# Patient Record
Sex: FEMALE | Race: WHITE | NOT HISPANIC OR LATINO | Employment: OTHER | URBAN - METROPOLITAN AREA
[De-identification: names, ages, dates, MRNs, and addresses within clinical notes are randomized per-mention and may not be internally consistent; named-entity substitution may affect disease eponyms.]

---

## 2017-04-17 ENCOUNTER — RADIANT APPOINTMENT (OUTPATIENT)
Dept: GENERAL RADIOLOGY | Facility: CLINIC | Age: 67
End: 2017-04-17
Attending: PHYSICIAN ASSISTANT
Payer: COMMERCIAL

## 2017-04-17 ENCOUNTER — OFFICE VISIT (OUTPATIENT)
Dept: FAMILY MEDICINE | Facility: CLINIC | Age: 67
End: 2017-04-17
Payer: COMMERCIAL

## 2017-04-17 VITALS
BODY MASS INDEX: 32.8 KG/M2 | TEMPERATURE: 101.1 F | HEART RATE: 117 BPM | WEIGHT: 209 LBS | HEIGHT: 67 IN | DIASTOLIC BLOOD PRESSURE: 78 MMHG | SYSTOLIC BLOOD PRESSURE: 132 MMHG | OXYGEN SATURATION: 97 %

## 2017-04-17 DIAGNOSIS — J01.90 ACUTE SINUSITIS WITH SYMPTOMS > 10 DAYS: ICD-10-CM

## 2017-04-17 DIAGNOSIS — R50.9 FEVER, UNSPECIFIED: ICD-10-CM

## 2017-04-17 DIAGNOSIS — Z82.49 FAMILY HISTORY OF ABDOMINAL AORTIC ANEURYSM: ICD-10-CM

## 2017-04-17 DIAGNOSIS — J20.9 ACUTE BRONCHITIS WITH SYMPTOMS GREATER THAN 10 DAYS: Primary | ICD-10-CM

## 2017-04-17 PROCEDURE — 99213 OFFICE O/P EST LOW 20 MIN: CPT | Performed by: PHYSICIAN ASSISTANT

## 2017-04-17 PROCEDURE — 71020 XR CHEST 2 VW: CPT

## 2017-04-17 RX ORDER — DOXYCYCLINE 100 MG/1
100 CAPSULE ORAL 2 TIMES DAILY
Qty: 20 CAPSULE | Refills: 0 | Status: SHIPPED | OUTPATIENT
Start: 2017-04-17 | End: 2017-05-15

## 2017-04-17 RX ORDER — ALBUTEROL SULFATE 90 UG/1
2 AEROSOL, METERED RESPIRATORY (INHALATION) EVERY 4 HOURS PRN
Qty: 1 INHALER | Refills: 1 | Status: SHIPPED | OUTPATIENT
Start: 2017-04-17 | End: 2018-01-25

## 2017-04-17 RX ORDER — CODEINE PHOSPHATE AND GUAIFENESIN 10; 100 MG/5ML; MG/5ML
1 SOLUTION ORAL EVERY 4 HOURS PRN
Qty: 240 ML | Refills: 0 | Status: SHIPPED | OUTPATIENT
Start: 2017-04-17 | End: 2017-05-15

## 2017-04-17 NOTE — PROGRESS NOTES
SUBJECTIVE:                                                    Tri Ingram is a 67 year old female who presents to clinic today for the following health issues:      ENT Symptoms             Symptoms: cc Present Absent Comment   Fever/Chills  x  Chills    Fatigue  x     Muscle Aches  x  Body aches    Eye Irritation  x  Pain and pressure    Sneezing  x     Nasal Deshawn/Drg  x  Runny nose, burning sensation    Sinus Pressure/Pain  x     Loss of smell  x     Dental pain  x  Pain and pressure    Sore Throat  x  Dry and drainage    Swollen Glands   x    Ear Pain/Fullness  x  Both, pressure and itching   Cough  x     Wheeze  x     Chest Pain   x    Shortness of breath  x     Rash   x    Other  x  Lost voice, loss of appetite, headaches, dizziness with coughing so hard      Symptom duration:  3 weeks    Symptom severity:  moderate    Treatments tried:  Nebulizer's, tylenol, ibuprofen, vitamin c    Contacts:  Her  had a cold      Started 3 weeks ago with cough/cold - her  was sick so she assumed she got what he had  Felt like she would have good days followed by bad days  Yesterday - says things took a turn for the worse  Cough worsened, she developed fevers and felt more pain and pressure in her cheeks/teeth and forehead    Has asthma - flares when she gets sick  Has been using her qvar two times daily and the albuterol prn but hasn't noticed much improvement        Problem list and histories reviewed & adjusted, as indicated.  Additional history: as documented    Current Outpatient Prescriptions   Medication Sig Dispense Refill     meclizine (ANTIVERT) 25 MG tablet Take 1 tablet (25 mg) by mouth every 6 hours as needed for dizziness 30 tablet 1     MAGNESIUM PO        lovastatin (MEVACOR) 40 MG tablet Take 1 tablet (40 mg) by mouth At Bedtime 90 tablet 3     Lovastatin 40 MG TB24 Take 1 tablet by mouth daily at bedtime. 90 tablet 3     estrogen conj-medroxyPROGESTERone (PREMPRO) 0.45-1.5 MG per  "tablet Take one three times weekly or as needed to control hot flashes/menopausal symptoms. 40 tablet 3     albuterol (PROAIR HFA, PROVENTIL HFA, VENTOLIN HFA) 108 (90 BASE) MCG/ACT inhaler Inhale 2 puffs into the lungs every 4 hours as needed for shortness of breath / dyspnea or wheezing 1 Inhaler 1     scopolamine (TRANSDERM) (1.5mg base/3day) patch Place 1 patch onto the skin every 72 hours.  Apply to hairless area behind one ear at least 4 hours before travel.  Remove old patch and change every 3 days. 2 patch 0     ibuprofen 200 MG capsule Take 1,000 mg by mouth as needed for fever (does not use every day.)       Multiple Vitamins-Minerals (ICAPS) CAPS Take 1 capsule by mouth daily       glucosamine-chondroitin 500-400 MG CAPS Take 3 capsules by mouth daily. Takes 2 in am and 1 in pm  0     acetaminophen (ARTHRITIS PAIN RELIEF) 650 MG CR tablet Take 2 tablets by mouth daily. Takes in pm 100 tablet 11     calcium carb 1250 mg, 500 mg [place] IRA,/vitamin D 200 units (OSCAL WITH D) 500-200 MG-UNIT per tablet Take 1 tablet by mouth daily.       ASPIRIN 81 MG OR TABS ONE DAILY 100 3     Allergies   Allergen Reactions     Adhesive Tape      Iodine Anaphylaxis     contrast dye     Penicillins Hives       Reviewed and updated as needed this visit by clinical staff       Reviewed and updated as needed this visit by Provider         ROS:  Remainder of ROS obtained and found to be negative other than that which was documented above      OBJECTIVE:                                                    /78  Pulse 117  Temp 101.1  F (38.4  C) (Tympanic)  Ht 5' 6.5\" (1.689 m)  Wt 209 lb (94.8 kg)  SpO2 97%  BMI 33.23 kg/m2  Body mass index is 33.23 kg/(m^2).  GENERAL: healthy, alert and no distress  EYES: Eyes grossly normal to inspection, PERRL and conjunctivae and sclerae normal  HENT: ear canals and TM's normal, nose and mouth without ulcers or lesions  NECK: no adenopathy  RESP: lungs with decreased breath sounds " throughout due to coughing fits  CV: regular rates and rhythm, normal S1 S2, no S3 or S4, no murmur, click or rub and no peripheral edema    Diagnostic Test Results:  CXR - no focal area of consolidation or infiltrate noted on exam pending final review by radiology     ASSESSMENT/PLAN:                                                    (J20.9) Acute bronchitis with symptoms greater than 10 days  (primary encounter diagnosis)  Comment:   Plan: albuterol (PROAIR HFA/PROVENTIL HFA/VENTOLIN         HFA) 108 (90 BASE) MCG/ACT Inhaler,         guaiFENesin-codeine (ROBITUSSIN AC) 100-10         MG/5ML SOLN solution            (J01.90) Acute sinusitis with symptoms > 10 days  Comment:   Plan: doxycycline (VIBRAMYCIN) 100 MG capsule            (R50.9) Fever, unspecified  Comment: given unable to get good listen to lungs due to coughing fits, cxr obtained   Plan: XR Chest 2 Views            (Z82.49) Family history of abdominal aortic aneurysm  Comment: brother with AAA. Possibly in dad as well but she isn't sure. Past history of smoking (quit 8 years ago) Recommended 1 time screening   Plan: US Abdomen Complete                  Charlene Cain PA-C  [facility]

## 2017-04-17 NOTE — NURSING NOTE
"Chief Complaint   Patient presents with     Sinus Problem       Initial BP (!) 161/100 (BP Location: Right arm, Patient Position: Chair, Cuff Size: Adult Large)  Pulse 117  Temp 101.1  F (38.4  C) (Tympanic)  Ht 5' 6.5\" (1.689 m)  Wt 209 lb (94.8 kg)  SpO2 97%  BMI 33.23 kg/m2 Estimated body mass index is 33.23 kg/(m^2) as calculated from the following:    Height as of this encounter: 5' 6.5\" (1.689 m).    Weight as of this encounter: 209 lb (94.8 kg).  Medication Reconciliation: complete     Linwood Obrien CMA    "

## 2017-04-17 NOTE — MR AVS SNAPSHOT
After Visit Summary   4/17/2017    Tri Ingram    MRN: [id number]           Patient Information     Date Of Birth          1950        Visit Information        Provider Department      4/17/2017 6:00 PM Charlene Cain PA-C [facility]        Today's Diagnoses     Fever, unspecified    -  1    Family history of abdominal aortic aneurysm        Acute sinusitis with symptoms > 10 days        Acute bronchitis with symptoms greater than 10 days        Persistent cough for 3 weeks or longer        Chest tightness           Follow-ups after your visit        Your next 10 appointments already scheduled     Apr 20, 2017  1:15 PM CDT   MA SCREENING DIGITAL BILATERAL with [id number]   [facility] ([facility])    [address] [place] [address], [address]  [facility] [address] [phone]           Do not use any powder, lotion or deodorant under your arms or on your breast. If you do, we will ask you to remove it before your exam.  Wear comfortable, two-piece clothing.  If you have any allergies, tell your care team.  Bring any previous mammograms from other facilities or have them mailed to the breast center.              Future tests that were ordered for you today     Open Future Orders        Priority Expected Expires Ordered    US Abdomen Complete Routine 7/16/2017 4/17/2018 4/17/2017    MA Screening Digital Bilateral Routine  4/17/2018 4/17/2017            Who to contact     Normal or non-critical lab and imaging results will be communicated to you by MyChart, letter or phone within 4 business days after the clinic has received the results. If you do not hear from us within 7 days, please contact the clinic through MyChart or phone. If you have a critical or abnormal lab result, we will notify you by phone as soon as possible.  Submit refill requests through [facility] or call your pharmacy and they will forward the refill request to us. Please allow 3 business  "days for your refill to be completed.          If you need to speak with a  for additional information , please call: [phone]             Additional Information About Your Visit        [facility] Information     [facility] lets you send messages to your doctor, view your test results, renew your prescriptions, schedule appointments and more. To sign up, go to [email or web address] . Click on \"Log in\" on the left side of the screen, which will take you to the Welcome page. Then click on \"Sign up Now\" on the right side of the page.     You will be asked to enter the access code listed below, as well as some personal information. Please follow the directions to create your username and password.     Your access code is: [email or web address]  Expires: 2017  6:14 PM     Your access code will  in 90 days. If you need help or a new code, please call your [facility] clinic or [phone].        Care EveryWhere ID     This is your Care EveryWhere ID. This could be used by other organizations to access your [facility] medical records  [id number]        Your Vitals Were     Pulse Temperature Height Pulse Oximetry BMI (Body Mass Index)       117 101.1  F (38.4  C) (Tympanic) 5' 6.5\" (1.689 m) 97% 33.23 kg/m2        Blood Pressure from Last 3 Encounters:   17 132/78   16 107/71   16 133/90    Weight from Last 3 Encounters:   17 209 lb (94.8 kg)   16 205 lb (93 kg)   16 209 lb 9.6 oz (95.1 kg)                 Today's Medication Changes          These changes are accurate as of: 17  6:15 PM.  If you have any questions, ask your nurse or doctor.               Start taking these medicines.        Dose/Directions    doxycycline 100 MG capsule   Commonly known as:  VIBRAMYCIN   Used for:  Acute sinusitis with symptoms > 10 days   Started by:  Charlene Cain PA-C        Dose:  100 mg   Take 1 capsule (100 mg) by mouth 2 times daily   Quantity:  20 capsule "   Refills:  0       guaiFENesin-codeine 100-10 MG/5ML Soln solution   Commonly known as:  ROBITUSSIN AC   Used for:  Persistent cough for 3 weeks or longer   Started by:  Charlene Cain PA-C        Dose:  1 tsp.   Take 5 mLs by mouth every 4 hours as needed for cough   Quantity:  240 mL   Refills:  0            Where to get your medicines      These medications were sent to [place], [place] - [address] [facility]  [address] [facility] [address]     Phone:  [phone]     doxycycline 100 MG capsule         These medications were sent to [facility] [address] [facility]  [address] [facility] [address]     Phone:  [phone]     albuterol 108 (90 BASE) MCG/ACT Inhaler         Some of these will need a paper prescription and others can be bought over the counter.  Ask your nurse if you have questions.     Bring a paper prescription for each of these medications     guaiFENesin-codeine 100-10 MG/5ML Soln solution                Primary Care Provider Office Phone # Fax #    Preethi Itzel Jesus [phone]       [facility] [address] [address] [facility] [address]        Thank you!     Thank you for choosing [facility]  for your care. Our goal is always to provide you with excellent care. Hearing back from our patients is one way we can continue to improve our services. Please take a few minutes to complete the written survey that you may receive in the mail after your visit with us. Thank you!             Your Updated Medication List - Protect others around you: Learn how to safely use, store and throw away your medicines at www.disposemymeds.org.          This list is accurate as of: 4/17/17  6:15 PM.  Always use your most recent med list.                   Brand Name Dispense Instructions for use    albuterol 108 (90 BASE) MCG/ACT Inhaler    PROAIR HFA/PROVENTIL HFA/VENTOLIN HFA    1 Inhaler     Inhale 2 puffs into the lungs every 4 hours as needed for shortness of breath / dyspnea or wheezing       ARTHRITIS PAIN RELIEF 650 MG CR tablet   Generic drug:  acetaminophen     100 tablet    Take 2 tablets by mouth daily. Takes in pm       aspirin 81 MG tablet     100    ONE DAILY       calcium carb 1250 mg (500 mg [place])/vitamin D 200 units 500-200 MG-UNIT per tablet    OSCAL with D     Take 1 tablet by mouth daily.       doxycycline 100 MG capsule    VIBRAMYCIN    20 capsule    Take 1 capsule (100 mg) by mouth 2 times daily       estrogen conj-medroxyPROGESTERone 0.45-1.5 MG per tablet    PREMPRO    40 tablet    Take one three times weekly or as needed to control hot flashes/menopausal symptoms.       glucosamine-chondroitin 500-400 MG Caps per capsule      Take 3 capsules by mouth daily. Takes 2 in am and 1 in pm       guaiFENesin-codeine 100-10 MG/5ML Soln solution    ROBITUSSIN AC    240 mL    Take 5 mLs by mouth every 4 hours as needed for cough       ibuprofen 200 MG capsule      Take 1,000 mg by mouth as needed for fever (does not use every day.)       ICAPS Caps      Take 1 capsule by mouth daily       * Lovastatin 40 MG Tb24     90 tablet    Take 1 tablet by mouth daily at bedtime.       * lovastatin 40 MG tablet    MEVACOR    90 tablet    Take 1 tablet (40 mg) by mouth At Bedtime       MAGNESIUM PO          meclizine 25 MG tablet    ANTIVERT    30 tablet    Take 1 tablet (25 mg) by mouth every 6 hours as needed for dizziness       scopolamine 72 hr patch    TRANSDERM    2 patch    Place 1 patch onto the skin every 72 hours.  Apply to hairless area behind one ear at least 4 hours before travel.  Remove old patch and change every 3 days.       * Notice:  This list has 2 medication(s) that are the same as other medications prescribed for you. Read the directions carefully, and ask your doctor or other care provider to review them with you.

## 2017-05-02 ENCOUNTER — RADIANT APPOINTMENT (OUTPATIENT)
Dept: MAMMOGRAPHY | Facility: CLINIC | Age: 67
End: 2017-05-02
Attending: FAMILY MEDICINE
Payer: COMMERCIAL

## 2017-05-02 DIAGNOSIS — Z12.31 VISIT FOR SCREENING MAMMOGRAM: ICD-10-CM

## 2017-05-02 PROCEDURE — G0202 SCR MAMMO BI INCL CAD: HCPCS

## 2017-05-15 ENCOUNTER — OFFICE VISIT (OUTPATIENT)
Dept: FAMILY MEDICINE | Facility: CLINIC | Age: 67
End: 2017-05-15
Payer: COMMERCIAL

## 2017-05-15 VITALS
HEART RATE: 62 BPM | HEIGHT: 66 IN | WEIGHT: 210 LBS | DIASTOLIC BLOOD PRESSURE: 75 MMHG | TEMPERATURE: 98.1 F | BODY MASS INDEX: 33.75 KG/M2 | SYSTOLIC BLOOD PRESSURE: 124 MMHG

## 2017-05-15 DIAGNOSIS — G47.33 OBSTRUCTIVE SLEEP APNEA SYNDROME: ICD-10-CM

## 2017-05-15 DIAGNOSIS — Z00.00 ENCOUNTER FOR ROUTINE ADULT HEALTH EXAMINATION WITHOUT ABNORMAL FINDINGS: Primary | ICD-10-CM

## 2017-05-15 DIAGNOSIS — Z23 NEED FOR PROPHYLACTIC VACCINATION AGAINST STREPTOCOCCUS PNEUMONIAE (PNEUMOCOCCUS): ICD-10-CM

## 2017-05-15 DIAGNOSIS — Z12.11 SCREEN FOR COLON CANCER: ICD-10-CM

## 2017-05-15 DIAGNOSIS — N95.1 SYMPTOMATIC MENOPAUSAL OR FEMALE CLIMACTERIC STATES: ICD-10-CM

## 2017-05-15 DIAGNOSIS — Z13.6 CARDIOVASCULAR SCREENING; LDL GOAL LESS THAN 160: ICD-10-CM

## 2017-05-15 DIAGNOSIS — Z78.0 ASYMPTOMATIC POSTMENOPAUSAL STATUS: ICD-10-CM

## 2017-05-15 DIAGNOSIS — Z11.59 NEED FOR HEPATITIS C SCREENING TEST: ICD-10-CM

## 2017-05-15 PROCEDURE — 90471 IMMUNIZATION ADMIN: CPT | Performed by: PHYSICIAN ASSISTANT

## 2017-05-15 PROCEDURE — 90670 PCV13 VACCINE IM: CPT | Performed by: PHYSICIAN ASSISTANT

## 2017-05-15 PROCEDURE — 99397 PER PM REEVAL EST PAT 65+ YR: CPT | Mod: 25 | Performed by: PHYSICIAN ASSISTANT

## 2017-05-15 NOTE — MR AVS SNAPSHOT
After Visit Summary   5/15/2017    Tri Ingram    MRN: [id number]           Patient Information     Date Of Birth          1950        Visit Information        Provider Department      5/15/2017 5:40 PM Charlene aCin PA-C [facility]        Today's Diagnoses     Obstructive sleep apnea syndrome    -  1    SYMPTOMATIC FEMALE CLIMACTERIC STATE        Screen for colon cancer        Asymptomatic postmenopausal status        Need for hepatitis C screening test        Need for prophylactic vaccination against Streptococcus pneumoniae (pneumococcus)        CARDIOVASCULAR SCREENING; LDL GOAL LESS THAN 160          Care Instructions      Preventive Health Recommendations  Female Ages 65 +    Yearly exam:     See your health care provider every year in order to  o Review health changes.   o Discuss preventive care.    o Review your medicines if your doctor has prescribed any.      You no longer need a yearly Pap test unless you've had an abnormal Pap test in the past 10 years. If you have vaginal symptoms, such as bleeding or discharge, be sure to talk with your provider about a Pap test.      Every 1 to 2 years, have a mammogram.  If you are over 69, talk with your health care provider about whether or not you want to continue having screening mammograms.      Every 10 years, have a colonoscopy. Or, have a yearly FIT test (stool test). These exams will check for colon cancer.       Have a cholesterol test every 5 years, or more often if your doctor advises it.       Have a diabetes test (fasting glucose) every three years. If you are at risk for diabetes, you should have this test more often.       At age 65, have a bone density scan (DEXA) to check for osteoporosis (brittle bone disease).    Shots:    Get a flu shot each year.    Get a tetanus shot every 10 years.    Talk to your doctor about your pneumonia vaccines. There are now two you should receive - Pneumovax (PPSV  23) and Prevnar (PCV 13).    Talk to your doctor about the shingles vaccine.    Talk to your doctor about the hepatitis B vaccine.    Nutrition:     Eat at least 5 servings of fruits and vegetables each day.      Eat whole-grain bread, whole-wheat pasta and brown rice instead of white grains and rice.      Talk to your provider about Calcium and Vitamin D.     Lifestyle    Exercise at least 150 minutes a week (30 minutes a day, 5 days a week). This will help you control your weight and prevent disease.      Limit alcohol to one drink per day.      No smoking.       Wear sunscreen to prevent skin cancer.       See your dentist twice a year for an exam and cleaning.      See your eye doctor every 1 to 2 years to screen for conditions such as glaucoma, macular degeneration, cataracts, etc         Follow-ups after your visit        Additional Services     GASTROENTEROLOGY ADULT REF PROCEDURE ONLY       Last Lab Result: Creatinine (mg/dL)       Date                     Value                 11/28/2016               0.93             ----------  Body mass index is 33.89 kg/(m^2).     Needed:  No  Language:  English    Patient will be contacted to schedule procedure.     Please be aware that coverage of these services is subject to the terms and limitations of your health insurance plan.  Call member services at your health plan with any benefit or coverage questions.  Any procedures must be performed at a [facility] facility OR coordinated by your clinic's referral office.    Please bring the following with you to your appointment:    (1) Any X-Rays, CTs or MRIs which have been performed.  Contact the facility where they were done to arrange for  prior to your scheduled appointment.    (2) List of current medications   (3) This referral request   (4) Any documents/labs given to you for this referral            SLEEP EVALUATION & MANAGEMENT REFERRAL - ADULT       Please be aware that coverage of these  "services is subject to the terms and limitations of your health insurance plan.  Call member services at your health plan with any benefit or coverage questions.      Please bring the following to your appointment:    >>   List of current medications   >>   This referral request   >>   Any documents/labs given to you for this referral    [facility]   [phone] (Age 15 and up)                  Future tests that were ordered for you today     Open Future Orders        Priority Expected Expires Ordered    DEXA HIP/PELVIS/SPINE - Future Routine  5/15/2018 5/15/2017    Hepatitis C Screen Reflex to HCV RNA Quant and Genotype Routine  5/15/2018 5/15/2017    SLEEP EVALUATION & MANAGEMENT REFERRAL - ADULT Routine  5/15/2018 5/15/2017    Lipid Profile with reflex to direct LDL Routine  5/15/2018 5/15/2017    Glucose Routine  5/15/2018 5/15/2017            Who to contact     Normal or non-critical lab and imaging results will be communicated to you by [employer], letter or phone within 4 business days after the clinic has received the results. If you do not hear from us within 7 days, please contact the clinic through [employer] or phone. If you have a critical or abnormal lab result, we will notify you by phone as soon as possible.  Submit refill requests through [employer] or call your pharmacy and they will forward the refill request to us. Please allow 3 business days for your refill to be completed.          If you need to speak with a  for additional information , please call: [phone]             Additional Information About Your Visit        [facility] Information     [employer] lets you send messages to your doctor, view your test results, renew your prescriptions, schedule appointments and more. To sign up, go to [email or web address] . Click on \"Log in\" on the left side of the screen, which will take you to the Welcome page. Then click on \"Sign up Now\" on the right side of " "the page.     You will be asked to enter the access code listed below, as well as some personal information. Please follow the directions to create your username and password.     Your access code is: [email or web address]  Expires: 2017  6:14 PM     Your access code will  in 90 days. If you need help or a new code, please call your [facility] clinic or [phone].        Care EveryWhere ID     This is your Care EveryWhere ID. This could be used by other organizations to access your [facility] medical records  [id number]        Your Vitals Were     Pulse Temperature Height BMI (Body Mass Index)          62 98.1  F (36.7  C) (Tympanic) 5' 6\" (1.676 m) 33.89 kg/m2         Blood Pressure from Last 3 Encounters:   05/15/17 124/75   17 132/78   16 107/71    Weight from Last 3 Encounters:   05/15/17 210 lb (95.3 kg)   17 209 lb (94.8 kg)   16 205 lb (93 kg)              We Performed the Following     GASTROENTEROLOGY ADULT REF PROCEDURE ONLY          Where to get your medicines      These medications were sent to [facility] [address]  [address] [facility] [address]     Phone:  [phone]     estrogen conj-medroxyPROGESTERone 0.45-1.5 MG per tablet          Primary Care Provider Office Phone # Fax #    Preethi Itzel Jesus [phone]       [facility] [address] [place] [address]        Thank you!     Thank you for choosing [facility]  for your care. Our goal is always to provide you with excellent care. Hearing back from our patients is one way we can continue to improve our services. Please take a few minutes to complete the written survey that you may receive in the mail after your visit with us. Thank you!             Your Updated Medication List - Protect others around you: Learn how to safely use, store and throw away your medicines at www.disposemymeds.org.          This list is " accurate as of: 5/15/17  6:42 PM.  Always use your most recent med list.                   Brand Name Dispense Instructions for use    albuterol 108 (90 BASE) MCG/ACT Inhaler    PROAIR HFA/PROVENTIL HFA/VENTOLIN HFA    1 Inhaler    Inhale 2 puffs into the lungs every 4 hours as needed for shortness of breath / dyspnea or wheezing       ARTHRITIS PAIN RELIEF 650 MG CR tablet   Generic drug:  acetaminophen     100 tablet    Take 2 tablets by mouth daily. Takes in pm       aspirin 81 MG tablet     100    ONE DAILY       calcium carb 1250 mg (500 mg [place])/vitamin D 200 units 500-200 MG-UNIT per tablet    OSCAL with D     Take 1 tablet by mouth daily.       estrogen conj-medroxyPROGESTERone 0.45-1.5 MG per tablet    PREMPRO    40 tablet    Take one three times weekly or as needed to control hot flashes/menopausal symptoms.       glucosamine-chondroitin 500-400 MG Caps per capsule      Take 3 capsules by mouth daily. Takes 2 in am and 1 in pm       ibuprofen 200 MG capsule      Take 1,000 mg by mouth as needed for fever (does not use every day.)       ICAPS Caps      Take 1 capsule by mouth daily       * Lovastatin 40 MG Tb24     90 tablet    Take 1 tablet by mouth daily at bedtime.       * lovastatin 40 MG tablet    MEVACOR    90 tablet    Take 1 tablet (40 mg) by mouth At Bedtime       MAGNESIUM PO          scopolamine 72 hr patch    TRANSDERM    2 patch    Place 1 patch onto the skin every 72 hours.  Apply to hairless area behind one ear at least 4 hours before travel.  Remove old patch and change every 3 days.       * Notice:  This list has 2 medication(s) that are the same as other medications prescribed for you. Read the directions carefully, and ask your doctor or other care provider to review them with you.

## 2017-05-15 NOTE — PROGRESS NOTES
SUBJECTIVE:     CC: Tri Ingram is an 67 year old woman who presents for preventive health visit.     Healthy Habits:    Do you get at least three servings of calcium containing foods daily (dairy, green leafy vegetables, etc.)? yes    Amount of exercise or daily activities, outside of work: Not much     Problems taking medications regularly No    Medication side effects: No    Have you had an eye exam in the past two years? yes    Do you see a dentist twice per year? no    Do you have sleep apnea, excessive snoring or daytime drowsiness?yes, she knows she should have the sleep study done, she snores very loud per her . She states when she took doxycycline it made her not snore.            Today's PHQ-2 Score:   PHQ-2 ( 1999 [employer]) 5/15/2017 4/18/2016   Q1: Little interest or pleasure in doing things 0 0   Q2: Feeling down, depressed or hopeless 0 0   PHQ-2 Score 0 0       Abuse: Current or Past(Physical, Sexual or Emotional)- No  Do you feel safe in your environment - Yes    Social History   Substance Use Topics     Smoking status: Former Smoker     Packs/day: 0.50     Years: 37.00     Types: Cigarettes     Quit date: 10/20/2007     Smokeless tobacco: Not on file     Alcohol use Yes      Comment: minimal     The patient does not drink >3 drinks per day nor >7 drinks per week.    Recent Labs   Lab Test  04/14/16   0847  03/17/15   0922   03/21/14   0800   CHOL  154  150   --   185   HDL  46*  44*   --   39*   LDL  75  64   --   Cannot estimate LDL when triglyceride exceeds 400 mg/dL  110   TRIG  167*  212*   < >  413*   CHOLHDLRATIO   --   3.4   --   5.0   NHDL  108   --    --    --     < > = values in this interval not displayed.       Reviewed orders with patient.  Reviewed health maintenance and updated orders accordingly - Yes    Mammo Decision Support:  Patient over age 50, mutual decision to screen reflected in health maintenance.    Pertinent mammograms are reviewed under the imaging  "tab.  History of abnormal Pap smear: NO - age 67 - see link Cervical Cytology Screening Guidelines  Normal Pap and HPV in 2015, no need for future paps. Screening concluded.    Reviewed and updated as needed this visit by clinical staff  Tobacco  Allergies  Meds  Med Hx  Surg Hx  Fam Hx  Soc Hx        Reviewed and updated as needed this visit by Provider            ROS:  C: NEGATIVE for fever, chills, change in weight  I: NEGATIVE for worrisome rashes, moles or lesions  E: NEGATIVE for vision changes or irritation  ENT: NEGATIVE for ear, mouth and throat problems  R: NEGATIVE for significant cough or SOB  B: NEGATIVE for masses, tenderness or discharge  CV: NEGATIVE for chest pain, palpitations or peripheral edema  GI: NEGATIVE for nausea, abdominal pain, heartburn, or change in bowel habits  : NEGATIVE for unusual urinary or vaginal symptoms. No vaginal bleeding.  M: NEGATIVE for significant arthralgias or myalgia  N: NEGATIVE for weakness, dizziness or paresthesias  P: NEGATIVE for changes in mood or affect     Problem list, Medication list, Allergies, and Medical/Social/Surgical histories reviewed in EPIC and updated as appropriate.  BP Readings from Last 3 Encounters:   05/15/17 124/75   04/17/17 132/78   11/30/16 107/71    Wt Readings from Last 3 Encounters:   05/15/17 210 lb (95.3 kg)   04/17/17 209 lb (94.8 kg)   11/30/16 205 lb (93 kg)                  OBJECTIVE:     /75 (BP Location: Right arm, Patient Position: Chair, Cuff Size: Adult Large)  Pulse 62  Temp 98.1  F (36.7  C) (Tympanic)  Ht 5' 6\" (1.676 m)  Wt 210 lb (95.3 kg)  BMI 33.89 kg/m2  EXAM:  GENERAL: healthy, alert and no distress  EYES: Eyes grossly normal to inspection, PERRL and conjunctivae and sclerae normal  HENT: ear canals and TM's normal, nose and mouth without ulcers or lesions  NECK: no adenopathy, no asymmetry, masses, or scars and thyroid normal to palpation  RESP: lungs clear to auscultation - no rales, rhonchi or " wheezes  BREAST: deferred per patient as she just had her mammogram  CV: regular rate and rhythm, normal S1 S2, no S3 or S4, no murmur, click or rub, no peripheral edema and peripheral pulses strong  ABDOMEN: soft, nontender, no hepatosplenomegaly, no masses and bowel sounds normal  MS: no gross musculoskeletal defects noted, no edema  SKIN: no suspicious lesions or rashes  NEURO: Normal strength and tone, mentation intact and speech normal  PSYCH: mentation appears normal, affect normal/bright    ASSESSMENT/PLAN:         ICD-10-CM    1. Encounter for routine adult health examination without abnormal findings Z00.00    2. SYMPTOMATIC FEMALE CLIMACTERIC STATE N95.1 estrogen conj-medroxyPROGESTERone (PREMPRO) 0.45-1.5 MG per tablet   3. Screen for colon cancer Z12.11 GASTROENTEROLOGY ADULT REF PROCEDURE ONLY   4. Asymptomatic postmenopausal status Z78.0 DEXA HIP/PELVIS/SPINE - Future   5. Need for hepatitis C screening test Z11.59 Hepatitis C Screen Reflex to HCV RNA Quant and Genotype   6. Need for prophylactic vaccination against Streptococcus pneumoniae (pneumococcus) Z23 ADMIN 1st VACCINE     PNEUMOCOCCAL CONJ VACCINE 13 VALENT IM   7. Obstructive sleep apnea syndrome G47.33 SLEEP EVALUATION & MANAGEMENT REFERRAL - ADULT   8. CARDIOVASCULAR SCREENING; LDL GOAL LESS THAN 160 Z13.6 Lipid Profile with reflex to direct LDL     Glucose     Discussed ongoing hormones for menopause  Patient notes that a few years ago when she attempted to go off she had significant change in her mood and energy  Went back on and symptoms improved  She is really unwilling to try to taper and understands the risks    COUNSELING:   Reviewed preventive health counseling, as reflected in patient instructions       Regular exercise       Healthy diet/nutrition         reports that she quit smoking about 9 years ago. Her smoking use included Cigarettes. She has a 18.50 pack-year smoking history. She does not have any smokeless tobacco history  "on file.    Estimated body mass index is 33.89 kg/(m^2) as calculated from the following:    Height as of this encounter: 5' 6\" (1.676 m).    Weight as of this encounter: 210 lb (95.3 kg).       Counseling Resources:  ATP IV Guidelines  Pooled Cohorts Equation Calculator  Breast Cancer Risk Calculator  FRAX Risk Assessment  ICSI Preventive Guidelines  Dietary Guidelines for Americans, 2010  USDA's MyPlate  ASA Prophylaxis  Lung CA Screening    Charlene Cain PA-C  [facility]  "

## 2017-05-15 NOTE — NURSING NOTE
Screening Questionnaire for Adult Immunization    Are you sick today?   No   Do you have allergies to medications, food, a vaccine component or latex?   No   Have you ever had a serious reaction after receiving a vaccination?   No   Do you have a long-term health problem with heart disease, lung disease, asthma, kidney disease, metabolic disease (e.g. diabetes), anemia, or other blood disorder?   No   Do you have cancer, leukemia, HIV/AIDS, or any other immune system problem?   No   In the past 3 months, have you taken medications that affect  your immune system, such as prednisone, other steroids, or anticancer drugs; drugs for the treatment of rheumatoid arthritis, Crohn s disease, or psoriasis; or have you had radiation treatments?   No   Have you had a seizure, or a brain or other nervous system problem?   No   During the past year, have you received a transfusion of blood or blood     products, or been given immune (gamma) globulin or antiviral drug?   No   For women: Are you pregnant or is there a chance you could become        pregnant during the next month?   No   Have you received any vaccinations in the past 4 weeks?   No     Immunization questionnaire answers were all negative.      MNVFC doesn't apply on this patient    Per orders of Charlene Cain PA-C, injection of Prevnar 13 given by Linwood Obrien. Patient instructed to remain in clinic for 20 minutes afterwards, and to report any adverse reaction to me immediately.       Screening performed by Linwood Obrien on 5/15/2017 at 6:50 PM.

## 2017-05-15 NOTE — NURSING NOTE
"Chief Complaint   Patient presents with     Physical       Initial /75 (BP Location: Right arm, Patient Position: Chair, Cuff Size: Adult Large)  Pulse 62  Temp 98.1  F (36.7  C) (Tympanic)  Ht 5' 6\" (1.676 m)  Wt 210 lb (95.3 kg)  BMI 33.89 kg/m2 Estimated body mass index is 33.89 kg/(m^2) as calculated from the following:    Height as of this encounter: 5' 6\" (1.676 m).    Weight as of this encounter: 210 lb (95.3 kg).  Medication Reconciliation: complete     Linwood Obrien CMA    "

## 2017-05-16 ENCOUNTER — TELEPHONE (OUTPATIENT)
Dept: FAMILY MEDICINE | Facility: CLINIC | Age: 67
End: 2017-05-16

## 2017-05-16 DIAGNOSIS — Z82.49 FAMILY HISTORY OF ABDOMINAL AORTIC ANEURYSM: Primary | ICD-10-CM

## 2017-05-16 NOTE — TELEPHONE ENCOUNTER
Yes - I only want the US given her family history of abdominal aortic aneurysm in her dad and brother. I just want her screened. I changed the order to what was advised.   Charlene

## 2017-05-16 NOTE — TELEPHONE ENCOUNTER
Kaitlyn from  of  Radiology calling with questions about US order of 4/17/17.    If the only reason she is having the US is Hx of abdominal aortic aneurysm, then the IMG code should be changed to [id number].  Could you please review and change in chart if appropriate.  States that you might need to change whole order, she wasn't sure.  Thank you..Maureen Valdivia

## 2017-05-17 DIAGNOSIS — Z13.6 CARDIOVASCULAR SCREENING; LDL GOAL LESS THAN 160: ICD-10-CM

## 2017-05-17 DIAGNOSIS — Z11.59 NEED FOR HEPATITIS C SCREENING TEST: ICD-10-CM

## 2017-05-17 LAB
CHOLEST SERPL-MCNC: 168 MG/DL
GLUCOSE SERPL-MCNC: 108 MG/DL (ref 70–99)
HCV AB SERPL QL IA: NORMAL
HDLC SERPL-MCNC: 47 MG/DL
LDLC SERPL CALC-MCNC: 86 MG/DL
NONHDLC SERPL-MCNC: 121 MG/DL
TRIGL SERPL-MCNC: 176 MG/DL

## 2017-05-17 PROCEDURE — 82947 ASSAY GLUCOSE BLOOD QUANT: CPT | Performed by: PHYSICIAN ASSISTANT

## 2017-05-17 PROCEDURE — 86803 HEPATITIS C AB TEST: CPT | Performed by: PHYSICIAN ASSISTANT

## 2017-05-17 PROCEDURE — 36415 COLL VENOUS BLD VENIPUNCTURE: CPT | Performed by: PHYSICIAN ASSISTANT

## 2017-05-17 PROCEDURE — 80061 LIPID PANEL: CPT | Performed by: PHYSICIAN ASSISTANT

## 2017-05-22 ENCOUNTER — TELEPHONE (OUTPATIENT)
Dept: FAMILY MEDICINE | Facility: CLINIC | Age: 67
End: 2017-05-22

## 2017-05-22 NOTE — TELEPHONE ENCOUNTER
Tri was notified of her US results and she would like a letter with those results and if any follow up is needed in the future?  Does she keep an eye on this for a year and have it rechecked?   She would like it written in a letter.  Please review and advise. Thank you..Maureen Valdivia

## 2017-05-23 ENCOUNTER — RADIANT APPOINTMENT (OUTPATIENT)
Dept: BONE DENSITY | Facility: CLINIC | Age: 67
End: 2017-05-23
Attending: PHYSICIAN ASSISTANT
Payer: COMMERCIAL

## 2017-05-23 DIAGNOSIS — Z78.0 ASYMPTOMATIC POSTMENOPAUSAL STATUS: ICD-10-CM

## 2017-05-23 PROCEDURE — 77080 DXA BONE DENSITY AXIAL: CPT

## 2017-11-02 ENCOUNTER — ANESTHESIA EVENT (OUTPATIENT)
Dept: GASTROENTEROLOGY | Facility: CLINIC | Age: 67
End: 2017-11-02
Payer: COMMERCIAL

## 2017-11-02 ASSESSMENT — LIFESTYLE VARIABLES: TOBACCO_USE: 1

## 2017-11-02 NOTE — ANESTHESIA PREPROCEDURE EVALUATION
Anesthesia Evaluation     . Pt has had prior anesthetic. Type: General    No history of anesthetic complications          ROS/MED HX    ENT/Pulmonary:     (+)tobacco use, Past use , . .    Neurologic:  - neg neurologic ROS     Cardiovascular:     (+) Dyslipidemia, ----. : . . . :. .       METS/Exercise Tolerance:  4 - Raking leaves, gardening   Hematologic:  - neg hematologic  ROS       Musculoskeletal:   (+) arthritis, , , other musculoskeletal-       GI/Hepatic: Comment: IBS        Renal/Genitourinary:  - ROS Renal section negative       Endo:     (+) Obesity, .      Psychiatric:  - neg psychiatric ROS       Infectious Disease:  - neg infectious disease ROS       Malignancy:      - no malignancy   Other:    - neg other ROS                 Physical Exam  Normal systems: cardiovascular, pulmonary and dental    Airway   Mallampati: III  TM distance: >3 FB  Neck ROM: full    Dental     Cardiovascular       Pulmonary                     Anesthesia Plan      History & Physical Review      ASA Status:  2 .    NPO Status:  > 4 hours    Plan for MAC Reason for MAC:  Deep or markedly invasive procedure (G8)         Postoperative Care      Consents  Anesthetic plan, risks, benefits and alternatives discussed with:  Patient..                          .

## 2017-11-03 ENCOUNTER — SURGERY (OUTPATIENT)
Age: 67
End: 2017-11-03

## 2017-11-03 ENCOUNTER — ANESTHESIA (OUTPATIENT)
Dept: GASTROENTEROLOGY | Facility: CLINIC | Age: 67
End: 2017-11-03
Payer: COMMERCIAL

## 2017-11-03 ENCOUNTER — HOSPITAL ENCOUNTER (OUTPATIENT)
Facility: CLINIC | Age: 67
Discharge: HOME | End: 2017-11-03
Attending: SURGERY | Admitting: SURGERY
Payer: COMMERCIAL

## 2017-11-03 VITALS
TEMPERATURE: 98.1 F | OXYGEN SATURATION: 100 % | DIASTOLIC BLOOD PRESSURE: 73 MMHG | RESPIRATION RATE: 16 BRPM | HEART RATE: 86 BPM | HEIGHT: 67 IN | WEIGHT: 202 LBS | BODY MASS INDEX: 31.71 KG/M2 | SYSTOLIC BLOOD PRESSURE: 131 MMHG

## 2017-11-03 LAB — COLONOSCOPY: NORMAL

## 2017-11-03 PROCEDURE — 25000125 ZZHC RX 250: Performed by: NURSE ANESTHETIST, CERTIFIED REGISTERED

## 2017-11-03 PROCEDURE — 45378 DIAGNOSTIC COLONOSCOPY: CPT | Performed by: SURGERY

## 2017-11-03 PROCEDURE — 25000125 ZZHC RX 250: Performed by: SURGERY

## 2017-11-03 PROCEDURE — G0121 COLON CA SCRN NOT HI RSK IND: HCPCS | Performed by: SURGERY

## 2017-11-03 PROCEDURE — 25000128 H RX IP 250 OP 636: Performed by: SURGERY

## 2017-11-03 PROCEDURE — 37000008 ZZH ANESTHESIA TECHNICAL FEE, 1ST 30 MIN: Performed by: SURGERY

## 2017-11-03 PROCEDURE — 25000128 H RX IP 250 OP 636: Performed by: NURSE ANESTHETIST, CERTIFIED REGISTERED

## 2017-11-03 RX ORDER — PROPOFOL 10 MG/ML
INJECTION, EMULSION INTRAVENOUS CONTINUOUS PRN
Status: DISCONTINUED | OUTPATIENT
Start: 2017-11-03 | End: 2017-11-03

## 2017-11-03 RX ORDER — SODIUM CHLORIDE, SODIUM LACTATE, POTASSIUM CHLORIDE, CALCIUM CHLORIDE 600; 310; 30; 20 MG/100ML; MG/100ML; MG/100ML; MG/100ML
INJECTION, SOLUTION INTRAVENOUS CONTINUOUS
Status: DISCONTINUED | OUTPATIENT
Start: 2017-11-03 | End: 2017-11-03 | Stop reason: HOSPADM

## 2017-11-03 RX ORDER — ONDANSETRON 2 MG/ML
4 INJECTION INTRAMUSCULAR; INTRAVENOUS
Status: DISCONTINUED | OUTPATIENT
Start: 2017-11-03 | End: 2017-11-03 | Stop reason: HOSPADM

## 2017-11-03 RX ORDER — PROPOFOL 10 MG/ML
INJECTION, EMULSION INTRAVENOUS PRN
Status: DISCONTINUED | OUTPATIENT
Start: 2017-11-03 | End: 2017-11-03

## 2017-11-03 RX ORDER — LIDOCAINE HYDROCHLORIDE 10 MG/ML
INJECTION, SOLUTION EPIDURAL; INFILTRATION; INTRACAUDAL; PERINEURAL PRN
Status: DISCONTINUED | OUTPATIENT
Start: 2017-11-03 | End: 2017-11-03

## 2017-11-03 RX ORDER — GLYCOPYRROLATE 0.2 MG/ML
INJECTION, SOLUTION INTRAMUSCULAR; INTRAVENOUS PRN
Status: DISCONTINUED | OUTPATIENT
Start: 2017-11-03 | End: 2017-11-03

## 2017-11-03 RX ORDER — LIDOCAINE 40 MG/G
CREAM TOPICAL
Status: DISCONTINUED | OUTPATIENT
Start: 2017-11-03 | End: 2017-11-03 | Stop reason: HOSPADM

## 2017-11-03 RX ADMIN — LIDOCAINE HYDROCHLORIDE 1 ML: 10 INJECTION, SOLUTION EPIDURAL; INFILTRATION; INTRACAUDAL; PERINEURAL at 09:07

## 2017-11-03 RX ADMIN — SODIUM CHLORIDE, POTASSIUM CHLORIDE, SODIUM LACTATE AND CALCIUM CHLORIDE: 600; 310; 30; 20 INJECTION, SOLUTION INTRAVENOUS at 09:06

## 2017-11-03 RX ADMIN — PROPOFOL 100 MG: 10 INJECTION, EMULSION INTRAVENOUS at 09:45

## 2017-11-03 RX ADMIN — GLYCOPYRROLATE 0.2 MG: 0.2 INJECTION, SOLUTION INTRAMUSCULAR; INTRAVENOUS at 09:44

## 2017-11-03 RX ADMIN — PROPOFOL 200 MCG/KG/MIN: 10 INJECTION, EMULSION INTRAVENOUS at 09:45

## 2017-11-03 RX ADMIN — LIDOCAINE HYDROCHLORIDE 50 MG: 10 INJECTION, SOLUTION EPIDURAL; INFILTRATION; INTRACAUDAL; PERINEURAL at 09:44

## 2017-11-03 NOTE — ANESTHESIA POSTPROCEDURE EVALUATION
Patient: Tri Ingram    Procedure(s):  Colonoscopy   - Wound Class: II-Clean Contaminated    Diagnosis:screening  Diagnosis Additional Information: No value filed.    Anesthesia Type:  No value filed.    Note:  Anesthesia Post Evaluation    Patient location during evaluation: Bedside  Patient participation: Able to fully participate in evaluation  Level of consciousness: awake and alert  Pain management: adequate  Airway patency: patent  Cardiovascular status: acceptable  Respiratory status: acceptable  Hydration status: acceptable  PONV: none     Anesthetic complications: None          Last vitals:  Vitals:    11/03/17 0853   BP: (!) 140/94   Pulse: 86   Resp: 18   Temp: 36.7  C (98.1  F)   SpO2: 96%         Electronically Signed By: HEBERT Mederos CRNA  November 3, 2017  10:01 AM

## 2017-11-03 NOTE — BRIEF OP NOTE
[facility]   Brief Operative Note    Pre-operative diagnosis: screening   Post-operative diagnosis diverticulosis, no polyps seen   Procedure: Procedure(s):  Colonoscopy   - Wound Class: II-Clean Contaminated   Surgeon(s): Surgeon(s) and Role:     * Lg Guerrero MD - Primary   Estimated blood loss: * No values recorded between 11/3/2017 12:00 AM and 11/3/2017  9:58 AM *    Specimens: * No specimens in log *   Findings: 1.  Mild sigmoid diverticulosis  2.  Colon otherwise normal

## 2017-11-03 NOTE — ANESTHESIA CARE TRANSFER NOTE
Patient: Tri Ingram    Procedure(s):  Colonoscopy   - Wound Class: II-Clean Contaminated    Diagnosis: screening  Diagnosis Additional Information: No value filed.    Anesthesia Type:   No value filed.     Note:  Airway :Room Air  Patient transferred to:Phase II  Handoff Report: Identifed the Patient, Identified the Reponsible Provider, Reviewed the pertinent medical history, Discussed the surgical course, Reviewed Intra-OP anesthesia mangement and issues during anesthesia, Set expectations for post-procedure period and Allowed opportunity for questions and acknowledgement of understanding      Vitals: (Last set prior to Anesthesia Care Transfer)    CRNA VITALS  11/3/2017 0931 - 11/3/2017 1001      11/3/2017             Pulse: 71    SpO2: 97 %    EKG: NSR                Electronically Signed By: HEBERT Mederos CRNA  November 3, 2017  10:01 AM

## 2017-11-03 NOTE — H&P
67 year old year old female here for colonoscopy for screening.    Patient Active Problem List   Diagnosis     Other malignant neoplasm of skin of trunk, except scrotum     Generalized osteoarthrosis, unspecified site     Disorder of bone and cartilage     Symptomatic menopausal or female climacteric states     Plantar fasciitis     Hyperlipidemia LDL goal <130     Obesity     Advanced directives, counseling/discussion     Family history of abdominal aortic aneurysm     Former moderate cigarette smoker (10-19 per day)       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       Allergies   Allergen Reactions     Adhesive Tape      Iodine Anaphylaxis     contrast dye     Penicillins Hives       Pt reports that she quit smoking about 10 years ago. Her smoking use included Cigarettes. She has a 18.50 pack-year smoking history. She does not have any smokeless tobacco history on file. She reports that she drinks alcohol. She reports that she does not use illicit drugs.    Exam:  There were no vitals taken for this visit.    Awake, Alert OX3  Lungs - CTA bilaterally  CV - RRR, no murmurs, distal pulses intact  Abd - soft, non-distended, non-tender, +BS  Extr - No cyanosis or edema    A/P 67 year old year old female in need of colonoscopy for screening. Risks, benefits, alternatives, and complications were discussed including the possibility of perforation and the patient  agreed to proceed    Lg Guerrero MD

## 2017-11-18 DIAGNOSIS — E78.5 HYPERLIPIDEMIA LDL GOAL <130: ICD-10-CM

## 2017-11-21 RX ORDER — LOVASTATIN 40 MG
TABLET ORAL
Qty: 90 TABLET | Refills: 1 | Status: SHIPPED | OUTPATIENT
Start: 2017-11-21 | End: 2018-07-25

## 2017-11-22 ENCOUNTER — OFFICE VISIT (OUTPATIENT)
Dept: DERMATOLOGY | Facility: CLINIC | Age: 67
End: 2017-11-22

## 2017-11-22 DIAGNOSIS — Z29.9 PROPHYLACTIC MEASURE: ICD-10-CM

## 2017-11-22 DIAGNOSIS — D48.5 NEOPLASM OF UNCERTAIN BEHAVIOR OF SKIN: Primary | ICD-10-CM

## 2017-11-22 RX ORDER — CLINDAMYCIN HCL 300 MG
600 CAPSULE ORAL ONCE
Qty: 2 CAPSULE | Refills: 0 | Status: SHIPPED | OUTPATIENT
Start: 2017-11-22 | End: 2017-11-22

## 2017-11-22 ASSESSMENT — PAIN SCALES - GENERAL
PAINLEVEL: NO PAIN (0)
PAINLEVEL: NO PAIN (0)

## 2017-11-22 NOTE — PATIENT INSTRUCTIONS

## 2017-11-22 NOTE — LETTER
"Patient:  Funmi De La Cruz  :   1950  MRN:     [id number]      2017    Funmi De La Cruz  [address] [facility] [address]      Dear Funmi De La Cruz,    We are writing to inform you of your test results that show a harmless keratosis.       Results for orders placed or performed in visit on 17   Surgical pathology exam   Result Value Ref Range    Copath Report       Patient Name: FUNMI DE LA CRUZ  MR#: [id number]  Specimen #: [id number]  Collected: 2017  Received: 2017  Reported: 2017 15:13  Ordering Phy(s): GUIDO CABRAL    For improved result formatting, select 'View Enhanced Report Format'  under Linked Documents section.    SPECIMEN(S):  Skin, left groin    FINAL DIAGNOSIS:  Skin, groin, left:  - Seborrheic keratosis, inflamed - (see description)    I have personally reviewed all specimens and/or slides, including the  listed special stains, and used them with my medical judgement to  determine or confirm the final diagnosis.    Electronically signed out by:    Cosmo Glass M.D., [facility]    CLINICAL HISTORY:  The patient is a 67-year-old female.    GROSS:  The specimen is received in formalin with proper patient identification,  labeled \"left groin\" and the specimen consists of a single, irregular  skin shave measuring 0.7 x 0.5 cm.  The skin surface displays a 1.0 x  0.7 x 0.2 cm brown lesion.  The resection winifred in is inked black.  It is  serially sectioned and entirely submitted in cassette 1. (Dictated by:  Amanda BURRIS [facility] 2017 04:00 PM)    MICROSCOPIC:  The specimen shows hyperkeratosis with basaloid epidermal hyperplasia  and horn cyst formation typical of a seborrheic keratosis.  A  mononuclear inflammatory cell infiltrate is seen in the dermis.  This  infiltrate impinges on the base of the lesion with exocytosis and  spongiosis.    CPT Codes:  A: 73828-ZA2.T, 86333-IQ2.P    TESTING LAB LOCATION:  [facility]" [facility], [facility] [address] [place]   [address] [phone]    COLLECTION SITE:  Client: [facility]  Location: [facility] (B)           Thank you for taking the time to be seen in our dermatology clinic. If you have further questions or concerns, please contact the clinic(see phone number listed below).      Sincerely,    Lacy Ch MD    Department of Dermatology  [facility]: Phone: [phone], Fax:[phone]  [facility]: Phone: [phone], Fax: [phone]

## 2017-11-22 NOTE — LETTER
11/22/2017       RE: Tri Ingram  [address] [facility] [address]     Dear Colleague,    Thank you for referring your patient, Tri Ingram, to the [facility] DERMATOLOGY at [facility]. Please see a copy of my visit note below.    [facility] Dermatology Note      Dermatology Problem List:  1.Hx of NMSC  -Seferino, 1998, recurrence on leg and groin per chart  2. NUB, left groin s/p biopsy 11/22/17   -Current Tx: 600 mg Clindamycin today.     Encounter Date: Nov 22, 2017    CC:  Chief Complaint   Patient presents with     Derm Problem     Bleeding lesion in groin. Tri has Bowen's disease.         History of Present Illness:  Ms. Tri Ingram is a 67 year old female with a history of NMSC who presents for a spot check on the groin. The patient notes that it bleeds occasionally and it is painful at times.     No other reported lesions.     Past Medical History:   Patient Active Problem List   Diagnosis     Other malignant neoplasm of skin of trunk, except scrotum     Generalized osteoarthrosis, unspecified site     Disorder of bone and cartilage     Symptomatic menopausal or female climacteric states     Plantar fasciitis     Hyperlipidemia LDL goal <130     Obesity     Advanced directives, counseling/discussion     Family history of abdominal aortic aneurysm     Former moderate cigarette smoker (10-19 per day)     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     Family History:  Not addressed at this visit     Medications:  Current Outpatient Prescriptions   Medication Sig Dispense Refill     lovastatin (MEVACOR) 40 MG tablet TAKE ONE TABLET BY MOUTH EVERY NIGHT AT BEDTIME 90 tablet 1     estrogen conj-medroxyPROGESTERone (PREMPRO) 0.45-1.5 MG per tablet Take one three times weekly or as needed to control hot flashes/menopausal symptoms. 40 tablet 3     albuterol (PROAIR HFA/PROVENTIL HFA/VENTOLIN HFA) 108 (90 BASE) MCG/ACT Inhaler Inhale 2 puffs into the lungs every 4 hours as needed for shortness of breath / dyspnea or wheezing 1 Inhaler 1     MAGNESIUM PO        Lovastatin 40 MG TB24 Take 1 tablet by mouth daily at bedtime. 90 tablet 3     ibuprofen 200 MG capsule Take 1,000 mg by mouth as needed for fever (does not use every day.)       Multiple Vitamins-Minerals (ICAPS) CAPS Take 1 capsule by mouth daily       glucosamine-chondroitin 500-400 MG CAPS Take 3 capsules by mouth daily. Takes 2 in am and 1 in pm  0     acetaminophen (ARTHRITIS PAIN RELIEF) 650 MG CR tablet Take 2 tablets by mouth daily. Takes in pm 100 tablet 11     calcium carb 1250 mg, 500 mg [place],/vitamin D 200 units (OSCAL WITH D) 500-200 MG-UNIT per tablet Take 1 tablet by mouth daily.       ASPIRIN 81 MG OR TABS ONE DAILY 100 3     scopolamine (TRANSDERM) (1.5mg base/3day) patch Place 1 patch onto the skin every 72 hours.  Apply to hairless area behind one ear at least 4 hours before travel.  Remove old patch and change every 3 days. (Patient not taking: Reported on 11/22/2017) 2 patch 0       Allergies   Allergen Reactions     Adhesive Tape      Iodine Anaphylaxis     contrast dye     Penicillins Hives       Review of Systems:  -Constitutional: The patient denies fatigue, fevers,  chills, unintended weight loss, and night sweats. The patient reports having a cold last week.   -Skin: As above in HPI. No additional skin concerns.    Physical exam:  Vitals: There were no vitals taken for this visit.  GEN: This is a well developed, well-nourished female in no acute distress, in a pleasant mood.    SKIN: Focused examination of the groin was performed.  - On the left groin, there is a stuck on brown to black papule, 1 cm   -No other lesions of concern on areas examined.       Impression/Plan:  1. Hx of nonmelanoma skin cancer-no addressed today  2. Neoplasm of unknown behavior. Left groin. Most likely SK rule out Melanoma    Shave biopsy:  After discussion of benefits and risks including but not limited to bleeding/bruising, pain/swelling, infection, scar, incomplete removal, nerve damage/numbness, recurrence, and non-diagnostic biopsy, written consent, verbal consent and photographs were obtained. Time-out was performed. The area was cleaned with chlorohexadine.  was injected to obtain adequate anesthesia of the lesion on the left groin. 0.5 ml of 1% lidocaine was injected to obtain adequate anesthesia. A  shave biopsy was performed. Hemostasis was achieved with aluminium chloride. Vaseline and a sterile dressing were applied. The patient tolerated the procedure and no complications were noted. The patient was provided with verbal and written post care instructions.      Start clindamycin 600 mg once      Follow-up within 1 year for skin exam and pending biopsy results, other new or changing lesions.       Staff Involved:  Scribe/Staff      Scribe Disclosure:   I, Cailin Mays, am serving as a scribe to document services personally performed by Dr. Lacy Ch, based on data collection and the provider's statements to me.       Provider Disclosure:   The documentation recorded by the scribe accurately reflects the services I personally performed and the decisions made by me.    Lacy Ch,  MD    Department of Dermatology  [facility]: Phone: [phone], Fax:[phone]  [facility]: Phone: [phone], Fax: [phone]            Again, thank you for allowing me to participate in the care of your patient.      Sincerely,    Lacy Ch MD

## 2017-11-22 NOTE — NURSING NOTE
Dermatology Rooming Note    Tri Ingram's goals for this visit include:   Chief Complaint   Patient presents with     Derm Problem     Bleeding lesion in groin. Tri has Bowen's disease.     Disha Calix, CMA

## 2017-11-22 NOTE — PROGRESS NOTES
[facility] Dermatology Note      Dermatology Problem List:  1.Hx of NMSC  -Seferino, 1998, recurrence on leg and groin per chart  2. NUB, left groin s/p biopsy 11/22/17   -Current Tx: 600 mg Clindamycin today.     Encounter Date: Nov 22, 2017    CC:  Chief Complaint   Patient presents with     Derm Problem     Bleeding lesion in groin. Tri has Bowen's disease.         History of Present Illness:  Ms. Tri Ingram is a 67 year old female with a history of NMSC who presents for a spot check on the groin. The patient notes that it bleeds occasionally and it is painful at times.     No other reported lesions.     Past Medical History:   Patient Active Problem List   Diagnosis     Other malignant neoplasm of skin of trunk, except scrotum     Generalized osteoarthrosis, unspecified site     Disorder of bone and cartilage     Symptomatic menopausal or female climacteric states     Plantar fasciitis     Hyperlipidemia LDL goal <130     Obesity     Advanced directives, counseling/discussion     Family history of abdominal aortic aneurysm     Former moderate cigarette smoker (10-19 per day)     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     Past Surgical History:   Procedure Laterality Date     C NONSPECIFIC PROCEDURE      Bowen's disease removed X 2     COLONOSCOPY  2001, 2007     COLONOSCOPY N/A 11/3/2017    Procedure: COLONOSCOPY;  Colonoscopy  ;  Surgeon: Lg Guerrero MD;  Location: [facility]       Social History:  The patient works in [employer] for [employer] .     Family History:  Not addressed at this visit     Medications:  Current  Outpatient Prescriptions   Medication Sig Dispense Refill     lovastatin (MEVACOR) 40 MG tablet TAKE ONE TABLET BY MOUTH EVERY NIGHT AT BEDTIME 90 tablet 1     estrogen conj-medroxyPROGESTERone (PREMPRO) 0.45-1.5 MG per tablet Take one three times weekly or as needed to control hot flashes/menopausal symptoms. 40 tablet 3     albuterol (PROAIR HFA/PROVENTIL HFA/VENTOLIN HFA) 108 (90 BASE) MCG/ACT Inhaler Inhale 2 puffs into the lungs every 4 hours as needed for shortness of breath / dyspnea or wheezing 1 Inhaler 1     MAGNESIUM PO        Lovastatin 40 MG TB24 Take 1 tablet by mouth daily at bedtime. 90 tablet 3     ibuprofen 200 MG capsule Take 1,000 mg by mouth as needed for fever (does not use every day.)       Multiple Vitamins-Minerals (ICAPS) CAPS Take 1 capsule by mouth daily       glucosamine-chondroitin 500-400 MG CAPS Take 3 capsules by mouth daily. Takes 2 in am and 1 in pm  0     acetaminophen (ARTHRITIS PAIN RELIEF) 650 MG CR tablet Take 2 tablets by mouth daily. Takes in pm 100 tablet 11     calcium carb 1250 mg, 500 mg [place],/vitamin D 200 units (OSCAL WITH D) 500-200 MG-UNIT per tablet Take 1 tablet by mouth daily.       ASPIRIN 81 MG OR TABS ONE DAILY 100 3     scopolamine (TRANSDERM) (1.5mg base/3day) patch Place 1 patch onto the skin every 72 hours.  Apply to hairless area behind one ear at least 4 hours before travel.  Remove old patch and change every 3 days. (Patient not taking: Reported on 11/22/2017) 2 patch 0       Allergies   Allergen Reactions     Adhesive Tape      Iodine Anaphylaxis     contrast dye     Penicillins Hives       Review of Systems:  -Constitutional: The patient denies fatigue, fevers, chills, unintended weight loss, and night sweats. The patient reports having a cold last week.   -Skin: As above in HPI. No additional skin concerns.    Physical exam:  Vitals: There were no vitals taken for this visit.  GEN: This is a well developed, well-nourished female in no acute distress,  in a pleasant mood.    SKIN: Focused examination of the groin was performed.  - On the left groin, there is a stuck on brown to black papule, 1 cm   -No other lesions of concern on areas examined.       Impression/Plan:  1. Hx of nonmelanoma skin cancer-no addressed today  2. Neoplasm of unknown behavior. Left groin. Most likely SK rule out Melanoma    Shave biopsy:  After discussion of benefits and risks including but not limited to bleeding/bruising, pain/swelling, infection, scar, incomplete removal, nerve damage/numbness, recurrence, and non-diagnostic biopsy, written consent, verbal consent and photographs were obtained. Time-out was performed. The area was cleaned with chlorohexadine.  was injected to obtain adequate anesthesia of the lesion on the left groin. 0.5 ml of 1% lidocaine was injected to obtain adequate anesthesia. A  shave biopsy was performed. Hemostasis was achieved with aluminium chloride. Vaseline and a sterile dressing were applied. The patient tolerated the procedure and no complications were noted. The patient was provided with verbal and written post care instructions.      Start clindamycin 600 mg once      Follow-up within 1 year for skin exam and pending biopsy results, other new or changing lesions.       Staff Involved:  Scribe/Staff      Scribe Disclosure:   I, Cailin Mays, am serving as a scribe to document services personally performed by Dr. Lacy Ch, based on data collection and the provider's statements to me.       Provider Disclosure:   The documentation recorded by the scribe accurately reflects the services I personally performed and the decisions made by me.    Lacy Ch MD    Department of Dermatology  [facility]: Phone: [phone], Fax:[phone]  [facility]: Phone: [phone], Fax: [phone]

## 2017-11-22 NOTE — MR AVS SNAPSHOT
After Visit Summary   11/22/2017    Tri Ingram    MRN: [id number]           Patient Information     Date Of Birth          1950        Visit Information        Provider Department      11/22/2017 12:45 PM Lacy Ch MD [facility] Dermatology        Today's Diagnoses     Neoplasm of uncertain behavior of skin    -  1    Prophylactic measure          Care Instructions    Wound Care After a Biopsy    What is a skin biopsy?  A skin biopsy allows the doctor to examine a very small piece of tissue under the microscope to determine the diagnosis and the best treatment for the skin condition. A local anesthetic (numbing medicine)  is injected with a very small needle into the skin area to be tested. A small piece of skin is taken from the area. Sometimes a suture (stitch) is used.     What are the risks of a skin biopsy?  I will experience scar, bleeding, swelling, pain, crusting and redness. I may experience incomplete removal or recurrence. Risks of this procedure are excessive bleeding, bruising, infection, nerve damage, numbness, thick (hypertrophic or keloidal) scar and non-diagnostic biopsy.    How should I care for my wound for the first 24 hours?    Keep the wound dry and covered for 24 hours    If it bleeds, hold direct pressure on the area for 15 minutes. If bleeding does not stop then go to the emergency room    Avoid strenuous exercise the first 1-2 days or as your doctor instructs you    How should I care for the wound after 24 hours?    After 24 hours, remove the bandage    You may bathe or shower as normal    If you had a scalp biopsy, you can shampoo as usual and can use shower water to clean the biopsy site daily    Clean the wound twice a day with gentle soap and water    Do not scrub, be gentle    Apply white petroleum/Vaseline after cleaning the wound with a cotton swab or a clean finger, and keep the site covered with a Bandaid /bandage. Bandages are not necessary with a  scalp biopsy    If you are unable to cover the site with a Bandaid /bandage, re-apply ointment 2-3 times a day to keep the site moist. Moisture will help with healing    Avoid strenuous activity for first 1-2 days    Avoid lakes, rivers, pools, and oceans until the stitches are removed or the site is healed    How do I clean my wound?    Wash hands thoroughly with soap or use hand  before all wound care    Clean the wound with gentle soap and water    Apply white petroleum/Vaseline  to wound after it is clean    Replace the Bandaid /bandage to keep the wound covered for the first few days or as instructed by your doctor    If you had a scalp biopsy, warm shower water to the area on a daily basis should suffice    What should I use to clean my wound?     Cotton-tipped applicators (Qtips )    White petroleum jelly (Vaseline ). Use a clean new container and use Q-tips to apply.    Bandaids   as needed    Gentle soap     How should I care for my wound long term?    Do not get your wound dirty    Keep up with wound care for one week or until the area is healed.    A small scab will form and fall off by itself when the area is completely healed. The area will be red and will become pink in color as it heals. Sun protection is very important for how your scar will turn out. Sunscreen with an SPF 30 or greater is recommended once the area is healed.    You should have some soreness but it should be mild and slowly go away over several days. Talk to your doctor about using tylenol for pain,    When should I call my doctor?  If you have increased:     Pain or swelling    Pus or drainage (clear or slightly yellow drainage is ok)    Temperature over 100F    Spreading redness or warmth around wound    When will I hear about my results?  The biopsy results can take 2-3 weeks to come back. The clinic will call you with the results, send you a Book[employer] (Livebook[employer]) message, or have you schedule a follow-up clinic or phone time to  discuss the results. Contact our clinics if you do not hear from us in 3 weeks.     Who should I call with questions?    [facility]: [phone]     [facility]: [phone]    For urgent needs outside of business hours call the [facility] at [phone] and ask for the dermatology resident on call              Follow-ups after your visit        Your next 10 appointments already scheduled     Jan 09, 2018  1:15 PM CST   [facility]    [address] [address] [phone]              Who to contact     Please call your clinic at [phone] to:    Ask questions about your health    Make or cancel appointments    Discuss your medicines    Learn about your test results    Speak to your doctor   If you have compliments or concerns about an experience at your clinic, or if you wish to file a complaint, please contact [facility] Patient Relations at [phone] or email us at [email or web address]         Additional Information About Your Visit        Care EveryWhere ID     This is your Care EveryWhere ID. This could be used by other organizations to access your [place] medical records  [id number]         Blood Pressure from Last 3 Encounters:   11/03/17 131/73   05/15/17 124/75   04/17/17 132/78    Weight from Last 3 Encounters:   11/03/17 91.6 kg (202 lb)   05/15/17 95.3 kg (210 lb)   04/17/17 94.8 kg (209 lb)              We Performed the Following     BIOPSY SKIN/SUBQ/MUC MEM, SINGLE LESION     Surgical pathology exam          Today's Medication Changes          These changes are accurate as of: 11/22/17  2:15 PM.  If you have any questions, ask your nurse or doctor.               Start taking these medicines.        Dose/Directions    clindamycin 300 MG capsule   Commonly known as:  CLEOCIN    Used for:  Prophylactic measure   Started by:  Lacy Ch MD        Dose:  600 mg   Take 2 capsules (600 mg) by mouth once for 1 dose   Quantity:  2 capsule   Refills:  0            Where to get your medicines      These medications were sent to [place], [place] - [address]  [address], [facility] [address]    Hours:  TRANSPLANT PHONE NUMBER [phone] Phone:  [phone]     clindamycin 300 MG capsule                Primary Care Provider Office Phone # Fax #    Charlene Cain PA-C [phone] [address] [facility] [address]        Equal Access to Services     CHANTEL SUAREZ : Favian Hanna, wahelena wing, qaybta kaalmada rose, deanne clay. So [facility] [phone].    ATENCIÓN: Si habla español, tiene a jackson disposición servicios gratuitos de asistencia lingüística. Llame al [phone].    We comply with applicable federal civil rights laws and [place] laws. We do not discriminate on the basis of race, color, national origin, age, disability, sex, sexual orientation, or gender identity.            Thank you!     Thank you for choosing [facility] DERMATOLOG[facility]  for your care. Our goal is always to provide you with excellent care. Hearing back from our patients is one way we can continue to improve our services. Please take a few minutes to complete the written survey that you may receive in the mail after your visit with us. Thank you!             Your Updated Medication List - Protect others around you: Learn how to safely use, store and throw away your medicines at www.disposemymeds.org.          This list is accurate as of: 11/22/17  2:15 PM.  Always use your most recent med list.                   Brand Name Dispense Instructions for use Diagnosis    albuterol 108 (90 BASE) MCG/ACT Inhaler    PROAIR HFA/PROVENTIL HFA/VENTOLIN HFA    1 Inhaler    Inhale 2  puffs into the lungs every 4 hours as needed for shortness of breath / dyspnea or wheezing    Acute bronchitis with symptoms greater than 10 days       ARTHRITIS PAIN RELIEF 650 MG CR tablet   Generic drug:  acetaminophen     100 tablet    Take 2 tablets by mouth daily. Takes in pm        aspirin 81 MG tablet     100    ONE DAILY    Tobacco use disorder, Other and unspecified hyperlipidemia       Calcium carb-Vitamin D 500 mg [place]-200 units 500-200 MG-UNIT per tablet    OSCAL with D;Oyster Shell Calcium     Take 1 tablet by mouth daily.        clindamycin 300 MG capsule    CLEOCIN    2 capsule    Take 2 capsules (600 mg) by mouth once for 1 dose    Prophylactic measure       estrogen conj-medroxyPROGESTERone 0.45-1.5 MG per tablet    PREMPRO    40 tablet    Take one three times weekly or as needed to control hot flashes/menopausal symptoms.    Symptomatic menopausal or female climacteric states       glucosamine-chondroitin 500-400 MG Caps per capsule      Take 3 capsules by mouth daily. Takes 2 in am and 1 in pm        ibuprofen 200 MG capsule      Take 1,000 mg by mouth as needed for fever (does not use every day.)        ICAPS Caps      Take 1 capsule by mouth daily        * Lovastatin 40 MG Tb24     90 tablet    Take 1 tablet by mouth daily at bedtime.    Hyperlipidemia LDL goal <130       * lovastatin 40 MG tablet    MEVACOR    90 tablet    TAKE ONE TABLET BY MOUTH EVERY NIGHT AT BEDTIME    Hyperlipidemia LDL goal <130       MAGNESIUM PO           scopolamine 72 hr patch    TRANSDERM    2 patch    Place 1 patch onto the skin every 72 hours.  Apply to hairless area behind one ear at least 4 hours before travel.  Remove old patch and change every 3 days.    Motion sickness, initial encounter       * Notice:  This list has 2 medication(s) that are the same as other medications prescribed for you. Read the directions carefully, and ask your doctor or other care provider to review them with you.

## 2017-11-27 LAB — COPATH REPORT: NORMAL

## 2018-01-09 ENCOUNTER — OFFICE VISIT (OUTPATIENT)
Dept: DERMATOLOGY | Facility: CLINIC | Age: 68
End: 2018-01-09
Payer: COMMERCIAL

## 2018-01-09 VITALS — DIASTOLIC BLOOD PRESSURE: 92 MMHG | HEART RATE: 81 BPM | SYSTOLIC BLOOD PRESSURE: 144 MMHG | TEMPERATURE: 98.7 F

## 2018-01-09 DIAGNOSIS — Z85.828 HISTORY OF SKIN CANCER: Primary | ICD-10-CM

## 2018-01-09 DIAGNOSIS — L81.4 LENTIGO: ICD-10-CM

## 2018-01-09 DIAGNOSIS — L85.3 XEROSIS OF SKIN: ICD-10-CM

## 2018-01-09 DIAGNOSIS — D18.00 ANGIOMA: ICD-10-CM

## 2018-01-09 DIAGNOSIS — L82.1 SK (SEBORRHEIC KERATOSIS): ICD-10-CM

## 2018-01-09 PROCEDURE — 99214 OFFICE O/P EST MOD 30 MIN: CPT | Performed by: DERMATOLOGY

## 2018-01-09 NOTE — PATIENT INSTRUCTIONS
Proper skin care from Dr. Good- [facility]     Eliminate harsh soaps, i.e. Dial, Zest, [place];   Use mild soaps such as Cetaphil or Dove Sensitive Skin   Avoid hot or cold showers   After showering, lightly dry off.    Aggressive use of a moisturizer (including Vanicream, Cetaphil, Aquaphor or Cerave)   We recommend using a tub that needs to be scooped out, not a pump. This has more of an oil base. It will hold moisture in your skin much better than a water base moisturizer. The ones recommended are non- pore clogging.       If you have any questions call [phone] and follow the prompts to Dr. Good's office.

## 2018-01-09 NOTE — LETTER
1/9/2018         RE: Tri Ingram  [address]  [facility] [address]        Dear Colleague,    Thank you for referring your patient, Tri Ingram, to the [facility]. Please see a copy of my visit note below.    Tri Ingram is a 68 year old year old female patient here today for hx of bowens disease.  She notes spots on back.   .  Patient states this has been present for a while.  Patient reports the following symptoms:  itching.  Patient reports the following previous treatments none.  Patient reports the following modifying factors none.  Associated symptoms: none.  Patient has no other skin complaints today.  Remainder of the HPI, Meds, PMH, Allergies, FH, and SH was reviewed in chart.      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       Past Surgical History:   Procedure Laterality Date     C NONSPECIFIC PROCEDURE      Bowen's disease removed X 2     COLONOSCOPY  2001, 2007     COLONOSCOPY N/A 11/3/2017    Procedure: COLONOSCOPY;  Colonoscopy  ;  Surgeon: Lg Guerrero MD;  Location: [facility]        Family History   Problem Relation Age of Onset     Eye Disorder Mother      glaucoma, wet macular degeneration     Lipids Mother      Gynecology Mother      hyst     Melanoma Mother      Skin Cancer Mother      CANCER Father      colon /prostate     Circulatory Father      amputee     DIABETES Father      type II     C.A.D. Father      MIs     Eye Disorder Father      dry macular degeneration     Cardiovascular Father      small AAA     Melanoma Father      Arthritis Sister      Gynecology Sister      partial  "hyst  fibrous sheaths on ovary egg sized polyp uterine removed     Lipids Sister      Neurologic Disorder Sister      migraines     Cardiovascular Brother      large 7 cm AAA     Coronary Artery Disease Brother      MI     GASTROINTESTINAL DISEASE Paternal Grandmother       from hepatitis       Social History     Social History     Marital status:      Spouse name: N/A     Number of children: N/A     Years of education: N/A     Occupational History     Not on file.     Social History Main Topics     Smoking status: Former Smoker     Packs/day: 0.50     Years: 37.00     Types: Cigarettes     Quit date: 10/20/2007     Smokeless tobacco: Never Used     Alcohol use Yes      Comment: minimal     Drug use: No     Sexual activity: Yes     Partners: Male      Comment: menopause     Other Topics Concern      Service No     Blood Transfusions No     Caffeine Concern No     Occupational Exposure No     Hobby Hazards No     Sleep Concern Yes     arthritis     Stress Concern Yes     Weight Concern No     Special Diet Yes     Frazier     Back Care Yes     arthritis     Exercise Yes     Bike Helmet No     Seat Belt Yes     Self-Exams Yes     Parent/Sibling W/ Cabg, Mi Or Angioplasty Before 65f 55m? Yes     brother MI at age 50     Social History Narrative    Caffeine intake/servings daily - 0    Calcium intake/servings daily - 4 occ. calcium supplement    Exercise 7 times weekly - describe  walking    Sunscreen used - No    Seatbelts used - Yes    Guns stored in the home - No    Self Breast Exam - Yes    Pap test up to date -  Yes    Eye exam up to date -  Yes    Dental exam up to date -  No    DEXA scan up to date -  Yes, last done 04    Flex Sig/Colonoscopy up to date -  Yes, at age 52 \"normal\"    Mammography up to date - 06    Immunizations reviewed and up to date - unknown last tetanus shot    Abuse: Current or Past (Physical, Sexual or Emotional) - No    Do you feel safe in your environment - Yes    " Do you cope well with stress - Yes    Do you suffer from insomnia - Yes    Last updated by: Mary Beth Katz 8/28/06       Outpatient Encounter Prescriptions as of 1/9/2018   Medication Sig Dispense Refill     lovastatin (MEVACOR) 40 MG tablet TAKE ONE TABLET BY MOUTH EVERY NIGHT AT BEDTIME 90 tablet 1     estrogen conj-medroxyPROGESTERone (PREMPRO) 0.45-1.5 MG per tablet Take one three times weekly or as needed to control hot flashes/menopausal symptoms. 40 tablet 3     albuterol (PROAIR HFA/PROVENTIL HFA/VENTOLIN HFA) 108 (90 BASE) MCG/ACT Inhaler Inhale 2 puffs into the lungs every 4 hours as needed for shortness of breath / dyspnea or wheezing 1 Inhaler 1     MAGNESIUM PO        Lovastatin 40 MG TB24 Take 1 tablet by mouth daily at bedtime. 90 tablet 3     scopolamine (TRANSDERM) (1.5mg base/3day) patch Place 1 patch onto the skin every 72 hours.  Apply to hairless area behind one ear at least 4 hours before travel.  Remove old patch and change every 3 days. 2 patch 0     ibuprofen 200 MG capsule Take 1,000 mg by mouth as needed for fever (does not use every day.)       Multiple Vitamins-Minerals (ICAPS) CAPS Take 1 capsule by mouth daily       glucosamine-chondroitin 500-400 MG CAPS Take 3 capsules by mouth daily. Takes 2 in am and 1 in pm  0     acetaminophen (ARTHRITIS PAIN RELIEF) 650 MG CR tablet Take 2 tablets by mouth daily. Takes in pm 100 tablet 11     calcium carb 1250 mg, 500 mg [place],/vitamin D 200 units (OSCAL WITH D) 500-200 MG-UNIT per tablet Take 1 tablet by mouth daily.       ASPIRIN 81 MG OR TABS ONE DAILY 100 3     No facility-administered encounter medications on file as of 1/9/2018.              Review Of Systems  Skin: As above  Eyes: negative  Ears/Nose/Throat: negative  Respiratory: No shortness of breath, dyspnea on exertion, cough, or hemoptysis  Cardiovascular: negative  Gastrointestinal: negative  Genitourinary: negative  Musculoskeletal: negative  Neurologic: negative  Psychiatric:  negative  Hematologic/Lymphatic/Immunologic: negative  Endocrine: negative      O:   NAD, WDWN, Alert & Oriented, Mood & Affect wnl, Vitals stable   Here today alone   BP (!) 144/92  Pulse 81  Temp 98.7  F (37.1  C) (Tympanic)   General appearance normal   Vitals stable   Alert, oriented and in no acute distress      Following lymph nodes palpated: Occipital, Cervical, Supraclavicular no lad   Stuck on papules and brown macules on trunk and ext    Xerosis throuhgout   Red papules on trunk        The remainder of the full exam was unremarkable; the following areas were examined:  conjunctiva/lids, oral mucosa, neck, peripheral vascular system, abdomen, lymph nodes, digits/nails, eccrine and apocrine glands, scalp/hair, face, neck, chest, abdomen, buttocks, back, RUE, LUE, RLE, LLE       Eyes: Conjunctivae/lids:Normal     ENT: Lips, buccal mucosa, tongue: normal    MSK:Normal    Cardiovascular: peripheral edema none    Pulm: Breathing Normal    Lymph Nodes: No Head and Neck Lymphadenopathy     Neuro/Psych: Orientation:Normal; Mood/Affect:Normal      A/P:  1. Hx of non-melanoma skin cancer, seborrheic keratosis, lentigo, angioma  2. Xerosis  BENIGN LESIONS DISCUSSED WITH PATIENT:  I discussed the specifics of tumor, prognosis, and genetics of benign lesions.  I explained that treatment of these lesions would be purely cosmetic and not medically neccessary.  I discussed with patient different removal options including excision, cautery and /or laser.      Nature and genetics of benign skin lesions dicussed with patient.  Signs and Symptoms of skin cancer discussed with patient.  ABCDEs of melanoma reviewed with patient.  Patient encouraged to perform monthly skin exams.  UV precautions reviewed with patient.  Skin care regimen reviewed with patient: Eliminate harsh soaps, i.e. Dial, zest, irsih spring; Mild soaps such as Cetaphil or Dove sensitive skin, avoid hot or cold showers, aggressive use of emollients including  vanicream, cetaphil or cerave discussed with patient.    Risks of non-melanoma skin cancer discussed with patient   Return to clinic 12 months      Again, thank you for allowing me to participate in the care of your patient.        Sincerely,        Harris Good MD

## 2018-01-09 NOTE — NURSING NOTE
"Initial BP (!) 144/92  Pulse 81  Temp 98.7  F (37.1  C) (Tympanic) Estimated body mass index is 31.64 kg/(m^2) as calculated from the following:    Height as of 11/3/17: 1.702 m (5' 7\").    Weight as of 11/3/17: 91.6 kg (202 lb). .    Yumiko Wu LPN    "

## 2018-01-09 NOTE — MR AVS SNAPSHOT
After Visit Summary   1/9/2018    Tri Ingram    MRN: [id number]           Patient Information     Date Of Birth          1950        Visit Information        Provider Department      1/9/2018 1:15 PM Harris Good MD [facility]        Today's Diagnoses     History of skin cancer    -  1    Lentigo        SK (seborrheic keratosis)        Angioma        Xerosis of skin          Care Instructions        Proper skin care from Dr. Good- [facility]     Eliminate harsh soaps, i.e. Dial, Zest, [place];   Use mild soaps such as Cetaphil or Dove Sensitive Skin   Avoid hot or cold showers   After showering, lightly dry off.    Aggressive use of a moisturizer (including Vanicream, Cetaphil, Aquaphor or Cerave)   We recommend using a tub that needs to be scooped out, not a pump. This has more of an oil base. It will hold moisture in your skin much better than a water base moisturizer. The ones recommended are non- pore clogging.       If you have any questions call [phone] and follow the prompts to Dr. Good's office.             Follow-ups after your visit        Who to contact     If you have questions or need follow up information about today's clinic visit or your schedule please contact [facility] directly at [phone].  Normal or non-critical lab and imaging results will be communicated to you by MyChart, letter or phone within 4 business days after the clinic has received the results. If you do not hear from us within 7 days, please contact the clinic through MyChart or phone. If you have a critical or abnormal lab result, we will notify you by phone as soon as possible.  Submit refill requests through [employer] or call your pharmacy and they will forward the refill request to us. Please allow 3 business days for your refill to be completed.          Additional Information About Your Visit        Care EveryWhere ID     This is  your Care EveryWhere ID. This could be used by other organizations to access your [place] medical records  [id number]        Your Vitals Were     Pulse Temperature                81 98.7  F (37.1  C) (Tympanic)           Blood Pressure from Last 3 Encounters:   01/09/18 (!) 144/92   11/03/17 131/73   05/15/17 124/75    Weight from Last 3 Encounters:   11/03/17 91.6 kg (202 lb)   05/15/17 95.3 kg (210 lb)   04/17/17 94.8 kg (209 lb)              Today, you had the following     No orders found for display       Primary Care Provider Office Phone # Fax #    Charlene Cain PA-C [phone] [address] [place] [place] [address]        Equal Access to Services     CHANTEL SUAREZ : Hadii baldemar irizarry Sokevin, waaxda luqadaha, qaybta kaalmada guyyasarthak, deanne albert . So [facility] [phone].    ATENCIÓN: Si habla español, tiene a jackson disposición servicios gratuitos de asistencia lingüística. Flavia al [phone].    We comply with applicable federal civil rights laws and [place] laws. We do not discriminate on the basis of race, color, national origin, age, disability, sex, sexual orientation, or gender identity.            Thank you!     Thank you for choosing [facility]  for your care. Our goal is always to provide you with excellent care. Hearing back from our patients is one way we can continue to improve our services. Please take a few minutes to complete the written survey that you may receive in the mail after your visit with us. Thank you!             Your Updated Medication List - Protect others around you: Learn how to safely use, store and throw away your medicines at www.disposemymeds.org.          This list is accurate as of: 1/9/18  1:28 PM.  Always use your most recent med list.                   Brand Name Dispense Instructions for use Diagnosis    albuterol 108 (90 BASE) MCG/ACT Inhaler    PROAIR HFA/PROVENTIL HFA/VENTOLIN HFA     1 Inhaler    Inhale 2 puffs into the lungs every 4 hours as needed for shortness of breath / dyspnea or wheezing    Acute bronchitis with symptoms greater than 10 days       ARTHRITIS PAIN RELIEF 650 MG CR tablet   Generic drug:  acetaminophen     100 tablet    Take 2 tablets by mouth daily. Takes in pm        aspirin 81 MG tablet     100    ONE DAILY    Tobacco use disorder, Other and unspecified hyperlipidemia       Calcium carb-Vitamin D 500 mg [place]-200 units 500-200 MG-UNIT per tablet    OSCAL with D;Oyster Shell Calcium     Take 1 tablet by mouth daily.        estrogen conj-medroxyPROGESTERone 0.45-1.5 MG per tablet    PREMPRO    40 tablet    Take one three times weekly or as needed to control hot flashes/menopausal symptoms.    Symptomatic menopausal or female climacteric states       glucosamine-chondroitin 500-400 MG Caps per capsule      Take 3 capsules by mouth daily. Takes 2 in am and 1 in pm        ibuprofen 200 MG capsule      Take 1,000 mg by mouth as needed for fever (does not use every day.)        ICAPS Caps      Take 1 capsule by mouth daily        * Lovastatin 40 MG Tb24     90 tablet    Take 1 tablet by mouth daily at bedtime.    Hyperlipidemia LDL goal <130       * lovastatin 40 MG tablet    MEVACOR    90 tablet    TAKE ONE TABLET BY MOUTH EVERY NIGHT AT BEDTIME    Hyperlipidemia LDL goal <130       MAGNESIUM PO           scopolamine 72 hr patch    TRANSDERM    2 patch    Place 1 patch onto the skin every 72 hours.  Apply to hairless area behind one ear at least 4 hours before travel.  Remove old patch and change every 3 days.    Motion sickness, initial encounter       * Notice:  This list has 2 medication(s) that are the same as other medications prescribed for you. Read the directions carefully, and ask your doctor or other care provider to review them with you.

## 2018-01-09 NOTE — PROGRESS NOTES
Tri Ingram is a 68 year old year old female patient here today for hx of bowens disease.  She notes spots on back.   .  Patient states this has been present for a while.  Patient reports the following symptoms:  itching.  Patient reports the following previous treatments none.  Patient reports the following modifying factors none.  Associated symptoms: none.  Patient has no other skin complaints today.  Remainder of the HPI, Meds, PMH, Allergies, FH, and SH was reviewed in chart.      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       Past Surgical History:   Procedure Laterality Date     C NONSPECIFIC PROCEDURE      Bowen's disease removed X 2     COLONOSCOPY  2001, 2007     COLONOSCOPY N/A 11/3/2017    Procedure: COLONOSCOPY;  Colonoscopy  ;  Surgeon: Lg Guerrero MD;  Location: [facility]        Family History   Problem Relation Age of Onset     Eye Disorder Mother      glaucoma, wet macular degeneration     Lipids Mother      Gynecology Mother      hyst     Melanoma Mother      Skin Cancer Mother      CANCER Father      colon /prostate     Circulatory Father      amputee     DIABETES Father      type II     C.A.D. Father      MIs     Eye Disorder Father      dry macular degeneration     Cardiovascular Father      small AAA     Melanoma Father      Arthritis Sister      Gynecology Sister      partial hyst  fibrous sheaths on ovary egg sized polyp uterine removed     Lipids Sister      Neurologic Disorder Sister      migraines     Cardiovascular Brother      large 7 cm AAA     Coronary Artery Disease Brother      MI     GASTROINTESTINAL DISEASE Paternal  "Grandmother       from hepatitis       Social History     Social History     Marital status:      Spouse name: N/A     Number of children: N/A     Years of education: N/A     Occupational History     Not on file.     Social History Main Topics     Smoking status: Former Smoker     Packs/day: 0.50     Years: 37.00     Types: Cigarettes     Quit date: 10/20/2007     Smokeless tobacco: Never Used     Alcohol use Yes      Comment: minimal     Drug use: No     Sexual activity: Yes     Partners: Male      Comment: menopause     Other Topics Concern      Service No     Blood Transfusions No     Caffeine Concern No     Occupational Exposure No     Hobby Hazards No     Sleep Concern Yes     arthritis     Stress Concern Yes     Weight Concern No     Special Diet Yes     Frazier     Back Care Yes     arthritis     Exercise Yes     Bike Helmet No     Seat Belt Yes     Self-Exams Yes     Parent/Sibling W/ Cabg, Mi Or Angioplasty Before 65f 55m? Yes     brother MI at age 50     Social History Narrative    Caffeine intake/servings daily - 0    Calcium intake/servings daily - 4 occ. calcium supplement    Exercise 7 times weekly - describe  walking    Sunscreen used - No    Seatbelts used - Yes    Guns stored in the home - No    Self Breast Exam - Yes    Pap test up to date -  Yes    Eye exam up to date -  Yes    Dental exam up to date -  No    DEXA scan up to date -  Yes, last done 04    Flex Sig/Colonoscopy up to date -  Yes, at age 52 \"normal\"    Mammography up to date - 06    Immunizations reviewed and up to date - unknown last tetanus shot    Abuse: Current or Past (Physical, Sexual or Emotional) - No    Do you feel safe in your environment - Yes    Do you cope well with stress - Yes    Do you suffer from insomnia - Yes    Last updated by: Mary Beth Katz 06       Outpatient Encounter Prescriptions as of 2018   Medication Sig Dispense Refill     lovastatin (MEVACOR) 40 MG tablet TAKE ONE TABLET BY " MOUTH EVERY NIGHT AT BEDTIME 90 tablet 1     estrogen conj-medroxyPROGESTERone (PREMPRO) 0.45-1.5 MG per tablet Take one three times weekly or as needed to control hot flashes/menopausal symptoms. 40 tablet 3     albuterol (PROAIR HFA/PROVENTIL HFA/VENTOLIN HFA) 108 (90 BASE) MCG/ACT Inhaler Inhale 2 puffs into the lungs every 4 hours as needed for shortness of breath / dyspnea or wheezing 1 Inhaler 1     MAGNESIUM PO        Lovastatin 40 MG TB24 Take 1 tablet by mouth daily at bedtime. 90 tablet 3     scopolamine (TRANSDERM) (1.5mg base/3day) patch Place 1 patch onto the skin every 72 hours.  Apply to hairless area behind one ear at least 4 hours before travel.  Remove old patch and change every 3 days. 2 patch 0     ibuprofen 200 MG capsule Take 1,000 mg by mouth as needed for fever (does not use every day.)       Multiple Vitamins-Minerals (ICAPS) CAPS Take 1 capsule by mouth daily       glucosamine-chondroitin 500-400 MG CAPS Take 3 capsules by mouth daily. Takes 2 in am and 1 in pm  0     acetaminophen (ARTHRITIS PAIN RELIEF) 650 MG CR tablet Take 2 tablets by mouth daily. Takes in pm 100 tablet 11     calcium carb 1250 mg, 500 mg [place],/vitamin D 200 units (OSCAL WITH D) 500-200 MG-UNIT per tablet Take 1 tablet by mouth daily.       ASPIRIN 81 MG OR TABS ONE DAILY 100 3     No facility-administered encounter medications on file as of 1/9/2018.              Review Of Systems  Skin: As above  Eyes: negative  Ears/Nose/Throat: negative  Respiratory: No shortness of breath, dyspnea on exertion, cough, or hemoptysis  Cardiovascular: negative  Gastrointestinal: negative  Genitourinary: negative  Musculoskeletal: negative  Neurologic: negative  Psychiatric: negative  Hematologic/Lymphatic/Immunologic: negative  Endocrine: negative      O:   NAD, WDWN, Alert & Oriented, Mood & Affect wnl, Vitals stable   Here today alone   BP (!) 144/92  Pulse 81  Temp 98.7  F (37.1  C) (Tympanic)   General appearance normal   Vitals  stable   Alert, oriented and in no acute distress      Following lymph nodes palpated: Occipital, Cervical, Supraclavicular no lad   Stuck on papules and brown macules on trunk and ext    Xerosis throuhgout   Red papules on trunk        The remainder of the full exam was unremarkable; the following areas were examined:  conjunctiva/lids, oral mucosa, neck, peripheral vascular system, abdomen, lymph nodes, digits/nails, eccrine and apocrine glands, scalp/hair, face, neck, chest, abdomen, buttocks, back, RUE, LUE, RLE, LLE       Eyes: Conjunctivae/lids:Normal     ENT: Lips, buccal mucosa, tongue: normal    MSK:Normal    Cardiovascular: peripheral edema none    Pulm: Breathing Normal    Lymph Nodes: No Head and Neck Lymphadenopathy     Neuro/Psych: Orientation:Normal; Mood/Affect:Normal      A/P:  1. Hx of non-melanoma skin cancer, seborrheic keratosis, lentigo, angioma  2. Xerosis  BENIGN LESIONS DISCUSSED WITH PATIENT:  I discussed the specifics of tumor, prognosis, and genetics of benign lesions.  I explained that treatment of these lesions would be purely cosmetic and not medically neccessary.  I discussed with patient different removal options including excision, cautery and /or laser.      Nature and genetics of benign skin lesions dicussed with patient.  Signs and Symptoms of skin cancer discussed with patient.  ABCDEs of melanoma reviewed with patient.  Patient encouraged to perform monthly skin exams.  UV precautions reviewed with patient.  Skin care regimen reviewed with patient: Eliminate harsh soaps, i.e. Dial, zest, irsih spring; Mild soaps such as Cetaphil or Dove sensitive skin, avoid hot or cold showers, aggressive use of emollients including vanicream, cetaphil or cerave discussed with patient.    Risks of non-melanoma skin cancer discussed with patient   Return to clinic 12 months

## 2018-01-18 ENCOUNTER — NURSE TRIAGE (OUTPATIENT)
Dept: NURSING | Facility: CLINIC | Age: 68
End: 2018-01-18

## 2018-01-18 ENCOUNTER — APPOINTMENT (OUTPATIENT)
Dept: GENERAL RADIOLOGY | Facility: CLINIC | Age: 68
End: 2018-01-18
Attending: EMERGENCY MEDICINE
Payer: COMMERCIAL

## 2018-01-18 ENCOUNTER — HOSPITAL ENCOUNTER (EMERGENCY)
Facility: CLINIC | Age: 68
Discharge: HOME | End: 2018-01-18
Attending: EMERGENCY MEDICINE | Admitting: EMERGENCY MEDICINE
Payer: COMMERCIAL

## 2018-01-18 VITALS
OXYGEN SATURATION: 98 % | DIASTOLIC BLOOD PRESSURE: 87 MMHG | TEMPERATURE: 98.5 F | RESPIRATION RATE: 21 BRPM | SYSTOLIC BLOOD PRESSURE: 135 MMHG

## 2018-01-18 DIAGNOSIS — J20.9 ACUTE BRONCHITIS, UNSPECIFIED ORGANISM: ICD-10-CM

## 2018-01-18 DIAGNOSIS — R05.9 COUGH: ICD-10-CM

## 2018-01-18 DIAGNOSIS — B34.9 VIRAL ILLNESS: ICD-10-CM

## 2018-01-18 LAB
ALBUMIN SERPL-MCNC: 3.7 G/DL (ref 3.4–5)
ALBUMIN UR-MCNC: NEGATIVE MG/DL
ALP SERPL-CCNC: 90 U/L (ref 40–150)
ALT SERPL W P-5'-P-CCNC: 35 U/L (ref 0–50)
ANION GAP SERPL CALCULATED.3IONS-SCNC: 10 MMOL/L (ref 3–14)
APPEARANCE UR: CLEAR
AST SERPL W P-5'-P-CCNC: 26 U/L (ref 0–45)
BACTERIA #/AREA URNS HPF: ABNORMAL /HPF
BASOPHILS # BLD AUTO: 0 10E9/L (ref 0–0.2)
BASOPHILS NFR BLD AUTO: 0.4 %
BILIRUB SERPL-MCNC: 0.4 MG/DL (ref 0.2–1.3)
BILIRUB UR QL STRIP: NEGATIVE
BUN SERPL-MCNC: 18 MG/DL (ref 7–30)
CALCIUM SERPL-MCNC: 9.6 MG/DL (ref 8.5–10.1)
CHLORIDE SERPL-SCNC: 104 MMOL/L (ref 94–109)
CO2 SERPL-SCNC: 23 MMOL/L (ref 20–32)
COLOR UR AUTO: ABNORMAL
CREAT SERPL-MCNC: 0.79 MG/DL (ref 0.52–1.04)
DIFFERENTIAL METHOD BLD: NORMAL
EOSINOPHIL # BLD AUTO: 0.2 10E9/L (ref 0–0.7)
EOSINOPHIL NFR BLD AUTO: 4.2 %
ERYTHROCYTE [DISTWIDTH] IN BLOOD BY AUTOMATED COUNT: 12.3 % (ref 10–15)
FLUAV+FLUBV AG SPEC QL: NEGATIVE
FLUAV+FLUBV AG SPEC QL: NEGATIVE
GFR SERPL CREATININE-BSD FRML MDRD: 73 ML/MIN/1.7M2
GLUCOSE SERPL-MCNC: 92 MG/DL (ref 70–99)
GLUCOSE UR STRIP-MCNC: NEGATIVE MG/DL
HCT VFR BLD AUTO: 42.5 % (ref 35–47)
HGB BLD-MCNC: 14.4 G/DL (ref 11.7–15.7)
HGB UR QL STRIP: ABNORMAL
IMM GRANULOCYTES # BLD: 0 10E9/L (ref 0–0.4)
IMM GRANULOCYTES NFR BLD: 0.2 %
KETONES UR STRIP-MCNC: NEGATIVE MG/DL
LEUKOCYTE ESTERASE UR QL STRIP: NEGATIVE
LIPASE SERPL-CCNC: 287 U/L (ref 73–393)
LYMPHOCYTES # BLD AUTO: 2.2 10E9/L (ref 0.8–5.3)
LYMPHOCYTES NFR BLD AUTO: 45.1 %
MCH RBC QN AUTO: 28.7 PG (ref 26.5–33)
MCHC RBC AUTO-ENTMCNC: 33.9 G/DL (ref 31.5–36.5)
MCV RBC AUTO: 85 FL (ref 78–100)
MONOCYTES # BLD AUTO: 0.4 10E9/L (ref 0–1.3)
MONOCYTES NFR BLD AUTO: 7.2 %
MUCOUS THREADS #/AREA URNS LPF: PRESENT /LPF
NEUTROPHILS # BLD AUTO: 2.1 10E9/L (ref 1.6–8.3)
NEUTROPHILS NFR BLD AUTO: 42.9 %
NITRATE UR QL: NEGATIVE
PH UR STRIP: 5.5 PH (ref 5–7)
PLATELET # BLD AUTO: 197 10E9/L (ref 150–450)
POTASSIUM SERPL-SCNC: 3.8 MMOL/L (ref 3.4–5.3)
PROT SERPL-MCNC: 8 G/DL (ref 6.8–8.8)
RBC # BLD AUTO: 5.02 10E12/L (ref 3.8–5.2)
RBC #/AREA URNS AUTO: <1 /HPF (ref 0–2)
SODIUM SERPL-SCNC: 137 MMOL/L (ref 133–144)
SOURCE: ABNORMAL
SP GR UR STRIP: 1.01 (ref 1–1.03)
SPECIMEN SOURCE: NORMAL
SQUAMOUS #/AREA URNS AUTO: <1 /HPF (ref 0–1)
TROPONIN I SERPL-MCNC: <0.015 UG/L (ref 0–0.04)
UROBILINOGEN UR STRIP-MCNC: NORMAL MG/DL (ref 0–2)
WBC # BLD AUTO: 5 10E9/L (ref 4–11)
WBC #/AREA URNS AUTO: 1 /HPF (ref 0–2)

## 2018-01-18 PROCEDURE — 93005 ELECTROCARDIOGRAM TRACING: CPT | Performed by: EMERGENCY MEDICINE

## 2018-01-18 PROCEDURE — 25000125 ZZHC RX 250: Performed by: EMERGENCY MEDICINE

## 2018-01-18 PROCEDURE — 71046 X-RAY EXAM CHEST 2 VIEWS: CPT

## 2018-01-18 PROCEDURE — 93010 ELECTROCARDIOGRAM REPORT: CPT | Mod: Z6 | Performed by: EMERGENCY MEDICINE

## 2018-01-18 PROCEDURE — 83690 ASSAY OF LIPASE: CPT | Performed by: EMERGENCY MEDICINE

## 2018-01-18 PROCEDURE — 85025 COMPLETE CBC W/AUTO DIFF WBC: CPT | Performed by: EMERGENCY MEDICINE

## 2018-01-18 PROCEDURE — 99284 EMERGENCY DEPT VISIT MOD MDM: CPT | Mod: 25 | Performed by: EMERGENCY MEDICINE

## 2018-01-18 PROCEDURE — 87804 INFLUENZA ASSAY W/OPTIC: CPT | Performed by: EMERGENCY MEDICINE

## 2018-01-18 PROCEDURE — 84484 ASSAY OF TROPONIN QUANT: CPT | Performed by: EMERGENCY MEDICINE

## 2018-01-18 PROCEDURE — 81001 URINALYSIS AUTO W/SCOPE: CPT | Performed by: EMERGENCY MEDICINE

## 2018-01-18 PROCEDURE — 94640 AIRWAY INHALATION TREATMENT: CPT

## 2018-01-18 PROCEDURE — 80053 COMPREHEN METABOLIC PANEL: CPT | Performed by: EMERGENCY MEDICINE

## 2018-01-18 RX ORDER — PREDNISONE 20 MG/1
40 TABLET ORAL DAILY
Qty: 10 TABLET | Refills: 0 | Status: SHIPPED | OUTPATIENT
Start: 2018-01-18 | End: 2018-01-23

## 2018-01-18 RX ORDER — GUAIFENESIN/DEXTROMETHORPHAN 100-10MG/5
10 SYRUP ORAL EVERY 4 HOURS PRN
COMMUNITY
End: 2018-01-25

## 2018-01-18 RX ORDER — PREDNISONE 20 MG/1
60 TABLET ORAL ONCE
Status: COMPLETED | OUTPATIENT
Start: 2018-01-18 | End: 2018-01-18

## 2018-01-18 RX ORDER — AZITHROMYCIN 250 MG/1
TABLET, FILM COATED ORAL
Qty: 6 TABLET | Refills: 0 | Status: SHIPPED | OUTPATIENT
Start: 2018-01-18 | End: 2018-01-25

## 2018-01-18 RX ORDER — CODEINE PHOSPHATE AND GUAIFENESIN 10; 100 MG/5ML; MG/5ML
1-2 SOLUTION ORAL EVERY 4 HOURS PRN
COMMUNITY
End: 2018-01-25

## 2018-01-18 RX ORDER — IPRATROPIUM BROMIDE AND ALBUTEROL SULFATE 2.5; .5 MG/3ML; MG/3ML
3 SOLUTION RESPIRATORY (INHALATION) ONCE
Status: COMPLETED | OUTPATIENT
Start: 2018-01-18 | End: 2018-01-18

## 2018-01-18 RX ADMIN — PREDNISONE 60 MG: 20 TABLET ORAL at 17:01

## 2018-01-18 RX ADMIN — IPRATROPIUM BROMIDE AND ALBUTEROL SULFATE 3 ML: .5; 3 SOLUTION RESPIRATORY (INHALATION) at 16:09

## 2018-01-18 ASSESSMENT — ENCOUNTER SYMPTOMS
WHEEZING: 1
NAUSEA: 0
RHINORRHEA: 1
APPETITE CHANGE: 1
SHORTNESS OF BREATH: 1
COUGH: 1
FEVER: 0
VOMITING: 1
FATIGUE: 1

## 2018-01-18 NOTE — ED NOTES
Pt has had cough and discomfort - states coughing to the point of vomiting - patient has deep bronchial cough although lungs are clear. Patient is in no distress.

## 2018-01-18 NOTE — ED AVS SNAPSHOT
[facility] Emergency Department    [address] [facility] [address]    Phone:  [phone]    Fax:  [phone]                                       Tri Ingram   MRN: [id number]    Department:  [facility] Emergency Department   Date of Visit:  1/18/2018           Patient Information     Date Of Birth          1950        Your diagnoses for this visit were:     Cough     Viral illness     Acute bronchitis, unspecified organism        You were seen by Lg Hernández MD.      Follow-up Information     Follow up with Charlene Cain PA-C.    Specialty:  Physician Assistant    Why:  Next week for recheck    Contact information:    [address] [facility] [id number] [phone]          Follow up with [facility] Emergency Department.    Specialty:  EMERGENCY MEDICINE    Why:  If symptoms worsen    Contact information:    [address] [facility] [id number] [phone]    Additional information:    The medical center is located at   [address] [place] [address]. (between [address] in [place], [place] miles north   of [place]).        Discharge Instructions       Return if symptoms worsen or new symptoms develop.  Follow-up with primary care physician next available.  Drink plenty of fluids.  Continue steroids.  If any further fevers no improvement of cough or worsening of productive cough no other symptoms present please begin Zithromax and return if symptoms worsen.  Bronchitis with Wheezing (Viral or Bacterial: Adult)    Bronchitis is an infection of the air passages. It often occurs during a cold and is usually caused by a virus. Symptoms include cough with mucus (phlegm) and low-grade fever. This illness is contagious during the first few days and is spread through the air by coughing and sneezing, or by direct contact (touching the sick person and then touching your own eyes, nose, or mouth).  If there is a lot of inflammation, air  flow is restricted. The air passages may also go into spasm, especially if you have asthma. This causes wheezing and difficulty breathing even in people who do not have asthma.  Bronchitis usually lasts 7 to 14 days. The wheezing should improve with treatment during the first week. An inhaler is often prescribed to relax the air passages and stop wheezing. Antibiotics will be prescribed if your doctor thinks there is also a secondary bacterial infection.  Home care    If symptoms are severe, rest at home for the first 2 to 3 days. When you go back to your usual activities, don't let yourself get too tired.    Do not smoke. Also avoid being exposed to secondhand smoke.    You may use over-the-counter medicine to control fever or pain, unless another medicine was prescribed. Note: If you have chronic liver or kidney disease or have ever had a stomach ulcer or gastrointestinal bleeding, talk with your healthcare provider before using these medicines. Also talk to your provider if you are taking medicine to prevent blood clots.) Aspirin should never be given to anyone younger than 18 years of age who is ill with a viral infection or fever. It may cause severe liver or brain damage.    Your appetite may be poor, so a light diet is fine. Avoid dehydration by drinking 6 to 8 glasses of fluids per day (such as water, soft drinks, sports drinks, juices, tea, or soup). Extra fluids will help loosen secretions in the nose and lungs.    Over-the-counter cough, cold, and sore-throat medicines will not shorten the length of the illness, but they may be helpful to reduce symptoms. (Note: Do not use decongestants if you have high blood pressure.)    If you were given an inhaler, use it exactly as directed. If you need to use it more often than prescribed, your condition may be worsening. If this happens, contact your healthcare provider.    If prescribed, finish all antibiotic medicine, even if you are feeling better after only a  few days.  Follow-up care  Follow up with your healthcare provider, or as advised. If you had an X-ray or ECG (electrocardiogram), a specialist will review it. You will be notified of any new findings that may affect your care.  Note: If you are age 65 or older, or if you have a chronic lung disease or condition that affects your immune system, or you smoke, talk to your healthcare provider about having a pneumococcal vaccinations and a yearly influenza vaccination (flu shot).  When to seek medical advice  Call your healthcare provider right away if any of these occur:    Fever of 100.4 F (38 C) or higher    Coughing up increasing amounts of colored sputum    Weakness, drowsiness, headache, facial pain, ear pain, or a stiff neck  Call 911, or get immediate medical care  Contact emergency services right away if any of these occur.    Coughing up blood    Worsening weakness, drowsiness, headache, or stiff neck    Increased wheezing not helped with medication, shortness of breath, or pain with breathing  Date Last Reviewed: 9/13/2015 2000-2017 [employer]. [address] [place] [address]. All rights reserved. This information is not intended as a substitute for professional medical care. Always follow your healthcare professional's instructions.          Acute Bronchitis  Your healthcare provider has told you that you have acute bronchitis. Bronchitis is infection or inflammation of the bronchial tubes (airways in the lungs). Normally, air moves easily in and out of the airways. Bronchitis narrows the airways, making it harder for air to flow in and out of the lungs. This causes symptoms such as shortness of breath, coughing up yellow or green mucus, and wheezing. Bronchitis can be acute or chronic. Acute means the condition comes on quickly and goes away in a short time, usually within 3 to 10 days. Chronic means a condition lasts a long time and often comes back.    What causes acute  bronchitis?  Acute bronchitis almost always starts as a viral respiratory infection, such as a cold or the flu. Certain factors make it more likely for a cold or flu to turn into bronchitis. These include being very young, being elderly, having a heart or lung problem, or having a weak immune system. Cigarette smoking also makes bronchitis more likely.  When bronchitis develops, the airways become swollen. The airways may also become infected with bacteria. This is known as a secondary infection.  Diagnosing acute bronchitis  Your healthcare provider will examine you and ask about your symptoms and health history. You may also have a sputum culture to test the fluid in your lungs. Chest X-rays may be done to look for infection in the lungs.  Treating acute bronchitis  Bronchitis usually clears up as the cold or flu goes away. You can help feel better faster by doing the following:    Take medicine as directed. You may be told to take ibuprofen or other over-the-counter medicines. These help relieve inflammation in your bronchial tubes. Your healthcare provider may prescribe an inhaler to help open up the bronchial tubes. Most of the time, acute bronchitis is caused by a viral infection. Antibiotics are usually not prescribed for viral infections.    Drink plenty of fluids, such as water, juice, or warm soup. Fluids loosen mucus so that you can cough it up. This helps you breathe more easily. Fluids also prevent dehydration.    Make sure you get plenty of rest.    Do not smoke. Do not allow anyone else to smoke in your home.  Recovery and follow-up  Follow up with your doctor as you are told. You will likely feel better in a week or two. But a dry cough can linger beyond that time. Let your doctor know if you still have symptoms (other than a dry cough) after 2 weeks, or if you re prone to getting bronchial infections. Take steps to protect yourself from future infections. These steps include stopping smoking and  avoiding tobacco smoke, washing your hands often, and getting a yearly flu shot.  When to call your healthcare provider  Call the healthcare provider if you have any of the following:    Fever of 100.4 F (38.0 C) or higher, or as advised    Symptoms that get worse, or new symptoms    Trouble breathing    Symptoms that don t start to improve within a week, or within 3 days of taking antibiotics   Date Last Reviewed: 12/1/2016 2000-2017 [employer]. [address] [place] [address]. All rights reserved. This information is not intended as a substitute for professional medical care. Always follow your healthcare professional's instructions.          24 Hour Appointment Hotline       To make an appointment at any [facility], call [phone] ([phone]). If you don't have a family doctor or clinic, we will help you find one. [facility] clinics are conveniently located to serve the needs of you and your family.             Review of your medicines      START taking        Dose / Directions Last dose taken    azithromycin 250 MG tablet   Commonly known as:  ZITHROMAX   Quantity:  6 tablet        Two tablets first day, then one tablet daily for four days.   Refills:  0        predniSONE 20 MG tablet   Commonly known as:  DELTASONE   Dose:  40 mg   Quantity:  10 tablet        Take 2 tablets (40 mg) by mouth daily for 5 days   Refills:  0          Our records show that you are taking the medicines listed below. If these are incorrect, please call your family doctor or clinic.        Dose / Directions Last dose taken    albuterol 108 (90 BASE) MCG/ACT Inhaler   Commonly known as:  PROAIR HFA/PROVENTIL HFA/VENTOLIN HFA   Dose:  2 puff   Quantity:  1 Inhaler        Inhale 2 puffs into the lungs every 4 hours as needed for shortness of breath / dyspnea or wheezing   Refills:  1        ARTHRITIS PAIN RELIEF 650 MG CR tablet   Dose:  1300 mg   Quantity:  100 tablet   Generic drug:   acetaminophen        Take 2 tablets by mouth daily. Takes in pm   Refills:  11        aspirin 81 MG tablet   Quantity:  100        ONE DAILY   Refills:  3        Calcium carb-Vitamin D 500 mg [place]-200 units 500-200 MG-UNIT per tablet   Commonly known as:  OSCAL with D;Oyster Shell Calcium   Dose:  1 tablet        Take 1 tablet by mouth daily.   Refills:  0        estrogen conj-medroxyPROGESTERone 0.45-1.5 MG per tablet   Commonly known as:  PREMPRO   Quantity:  40 tablet        Take one three times weekly or as needed to control hot flashes/menopausal symptoms.   Refills:  3        guaiFENesin-codeine 100-10 MG/5ML Soln solution   Commonly known as:  ROBITUSSIN AC   Dose:  1-2 tsp.        Take 1-2 tsp. by mouth every 4 hours as needed for cough   Refills:  0        guaiFENesin-dextromethorphan 100-10 MG/5ML syrup   Commonly known as:  ROBITUSSIN DM   Dose:  10 mL        Take 10 mLs by mouth every 4 hours as needed for cough   Refills:  0        ibuprofen 200 MG capsule   Dose:  1200 mg        Take 1,200 mg by mouth 2 times daily   Refills:  0        ICAPS Caps   Dose:  1 capsule        Take 1 capsule by mouth daily   Refills:  0        lovastatin 40 MG tablet   Commonly known as:  MEVACOR   Quantity:  90 tablet        TAKE ONE TABLET BY MOUTH EVERY NIGHT AT BEDTIME   Refills:  1        MAGNESIUM PO   Dose:  250 mg        Take 250 mg by mouth daily   Refills:  0                Prescriptions were sent or printed at these locations (2 Prescriptions)                   [facility] [place], [place] - [address] [facility]   [address] [facility] [address]    Telephone:  [phone]   Fax:  [phone]   Hours:                  Printed at Department/Unit printer (2 of 2)         predniSONE (DELTASONE) 20 MG tablet               azithromycin (ZITHROMAX) 250 MG tablet                Procedures and tests performed during your visit     CBC with platelets, differential    Chest XR,  PA & LAT    Comprehensive  metabolic panel    EKG 12 lead    Influenza A/B antigen    Lipase    Troponin I    UA reflex to Microscopic      Orders Needing Specimen Collection     None      Pending Results     No orders found from 1/16/2018 to 1/19/2018.            Pending Culture Results     No orders found from 1/16/2018 to 1/19/2018.            Pending Results Instructions     If you had any lab results that were not finalized at the time of your Discharge, you can call the ED Lab Result RN at [phone]. You will be contacted by this team for any positive Lab results or changes in treatment. The nurses are available 7 days a week from 10A to 6:30P.  You can leave a message 24 hours per day and they will return your call.        Test Results From Your Hospital Stay        1/18/2018  3:00 PM      Component Results     Component Value Ref Range & Units Status    Influenza A/B Agn Specimen Nasal  Final    Influenza A Negative NEG^Negative Final    Influenza B Negative NEG^Negative Final    Test results must be correlated with clinical data. If necessary, results   should be confirmed by a molecular assay or viral culture.           1/18/2018  4:08 PM      Component Results     Component Value Ref Range & Units Status    WBC 5.0 4.0 - 11.0 10e9/L Final    RBC Count 5.02 3.8 - 5.2 10e12/L Final    Hemoglobin 14.4 11.7 - 15.7 g/dL Final    Hematocrit 42.5 35.0 - 47.0 % Final    MCV 85 78 - 100 fl Final    MCH 28.7 26.5 - 33.0 pg Final    MCHC 33.9 31.5 - 36.5 g/dL Final    RDW 12.3 10.0 - 15.0 % Final    Platelet Count 197 150 - 450 10e9/L Final    Diff Method Automated Method  Final    % Neutrophils 42.9 % Final    % Lymphocytes 45.1 % Final    % Monocytes 7.2 % Final    % Eosinophils 4.2 % Final    % Basophils 0.4 % Final    % Immature Granulocytes 0.2 % Final    Absolute Neutrophil 2.1 1.6 - 8.3 10e9/L Final    Absolute Lymphocytes 2.2 0.8 - 5.3 10e9/L Final    Absolute Monocytes 0.4 0.0 - 1.3 10e9/L Final    Absolute Eosinophils 0.2 0.0 -  0.7 10e9/L Final    Absolute Basophils 0.0 0.0 - 0.2 10e9/L Final    Abs Immature Granulocytes 0.0 0 - 0.4 10e9/L Final         1/18/2018  4:19 PM      Component Results     Component Value Ref Range & Units Status    Sodium 137 133 - 144 mmol/L Final    Potassium 3.8 3.4 - 5.3 mmol/L Final    Chloride 104 94 - 109 mmol/L Final    Carbon Dioxide 23 20 - 32 mmol/L Final    Anion Gap 10 3 - 14 mmol/L Final    Glucose 92 70 - 99 mg/dL Final    Urea Nitrogen 18 7 - 30 mg/dL Final    Creatinine 0.79 0.52 - 1.04 mg/dL Final    GFR Estimate 73 >60 mL/min/1.7m2 Final    Non  GFR Calc    GFR Estimate If Black 88 >60 mL/min/1.7m2 Final    African American GFR Calc    Calcium 9.6 8.5 - 10.1 mg/dL Final    Bilirubin Total 0.4 0.2 - 1.3 mg/dL Final    Albumin 3.7 3.4 - 5.0 g/dL Final    Protein Total 8.0 6.8 - 8.8 g/dL Final    Alkaline Phosphatase 90 40 - 150 U/L Final    ALT 35 0 - 50 U/L Final    AST 26 0 - 45 U/L Final         1/18/2018  4:18 PM      Component Results     Component Value Ref Range & Units Status    Lipase 287 73 - 393 U/L Final         1/18/2018  4:33 PM      Component Results     Component Value Ref Range & Units Status    Color Urine Light Yellow  Final    Appearance Urine Clear  Final    Glucose Urine Negative NEG^Negative mg/dL Final    Bilirubin Urine Negative NEG^Negative Final    Ketones Urine Negative NEG^Negative mg/dL Final    Specific Gravity Urine 1.010 1.003 - 1.035 Final    Blood Urine Trace (A) NEG^Negative Final    pH Urine 5.5 5.0 - 7.0 pH Final    Protein Albumin Urine Negative NEG^Negative mg/dL Final    Urobilinogen mg/dL Normal 0.0 - 2.0 mg/dL Final    Nitrite Urine Negative NEG^Negative Final    Leukocyte Esterase Urine Negative NEG^Negative Final    Source Midstream Urine  Final    RBC Urine <1 0 - 2 /HPF Final    WBC Urine 1 0 - 2 /HPF Final    Bacteria Urine Few (A) NEG^Negative /HPF Final    Squamous Epithelial /HPF Urine <1 0 - 1 /HPF Final    Mucous Urine Present  (A) NEG^Negative /LPF Final         1/18/2018  4:22 PM      Narrative     CHEST TWO VIEWS  1/18/2018 4:18 PM     HISTORY: Shortness of breath.    COMPARISON: 4/17/2017.        Impression     IMPRESSION: No focal airspace opacity. No pleural effusion or  pneumothorax. Cardiac silhouette within normal limits. Mild  degenerative changes in the spine.    LEE GUTHRIE MD         1/18/2018  5:32 PM      Component Results     Component Value Ref Range & Units Status    Troponin I ES <0.015 0.000 - 0.045 ug/L Final    The 99th percentile for upper reference range is 0.045 ug/L.  Troponin values   in the range of 0.045 - 0.120 ug/L may be associated with risks of adverse   clinical events.                  Thank you for choosing [facility]       Thank you for choosing [facility] for your care. Our goal is always to provide you with excellent care. Hearing back from our patients is one way we can continue to improve our services. Please take a few minutes to complete the written survey that you may receive in the mail after you visit with us. Thank you!        Care EveryWhere ID     This is your Care EveryWhere ID. This could be used by other organizations to access your [facility] medical records  [id number]        Equal Access to Services     CHANTEL SUAREZ : Hadii baldemar Hanna, wahelena wing, qaandres kaalmasarthak rose, deanne clay. So [facility] [phone].    ATENCIÓN: Si habla español, tiene a jackson disposición servicios gratuitos de asistencia lingüística. Llame al [phone].    We comply with applicable federal civil rights laws and [place] laws. We do not discriminate on the basis of race, color, national origin, age, disability, sex, sexual orientation, or gender identity.            After Visit Summary       This is your record. Keep this with you and show to your community pharmacist(s) and doctor(s) at your next visit.

## 2018-01-18 NOTE — ED AVS SNAPSHOT
[facility] Emergency Department    [address] [facility] [address]    Phone:  [phone]    Fax:  [phone]                                       Tri Ingram   MRN: [id number]    Department:  [facility] Emergency Department   Date of Visit:  1/18/2018           After Visit Summary Signature Page     I have received my discharge instructions, and my questions have been answered. I have discussed any challenges I see with this plan with the nurse or doctor.    ..........................................................................................................................................  Patient/Patient Representative Signature      ..........................................................................................................................................  Patient Representative Print Name and Relationship to Patient    ..................................................               ................................................  Date                                            Time    ..........................................................................................................................................  Reviewed by Signature/Title    ...................................................              ..............................................  Date                                                            Time

## 2018-01-18 NOTE — ED PROVIDER NOTES
History     Chief Complaint   Patient presents with     Cough     Pt has had cough for 2 weeks.  Coughing so much, she is throwing up.  Sleeping more than usual  Wheezing.  Not coughing up much.  Chest hurts all of the time.      Chest Pain     HPI  Tri Ingram is a 68 year old female who presents to the emergency department for evaluation of a cough. The patient reports that she has had a dry cough for the past couple of weeks. Cough is significant to the point that it has induced vomiting. It has not been improving since onset. She has pain in her chest due to excessive coughing. She has occasional shortness of breath and wheeze. She also has clear rhinorrhea. She feels warm but has not measured a fever. Her appetite has been poor and she has been feeling fatigued. She reports losing 4 pounds since the onset of these symptoms. She has been using her inhaler, cough syrup with codeine, and robitussin without significant improvement.       Problem List:    Patient Active Problem List    Diagnosis Date Noted     Former moderate cigarette smoker (10-19 per day) 03/18/2015     Priority: Medium     Stopped 2007       Advanced directives, counseling/discussion 07/31/2012     Priority: Medium     Patient does not have an Advance/Health Care Directive (HCD), will get on own..    Alba Pereyraon  July 31, 2012         Family history of abdominal aortic aneurysm 07/31/2012     Priority: Medium     Father in late 60s, brother in late 50s, both smokers;  Patient is former smoker (36 years), discussed AAA screening at age 67 with Welcome to Medicare exam       Obesity 07/27/2011     Priority: Medium     Body mass index is 32.23 kg/(m^2).         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 Provider to review and confirm       Generalized osteoarthrosis, unspecified site 09/09/2004     Priority: Medium     back and shoulder       Disorder of bone and cartilage 09/09/2004     Priority: Medium     Very mild osteopenia, was originally put on Fosamax in addition to hormones in 2002, improved diet and discontinued smoking, taken of Fosamax in 2008 (femoral T-score -1.1, lumbar scorre normal) with plans to repeat DEXA at age 63-65.          Problem list name updated by automated process. Provider to review          Past Medical History:    Past Medical History:   Diagnosis Date     Disorder of bone and cartilage, unspecified      Generalized osteoarthrosis, unspecified site      Hematuria      IBS (irritable bowel syndrome) 11/14/14     Other malignant neoplasm of skin of trunk, except scrotum 1998       Past Surgical History:    Past Surgical History:   Procedure Laterality Date     C NONSPECIFIC PROCEDURE      Bowen's disease removed X 2     COLONOSCOPY  2001, 2007     COLONOSCOPY N/A 11/3/2017    Procedure: COLONOSCOPY;  Colonoscopy  ;  Surgeon: Lg Guerrero MD;  Location: [facility]       Family History:    Family History   Problem Relation Age of Onset     Eye Disorder Mother      glaucoma, wet macular degeneration     Lipids Mother      Gynecology Mother      hyst     Melanoma Mother      Skin Cancer Mother      CANCER Father      colon /prostate     Circulatory Father      amputee     DIABETES Father      type II     C.A.D. Father      MIs     Eye Disorder Father      dry macular degeneration     Cardiovascular Father      small AAA     Melanoma Father      Arthritis Sister      Gynecology Sister      partial hyst  fibrous sheaths on ovary egg sized polyp uterine removed     Lipids Sister      Neurologic Disorder Sister      migraines     Cardiovascular Brother      large 7 cm  AAA     Coronary Artery Disease Brother      MI     GASTROINTESTINAL DISEASE Paternal Grandmother       from hepatitis       Social History:  Marital Status:   [2]  Social History   Substance Use Topics     Smoking status: Former Smoker     Packs/day: 0.50     Years: 37.00     Types: Cigarettes     Quit date: 10/20/2007     Smokeless tobacco: Never Used     Alcohol use Yes      Comment: minimal        Medications:      lovastatin (MEVACOR) 40 MG tablet   estrogen conj-medroxyPROGESTERone (PREMPRO) 0.45-1.5 MG per tablet   albuterol (PROAIR HFA/PROVENTIL HFA/VENTOLIN HFA) 108 (90 BASE) MCG/ACT Inhaler   MAGNESIUM PO   Lovastatin 40 MG TB24   scopolamine (TRANSDERM) (1.5mg base/3day) patch   ibuprofen 200 MG capsule   Multiple Vitamins-Minerals (ICAPS) CAPS   glucosamine-chondroitin 500-400 MG CAPS   acetaminophen (ARTHRITIS PAIN RELIEF) 650 MG CR tablet   calcium carb 1250 mg, 500 mg [place],/vitamin D 200 units (OSCAL WITH D) 500-200 MG-UNIT per tablet   ASPIRIN 81 MG OR TABS          Review of Systems   Constitutional: Positive for appetite change and fatigue. Negative for fever.   HENT: Positive for rhinorrhea.    Eyes: Negative for visual disturbance.   Respiratory: Positive for cough, shortness of breath and wheezing.    Cardiovascular: Positive for chest pain.   Gastrointestinal: Positive for vomiting. Negative for nausea.   Genitourinary: Negative for decreased urine volume and dysuria.   Musculoskeletal: Negative for back pain and neck pain.   Skin: Negative for rash.   Neurological: Negative for weakness, numbness and headaches.   Psychiatric/Behavioral: Negative for confusion.   All other systems reviewed and are negative.      Physical Exam   BP: 135/78  Heart Rate: 78  Temp: 98.5  F (36.9  C)  Resp: 16  SpO2: 97 %      Physical Exam   Constitutional: She appears well-developed and well-nourished. No distress.   Eyes: Conjunctivae are normal.   Psychiatric: She has a normal mood and affect.    Nursing note and vitals reviewed.    HENT: Oral mucosa moist. No lesions. Posterior pharynx no erythema edema.   Neck: Supple  Pulmonary/Chest: Faint upper expiratory wheeze with breath sounds decreased at bases bilaterally.    Cardiovascular: Heart is regular rate and rhythm. No murmur.  Abdomen: Soft, non-distended, non-tender.   Musculoskeletal: Moving all extremities well. No peripheral edema.   Neurological: Alert. No focal neurologic deficit.   Skin: No rash.    ED Course     ED Course     Procedures       EKG Interpretation:      Interpreted by Lg Hernández  Rhythm: normal sinus   Rate: Normal  Axis: Normal  Ectopy: none  Conduction: normal  ST Segments/ T Waves: Non-specific ST-T wave changes  Q Waves: none  Comparison to prior: No old EKG available    Clinical Impression: Normal sinus rhythm with non-specific st-twave changes.                          Critical Care time:  none               Labs Ordered and Resulted from Time of ED Arrival Up to the Time of Departure from the ED   INFLUENZA A/B ANTIGEN     Results for orders placed or performed during the hospital encounter of 01/18/18 (from the past 24 hour(s))   Influenza A/B antigen   Result Value Ref Range    Influenza A/B Agn Specimen Nasal     Influenza A Negative NEG^Negative    Influenza B Negative NEG^Negative       Medications - No data to display    3:20 PM Patient Assessed.     Assessments & Plan (with Medical Decision Making)records were reviewed. Labs , ekg and cxr were obtained. White count was not elevated and there was not a L shift. Influenza was negative. cmp within normal limits and troponin was unremarkable. CXR without acute abnormality. Patient was given a duoneb with improvement of her breathing. Findings discussed with the patient . This appears to more than likely be a viral illness. I am going to treat with steroids and if symptoms worsen or new symptoms develop I am going to give her a script for a zpak to be used for a  bronchitis is the steroids do not work She is in agreement with this plan.      I have reviewed the nursing notes.    I have reviewed the findings, diagnosis, plan and need for follow up with the patient.       New Prescriptions    No medications on file       Final diagnoses:   Cough   Viral illness   Acute bronchitis, unspecified organism     This document serves as a record of the services and decisions personally performed and made by Lg Hernández MD. It was created on HIS/HER behalf by Aj Yuan, a trained medical scribe. The creation of this document is based the provider's statements to the medical scribe.  Aj Yuan 3:20 PM 1/18/2018    Provider:   The information in this document, created by the medical scribe for me, accurately reflects the services I personally performed and the decisions made by me. I have reviewed and approved this document for accuracy prior to leaving the patient care area.  Lg Hernández MD 3:20 PM 1/18/2018 1/18/2018   [facility] EMER[facility] DEPART[facility]     Lg Hernández MD  01/21/18 [id number]

## 2018-01-18 NOTE — DISCHARGE INSTRUCTIONS
Return if symptoms worsen or new symptoms develop.  Follow-up with primary care physician next available.  Drink plenty of fluids.  Continue steroids.  If any further fevers no improvement of cough or worsening of productive cough no other symptoms present please begin Zithromax and return if symptoms worsen.  Bronchitis with Wheezing (Viral or Bacterial: Adult)    Bronchitis is an infection of the air passages. It often occurs during a cold and is usually caused by a virus. Symptoms include cough with mucus (phlegm) and low-grade fever. This illness is contagious during the first few days and is spread through the air by coughing and sneezing, or by direct contact (touching the sick person and then touching your own eyes, nose, or mouth).  If there is a lot of inflammation, air flow is restricted. The air passages may also go into spasm, especially if you have asthma. This causes wheezing and difficulty breathing even in people who do not have asthma.  Bronchitis usually lasts 7 to 14 days. The wheezing should improve with treatment during the first week. An inhaler is often prescribed to relax the air passages and stop wheezing. Antibiotics will be prescribed if your doctor thinks there is also a secondary bacterial infection.  Home care    If symptoms are severe, rest at home for the first 2 to 3 days. When you go back to your usual activities, don't let yourself get too tired.    Do not smoke. Also avoid being exposed to secondhand smoke.    You may use over-the-counter medicine to control fever or pain, unless another medicine was prescribed. Note: If you have chronic liver or kidney disease or have ever had a stomach ulcer or gastrointestinal bleeding, talk with your healthcare provider before using these medicines. Also talk to your provider if you are taking medicine to prevent blood clots.) Aspirin should never be given to anyone younger than 18 years of age who is ill with a viral infection or fever. It  may cause severe liver or brain damage.    Your appetite may be poor, so a light diet is fine. Avoid dehydration by drinking 6 to 8 glasses of fluids per day (such as water, soft drinks, sports drinks, juices, tea, or soup). Extra fluids will help loosen secretions in the nose and lungs.    Over-the-counter cough, cold, and sore-throat medicines will not shorten the length of the illness, but they may be helpful to reduce symptoms. (Note: Do not use decongestants if you have high blood pressure.)    If you were given an inhaler, use it exactly as directed. If you need to use it more often than prescribed, your condition may be worsening. If this happens, contact your healthcare provider.    If prescribed, finish all antibiotic medicine, even if you are feeling better after only a few days.  Follow-up care  Follow up with your healthcare provider, or as advised. If you had an X-ray or ECG (electrocardiogram), a specialist will review it. You will be notified of any new findings that may affect your care.  Note: If you are age 65 or older, or if you have a chronic lung disease or condition that affects your immune system, or you smoke, talk to your healthcare provider about having a pneumococcal vaccinations and a yearly influenza vaccination (flu shot).  When to seek medical advice  Call your healthcare provider right away if any of these occur:    Fever of 100.4 F (38 C) or higher    Coughing up increasing amounts of colored sputum    Weakness, drowsiness, headache, facial pain, ear pain, or a stiff neck  Call 911, or get immediate medical care  Contact emergency services right away if any of these occur.    Coughing up blood    Worsening weakness, drowsiness, headache, or stiff neck    Increased wheezing not helped with medication, shortness of breath, or pain with breathing  Date Last Reviewed: 9/13/2015 2000-2017 [employer]. [address], [place] [address]. All rights reserved. This  information is not intended as a substitute for professional medical care. Always follow your healthcare professional's instructions.          Acute Bronchitis  Your healthcare provider has told you that you have acute bronchitis. Bronchitis is infection or inflammation of the bronchial tubes (airways in the lungs). Normally, air moves easily in and out of the airways. Bronchitis narrows the airways, making it harder for air to flow in and out of the lungs. This causes symptoms such as shortness of breath, coughing up yellow or green mucus, and wheezing. Bronchitis can be acute or chronic. Acute means the condition comes on quickly and goes away in a short time, usually within 3 to 10 days. Chronic means a condition lasts a long time and often comes back.    What causes acute bronchitis?  Acute bronchitis almost always starts as a viral respiratory infection, such as a cold or the flu. Certain factors make it more likely for a cold or flu to turn into bronchitis. These include being very young, being elderly, having a heart or lung problem, or having a weak immune system. Cigarette smoking also makes bronchitis more likely.  When bronchitis develops, the airways become swollen. The airways may also become infected with bacteria. This is known as a secondary infection.  Diagnosing acute bronchitis  Your healthcare provider will examine you and ask about your symptoms and health history. You may also have a sputum culture to test the fluid in your lungs. Chest X-rays may be done to look for infection in the lungs.  Treating acute bronchitis  Bronchitis usually clears up as the cold or flu goes away. You can help feel better faster by doing the following:    Take medicine as directed. You may be told to take ibuprofen or other over-the-counter medicines. These help relieve inflammation in your bronchial tubes. Your healthcare provider may prescribe an inhaler to help open up the bronchial tubes. Most of the time, acute  bronchitis is caused by a viral infection. Antibiotics are usually not prescribed for viral infections.    Drink plenty of fluids, such as water, juice, or warm soup. Fluids loosen mucus so that you can cough it up. This helps you breathe more easily. Fluids also prevent dehydration.    Make sure you get plenty of rest.    Do not smoke. Do not allow anyone else to smoke in your home.  Recovery and follow-up  Follow up with your doctor as you are told. You will likely feel better in a week or two. But a dry cough can linger beyond that time. Let your doctor know if you still have symptoms (other than a dry cough) after 2 weeks, or if you re prone to getting bronchial infections. Take steps to protect yourself from future infections. These steps include stopping smoking and avoiding tobacco smoke, washing your hands often, and getting a yearly flu shot.  When to call your healthcare provider  Call the healthcare provider if you have any of the following:    Fever of 100.4 F (38.0 C) or higher, or as advised    Symptoms that get worse, or new symptoms    Trouble breathing    Symptoms that don t start to improve within a week, or within 3 days of taking antibiotics   Date Last Reviewed: 12/1/2016 2000-2017 [employer]. [address] [place] [address], [place] [address]. All rights reserved. This information is not intended as a substitute for professional medical care. Always follow your healthcare professional's instructions.

## 2018-01-18 NOTE — TELEPHONE ENCOUNTER
Reason for Disposition    Difficulty breathing    Additional Information    Negative: Severe difficulty breathing (e.g., struggling for each breath, speaks in single words)    Negative: Bluish lips, tongue, or face now    Negative: [1] Difficulty breathing AND [2] exposure to flames, smoke, or fumes    Negative: [1] Stridor AND [2] difficulty breathing    Negative: Sounds like a life-threatening emergency to the triager    Negative: [1] Previous asthma attacks AND [2] this feels like asthma attack    Negative: Dry (non-productive) cough (i.e., no sputum or minimal clear sputum)    Negative: Chest pain  (Exception: MILD central chest pain, present only when coughing)    Protocols used: COUGH - ACUTE PRODUCTIVE-ADULT-AH    She states she has wheezing and shortness of breath. She has a cough that sounds like something is rolling.  Her  will bring her to the ER.  Particia Schroeder RN-[facility]

## 2018-01-21 ASSESSMENT — ENCOUNTER SYMPTOMS
NUMBNESS: 0
BACK PAIN: 0
HEADACHES: 0
CONFUSION: 0
NECK PAIN: 0
DYSURIA: 0
WEAKNESS: 0

## 2018-01-25 ENCOUNTER — OFFICE VISIT (OUTPATIENT)
Dept: FAMILY MEDICINE | Facility: CLINIC | Age: 68
End: 2018-01-25
Payer: COMMERCIAL

## 2018-01-25 VITALS
OXYGEN SATURATION: 100 % | DIASTOLIC BLOOD PRESSURE: 72 MMHG | HEART RATE: 68 BPM | HEIGHT: 67 IN | SYSTOLIC BLOOD PRESSURE: 126 MMHG | TEMPERATURE: 97.1 F

## 2018-01-25 DIAGNOSIS — J20.9 ACUTE BRONCHITIS WITH SYMPTOMS GREATER THAN 10 DAYS: ICD-10-CM

## 2018-01-25 PROCEDURE — 99213 OFFICE O/P EST LOW 20 MIN: CPT | Performed by: PHYSICIAN ASSISTANT

## 2018-01-25 RX ORDER — IPRATROPIUM BROMIDE AND ALBUTEROL SULFATE 2.5; .5 MG/3ML; MG/3ML
1 SOLUTION RESPIRATORY (INHALATION) EVERY 6 HOURS PRN
Qty: 30 VIAL | Refills: 1 | Status: SHIPPED | OUTPATIENT
Start: 2018-01-25 | End: 2019-12-16

## 2018-01-25 RX ORDER — BENZONATATE 200 MG/1
200 CAPSULE ORAL 3 TIMES DAILY PRN
Qty: 21 CAPSULE | Refills: 0 | Status: SHIPPED | OUTPATIENT
Start: 2018-01-25 | End: 2018-03-07

## 2018-01-25 RX ORDER — CODEINE PHOSPHATE AND GUAIFENESIN 10; 100 MG/5ML; MG/5ML
1-2 SOLUTION ORAL EVERY 4 HOURS PRN
Qty: 240 ML | Refills: 0 | Status: SHIPPED | OUTPATIENT
Start: 2018-01-25 | End: 2018-03-07

## 2018-01-25 RX ORDER — ALBUTEROL SULFATE 90 UG/1
2 AEROSOL, METERED RESPIRATORY (INHALATION) EVERY 4 HOURS PRN
Qty: 1 INHALER | Refills: 1 | Status: SHIPPED | OUTPATIENT
Start: 2018-01-25 | End: 2021-03-16

## 2018-01-25 RX ORDER — DOXYCYCLINE 100 MG/1
100 CAPSULE ORAL 2 TIMES DAILY
Qty: 20 CAPSULE | Refills: 0 | Status: SHIPPED | OUTPATIENT
Start: 2018-01-25 | End: 2018-03-07

## 2018-01-25 NOTE — NURSING NOTE
"Chief Complaint   Patient presents with     ER F/U       Initial /72  Pulse 68  Temp 97.1  F (36.2  C) (Tympanic)  Ht 5' 7\" (1.702 m)  SpO2 100% Estimated body mass index is 31.64 kg/(m^2) as calculated from the following:    Height as of 11/3/17: 5' 7\" (1.702 m).    Weight as of 11/3/17: 202 lb (91.6 kg).  Medication Reconciliation: complete     Linwood Obrien, VICKI    "

## 2018-01-25 NOTE — PATIENT INSTRUCTIONS
Take the antibiotic (doxycycline) for 10 days    Use the nebulizer in place of the albuterol every 6 hours    Try the tessalon for the cough     Okay to continue the robitussin with codeine at night

## 2018-01-25 NOTE — MR AVS SNAPSHOT
"              After Visit Summary   1/25/2018    Tri Ingram    MRN: [id number]           Patient Information     Date Of Birth          1950        Visit Information        Provider Department      1/25/2018 10:40 AM Charlene Cain PA-C [facility]        Today's Diagnoses     Acute bronchitis with symptoms greater than 10 days          Care Instructions    Take the antibiotic (doxycycline) for 10 days    Use the nebulizer in place of the albuterol every 6 hours    Try the tessalon for the cough     Okay to continue the robitussin with codeine at night          Follow-ups after your visit        Who to contact     Normal or non-critical lab and imaging results will be communicated to you by MyChart, letter or phone within 4 business days after the clinic has received the results. If you do not hear from us within 7 days, please contact the clinic through MyChart or phone. If you have a critical or abnormal lab result, we will notify you by phone as soon as possible.  Submit refill requests through [employer] or call your pharmacy and they will forward the refill request to us. Please allow 3 business days for your refill to be completed.          If you need to speak with a  for additional information , please call: [phone]             Additional Information About Your Visit        Care EveryWhere ID     This is your Care EveryWhere ID. This could be used by other organizations to access your [facility] medical records  [id number]        Your Vitals Were     Pulse Temperature Height Pulse Oximetry          68 97.1  F (36.2  C) (Tympanic) 5' 7\" (1.702 m) 100%         Blood Pressure from Last 3 Encounters:   01/25/18 126/72   01/18/18 135/87   01/09/18 (!) 144/92    Weight from Last 3 Encounters:   11/03/17 202 lb (91.6 kg)   05/15/17 210 lb (95.3 kg)   04/17/17 209 lb (94.8 kg)              Today, you had the following     No orders found for display       "   Today's Medication Changes          These changes are accurate as of 1/25/18 11:10 AM.  If you have any questions, ask your nurse or doctor.               Start taking these medicines.        Dose/Directions    benzonatate 200 MG capsule   Commonly known as:  TESSALON   Used for:  Acute bronchitis with symptoms greater than 10 days   Started by:  Charlene Cain PA-C        Dose:  200 mg   Take 1 capsule (200 mg) by mouth 3 times daily as needed for cough   Quantity:  21 capsule   Refills:  0       doxycycline 100 MG capsule   Commonly known as:  VIBRAMYCIN   Used for:  Acute bronchitis with symptoms greater than 10 days   Started by:  Charlene Cain PA-C        Dose:  100 mg   Take 1 capsule (100 mg) by mouth 2 times daily   Quantity:  20 capsule   Refills:  0       ipratropium - albuterol 0.5 mg/2.5 mg/3 mL 0.5-2.5 (3) MG/3ML neb solution   Commonly known as:  DUONEB   Used for:  Acute bronchitis with symptoms greater than 10 days   Started by:  Charlene Cain PA-C        Dose:  1 vial   Take 1 vial (3 mLs) by nebulization every 6 hours as needed for shortness of breath / dyspnea or wheezing   Quantity:  30 vial   Refills:  1       order for DME   Used for:  Acute bronchitis with symptoms greater than 10 days   Started by:  Charlene Cain PA-C        Equipment being ordered: Nebulizer   Quantity:  1 Device   Refills:  0         Stop taking these medicines if you haven't already. Please contact your care team if you have questions.     guaiFENesin-dextromethorphan 100-10 MG/5ML syrup   Commonly known as:  ROBITUSSIN DM   Stopped by:  Charlene Cain PA-C                Where to get your medicines      These medications were sent to [facility] [address]  [address], [facility] [address]     Phone:  [phone]     albuterol 108 (90 BASE) MCG/ACT Inhaler    benzonatate 200 MG capsule    doxycycline  100 MG capsule    ipratropium - albuterol 0.5 mg/2.5 mg/3 mL 0.5-2.5 (3) MG/3ML neb solution         Some of these will need a paper prescription and others can be bought over the counter.  Ask your nurse if you have questions.     Bring a paper prescription for each of these medications     guaiFENesin-codeine 100-10 MG/5ML Soln solution    order for DME                Primary Care Provider Office Phone # Fax #    Charlene Cain PA-C [phone] [address] [facility] [address]        Equal Access to Services     [facility]: Hadii aad ku hadasho Sokevin, waaxda luqadaha, qaybta kaalmada adeshelbiyasarthak, deanne albert . So [facility] [phone].    ATENCIÓN: Si habla español, tiene a jackson disposición servicios gratuitos de asistencia lingüística. Ll[facility] [phone].    We comply with applicable federal civil rights laws and [place] laws. We do not discriminate on the basis of race, color, national origin, age, disability, sex, sexual orientation, or gender identity.            Thank you!     Thank you for choosing [facility]  for your care. Our goal is always to provide you with excellent care. Hearing back from our patients is one way we can continue to improve our services. Please take a few minutes to complete the written survey that you may receive in the mail after your visit with us. Thank you!             Your Updated Medication List - Protect others around you: Learn how to safely use, store and throw away your medicines at www.disposemymeds.org.          This list is accurate as of 1/25/18 11:10 AM.  Always use your most recent med list.                   Brand Name Dispense Instructions for use Diagnosis    albuterol 108 (90 BASE) MCG/ACT Inhaler    PROAIR HFA/PROVENTIL HFA/VENTOLIN HFA    1 Inhaler    Inhale 2 puffs into the lungs every 4 hours as needed for shortness of breath / dyspnea or wheezing    Acute bronchitis with symptoms  greater than 10 days       ARTHRITIS PAIN RELIEF 650 MG CR tablet   Generic drug:  acetaminophen     100 tablet    Take 2 tablets by mouth daily. Takes in pm        aspirin 81 MG tablet     100    ONE DAILY    Tobacco use disorder, Other and unspecified hyperlipidemia       benzonatate 200 MG capsule    TESSALON    21 capsule    Take 1 capsule (200 mg) by mouth 3 times daily as needed for cough    Acute bronchitis with symptoms greater than 10 days       Calcium carb-Vitamin D 500 mg [place]-200 units 500-200 MG-UNIT per tablet    OSCAL with D;Oyster Shell Calcium     Take 1 tablet by mouth daily.        doxycycline 100 MG capsule    VIBRAMYCIN    20 capsule    Take 1 capsule (100 mg) by mouth 2 times daily    Acute bronchitis with symptoms greater than 10 days       estrogen conj-medroxyPROGESTERone 0.45-1.5 MG per tablet    PREMPRO    40 tablet    Take one three times weekly or as needed to control hot flashes/menopausal symptoms.    Symptomatic menopausal or female climacteric states       guaiFENesin-codeine 100-10 MG/5ML Soln solution    ROBITUSSIN AC    240 mL    Take 5-10 mLs by mouth every 4 hours as needed for cough    Acute bronchitis with symptoms greater than 10 days       ibuprofen 200 MG capsule      Take 1,200 mg by mouth 2 times daily        ICAPS Caps      Take 1 capsule by mouth daily        ipratropium - albuterol 0.5 mg/2.5 mg/3 mL 0.5-2.5 (3) MG/3ML neb solution    DUONEB    30 vial    Take 1 vial (3 mLs) by nebulization every 6 hours as needed for shortness of breath / dyspnea or wheezing    Acute bronchitis with symptoms greater than 10 days       lovastatin 40 MG tablet    MEVACOR    90 tablet    TAKE ONE TABLET BY MOUTH EVERY NIGHT AT BEDTIME    Hyperlipidemia LDL goal <130       MAGNESIUM PO      Take 250 mg by mouth daily        order for DME     1 Device    Equipment being ordered: Nebulizer    Acute bronchitis with symptoms greater than 10 days

## 2018-01-25 NOTE — PROGRESS NOTES
SUBJECTIVE:   Tri Ingram is a 68 year old female who presents to clinic today for the following health issues:      ED/UC Followup:    Facility:  [facility]   Date of visit: 1/18/18   Reason for visit: Cough, Bronchitis   Current Status: She is doing better but still not feeling well.        -Would like a renewal on her inhaler, would possibly like some codeine, and if she needs to start back up on antibiotics again.         Cough is improved overall but still coughing enough that she is not sleeping, will cough so hard she will almost vomit  Back and stomach sore from coughing  Still with sinus pressure/congestion      Problem list and histories reviewed & adjusted, as indicated.  Additional history: as documented    Current Outpatient Prescriptions   Medication Sig Dispense Refill     guaiFENesin-codeine (ROBITUSSIN AC) 100-10 MG/5ML SOLN solution Take 1-2 tsp. by mouth every 4 hours as needed for cough       guaiFENesin-dextromethorphan (ROBITUSSIN DM) 100-10 MG/5ML syrup Take 10 mLs by mouth every 4 hours as needed for cough       lovastatin (MEVACOR) 40 MG tablet TAKE ONE TABLET BY MOUTH EVERY NIGHT AT BEDTIME 90 tablet 1     estrogen conj-medroxyPROGESTERone (PREMPRO) 0.45-1.5 MG per tablet Take one three times weekly or as needed to control hot flashes/menopausal symptoms. 40 tablet 3     albuterol (PROAIR HFA/PROVENTIL HFA/VENTOLIN HFA) 108 (90 BASE) MCG/ACT Inhaler Inhale 2 puffs into the lungs every 4 hours as needed for shortness of breath / dyspnea or wheezing 1 Inhaler 1     MAGNESIUM PO Take 250 mg by mouth daily        ibuprofen 200 MG capsule Take 1,200 mg by mouth 2 times daily        Multiple Vitamins-Minerals (ICAPS) CAPS Take 1 capsule by mouth daily       acetaminophen (ARTHRITIS PAIN RELIEF) 650 MG CR tablet Take 2 tablets by mouth daily. Takes in pm 100 tablet 11     calcium carb 1250 mg, 500 mg [place],/vitamin D 200 units (OSCAL WITH D) 500-200 MG-UNIT per  "tablet Take 1 tablet by mouth daily.       ASPIRIN 81 MG OR TABS ONE DAILY 100 3     Allergies   Allergen Reactions     Adhesive Tape      Iodine Anaphylaxis     contrast dye     Penicillins Hives     BP Readings from Last 3 Encounters:   01/25/18 126/72   01/18/18 135/87   01/09/18 (!) 144/92    Wt Readings from Last 3 Encounters:   11/03/17 202 lb (91.6 kg)   05/15/17 210 lb (95.3 kg)   04/17/17 209 lb (94.8 kg)                    Reviewed and updated as needed this visit by clinical staff       Reviewed and updated as needed this visit by Provider         ROS:  Remainder of ROS obtained and found to be negative other than that which was documented above      OBJECTIVE:     /72  Pulse 68  Temp 97.1  F (36.2  C) (Tympanic)  Ht 5' 7\" (1.702 m)  SpO2 100%  There is no height or weight on file to calculate BMI.  GENERAL: healthy, alert and no distress  EYES: Eyes grossly normal to inspection  HENT: ear canals and TM's normal, nose and mouth without ulcers or lesions  NECK: no adenopathy  RESP: lungs clear to auscultation - no rales, rhonchi or wheezes  CV: regular rates and rhythm, normal S1 S2, no S3 or S4 and no murmur, click or rub    Diagnostic Test Results:  none     ASSESSMENT/PLAN:     (J20.9) Acute bronchitis with symptoms greater than 10 days  Comment:   Plan: albuterol (PROAIR HFA/PROVENTIL HFA/VENTOLIN         HFA) 108 (90 BASE) MCG/ACT Inhaler, ipratropium        - albuterol 0.5 mg/2.5 mg/3 mL (DUONEB) 0.5-2.5        (3) MG/3ML neb solution, order for DME,         doxycycline (VIBRAMYCIN) 100 MG capsule,         benzonatate (TESSALON) 200 MG capsule,         guaiFENesin-codeine (ROBITUSSIN AC) 100-10         MG/5ML SOLN solution                Charlene Cain PA-C  [facility]        Patient Instructions   Take the antibiotic (doxycycline) for 10 days    Use the nebulizer in place of the albuterol every 6 hours    Try the tessalon for the cough     Okay to continue the robitussin " with codeine at night

## 2018-03-02 ENCOUNTER — TELEPHONE (OUTPATIENT)
Dept: FAMILY MEDICINE | Facility: CLINIC | Age: 68
End: 2018-03-02

## 2018-03-02 DIAGNOSIS — Z87.898 H/O MOTION SICKNESS: Primary | ICD-10-CM

## 2018-03-02 NOTE — TELEPHONE ENCOUNTER
Reason for Call:  Other prescription    Detailed comments: Tri is leaving for [place] on 3/12/18.  She was hoping to get a few scopolamine patches for motion sickness.  [facility]    She is also going on a cruise in August, 2018 and would like scopolamine for then also.  Should she get all the patches now, or wait until August to request patches for the cruise?  Please review and advise.  Thank you..Maureen Valdivia    Phone Number Patient can be reached at: Home number on file [phone] (home)    Best Time: any time    Can we leave a detailed message on this number? YES    Call taken on 3/2/2018 at 4:02 PM by Maureen Valdivia

## 2018-03-05 RX ORDER — SCOLOPAMINE TRANSDERMAL SYSTEM 1 MG/1
PATCH, EXTENDED RELEASE TRANSDERMAL
Qty: 2 PATCH | Refills: 0 | Status: SHIPPED | OUTPATIENT
Start: 2018-03-05 | End: 2018-07-09

## 2018-03-05 NOTE — TELEPHONE ENCOUNTER
Script for patches sent to the pharmacy. I would have her call  to August to get a refill for the cruismelissa Chang

## 2018-03-07 ENCOUNTER — OFFICE VISIT (OUTPATIENT)
Dept: FAMILY MEDICINE | Facility: CLINIC | Age: 68
End: 2018-03-07
Payer: COMMERCIAL

## 2018-03-07 VITALS
HEART RATE: 70 BPM | WEIGHT: 203 LBS | OXYGEN SATURATION: 100 % | SYSTOLIC BLOOD PRESSURE: 125 MMHG | BODY MASS INDEX: 31.79 KG/M2 | DIASTOLIC BLOOD PRESSURE: 77 MMHG | TEMPERATURE: 98.3 F

## 2018-03-07 DIAGNOSIS — J01.00 ACUTE NON-RECURRENT MAXILLARY SINUSITIS: Primary | ICD-10-CM

## 2018-03-07 PROCEDURE — 99213 OFFICE O/P EST LOW 20 MIN: CPT | Performed by: PHYSICIAN ASSISTANT

## 2018-03-07 RX ORDER — DOXYCYCLINE 100 MG/1
100 CAPSULE ORAL 2 TIMES DAILY
Qty: 20 CAPSULE | Refills: 0 | Status: SHIPPED | OUTPATIENT
Start: 2018-03-07 | End: 2018-03-17

## 2018-03-07 NOTE — NURSING NOTE
"Chief Complaint   Patient presents with     Sinus Problem       Initial /77 (BP Location: Right arm, Patient Position: Sitting, Cuff Size: Adult Regular)  Pulse 70  Temp 98.3  F (36.8  C) (Tympanic)  Wt 203 lb (92.1 kg)  SpO2 100%  BMI 31.79 kg/m2 Estimated body mass index is 31.79 kg/(m^2) as calculated from the following:    Height as of 1/25/18: 5' 7\" (1.702 m).    Weight as of this encounter: 203 lb (92.1 kg).  Medication Reconciliation: complete   Debbie Voss CMA  "

## 2018-03-07 NOTE — PATIENT INSTRUCTIONS
Doxycycline antibiotics if needed  Albuterol inhaler if needed    Have plenty of rest and fluids  Humidified air/steam  Use the flonase in each nostril daily  mucinex to loosen mucus  Zyrtec to dry up  Follow up if worsening symptoms or if not improving    -Ear pressure/pain is primarily a problem of drainage. You can best help your body clear the sinus secretions and pressure by opening up the natural passageways which are often blocked by viral colds and allergies.

## 2018-03-07 NOTE — MR AVS SNAPSHOT
"              After Visit Summary   3/7/2018    Tri Ingram    MRN: [id number]           Patient Information     Date Of Birth          1950        Visit Information        Provider Department      3/7/2018 1:40 PM Cristiana Marley PA-C [facility]        Today's Diagnoses     Acute non-recurrent maxillary sinusitis    -  1      Care Instructions    Doxycycline antibiotics if needed  Albuterol inhaler if needed    Have plenty of rest and fluids  Humidified air/steam  Use the flonase in each nostril daily  mucinex to loosen mucus  Zyrtec to dry up  Follow up if worsening symptoms or if not improving    -Ear pressure/pain is primarily a problem of drainage. You can best help your body clear the sinus secretions and pressure by opening up the natural passageways which are often blocked by viral colds and allergies.           Follow-ups after your visit        Who to contact     Normal or non-critical lab and imaging results will be communicated to you by VCEhart, letter or phone within 4 business days after the clinic has received the results. If you do not hear from us within 7 days, please contact the clinic through VCEhart or phone. If you have a critical or abnormal lab result, we will notify you by phone as soon as possible.  Submit refill requests through [employer] or call your pharmacy and they will forward the refill request to us. Please allow 3 business days for your refill to be completed.          If you need to speak with a  for additional information , please call: [phone]             Additional Information About Your Visit        [employer] Information     [employer] lets you send messages to your doctor, view your test results, renew your prescriptions, schedule appointments and more. To sign up, go to [email or web address] . Click on \"Log in\" on the left side of the screen, which will take you to the Welcome page. Then click on \"Sign up Now\" on the right side " of the page.     You will be asked to enter the access code listed below, as well as some personal information. Please follow the directions to create your username and password.     Your access code is: [email or web address]  Expires: 2018  2:11 PM     Your access code will  in 90 days. If you need help or a new code, please call your [facility] clinic or [phone].        Care EveryWhere ID     This is your Care EveryWhere ID. This could be used by other organizations to access your [facility] medical records  [id number]        Your Vitals Were     Pulse Temperature Pulse Oximetry BMI (Body Mass Index)          70 98.3  F (36.8  C) (Tympanic) 100% 31.79 kg/m2         Blood Pressure from Last 3 Encounters:   18 125/77   18 126/72   18 135/87    Weight from Last 3 Encounters:   18 203 lb (92.1 kg)   17 202 lb (91.6 kg)   05/15/17 210 lb (95.3 kg)              Today, you had the following     No orders found for display         Today's Medication Changes          These changes are accurate as of 3/7/18  2:11 PM.  If you have any questions, ask your nurse or doctor.               Start taking these medicines.        Dose/Directions    doxycycline monohydrate 100 MG capsule   Used for:  Acute non-recurrent maxillary sinusitis   Started by:  Cristiana Marley PA-C        Dose:  100 mg   Take 1 capsule (100 mg) by mouth 2 times daily for 10 days   Quantity:  20 capsule   Refills:  0            Where to get your medicines      These medications were sent to [facility] [address]  [address] [address]     Phone:  [phone]     doxycycline monohydrate 100 MG capsule                Primary Care Provider Office Phone # Fax #    Charlene Cain PA-C [phone] [address]  [facility] [address]        Equal Access to Services     CHANTEL SUAREZ AH: priyanka Harmon,  deanne rinaldi ah. Amita [facility] [phone].    ATENCIÓN: Si nanci mendez, tiene a jackson disposición servicios gratuitos de asistencia lingüística. Flavia al [phone].    We comply with applicable federal civil rights laws and [place] laws. We do not discriminate on the basis of race, color, national origin, age, disability, sex, sexual orientation, or gender identity.            Thank you!     Thank you for choosing [facility]  for your care. Our goal is always to provide you with excellent care. Hearing back from our patients is one way we can continue to improve our services. Please take a few minutes to complete the written survey that you may receive in the mail after your visit with us. Thank you!             Your Updated Medication List - Protect others around you: Learn how to safely use, store and throw away your medicines at www.disposemymeds.org.          This list is accurate as of 3/7/18  2:11 PM.  Always use your most recent med list.                   Brand Name Dispense Instructions for use Diagnosis    albuterol 108 (90 BASE) MCG/ACT Inhaler    PROAIR HFA/PROVENTIL HFA/VENTOLIN HFA    1 Inhaler    Inhale 2 puffs into the lungs every 4 hours as needed for shortness of breath / dyspnea or wheezing    Acute bronchitis with symptoms greater than 10 days       ARTHRITIS PAIN RELIEF 650 MG CR tablet   Generic drug:  acetaminophen     100 tablet    Take 2 tablets by mouth daily. Takes in pm        aspirin 81 MG tablet     100    ONE DAILY    Tobacco use disorder, Other and unspecified hyperlipidemia       Calcium carb-Vitamin D 500 mg [place]-200 units 500-200 MG-UNIT per tablet    OSCAL with D;Oyster Shell Calcium     Take 1 tablet by mouth daily.        doxycycline monohydrate 100 MG capsule     20 capsule    Take 1 capsule (100 mg) by mouth 2 times daily for 10 days    Acute non-recurrent maxillary sinusitis       estrogen  conj-medroxyPROGESTERone 0.45-1.5 MG per tablet    PREMPRO    40 tablet    Take one three times weekly or as needed to control hot flashes/menopausal symptoms.    Symptomatic menopausal or female climacteric states       ibuprofen 200 MG capsule      Take 1,200 mg by mouth 2 times daily        ICAPS Caps      Take 1 capsule by mouth daily        ipratropium - albuterol 0.5 mg/2.5 mg/3 mL 0.5-2.5 (3) MG/3ML neb solution    DUONEB    30 vial    Take 1 vial (3 mLs) by nebulization every 6 hours as needed for shortness of breath / dyspnea or wheezing    Acute bronchitis with symptoms greater than 10 days       lovastatin 40 MG tablet    MEVACOR    90 tablet    TAKE ONE TABLET BY MOUTH EVERY NIGHT AT BEDTIME    Hyperlipidemia LDL goal <130       MAGNESIUM PO      Take 250 mg by mouth daily        order for DME     1 Device    Equipment being ordered: Nebulizer    Acute bronchitis with symptoms greater than 10 days       scopolamine 72 hr patch    TRANSDERM    2 patch    Apply 1 patch to hairless area behind one ear at least 4 hours before travel.  Remove old patch and change every 3 days (72 hours).    H/O motion sickness

## 2018-03-07 NOTE — PROGRESS NOTES
"  SUBJECTIVE:   Tri Ingram is a 68 year old female who presents to clinic today for the following health issues:    ENT Symptoms             Symptoms: cc Present Absent Comment   Fever/Chills   x    Fatigue  x     Muscle Aches   x    Eye Irritation   x    Sneezing  x     Nasal Deshawn/Drg x      Sinus Pressure/Pain x      Loss of smell  x     Dental pain   x    Sore Throat  x  Scratchy not painful   Swollen Glands       Ear Pain/Fullness x      Cough   x    Wheeze   x    Chest Pain   x    Shortness of breath   x    Rash   x    Other  x  headache     Symptom duration:  2 days   Symptom severity:  moderate   Treatments tried:  steam, tylenol, cough drops, Vicks   Contacts:  none     Treated for bronchitis with doxycycline 1/25/18  Penicillin allergy  Has had sinus infections in past  Did get flu shot this year. She does not feel this is the flu, does not want tested  Doesn't tolerate decongestants - \"wired\"  She bought flonase and zyrtec but hasn't started yet    Leaves Monday for [place]      Problem list and histories reviewed & adjusted, as indicated.  Additional history: as documented    Patient Active Problem List   Diagnosis     Other malignant neoplasm of skin of trunk, except scrotum     Generalized osteoarthrosis, unspecified site     Disorder of bone and cartilage     Symptomatic menopausal or female climacteric states     Plantar fasciitis     Hyperlipidemia LDL goal <130     Obesity     Advanced directives, counseling/discussion     Family history of abdominal aortic aneurysm     Former moderate cigarette smoker (10-19 per day)     Past Surgical History:   Procedure Laterality Date     C NONSPECIFIC PROCEDURE      Bowen's disease removed X 2     COLONOSCOPY  2001, 2007     COLONOSCOPY N/A 11/3/2017    Procedure: COLONOSCOPY;  Colonoscopy  ;  Surgeon: Lg Guerrero MD;  Location: [facility]         Reviewed and updated as needed this visit by clinical staff  Tobacco  Allergies  Meds  Problems  Med " Hx  Surg Hx  Fam Hx  Soc Hx        Reviewed and updated as needed this visit by Provider  Tobacco  Allergies  Meds  Problems  Med Hx  Surg Hx  Fam Hx  Soc Hx          ROS:  Other than noted above, general, HEENT, respiratory, cardiac, MS, and gastrointestinal systems are negative.     OBJECTIVE:     /77 (BP Location: Right arm, Patient Position: Sitting, Cuff Size: Adult Regular)  Pulse 70  Temp 98.3  F (36.8  C) (Tympanic)  Wt 203 lb (92.1 kg)  SpO2 100%  BMI 31.79 kg/m2  Body mass index is 31.79 kg/(m^2).  GENERAL: healthy, alert and no distress  HENT: ear canals and TM's POSITIVE congestion/fluid bilaterally, no erythema/bulging, nose and mouth without ulcers or lesions. Tender to palpate sinuses bilaterally   NECK: no adenopathy, no asymmetry, masses, or scars and thyroid normal to palpation  RESP: lungs clear to auscultation - no rales, rhonchi or wheezes  CV: regular rate and rhythm, normal S1 S2, no S3 or S4, no murmur, click or rub, no peripheral edema and peripheral pulses strong  ABDOMEN: soft, nontender, no hepatosplenomegaly, no masses and bowel sounds normal  MS: no gross musculoskeletal defects noted, no edema    ASSESSMENT/PLAN:     ASSESSMENT/PLAN:      ICD-10-CM    1. Acute non-recurrent maxillary sinusitis J01.00 doxycycline monohydrate 100 MG capsule       Patient Instructions   Doxycycline antibiotics if needed  Albuterol inhaler if needed    Have plenty of rest and fluids  Humidified air/steam  Use the flonase in each nostril daily  mucinex to loosen mucus  Zyrtec to dry up  Follow up if worsening symptoms or if not improving    -Ear pressure/pain is primarily a problem of drainage. You can best help your body clear the sinus secretions and pressure by opening up the natural passageways which are often blocked by viral colds and allergies.     Cristiana Marley PA-C  [facility]

## 2018-03-26 DIAGNOSIS — N95.1 SYMPTOMATIC MENOPAUSAL OR FEMALE CLIMACTERIC STATES: ICD-10-CM

## 2018-03-26 RX ORDER — ESTROGEN,CON/M-PROGEST ACET 0.45-1.5MG
TABLET ORAL
Qty: 40 TABLET | Refills: 0 | Status: SHIPPED | OUTPATIENT
Start: 2018-03-26 | End: 2018-06-05

## 2018-03-26 NOTE — TELEPHONE ENCOUNTER
"PREMPRO 0.45-1.5MG TABS        Last Written Prescription Date:  11/21/2009  Last Fill Quantity: 90,   # refills: 3  Last Office Visit: 3/7/18  Future Office visit:       Requested Prescriptions   Pending Prescriptions Disp Refills     PREMPRO 0.45-1.5 MG per tablet [Pharmacy Med Name: PREMPRO 0.45-1.5MG TABS] 40 tablet 3     Sig: TAKE ONE TABLET BY MOUTH THREE TIMES WEEKLY OR AS NEEDED TO CONTROL HOT FLASHES/MENOPAUSAL SYMPTOMS    Hormone Replacement Therapy Passed    3/26/2018  9:30 AM       Passed - Blood pressure under 140/90 in past 12 months    BP Readings from Last 3 Encounters:   03/07/18 125/77   01/25/18 126/72   01/18/18 135/87                Passed - Recent (12 mo) or future (30 days) visit within the authorizing provider's specialty    Patient had office visit in the last 12 months or has a visit in the next 30 days with authorizing provider or within the authorizing provider's specialty.  See \"Patient Info\" tab in inbasket, or \"Choose Columns\" in Meds & Orders section of the refill encounter.           Passed - Patient has mammogram in past 2 years on file if age 50-75       Passed - Patient is 18 years of age or older       Passed - No active pregnancy on record       Passed - No positive pregnancy test on record in past 12 months          "

## 2018-04-04 ENCOUNTER — TELEPHONE (OUTPATIENT)
Dept: FAMILY MEDICINE | Facility: CLINIC | Age: 68
End: 2018-04-04

## 2018-04-04 DIAGNOSIS — Z13.6 CARDIOVASCULAR SCREENING; LDL GOAL LESS THAN 160: Primary | ICD-10-CM

## 2018-04-04 NOTE — TELEPHONE ENCOUNTER
Reason for Call: Request for an order or referral:    Order or referral being requested: Tri would like to schedule her labs before she comes in for yearly PE.  No orders yet.  Could you please review and place orders of labs you'd like her to have drawn.  She'd like to be called on Friday 4/6/18 when orders have been placed.  Thank you.Maureen Valdivia    Date needed: as soon as possible    Has the patient been seen by the PCP for this problem? YES future appointment after labs are drawn.    Phone number Patient can be reached at:  Home number on file [phone] (home)    Best Time:  Any time    Can we leave a detailed message on this number?  YES    Call taken on 4/4/2018 at 2:14 PM by Maureen Valdivia

## 2018-04-10 DIAGNOSIS — Z13.6 CARDIOVASCULAR SCREENING; LDL GOAL LESS THAN 160: ICD-10-CM

## 2018-04-10 LAB
ALBUMIN SERPL-MCNC: 3.7 G/DL (ref 3.4–5)
ALP SERPL-CCNC: 85 U/L (ref 40–150)
ALT SERPL W P-5'-P-CCNC: 26 U/L (ref 0–50)
ANION GAP SERPL CALCULATED.3IONS-SCNC: 5 MMOL/L (ref 3–14)
AST SERPL W P-5'-P-CCNC: 18 U/L (ref 0–45)
BILIRUB SERPL-MCNC: 0.4 MG/DL (ref 0.2–1.3)
BUN SERPL-MCNC: 19 MG/DL (ref 7–30)
CALCIUM SERPL-MCNC: 9.1 MG/DL (ref 8.5–10.1)
CHLORIDE SERPL-SCNC: 106 MMOL/L (ref 94–109)
CHOLEST SERPL-MCNC: 159 MG/DL
CO2 SERPL-SCNC: 28 MMOL/L (ref 20–32)
CREAT SERPL-MCNC: 1.06 MG/DL (ref 0.52–1.04)
GFR SERPL CREATININE-BSD FRML MDRD: 52 ML/MIN/1.7M2
GLUCOSE SERPL-MCNC: 111 MG/DL (ref 70–99)
HDLC SERPL-MCNC: 42 MG/DL
LDLC SERPL CALC-MCNC: 79 MG/DL
NONHDLC SERPL-MCNC: 117 MG/DL
POTASSIUM SERPL-SCNC: 5.2 MMOL/L (ref 3.4–5.3)
PROT SERPL-MCNC: 7.6 G/DL (ref 6.8–8.8)
SODIUM SERPL-SCNC: 139 MMOL/L (ref 133–144)
TRIGL SERPL-MCNC: 192 MG/DL

## 2018-04-10 PROCEDURE — 36415 COLL VENOUS BLD VENIPUNCTURE: CPT | Performed by: PHYSICIAN ASSISTANT

## 2018-04-10 PROCEDURE — 80061 LIPID PANEL: CPT | Performed by: PHYSICIAN ASSISTANT

## 2018-04-10 PROCEDURE — 80053 COMPREHEN METABOLIC PANEL: CPT | Performed by: PHYSICIAN ASSISTANT

## 2018-04-12 ENCOUNTER — OFFICE VISIT (OUTPATIENT)
Dept: FAMILY MEDICINE | Facility: CLINIC | Age: 68
End: 2018-04-12
Payer: COMMERCIAL

## 2018-04-12 VITALS
TEMPERATURE: 98.1 F | SYSTOLIC BLOOD PRESSURE: 127 MMHG | DIASTOLIC BLOOD PRESSURE: 77 MMHG | BODY MASS INDEX: 31.86 KG/M2 | HEART RATE: 64 BPM | HEIGHT: 67 IN | WEIGHT: 203 LBS

## 2018-04-12 DIAGNOSIS — Z12.31 ENCOUNTER FOR SCREENING MAMMOGRAM FOR BREAST CANCER: ICD-10-CM

## 2018-04-12 DIAGNOSIS — R42 DIZZINESS: ICD-10-CM

## 2018-04-12 DIAGNOSIS — Z87.891 HISTORY OF TOBACCO USE: ICD-10-CM

## 2018-04-12 DIAGNOSIS — Z00.00 ENCOUNTER FOR ROUTINE ADULT HEALTH EXAMINATION WITHOUT ABNORMAL FINDINGS: Primary | ICD-10-CM

## 2018-04-12 PROCEDURE — 99213 OFFICE O/P EST LOW 20 MIN: CPT | Mod: 25 | Performed by: PHYSICIAN ASSISTANT

## 2018-04-12 PROCEDURE — 99397 PER PM REEVAL EST PAT 65+ YR: CPT | Performed by: PHYSICIAN ASSISTANT

## 2018-04-12 NOTE — PROGRESS NOTES
"  SUBJECTIVE:   Tri Ingram is a 68 year old female who presents for Preventive Visit.      Are you in the first 12 months of your Medicare Part B coverage?  No    Healthy Habits:    Do you get at least three servings of calcium containing foods daily (dairy, green leafy vegetables, etc.)? yes    Amount of exercise or daily activities, outside of work: None    Problems taking medications regularly No    Medication side effects: No    Have you had an eye exam in the past two years? yes    Do you see a dentist twice per year? No, but has seen a dentist in the last year     Do you have sleep apnea, excessive snoring or daytime drowsiness? She does snore, is feeling tired during the day       Ability to successfully perform activities of daily living: Yes, no assistance needed    Home safety:  none identified     Hearing impairment: No    Fall risk:         Patient would like to discuss what happened to her in the fall of 2016 when she had to go to the ER and lost all of her vision. They told her she will have symptoms like this for the rest of her life. She will get dizzy and lightheaded from time to time. She sometimes will get hot flashes, feel nauseas and feel like she can't stand up. The doctors in the ER told her there is really nothing they can do and that she just needs to wait it out.   Overall she is not worried about the spells as they happen infrequently  She notes that she is more susceptible to brief \"waves\" of dizziness - for example, knows that she needs to take a few seconds before moving when standing up  She has not had any \"bad\" episodes until a month or so ago  Was out with her  and had the same sensation of feeling warm and nauseated and then dizzy. Was leaving a restaurant. Went to the car and her  turned the cool air on and she started to feel better  Denies any heart pounding, chest pain or SOB at the time  In the ER she had EKG, troponins to rule out cardiac cause. MRI " of brain was also normal    -She also has concerns with her lungs, had bronchitis in January and a sinus infection in March.  Feels like every winter she gets sick and it seems to be lasting longer  Does have h/o smoking - quit over 10 yeas ago but admits she smoked for 30+ years    COGNITIVE SCREEN  1) Repeat 3 items (Banana, Sunrise, Chair)    2) Clock draw: NORMAL  3) 3 item recall: Recalls 3 objects  Results: 3 items recalled: COGNITIVE IMPAIRMENT LESS LIKELY    Mini-CogTM Copyright S Yulissa. Licensed by the author for use in [facility]; reprinted with permission (tarik@[facility]). All rights reserved.                Reviewed and updated as needed this visit by clinical staff         Reviewed and updated as needed this visit by Provider        Social History   Substance Use Topics     Smoking status: Former Smoker     Packs/day: 0.50     Years: 37.00     Types: Cigarettes     Quit date: 10/20/2007     Smokeless tobacco: Never Used     Alcohol use Yes      Comment: minimal       If you drink alcohol do you typically have >3 drinks per day or >7 drinks per week? No                        Today's PHQ-2 Score:   PHQ-2 ( 1999 [employer]) 5/15/2017 4/18/2016   Q1: Little interest or pleasure in doing things 0 0   Q2: Feeling down, depressed or hopeless 0 0   PHQ-2 Score 0 0       Do you feel safe in your environment - Yes    Do you have a Health Care Directive?: No: Advance care planning was reviewed with patient; patient declined at this time.    Current providers sharing in care for this patient include:   Patient Care Team:  Charlene Cain PA-C as PCP - General (Physician Assistant)    The following health maintenance items are reviewed in Epic and correct as of today:  Health Maintenance   Topic Date Due     ADVANCE DIRECTIVE PLANNING Q5 YRS  07/31/2017     MAMMO Q1 YR  05/02/2018     FALL RISK ASSESSMENT  05/15/2018     PNEUMOCOCCAL (2 of 2 - PPSV23) 05/15/2018     INFLUENZA VACCINE (SYSTEM  ASSIGNED)  09/01/2018     LIPID SCREEN Q5 YR FEMALE (SYSTEM ASSIGNED)  04/10/2023     TETANUS IMMUNIZATION (SYSTEM ASSIGNED)  04/18/2026     COLON CANCER SCREEN (SYSTEM ASSIGNED)  11/03/2027     DEXA SCAN SCREENING (SYSTEM ASSIGNED)  Completed     HEPATITIS C SCREENING  Completed     BP Readings from Last 3 Encounters:   04/12/18 127/77   03/07/18 125/77   01/25/18 126/72    Wt Readings from Last 3 Encounters:   04/12/18 203 lb (92.1 kg)   03/07/18 203 lb (92.1 kg)   11/03/17 202 lb (91.6 kg)                  Current Outpatient Prescriptions   Medication Sig Dispense Refill     PREMPRO 0.45-1.5 MG per tablet TAKE ONE TABLET BY MOUTH THREE TIMES WEEKLY OR AS NEEDED TO CONTROL HOT FLASHES/MENOPAUSAL SYMPTOMS 40 tablet 0     scopolamine (TRANSDERM) 72 hr patch Apply 1 patch to hairless area behind one ear at least 4 hours before travel.  Remove old patch and change every 3 days (72 hours). 2 patch 0     albuterol (PROAIR HFA/PROVENTIL HFA/VENTOLIN HFA) 108 (90 BASE) MCG/ACT Inhaler Inhale 2 puffs into the lungs every 4 hours as needed for shortness of breath / dyspnea or wheezing 1 Inhaler 1     order for DME Equipment being ordered: Nebulizer 1 Device 0     lovastatin (MEVACOR) 40 MG tablet TAKE ONE TABLET BY MOUTH EVERY NIGHT AT BEDTIME 90 tablet 1     MAGNESIUM PO Take 250 mg by mouth daily        ibuprofen 200 MG capsule Take 1,200 mg by mouth 2 times daily        Multiple Vitamins-Minerals (ICAPS) CAPS Take 1 capsule by mouth daily       acetaminophen (ARTHRITIS PAIN RELIEF) 650 MG CR tablet Take 2 tablets by mouth daily. Takes in pm 100 tablet 11     calcium carb 1250 mg, 500 mg [place],/vitamin D 200 units (OSCAL WITH D) 500-200 MG-UNIT per tablet Take 1 tablet by mouth daily.       ASPIRIN 81 MG OR TABS ONE DAILY 100 3     ipratropium - albuterol 0.5 mg/2.5 mg/3 mL (DUONEB) 0.5-2.5 (3) MG/3ML neb solution Take 1 vial (3 mLs) by nebulization every 6 hours as needed for shortness of breath / dyspnea or wheezing  "(Patient not taking: Reported on 3/7/2018) 30 vial 1     Allergies   Allergen Reactions     Adhesive Tape      Iodine Anaphylaxis     contrast dye     Penicillins Hives       Pneumonia Vaccine: due in May  Mammogram Screening: Patient over age 50, mutual decision to screen reflected in health maintenance. Due in May  History of abnormal Pap smear: NO - age 65 - see link Cervical Cytology Screening Guidelines    ROS:  Constitutional, HEENT, cardiovascular, pulmonary, gi and gu systems are negative, except as otherwise noted.    OBJECTIVE:   /77  Pulse 64  Temp 98.1  F (36.7  C) (Tympanic)  Ht 5' 7\" (1.702 m)  Wt 203 lb (92.1 kg)  BMI 31.79 kg/m2 Estimated body mass index is 31.79 kg/(m^2) as calculated from the following:    Height as of 1/25/18: 5' 7\" (1.702 m).    Weight as of 3/7/18: 203 lb (92.1 kg).  EXAM:   GENERAL APPEARANCE: healthy, alert and no distress  EYES: Eyes grossly normal to inspection, PERRL and conjunctivae and sclerae normal  HENT: ear canals and TM's normal, nose and mouth without ulcers or lesions, oropharynx clear and oral mucous membranes moist  NECK: no adenopathy, no asymmetry, masses, or scars and thyroid normal to palpation  RESP: lungs clear to auscultation - no rales, rhonchi or wheezes  BREAST: normal without masses, tenderness or nipple discharge and no palpable axillary masses or adenopathy  CV: regular rate and rhythm, normal S1 S2, no S3 or S4, no murmur, click or rub, no peripheral edema and peripheral pulses strong  ABDOMEN: soft, nontender, no hepatosplenomegaly, no masses and bowel sounds normal  MS: no musculoskeletal defects are noted and gait is age appropriate without ataxia  SKIN: no suspicious lesions or rashes  NEURO: Normal strength and tone, sensory exam grossly normal, mentation intact and speech normal  PSYCH: mentation appears normal and affect normal/bright    ASSESSMENT / PLAN:   (Z00.00) Encounter for routine adult health examination without abnormal " "findings  (primary encounter diagnosis)  Comment:   Plan: **A1C FUTURE anytime, **Basic metabolic panel         FUTURE anytime            (Z12.31) Encounter for screening mammogram for breast cancer  Comment:   Plan: *MA Screening Digital Bilateral            (Z87.891) History of tobacco use  Comment: h/o smoking for over half her life. Suggested baseline PFT's given smoking history. No cough or other respiratory symptoms currently but given  Smoking history could consider CT for screenng  Plan: PFT13 Test            (R42) Dizziness  Comment:   Plan: not overly bothersome now and symptoms not worsening or becoming more frequent  Will continue to monitor closely for now    End of Life Planning:  Patient currently has an advanced directive: No.  I have verified the patient's ablity to prepare an advanced directive/make health care decisions.  Literature was provided to assist patient in preparing an advanced directive.    COUNSELING:  Reviewed preventive health counseling, as reflected in patient instructions       Regular exercise       Healthy diet/nutrition        Estimated body mass index is 31.79 kg/(m^2) as calculated from the following:    Height as of 1/25/18: 5' 7\" (1.702 m).    Weight as of 3/7/18: 203 lb (92.1 kg).  Weight management plan: Discussed healthy diet and exercise guidelines and patient will follow up in 12 months in clinic to re-evaluate.     reports that she quit smoking about 10 years ago. Her smoking use included Cigarettes. She has a 18.50 pack-year smoking history. She has never used smokeless tobacco.      Appropriate preventive services were discussed with this patient, including applicable screening as appropriate for cardiovascular disease, diabetes, osteopenia/osteoporosis, and glaucoma.  As appropriate for age/gender, discussed screening for colorectal cancer, prostate cancer, breast cancer, and cervical cancer. Checklist reviewing preventive services available has been given to the " patient.    Reviewed patients plan of care and provided an AVS. The Basic Care Plan (routine screening as documented in Health Maintenance) for Tri meets the Care Plan requirement. This Care Plan has been established and reviewed with the Patient.    Counseling Resources:  ATP IV Guidelines  Pooled Cohorts Equation Calculator  Breast Cancer Risk Calculator  FRAX Risk Assessment  ICSI Preventive Guidelines  Dietary Guidelines for Americans, 2010  USDA's MyPlate  ASA Prophylaxis  Lung CA Screening    Charlene Cain PA-C  [facility]

## 2018-04-12 NOTE — NURSING NOTE
"Chief Complaint   Patient presents with     Physical       Initial /77  Pulse 64  Temp 98.1  F (36.7  C) (Tympanic)  Ht 5' 7\" (1.702 m)  Wt 203 lb (92.1 kg)  BMI 31.79 kg/m2 Estimated body mass index is 31.79 kg/(m^2) as calculated from the following:    Height as of this encounter: 5' 7\" (1.702 m).    Weight as of this encounter: 203 lb (92.1 kg).  Medication Reconciliation: complete     Linwood Obrien CMA    "

## 2018-04-12 NOTE — MR AVS SNAPSHOT
After Visit Summary   4/12/2018    Tri Ingram    MRN: [id number]           Patient Information     Date Of Birth          1950        Visit Information        Provider Department      4/12/2018 1:20 PM Charlene Cain PA-C [facility]        Today's Diagnoses     Encounter for screening mammogram for breast cancer    -  1    History of tobacco use          Care Instructions      Preventive Health Recommendations    Female Ages 65 +    Yearly exam:     See your health care provider every year in order to  o Review health changes.   o Discuss preventive care.    o Review your medicines if your doctor has prescribed any.      You no longer need a yearly Pap test unless you've had an abnormal Pap test in the past 10 years. If you have vaginal symptoms, such as bleeding or discharge, be sure to talk with your provider about a Pap test.      Every 1 to 2 years, have a mammogram.  If you are over 69, talk with your health care provider about whether or not you want to continue having screening mammograms.      Every 10 years, have a colonoscopy. Or, have a yearly FIT test (stool test). These exams will check for colon cancer.       Have a cholesterol test every 5 years, or more often if your doctor advises it.       Have a diabetes test (fasting glucose) every three years. If you are at risk for diabetes, you should have this test more often.       At age 65, have a bone density scan (DEXA) to check for osteoporosis (brittle bone disease).    Shots:    Get a flu shot each year.    Get a tetanus shot every 10 years.    Talk to your doctor about your pneumonia vaccines. There are now two you should receive - Pneumovax (PPSV 23) and Prevnar (PCV 13).    Talk to your doctor about the shingles vaccine.    Talk to your doctor about the hepatitis B vaccine.    Nutrition:     Eat at least 5 servings of fruits and vegetables each day.      Eat whole-grain bread, whole-wheat pasta  "and brown rice instead of white grains and rice.      Talk to your provider about Calcium and Vitamin D.     Lifestyle    Exercise at least 150 minutes a week (30 minutes a day, 5 days a week). This will help you control your weight and prevent disease.      Limit alcohol to one drink per day.      No smoking.       Wear sunscreen to prevent skin cancer.       See your dentist twice a year for an exam and cleaning.      See your eye doctor every 1 to 2 years to screen for conditions such as glaucoma, macular degeneration and cataracts.          Follow-ups after your visit        Future tests that were ordered for you today     Open Future Orders        Priority Expected Expires Ordered    PFT13 Test Routine  4/12/2019 4/12/2018    *MA Screening Digital Bilateral Routine  4/12/2019 4/12/2018            Who to contact     Normal or non-critical lab and imaging results will be communicated to you by Beamrhart, letter or phone within 4 business days after the clinic has received the results. If you do not hear from us within 7 days, please contact the clinic through Beamrhart or phone. If you have a critical or abnormal lab result, we will notify you by phone as soon as possible.  Submit refill requests through INPHI or call your pharmacy and they will forward the refill request to us. Please allow 3 business days for your refill to be completed.          If you need to speak with a  for additional information , please call: [phone]             Additional Information About Your Visit        INPHI Information     INPHI lets you send messages to your doctor, view your test results, renew your prescriptions, schedule appointments and more. To sign up, go to [email or web address] . Click on \"Log in\" on the left side of the screen, which will take you to the Welcome page. Then click on \"Sign up Now\" on the right side of the page.     You will be asked to enter the access code listed below, as well " "as some personal information. Please follow the directions to create your username and password.     Your access code is: [email or web address]  Expires: 2018  3:11 PM     Your access code will  in 90 days. If you need help or a new code, please call your [facility] clinic or [phone].        Care EveryWhere ID     This is your Care EveryWhere ID. This could be used by other organizations to access your [facility] medical records  [id number]        Your Vitals Were     Pulse Temperature Height BMI (Body Mass Index)          64 98.1  F (36.7  C) (Tympanic) 5' 7\" (1.702 m) 31.79 kg/m2         Blood Pressure from Last 3 Encounters:   18 127/77   18 125/77   18 126/72    Weight from Last 3 Encounters:   18 203 lb (92.1 kg)   18 203 lb (92.1 kg)   17 202 lb (91.6 kg)               Primary Care Provider Office Phone # Fax #    Charlene Cain PA-C [phone] [id number] [facility] [address]        Equal Access to Services     CHANTEL SUAREZ AH: Hadii baldemar arroyo hadasho Soomaali, waaxda luqadaha, qaybta kaalmada adeegyada, deanne albert . So [facility] [phone].    ATENCIÓN: Si habla español, tiene a jackson disposición servicios gratuitos de asistencia lingüística. Llame al [phone].    We comply with applicable federal civil rights laws and [place] laws. We do not discriminate on the basis of race, color, national origin, age, disability, sex, sexual orientation, or gender identity.            Thank you!     Thank you for choosing [facility]  for your care. Our goal is always to provide you with excellent care. Hearing back from our patients is one way we can continue to improve our services. Please take a few minutes to complete the written survey that you may receive in the mail after your visit with us. Thank you!             Your Updated Medication List - Protect others around you: Learn how to safely use, store " and throw away your medicines at www.disposemymeds.org.          This list is accurate as of 4/12/18  1:55 PM.  Always use your most recent med list.                   Brand Name Dispense Instructions for use Diagnosis    albuterol 108 (90 Base) MCG/ACT Inhaler    PROAIR HFA/PROVENTIL HFA/VENTOLIN HFA    1 Inhaler    Inhale 2 puffs into the lungs every 4 hours as needed for shortness of breath / dyspnea or wheezing    Acute bronchitis with symptoms greater than 10 days       ARTHRITIS PAIN RELIEF 650 MG CR tablet   Generic drug:  acetaminophen     100 tablet    Take 2 tablets by mouth daily. Takes in pm        aspirin 81 MG tablet     100    ONE DAILY    Tobacco use disorder, Other and unspecified hyperlipidemia       Calcium carb-Vitamin D 500 mg [place]-200 units 500-200 MG-UNIT per tablet    OSCAL with D;Oyster Shell Calcium     Take 1 tablet by mouth daily.        ibuprofen 200 MG capsule      Take 1,200 mg by mouth 2 times daily        ICAPS Caps      Take 1 capsule by mouth daily        ipratropium - albuterol 0.5 mg/2.5 mg/3 mL 0.5-2.5 (3) MG/3ML neb solution    DUONEB    30 vial    Take 1 vial (3 mLs) by nebulization every 6 hours as needed for shortness of breath / dyspnea or wheezing    Acute bronchitis with symptoms greater than 10 days       lovastatin 40 MG tablet    MEVACOR    90 tablet    TAKE ONE TABLET BY MOUTH EVERY NIGHT AT BEDTIME    Hyperlipidemia LDL goal <130       MAGNESIUM PO      Take 250 mg by mouth daily        order for DME     1 Device    Equipment being ordered: Nebulizer    Acute bronchitis with symptoms greater than 10 days       PREMPRO 0.45-1.5 MG per tablet   Generic drug:  estrogen conj-medroxyPROGESTERone     40 tablet    TAKE ONE TABLET BY MOUTH THREE TIMES WEEKLY OR AS NEEDED TO CONTROL HOT FLASHES/MENOPAUSAL SYMPTOMS    Symptomatic menopausal or female climacteric states       scopolamine 72 hr patch    TRANSDERM    2 patch    Apply 1 patch to hairless area behind one ear at  least 4 hours before travel.  Remove old patch and change every 3 days (72 hours).    H/O motion sickness

## 2018-05-07 ENCOUNTER — RADIANT APPOINTMENT (OUTPATIENT)
Dept: MAMMOGRAPHY | Facility: CLINIC | Age: 68
End: 2018-05-07
Attending: PHYSICIAN ASSISTANT
Payer: COMMERCIAL

## 2018-05-07 DIAGNOSIS — Z12.31 VISIT FOR SCREENING MAMMOGRAM: ICD-10-CM

## 2018-05-07 PROCEDURE — 77067 SCR MAMMO BI INCL CAD: CPT | Mod: TC

## 2018-06-05 DIAGNOSIS — N95.1 SYMPTOMATIC MENOPAUSAL OR FEMALE CLIMACTERIC STATES: ICD-10-CM

## 2018-06-05 NOTE — TELEPHONE ENCOUNTER
Pt requested refill. Please write/dispense in increments of 28 since packages are not breakable, thanks!

## 2018-06-07 RX ORDER — ESTROGEN,CON/M-PROGEST ACET 0.45-1.5MG
TABLET ORAL
Qty: 28 TABLET | Refills: 1 | Status: SHIPPED | OUTPATIENT
Start: 2018-06-07 | End: 2018-09-17

## 2018-06-18 ENCOUNTER — TELEPHONE (OUTPATIENT)
Dept: FAMILY MEDICINE | Facility: CLINIC | Age: 68
End: 2018-06-18

## 2018-06-18 DIAGNOSIS — N95.1 SYMPTOMATIC MENOPAUSAL OR FEMALE CLIMACTERIC STATES: Primary | ICD-10-CM

## 2018-06-18 NOTE — TELEPHONE ENCOUNTER
Reason for Call:  Other prescription    Detailed comments: Ebonie calling as she received a letter from [facility] regarding her prempro.  She states that they are recommending that she switched from prempro to 2 other choices of medications.  Please call and discuss.  Thank you..Maureen Valdivia    Phone Number Patient can be reached at: Home number on file [phone] (home)    Best Time: any time    Can we leave a detailed message on this number? YES    Call taken on 6/18/2018 at 9:59 AM by Maureen Valdivia

## 2018-07-06 DIAGNOSIS — Z00.00 ENCOUNTER FOR ROUTINE ADULT HEALTH EXAMINATION WITHOUT ABNORMAL FINDINGS: ICD-10-CM

## 2018-07-06 LAB
ANION GAP SERPL CALCULATED.3IONS-SCNC: 4 MMOL/L (ref 3–14)
BUN SERPL-MCNC: 19 MG/DL (ref 7–30)
CALCIUM SERPL-MCNC: 9.2 MG/DL (ref 8.5–10.1)
CHLORIDE SERPL-SCNC: 107 MMOL/L (ref 94–109)
CO2 SERPL-SCNC: 29 MMOL/L (ref 20–32)
CREAT SERPL-MCNC: 0.95 MG/DL (ref 0.52–1.04)
GFR SERPL CREATININE-BSD FRML MDRD: 58 ML/MIN/1.7M2
GLUCOSE SERPL-MCNC: 103 MG/DL (ref 70–99)
HBA1C MFR BLD: 5.5 % (ref 0–5.6)
POTASSIUM SERPL-SCNC: 4.7 MMOL/L (ref 3.4–5.3)
SODIUM SERPL-SCNC: 140 MMOL/L (ref 133–144)

## 2018-07-06 PROCEDURE — 83036 HEMOGLOBIN GLYCOSYLATED A1C: CPT | Performed by: PHYSICIAN ASSISTANT

## 2018-07-06 PROCEDURE — 36415 COLL VENOUS BLD VENIPUNCTURE: CPT | Performed by: PHYSICIAN ASSISTANT

## 2018-07-06 PROCEDURE — 80048 BASIC METABOLIC PNL TOTAL CA: CPT | Performed by: PHYSICIAN ASSISTANT

## 2018-07-09 DIAGNOSIS — Z87.898 H/O MOTION SICKNESS: ICD-10-CM

## 2018-07-09 RX ORDER — SCOLOPAMINE TRANSDERMAL SYSTEM 1 MG/1
PATCH, EXTENDED RELEASE TRANSDERMAL
Qty: 2 PATCH | Refills: 0 | Status: SHIPPED | OUTPATIENT
Start: 2018-07-09 | End: 2019-12-16

## 2018-07-16 RX ORDER — MEDROXYPROGESTERONE ACETATE 2.5 MG/1
2.5 TABLET ORAL DAILY
Qty: 90 TABLET | Refills: 1 | Status: SHIPPED | OUTPATIENT
Start: 2018-07-16 | End: 2019-07-29 | Stop reason: ALTCHOICE

## 2018-07-16 RX ORDER — ESTRADIOL 0.5 MG/1
0.5 TABLET ORAL DAILY
Qty: 90 TABLET | Refills: 1 | Status: SHIPPED | OUTPATIENT
Start: 2018-07-16 | End: 2019-07-29

## 2018-07-25 DIAGNOSIS — E78.5 HYPERLIPIDEMIA LDL GOAL <130: ICD-10-CM

## 2018-07-25 RX ORDER — LOVASTATIN 40 MG
TABLET ORAL
Qty: 90 TABLET | Refills: 1 | Status: SHIPPED | OUTPATIENT
Start: 2018-07-25 | End: 2019-01-14

## 2018-07-25 NOTE — TELEPHONE ENCOUNTER
"LOVASTATIN 40MG TABS        Last Written Prescription Date:  11/21/17  Last Fill Quantity: 90,   # refills: 1  Last Office Visit: 4/12/18  Future Office visit:       Requested Prescriptions   Pending Prescriptions Disp Refills     lovastatin (MEVACOR) 40 MG tablet [Pharmacy Med Name: LOVASTATIN 40MG TABS] 90 tablet 1     Sig: TAKE ONE TABLET BY MOUTH EVERY NIGHT AT BEDTIME    Statins Protocol Passed    7/25/2018  9:30 AM       Passed - LDL on file in past 12 months    Recent Labs   Lab Test  04/10/18   0818   LDL  79            Passed - No abnormal creatine kinase in past 12 months    No lab results found.            Passed - Recent (12 mo) or future (30 days) visit within the authorizing provider's specialty    Patient had office visit in the last 12 months or has a visit in the next 30 days with authorizing provider or within the authorizing provider's specialty.  See \"Patient Info\" tab in inbasket, or \"Choose Columns\" in Meds & Orders section of the refill encounter.           Passed - Patient is age 18 or older       Passed - No active pregnancy on record       Passed - No positive pregnancy test in past 12 months          "

## 2018-07-26 ENCOUNTER — HOSPITAL ENCOUNTER (OUTPATIENT)
Dept: RESPIRATORY THERAPY | Facility: CLINIC | Age: 68
Discharge: HOME | End: 2018-07-26
Attending: INTERNAL MEDICINE | Admitting: INTERNAL MEDICINE
Payer: COMMERCIAL

## 2018-07-26 DIAGNOSIS — Z87.891 HISTORY OF TOBACCO USE: ICD-10-CM

## 2018-07-26 PROCEDURE — 25000125 ZZHC RX 250: Performed by: PHYSICIAN ASSISTANT

## 2018-07-26 PROCEDURE — 94726 PLETHYSMOGRAPHY LUNG VOLUMES: CPT | Mod: 26 | Performed by: INTERNAL MEDICINE

## 2018-07-26 PROCEDURE — 94729 DIFFUSING CAPACITY: CPT

## 2018-07-26 PROCEDURE — 94729 DIFFUSING CAPACITY: CPT | Mod: 26 | Performed by: INTERNAL MEDICINE

## 2018-07-26 PROCEDURE — 94726 PLETHYSMOGRAPHY LUNG VOLUMES: CPT

## 2018-07-26 PROCEDURE — 94060 EVALUATION OF WHEEZING: CPT | Mod: 26 | Performed by: INTERNAL MEDICINE

## 2018-07-26 PROCEDURE — 94060 EVALUATION OF WHEEZING: CPT

## 2018-07-26 RX ORDER — ALBUTEROL SULFATE 0.83 MG/ML
2.5 SOLUTION RESPIRATORY (INHALATION) ONCE
Status: COMPLETED | OUTPATIENT
Start: 2018-07-26 | End: 2018-07-26

## 2018-07-26 RX ADMIN — ALBUTEROL SULFATE 2.5 MG: 2.5 SOLUTION RESPIRATORY (INHALATION) at 08:30

## 2018-07-29 LAB
DLCOUNC-%PRED-PRE: 81 %
DLCOUNC-PRE: 18.05 ML/MIN/MMHG
DLCOUNC-PRED: 22.12 ML/MIN/MMHG
ERV-%PRED-PRE: 47 %
ERV-PRE: 0.27 L
ERV-PRED: 0.57 L
EXPTIME-PRE: 6.63 SEC
FEF2575-%PRED-POST: 94 %
FEF2575-%PRED-PRE: 96 %
FEF2575-POST: 2.01 L/SEC
FEF2575-PRE: 2.06 L/SEC
FEF2575-PRED: 2.13 L/SEC
FEFMAX-%PRED-PRE: 94 %
FEFMAX-PRE: 5.99 L/SEC
FEFMAX-PRED: 6.37 L/SEC
FEV1-%PRED-PRE: 81 %
FEV1-PRE: 2.09 L
FEV1FEV6-PRE: 81 %
FEV1FEV6-PRED: 80 %
FEV1FVC-PRE: 80 %
FEV1FVC-PRED: 77 %
FEV1SVC-PRE: 78 %
FEV1SVC-PRED: 73 %
FIFMAX-PRE: 4.41 L/SEC
FRCPLETH-%PRED-PRE: 116 %
FRCPLETH-PRE: 3.37 L
FRCPLETH-PRED: 2.88 L
FVC-%PRED-PRE: 79 %
FVC-PRE: 2.6 L
FVC-PRED: 3.28 L
IC-%PRED-PRE: 78 %
IC-PRE: 2.31 L
IC-PRED: 2.94 L
RVPLETH-%PRED-PRE: 139 %
RVPLETH-PRE: 3 L
RVPLETH-PRED: 2.15 L
TLCPLETH-%PRED-PRE: 104 %
TLCPLETH-PRE: 5.67 L
TLCPLETH-PRED: 5.44 L
VA-%PRED-PRE: 79 %
VA-PRE: 4.4 L
VC-%PRED-PRE: 76 %
VC-PRE: 2.68 L
VC-PRED: 3.51 L

## 2018-09-13 ENCOUNTER — OFFICE VISIT (OUTPATIENT)
Dept: PODIATRY | Facility: CLINIC | Age: 68
End: 2018-09-13
Payer: COMMERCIAL

## 2018-09-13 VITALS
HEIGHT: 67 IN | DIASTOLIC BLOOD PRESSURE: 79 MMHG | WEIGHT: 203 LBS | HEART RATE: 74 BPM | SYSTOLIC BLOOD PRESSURE: 144 MMHG | BODY MASS INDEX: 31.86 KG/M2

## 2018-09-13 DIAGNOSIS — M77.8 CAPSULITIS OF LEFT FOOT: ICD-10-CM

## 2018-09-13 DIAGNOSIS — M77.8 CAPSULITIS OF RIGHT FOOT: Primary | ICD-10-CM

## 2018-09-13 PROCEDURE — 99203 OFFICE O/P NEW LOW 30 MIN: CPT | Performed by: PODIATRIST

## 2018-09-13 NOTE — PROGRESS NOTES
PATIENT HISTORY:  Tri Ingram is a 68 year old female who presents to clinic for a painful right left foot .  The patient describes the pain as sharp stabbing.  The patient relates the pain level is moderate.  The patient relates pain is located under the ball of the right and left foot.  The patient relates the pain has been present for the past several weeks.  The patient relates pain with ambulation.  The patient has tried different shoes with little relief.       REVIEW OF SYSTEMS:  Constitutional, HEENT, cardiovascular, pulmonary, GI, , musculoskeletal, neuro, skin, endocrine and psych systems are negative, except as otherwise noted.     PAST MEDICAL HISTORY:   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        PAST SURGICAL HISTORY:   Past Surgical History:   Procedure Laterality Date     C NONSPECIFIC PROCEDURE      Bowen's disease removed X 2     COLONOSCOPY  2001, 2007     COLONOSCOPY N/A 11/3/2017    Procedure: COLONOSCOPY;  Colonoscopy  ;  Surgeon: Lg Guerrero MD;  Location: [facility]        MEDICATIONS:   Current Outpatient Prescriptions:      acetaminophen (ARTHRITIS PAIN RELIEF) 650 MG CR tablet, Take 2 tablets by mouth daily. Takes in pm, Disp: 100 tablet, Rfl: 11     albuterol (PROAIR HFA/PROVENTIL HFA/VENTOLIN HFA) 108 (90 BASE) MCG/ACT Inhaler, Inhale 2 puffs into the lungs every 4 hours as needed for shortness of breath / dyspnea or wheezing, Disp: 1 Inhaler, Rfl: 1     ASPIRIN 81 MG OR TABS, ONE DAILY, Disp: 100, Rfl: 3     calcium carb 1250 mg, 500 mg [place],/vitamin D 200 units (OSCAL WITH D) 500-200 MG-UNIT per tablet, Take 1  tablet by mouth daily., Disp: , Rfl:      estradiol (ESTRACE) 0.5 MG tablet, Take 1 tablet (0.5 mg) by mouth daily, Disp: 90 tablet, Rfl: 1     ibuprofen 200 MG capsule, Take 1,200 mg by mouth 2 times daily , Disp: , Rfl:      lovastatin (MEVACOR) 40 MG tablet, TAKE ONE TABLET BY MOUTH EVERY NIGHT AT BEDTIME, Disp: 90 tablet, Rfl: 1     MAGNESIUM PO, Take 250 mg by mouth daily , Disp: , Rfl:      medroxyPROGESTERone (PROVERA) 2.5 MG tablet, Take 1 tablet (2.5 mg) by mouth daily, Disp: 90 tablet, Rfl: 1     Multiple Vitamins-Minerals (ICAPS) CAPS, Take 1 capsule by mouth daily, Disp: , Rfl:      order for DME, Equipment being ordered: Dynaflex insert, Disp: 2 Units, Rfl: 0     order for DME, Equipment being ordered: Nebulizer, Disp: 1 Device, Rfl: 0     scopolamine (TRANSDERM) 72 hr patch, Apply 1 patch to hairless area behind one ear at least 4 hours before travel.  Remove old patch and change every 3 days (72 hours)., Disp: 2 patch, Rfl: 0     ipratropium - albuterol 0.5 mg/2.5 mg/3 mL (DUONEB) 0.5-2.5 (3) MG/3ML neb solution, Take 1 vial (3 mLs) by nebulization every 6 hours as needed for shortness of breath / dyspnea or wheezing (Patient not taking: Reported on 3/7/2018), Disp: 30 vial, Rfl: 1     PREMPRO 0.45-1.5 MG per tablet, TAKE ONE TABLET BY MOUTH THREE TIMES WEEKLY OR AS NEEDED TO CONTROL HOT FLASHES/MENOPAUSAL SYMPTOMS (Patient not taking: Reported on 9/13/2018), Disp: 28 tablet, Rfl: 1     ALLERGIES:    Allergies   Allergen Reactions     Adhesive Tape      Iodine Anaphylaxis     contrast dye     Penicillins Hives        SOCIAL HISTORY:   Social History     Social History     Marital status:      Spouse name: N/A     Number of children: N/A     Years of education: N/A     Occupational History     Not on file.     Social History Main Topics     Smoking status: Former Smoker     Packs/day: 0.50     Years: 37.00     Types: Cigarettes     Quit date: 10/20/2007     Smokeless tobacco: Never Used      "Alcohol use Yes      Comment: minimal     Drug use: No     Sexual activity: Yes     Partners: Male      Comment: menopause     Other Topics Concern      Service No     Blood Transfusions No     Caffeine Concern No     Occupational Exposure No     Hobby Hazards No     Sleep Concern Yes     arthritis     Stress Concern Yes     Weight Concern No     Special Diet Yes     Frazier     Back Care Yes     arthritis     Exercise Yes     Bike Helmet No     Seat Belt Yes     Self-Exams Yes     Parent/Sibling W/ Cabg, Mi Or Angioplasty Before 65f 55m? Yes     brother MI at age 50     Social History Narrative    Caffeine intake/servings daily - 0    Calcium intake/servings daily - 4 occ. calcium supplement    Exercise 7 times weekly - describe  walking    Sunscreen used - No    Seatbelts used - Yes    Guns stored in the home - No    Self Breast Exam - Yes    Pap test up to date -  Yes    Eye exam up to date -  Yes    Dental exam up to date -  No    DEXA scan up to date -  Yes, last done 5/4/04    Flex Sig/Colonoscopy up to date -  Yes, at age 52 \"normal\"    Mammography up to date - 8/18/06    Immunizations reviewed and up to date - unknown last tetanus shot    Abuse: Current or Past (Physical, Sexual or Emotional) - No    Do you feel safe in your environment - Yes    Do you cope well with stress - Yes    Do you suffer from insomnia - Yes    Last updated by: Mary Beth Katz 8/28/06        FAMILY HISTORY:   Family History   Problem Relation Age of Onset     Eye Disorder Mother      glaucoma, wet macular degeneration     Lipids Mother      Gynecology Mother      hyst     Melanoma Mother      Skin Cancer Mother      Cancer Father      colon /prostate     Circulatory Father      amputee     Diabetes Father      type II     C.A.D. Father      MIs     Eye Disorder Father      dry macular degeneration     Cardiovascular Father      small AAA     Melanoma Father      Arthritis Sister      Gynecology Sister      partial hyst  fibrous " "sheaths on ovary egg sized polyp uterine removed     Lipids Sister      Neurologic Disorder Sister      migraines     Cardiovascular Brother      large 7 cm AAA     Coronary Artery Disease Brother      MI     GASTROINTESTINAL DISEASE Paternal Grandmother       from hepatitis        EXAM:Vitals: /79 (BP Location: Left arm, Patient Position: Sitting, Cuff Size: Adult Regular)  Pulse 74  Ht 5' 7\" (1.702 m)  Wt 203 lb (92.1 kg)  BMI 31.79 kg/m2  BMI= Body mass index is 31.79 kg/(m^2).    Weight management plan: Patient was referred to their PCP to discuss a diet and exercise plan.    General appearance: Patient is alert and fully cooperative with history & exam.  No sign of distress is noted during the visit.     Psychiatric: Affect is pleasant & appropriate.  Patient appears motivated to improve health.     Respiratory: Breathing is regular & unlabored while sitting.     HEENT: Hearing is intact to spoken word.  Speech is clear.  No gross evidence of visual impairment that would impact ambulation.     Dermatologic: Skin is intact to both lower extremities without significant lesions, rash or abrasion.  No paronychia or evidence of soft tissue infection is noted.     Vascular: DP & PT pulses are intact & regular bilaterally.  No significant edema or varicosities noted.  CFT and skin temperature is normal to both lower extremities.     Neurologic: Lower extremity sensation is intact to light touch.  No evidence of weakness or contracture in the lower extremities.  No evidence of neuropathy.     Musculoskeletal: Patient is ambulatory without assistive device or brace.  No gross ankle deformity noted.  No foot or ankle joint effusion is noted.  Noted pain on palpation of the plantar aspect of the second metatarsophalangeal joint bilaterally.  One notes a positive Lockman's test of the second toe bilaterally.  No surrounding erythema noted.       ASSESSMENT / PLAN:     ICD-10-CM    1. Capsulitis of right foot " M77.51 order for DME   2. Capsulitis of left foot M77.52        I have explained to Tri  about the conditions.  We discussed the nature of the condition as well as the treatment plan and expected length of recovery.  At this time, the patient was instructed on icing, stretching, tissue massage and support.  The patient was fitted with a pair of Dynaflex inserts that will aid in offloading the tension forces to the soft tissues and prevent further inflammation.  The patient will return in four weeks for reevaluation if the symptoms do not resolve.        Disclaimer: This note consists of symbols derived from keyboarding, dictation and/or voice recognition software. As a result, there may be errors in the script that have gone undetected. Please consider this when interpreting information found in this chart.       INES Singh D.P.M., F.CARLOS.C.F.A.S.

## 2018-09-13 NOTE — MR AVS SNAPSHOT
After Visit Summary   9/13/2018    Tri Ingram    MRN: [id number]           Patient Information     Date Of Birth          1950        Visit Information        Provider Department      9/13/2018 3:20 PM Jm Singh DPM [facility]        Today's Diagnoses     Capsulitis of right foot    -  1    Capsulitis of left foot          Care Instructions    Patient to follow up with Primary Care provider regarding elevated blood pressure.  Initial musculoskeletal treatment recommendation:    1.  Wear supportive foot wear (stiff soles) and/or arch supports (rigid not cushion).  2.  Stretch the calf muscles as instructed once an hour.  3.  Massage the soft tissues around the injured area in the morning to loosen the tissue  4.  Ice the injured area in the evening; 20 min on/off.  5. Take antiinflammatory medication as indicated.    If no improvement in symptoms within four to six weeks, return to clinic for reevaluation.            Follow-ups after your visit        Follow-up notes from your care team     Return in about 4 weeks (around 10/11/2018), or if symptoms worsen or fail to improve.      Who to contact     If you have questions or need follow up information about today's clinic visit or your schedule please contact [facility] directly at [phone].  Normal or non-critical lab and imaging results will be communicated to you by [facility] [employer], letter or phone within 4 business days after the clinic has received the results. If you do not hear from us within 7 days, please contact the clinic through [facility] [employer] or phone. If you have a critical or abnormal lab result, we will notify you by phone as soon as possible.  Submit refill requests through [employer] or call your pharmacy and they will forward the refill request to us. Please allow 3 business days for your refill to be completed.          Additional Information About Your  "Visit        Care EveryWhere ID     This is your Care EveryWhere ID. This could be used by other organizations to access your [facility] medical records  [id number]        Your Vitals Were     Pulse Height BMI (Body Mass Index)             74 5' 7\" (1.702 m) 31.79 kg/m2          Blood Pressure from Last 3 Encounters:   09/17/18 126/76   09/13/18 144/79   04/12/18 127/77    Weight from Last 3 Encounters:   09/13/18 203 lb (92.1 kg)   04/12/18 203 lb (92.1 kg)   03/07/18 203 lb (92.1 kg)              Today, you had the following     No orders found for display         Today's Medication Changes          These changes are accurate as of 9/13/18 11:59 PM.  If you have any questions, ask your nurse or doctor.               Start taking these medicines.        Dose/Directions    order for DME   Used for:  Capsulitis of right foot   Started by:  Jm Singh DPM        Equipment being ordered: Dynaflex insert   Quantity:  2 Units   Refills:  0            Where to get your medicines      Some of these will need a paper prescription and others can be bought over the counter.  Ask your nurse if you have questions.     Bring a paper prescription for each of these medications     order for DME                Primary Care Provider Office Phone # Fax #    Charlene Cain PA-C [phone] [id number] [facility] [address]        Equal Access to Services     CHANTEL SUAREZ AH: Hadnilam Hanna, waaxda luqmehran, qaybta kaalgaby rose, deanne clay. So [facility] [phone].    ATENCIÓN: Si habla español, tiene a jackson disposición servicios gratuitos de asistencia lingüística. Flavia al [phone].    We comply with applicable federal civil rights laws and [place] laws. We do not discriminate on the basis of race, color, national origin, age, disability, sex, sexual orientation, or gender identity.            Thank you!     Thank you for choosing " [facility]  for your care. Our goal is always to provide you with excellent care. Hearing back from our patients is one way we can continue to improve our services. Please take a few minutes to complete the written survey that you may receive in the mail after your visit with us. Thank you!             Your Updated Medication List - Protect others around you: Learn how to safely use, store and throw away your medicines at www.disposemymeds.org.          This list is accurate as of 9/13/18 11:59 PM.  Always use your most recent med list.                   Brand Name Dispense Instructions for use Diagnosis    albuterol 108 (90 Base) MCG/ACT inhaler    PROAIR HFA/PROVENTIL HFA/VENTOLIN HFA    1 Inhaler    Inhale 2 puffs into the lungs every 4 hours as needed for shortness of breath / dyspnea or wheezing    Acute bronchitis with symptoms greater than 10 days       ARTHRITIS PAIN RELIEF 650 MG CR tablet   Generic drug:  acetaminophen     100 tablet    Take 2 tablets by mouth daily. Takes in pm        aspirin 81 MG tablet     100    ONE DAILY    Tobacco use disorder, Other and unspecified hyperlipidemia       calcium carbonate 500 mg-vitamin D 200 units 500-200 MG-UNIT per tablet    OSCAL with D;OYSTER SHELL CALCIUM     Take 1 tablet by mouth daily.        estradiol 0.5 MG tablet    ESTRACE    90 tablet    Take 1 tablet (0.5 mg) by mouth daily    Symptomatic menopausal or female climacteric states       ibuprofen 200 MG capsule      Take 1,200 mg by mouth 2 times daily        ICAPS Caps      Take 1 capsule by mouth daily        ipratropium - albuterol 0.5 mg/2.5 mg/3 mL 0.5-2.5 (3) MG/3ML neb solution    DUONEB    30 vial    Take 1 vial (3 mLs) by nebulization every 6 hours as needed for shortness of breath / dyspnea or wheezing    Acute bronchitis with symptoms greater than 10 days       lovastatin 40 MG tablet    MEVACOR    90 tablet    TAKE ONE TABLET BY MOUTH EVERY NIGHT AT BEDTIME     Hyperlipidemia LDL goal <130       MAGNESIUM PO      Take 250 mg by mouth daily        medroxyPROGESTERone 2.5 MG tablet    PROVERA    90 tablet    Take 1 tablet (2.5 mg) by mouth daily    Symptomatic menopausal or female climacteric states       order for DME     1 Device    Equipment being ordered: Nebulizer    Acute bronchitis with symptoms greater than 10 days       order for DME     2 Units    Equipment being ordered: Dynaflex insert    Capsulitis of right foot       scopolamine 72 hr patch    TRANSDERM    2 patch    Apply 1 patch to hairless area behind one ear at least 4 hours before travel.  Remove old patch and change every 3 days (72 hours).    H/O motion sickness

## 2018-09-13 NOTE — LETTER
9/13/2018         RE: Tri Ingram  [address] [id number]        Dear Colleague,    Thank you for referring your patient, Tri Ingram, to the [facility]. Please see a copy of my visit note below.    PATIENT HISTORY:  Tri Ingram is a 68 year old female who presents to clinic for a painful right left foot .  The patient describes the pain as sharp stabbing.  The patient relates the pain level is moderate.  The patient relates pain is located under the ball of the right and left foot.  The patient relates the pain has been present for the past several weeks.  The patient relates pain with ambulation.  The patient has tried different shoes with little relief.       REVIEW OF SYSTEMS:  Constitutional, HEENT, cardiovascular, pulmonary, GI, , musculoskeletal, neuro, skin, endocrine and psych systems are negative, except as otherwise noted.     PAST MEDICAL HISTORY:   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        PAST SURGICAL HISTORY:   Past Surgical History:   Procedure Laterality Date     C NONSPECIFIC PROCEDURE      Bowen's disease removed X 2     COLONOSCOPY  2001, 2007     COLONOSCOPY N/A 11/3/2017    Procedure: COLONOSCOPY;  Colonoscopy  ;  Surgeon: Lg Guerrero MD;  Location: [facility]        MEDICATIONS:   Current Outpatient Prescriptions:      acetaminophen (ARTHRITIS PAIN RELIEF) 650 MG CR tablet, Take 2 tablets by mouth daily. Takes in pm, Disp: 100 tablet, Rfl: 11     albuterol (PROAIR HFA/PROVENTIL HFA/VENTOLIN HFA) 108 (90 BASE) MCG/ACT Inhaler, Inhale 2 puffs into  the lungs every 4 hours as needed for shortness of breath / dyspnea or wheezing, Disp: 1 Inhaler, Rfl: 1     ASPIRIN 81 MG OR TABS, ONE DAILY, Disp: 100, Rfl: 3     calcium carb 1250 mg, 500 mg [place],/vitamin D 200 units (OSCAL WITH D) 500-200 MG-UNIT per tablet, Take 1 tablet by mouth daily., Disp: , Rfl:      estradiol (ESTRACE) 0.5 MG tablet, Take 1 tablet (0.5 mg) by mouth daily, Disp: 90 tablet, Rfl: 1     ibuprofen 200 MG capsule, Take 1,200 mg by mouth 2 times daily , Disp: , Rfl:      lovastatin (MEVACOR) 40 MG tablet, TAKE ONE TABLET BY MOUTH EVERY NIGHT AT BEDTIME, Disp: 90 tablet, Rfl: 1     MAGNESIUM PO, Take 250 mg by mouth daily , Disp: , Rfl:      medroxyPROGESTERone (PROVERA) 2.5 MG tablet, Take 1 tablet (2.5 mg) by mouth daily, Disp: 90 tablet, Rfl: 1     Multiple Vitamins-Minerals (ICAPS) CAPS, Take 1 capsule by mouth daily, Disp: , Rfl:      order for DME, Equipment being ordered: Dynaflex insert, Disp: 2 Units, Rfl: 0     order for DME, Equipment being ordered: Nebulizer, Disp: 1 Device, Rfl: 0     scopolamine (TRANSDERM) 72 hr patch, Apply 1 patch to hairless area behind one ear at least 4 hours before travel.  Remove old patch and change every 3 days (72 hours)., Disp: 2 patch, Rfl: 0     ipratropium - albuterol 0.5 mg/2.5 mg/3 mL (DUONEB) 0.5-2.5 (3) MG/3ML neb solution, Take 1 vial (3 mLs) by nebulization every 6 hours as needed for shortness of breath / dyspnea or wheezing (Patient not taking: Reported on 3/7/2018), Disp: 30 vial, Rfl: 1     PREMPRO 0.45-1.5 MG per tablet, TAKE ONE TABLET BY MOUTH THREE TIMES WEEKLY OR AS NEEDED TO CONTROL HOT FLASHES/MENOPAUSAL SYMPTOMS (Patient not taking: Reported on 9/13/2018), Disp: 28 tablet, Rfl: 1     ALLERGIES:    Allergies   Allergen Reactions     Adhesive Tape      Iodine Anaphylaxis     contrast dye     Penicillins Hives        SOCIAL HISTORY:   Social History     Social History     Marital status:      Spouse name: N/A     Number of  "children: N/A     Years of education: N/A     Occupational History     Not on file.     Social History Main Topics     Smoking status: Former Smoker     Packs/day: 0.50     Years: 37.00     Types: Cigarettes     Quit date: 10/20/2007     Smokeless tobacco: Never Used     Alcohol use Yes      Comment: minimal     Drug use: No     Sexual activity: Yes     Partners: Male      Comment: menopause     Other Topics Concern      Service No     Blood Transfusions No     Caffeine Concern No     Occupational Exposure No     Hobby Hazards No     Sleep Concern Yes     arthritis     Stress Concern Yes     Weight Concern No     Special Diet Yes     Frazier     Back Care Yes     arthritis     Exercise Yes     Bike Helmet No     Seat Belt Yes     Self-Exams Yes     Parent/Sibling W/ Cabg, Mi Or Angioplasty Before 65f 55m? Yes     brother MI at age 50     Social History Narrative    Caffeine intake/servings daily - 0    Calcium intake/servings daily - 4 occ. calcium supplement    Exercise 7 times weekly - describe  walking    Sunscreen used - No    Seatbelts used - Yes    Guns stored in the home - No    Self Breast Exam - Yes    Pap test up to date -  Yes    Eye exam up to date -  Yes    Dental exam up to date -  No    DEXA scan up to date -  Yes, last done 5/4/04    Flex Sig/Colonoscopy up to date -  Yes, at age 52 \"normal\"    Mammography up to date - 8/18/06    Immunizations reviewed and up to date - unknown last tetanus shot    Abuse: Current or Past (Physical, Sexual or Emotional) - No    Do you feel safe in your environment - Yes    Do you cope well with stress - Yes    Do you suffer from insomnia - Yes    Last updated by: Mary Beth Katz 8/28/06        FAMILY HISTORY:   Family History   Problem Relation Age of Onset     Eye Disorder Mother      glaucoma, wet macular degeneration     Lipids Mother      Gynecology Mother      hyst     Melanoma Mother      Skin Cancer Mother      Cancer Father      colon /prostate     Circulatory " "Father      amputee     Diabetes Father      type II     C.A.D. Father      MIs     Eye Disorder Father      dry macular degeneration     Cardiovascular Father      small AAA     Melanoma Father      Arthritis Sister      Gynecology Sister      partial hyst  fibrous sheaths on ovary egg sized polyp uterine removed     Lipids Sister      Neurologic Disorder Sister      migraines     Cardiovascular Brother      large 7 cm AAA     Coronary Artery Disease Brother      MI     GASTROINTESTINAL DISEASE Paternal Grandmother       from hepatitis        EXAM:Vitals: /79 (BP Location: Left arm, Patient Position: Sitting, Cuff Size: Adult Regular)  Pulse 74  Ht 5' 7\" (1.702 m)  Wt 203 lb (92.1 kg)  BMI 31.79 kg/m2  BMI= Body mass index is 31.79 kg/(m^2).    Weight management plan: Patient was referred to their PCP to discuss a diet and exercise plan.    General appearance: Patient is alert and fully cooperative with history & exam.  No sign of distress is noted during the visit.     Psychiatric: Affect is pleasant & appropriate.  Patient appears motivated to improve health.     Respiratory: Breathing is regular & unlabored while sitting.     HEENT: Hearing is intact to spoken word.  Speech is clear.  No gross evidence of visual impairment that would impact ambulation.     Dermatologic: Skin is intact to both lower extremities without significant lesions, rash or abrasion.  No paronychia or evidence of soft tissue infection is noted.     Vascular: DP & PT pulses are intact & regular bilaterally.  No significant edema or varicosities noted.  CFT and skin temperature is normal to both lower extremities.     Neurologic: Lower extremity sensation is intact to light touch.  No evidence of weakness or contracture in the lower extremities.  No evidence of neuropathy.     Musculoskeletal: Patient is ambulatory without assistive device or brace.  No gross ankle deformity noted.  No foot or ankle joint effusion is noted.  " Noted pain on palpation of the plantar aspect of the second metatarsophalangeal joint bilaterally.  One notes a positive Lockman's test of the second toe bilaterally.  No surrounding erythema noted.       ASSESSMENT / PLAN:     ICD-10-CM    1. Capsulitis of right foot M77.51 order for DME   2. Capsulitis of left foot M77.52        I have explained to Tri  about the conditions.  We discussed the nature of the condition as well as the treatment plan and expected length of recovery.  At this time, the patient was instructed on icing, stretching, tissue massage and support.  The patient was fitted with a pair of Dynaflex inserts that will aid in offloading the tension forces to the soft tissues and prevent further inflammation.  The patient will return in four weeks for reevaluation if the symptoms do not resolve.        Disclaimer: This note consists of symbols derived from keyboarding, dictation and/or voice recognition software. As a result, there may be errors in the script that have gone undetected. Please consider this when interpreting information found in this chart.       SHERRY Jung.P.SUMMER., F.A.C.F.A.S.        Again, thank you for allowing me to participate in the care of your patient.        Sincerely,        Jm Singh DPM

## 2018-09-15 ENCOUNTER — NURSE TRIAGE (OUTPATIENT)
Dept: NURSING | Facility: CLINIC | Age: 68
End: 2018-09-15

## 2018-09-15 ENCOUNTER — TELEPHONE (OUTPATIENT)
Dept: FAMILY MEDICINE | Facility: CLINIC | Age: 68
End: 2018-09-15

## 2018-09-15 NOTE — TELEPHONE ENCOUNTER
"  Reason for Disposition    [1] SEVERE back pain (e.g., excruciating) AND [2] sudden onset AND [3] age > 60     Pt wants to go to Physical Therapy has a history of back pain.  Does not want to go to ER or even [facility] wants office appt.    Additional Information    Negative: Passed out (i.e., lost consciousness, collapsed and was not responding)    Negative: Shock suspected (e.g., cold/pale/clammy skin, too weak to stand, low BP, rapid pulse)    Negative: Sounds like a life-threatening emergency to the triager    Negative: Major injury to the back (e.g., MVA, fall > 10 feet or 3 meters, penetrating injury, etc.)    Negative: Followed a tailbone injury    Negative: [1] Pain in the upper back over the ribs (rib cage) AND [2] radiates (travels, goes) into chest    Negative: [1] Pain in the upper back over the ribs (rib cage) AND [2] worsened by coughing (or clearly increases with breathing)    Negative: Back pain during pregnancy    Negative: Pain mainly in flank (i.e., in the side, over the lower ribs or just below the ribs)    Answer Assessment - Initial Assessment Questions  1. ONSET: \"When did the pain begin?\"       One hour ago  2. LOCATION: \"Where does it hurt?\" (upper, mid or lower back)      Lower back  3. SEVERITY: \"How bad is the pain?\"  (e.g., Scale 1-10; mild, moderate, or severe)    - MILD (1-3): doesn't interfere with normal activities     - MODERATE (4-7): interferes with normal activities or awakens from sleep     - SEVERE (8-10): excruciating pain, unable to do any normal activities       Mild to severe  4. PATTERN: \"Is the pain constant?\" (e.g., yes, no; constant, intermittent)       no  5. RADIATION: \"Does the pain shoot into your legs or elsewhere?\"      no  6. CAUSE:  \"What do you think is causing the back pain?\"       bending  7. BACK OVERUSE:  \"Any recent lifting of heavy objects, strenuous work or exercise?\"      no  8. MEDICATIONS: \"What have you taken so far for the pain?\" (e.g., nothing, " "acetaminophen, NSAIDS)      1200 ibuprofen  9. NEUROLOGIC SYMPTOMS: \"Do you have any weakness, numbness, or problems with bowel/bladder control?\"      no  10. OTHER SYMPTOMS: \"Do you have any other symptoms?\" (e.g., fever, abdominal pain, burning with urination, blood in urine)        no  11. PREGNANCY: \"Is there any chance you are pregnant?\" (e.g., yes, no; LMP)        no    Protocols used: BACK PAIN-ADULT-AH    "

## 2018-09-15 NOTE — TELEPHONE ENCOUNTER
"Patient calling her lower back has \"gone out\"  She is having severe pain.  She has a hx of back pains.  She wants to go to physical therapy.  Suggested she go to ER or [facility] and she declines wants message sent to her clinic.  "

## 2018-09-17 ENCOUNTER — OFFICE VISIT (OUTPATIENT)
Dept: FAMILY MEDICINE | Facility: CLINIC | Age: 68
End: 2018-09-17
Payer: COMMERCIAL

## 2018-09-17 VITALS
TEMPERATURE: 97.5 F | DIASTOLIC BLOOD PRESSURE: 76 MMHG | HEIGHT: 67 IN | SYSTOLIC BLOOD PRESSURE: 126 MMHG | HEART RATE: 55 BPM

## 2018-09-17 DIAGNOSIS — M62.830 BACK MUSCLE SPASM: ICD-10-CM

## 2018-09-17 DIAGNOSIS — S39.012A BACK STRAIN, INITIAL ENCOUNTER: Primary | ICD-10-CM

## 2018-09-17 PROCEDURE — 99213 OFFICE O/P EST LOW 20 MIN: CPT | Performed by: PHYSICIAN ASSISTANT

## 2018-09-17 RX ORDER — CYCLOBENZAPRINE HCL 10 MG
5-10 TABLET ORAL 3 TIMES DAILY PRN
Qty: 30 TABLET | Refills: 1 | Status: SHIPPED | OUTPATIENT
Start: 2018-09-17 | End: 2019-07-29

## 2018-09-17 NOTE — PROGRESS NOTES
"  SUBJECTIVE:   Tri Ingram is a 68 year old female who presents to clinic today for the following health issues:      Back Pain       Duration: 9/15/18        Specific cause: She was cleaning her house and she tweaked her back randomly     Description:   Location of pain: low back bilateral, middle of back bilateral and upper back bilateral  Character of pain: sharp  Pain radiation:none  New numbness or weakness in legs, not attributed to pain:  no     Intensity: severe    History:   Pain interferes with job: Not applicable  History of back problems: Yes, she has had issues with her back since she was 18  Any previous MRI or X-rays: None  Sees a specialist for back pain:  No  Therapies tried without relief: none    Alleviating factors:   Improved by: rest      Precipitating factors:  Worsened by: Lifting, Bending, Standing, Sitting and Walking           Accompanying Signs & Symptoms:  Risk of Fracture:  None  Risk of Cauda Equina:  None  Risk of Infection:  None  Risk of Cancer:  None  Risk of Ankylosing Spondylitis:  Onset at age <35, male, AND morning back stiffness. no              Cleaning her house and felt a sharp pain in her back that almost brought her to her knees  Was able to get upstairs to her  and had him get the TENS unit which she started using along with ice  Pain not worse but not much better  Worst pain is changing positions (ie from sitting to standing) - one she is up she is \"okay\" once she starts moving  Pain located in low back, more on right side  No radiation of pain down her legs   She has been taking 1200mg of ibuprofen three times daily     No prior back imaging or significant back history  Recalls hurting her back at age 18 - was on a canoe trip with other counselors and was portaging a canoe up a hill. She was in the back of the canoe. The girl in front slipped and the canoe went up and then down on to patient's back  Did not seek medical attention as [place] " [place]   Eventually got better but she has noticed that she will \"tweak\" it every now and then  Was doing yoga consistently for awhile but has not done that recently        Problem list and histories reviewed & adjusted, as indicated.  Additional history:       Current Outpatient Prescriptions   Medication Sig Dispense Refill     acetaminophen (ARTHRITIS PAIN RELIEF) 650 MG CR tablet Take 2 tablets by mouth daily. Takes in pm 100 tablet 11     albuterol (PROAIR HFA/PROVENTIL HFA/VENTOLIN HFA) 108 (90 BASE) MCG/ACT Inhaler Inhale 2 puffs into the lungs every 4 hours as needed for shortness of breath / dyspnea or wheezing 1 Inhaler 1     ASPIRIN 81 MG OR TABS ONE DAILY 100 3     calcium carb 1250 mg, 500 mg [place],/vitamin D 200 units (OSCAL WITH D) 500-200 MG-UNIT per tablet Take 1 tablet by mouth daily.       estradiol (ESTRACE) 0.5 MG tablet Take 1 tablet (0.5 mg) by mouth daily 90 tablet 1     ibuprofen 200 MG capsule Take 1,200 mg by mouth 2 times daily        ipratropium - albuterol 0.5 mg/2.5 mg/3 mL (DUONEB) 0.5-2.5 (3) MG/3ML neb solution Take 1 vial (3 mLs) by nebulization every 6 hours as needed for shortness of breath / dyspnea or wheezing 30 vial 1     lovastatin (MEVACOR) 40 MG tablet TAKE ONE TABLET BY MOUTH EVERY NIGHT AT BEDTIME 90 tablet 1     MAGNESIUM PO Take 250 mg by mouth daily        medroxyPROGESTERone (PROVERA) 2.5 MG tablet Take 1 tablet (2.5 mg) by mouth daily 90 tablet 1     Multiple Vitamins-Minerals (ICAPS) CAPS Take 1 capsule by mouth daily       order for DME Equipment being ordered: Dynaflex insert 2 Units 0     order for DME Equipment being ordered: Nebulizer 1 Device 0     scopolamine (TRANSDERM) 72 hr patch Apply 1 patch to hairless area behind one ear at least 4 hours before travel.  Remove old patch and change every 3 days (72 hours). (Patient not taking: Reported on 9/17/2018) 2 patch 0     Allergies   Allergen Reactions     Adhesive Tape      Iodine Anaphylaxis " "    contrast dye     Penicillins Hives     BP Readings from Last 3 Encounters:   09/17/18 126/76   09/13/18 144/79   04/12/18 127/77    Wt Readings from Last 3 Encounters:   09/13/18 203 lb (92.1 kg)   04/12/18 203 lb (92.1 kg)   03/07/18 203 lb (92.1 kg)                    Reviewed and updated as needed this visit by clinical staff       Reviewed and updated as needed this visit by Provider         ROS:  Remainder of ROS obtained and found to be negative other than that which was documented above      OBJECTIVE:     /76  Pulse 55  Temp 97.5  F (36.4  C) (Tympanic)  Ht 5' 7\" (1.702 m)  There is no height or weight on file to calculate BMI.  GENERAL: healthy, alert and no distress  Comprehensive back pain exam:  Tender to touch over low back L2-L4 more to the right side and radiating outward, Pain limits the following motions: extension/flexion, Lower extremity strength functional and equal on both sides, Lower extremity reflexes within normal limits bilaterally, Lower extremity sensation normal and equal on both sides and Straight leg raise negative bilaterally, but limited by pain limited to low back on right side    Diagnostic Test Results:  none     ASSESSMENT/PLAN:         ICD-10-CM    1. Back strain, initial encounter S39.012A cyclobenzaprine (FLEXERIL) 10 MG tablet   2. Back muscle spasm M62.830 cyclobenzaprine (FLEXERIL) 10 MG tablet       Patient Instructions   Continue the ibuprofen (with meals) through the next week    Add flexeril (muscle relaxer) - start with 1/2 tablet (5mg). If you tolerate (no nausea, not overly sedated) but are still having muscle tightness, try the 2nd 1/2 to equal the full 10mg. Can take up to 10mg three times daily as needed      Continue to move and do some light activity but avoid any lifting, twisting, bending, pulling for the next week    By the end of the week, you should be able to look back to how you are feeling today and note some improvement. If not, let me " tate Cain PA-C  [facility]

## 2018-09-17 NOTE — PATIENT INSTRUCTIONS
Continue the ibuprofen (with meals) through the next week    Add flexeril (muscle relaxer) - start with 1/2 tablet (5mg). If you tolerate (no nausea, not overly sedated) but are still having muscle tightness, try the 2nd 1/2 to equal the full 10mg. Can take up to 10mg three times daily as needed      Continue to move and do some light activity but avoid any lifting, twisting, bending, pulling for the next week    By the end of the week, you should be able to look back to how you are feeling today and note some improvement. If not, let me know

## 2018-09-17 NOTE — MR AVS SNAPSHOT
"              After Visit Summary   9/17/2018    Tri Ingram    MRN: [id number]           Patient Information     Date Of Birth          1950        Visit Information        Provider Department      9/17/2018 8:40 AM Charlene Cain PA-C [facility]        Today's Diagnoses     Back muscle spasm    -  1      Care Instructions    Continue the ibuprofen (with meals) through the next week    Add flexeril (muscle relaxer) - start with 1/2 tablet (5mg). If you tolerate (no nausea, not overly sedated) but are still having muscle tightness, try the 2nd 1/2 to equal the full 10mg. Can take up to 10mg three times daily as needed      Continue to move and do some light activity but avoid any lifting, twisting, bending, pulling for the next week    By the end of the week, you should be able to look back to how you are feeling today and note some improvement. If not, let me know          Follow-ups after your visit        Who to contact     Normal or non-critical lab and imaging results will be communicated to you by [employer]AR[employer]art, letter or phone within 4 business days after the clinic has received the results. If you do not hear from us within 7 days, please contact the clinic through [employer] or phone. If you have a critical or abnormal lab result, we will notify you by phone as soon as possible.  Submit refill requests through [employer] or call your pharmacy and they will forward the refill request to us. Please allow 3 business days for your refill to be completed.          If you need to speak with a  for additional information , please call: [phone]             Additional Information About Your Visit        Care EveryWhere ID     This is your Care EveryWhere ID. This could be used by other organizations to access your [place] medical records  [id number]        Your Vitals Were     Pulse Temperature Height             55 97.5  F (36.4  C) (Tympanic) 5' 7\" (1.702 m)       "    Blood Pressure from Last 3 Encounters:   09/17/18 126/76   09/13/18 144/79   04/12/18 127/77    Weight from Last 3 Encounters:   09/13/18 203 lb (92.1 kg)   04/12/18 203 lb (92.1 kg)   03/07/18 203 lb (92.1 kg)              Today, you had the following     No orders found for display         Today's Medication Changes          These changes are accurate as of 9/17/18  9:13 AM.  If you have any questions, ask your nurse or doctor.               Start taking these medicines.        Dose/Directions    cyclobenzaprine 10 MG tablet   Commonly known as:  FLEXERIL   Used for:  Back muscle spasm   Started by:  Charlene Cain PA-C        Dose:  5-10 mg   Take 0.5-1 tablets (5-10 mg) by mouth 3 times daily as needed for muscle spasms   Quantity:  30 tablet   Refills:  1            Where to get your medicines      These medications were sent to [facility] [address]  [address] [facility] [address]     Phone:  [phone]     cyclobenzaprine 10 MG tablet                Primary Care Provider Office Phone # Fax #    Charlene Cain PA-C [phone] [address] [facility] [address]        Equal Access to Services     CHANTEL SUAREZ AH: Hadii baldemar arroyo hadasho Soomaali, waaxda luqadaha, qaybta kaalmada adeegyada, deanne mahoney haymohamud clay. So [facility] [phone].    ATENCIÓN: Si habla español, tiene a jackson disposición servicios gratuitos de asistencia lingüística. Llame al [phone].    We comply with applicable federal civil rights laws and [place] laws. We do not discriminate on the basis of race, color, national origin, age, disability, sex, sexual orientation, or gender identity.            Thank you!     Thank you for choosing [facility]  for your care. Our goal is always to provide you with excellent care. Hearing back from our patients is one way we can continue to improve our services. Please take  a few minutes to complete the written survey that you may receive in the mail after your visit with us. Thank you!             Your Updated Medication List - Protect others around you: Learn how to safely use, store and throw away your medicines at www.disposemymeds.org.          This list is accurate as of 9/17/18  9:13 AM.  Always use your most recent med list.                   Brand Name Dispense Instructions for use Diagnosis    albuterol 108 (90 Base) MCG/ACT inhaler    PROAIR HFA/PROVENTIL HFA/VENTOLIN HFA    1 Inhaler    Inhale 2 puffs into the lungs every 4 hours as needed for shortness of breath / dyspnea or wheezing    Acute bronchitis with symptoms greater than 10 days       ARTHRITIS PAIN RELIEF 650 MG CR tablet   Generic drug:  acetaminophen     100 tablet    Take 2 tablets by mouth daily. Takes in pm        aspirin 81 MG tablet     100    ONE DAILY    Tobacco use disorder, Other and unspecified hyperlipidemia       calcium carbonate 500 mg-vitamin D 200 units 500-200 MG-UNIT per tablet    OSCAL with D;OYSTER SHELL CALCIUM     Take 1 tablet by mouth daily.        cyclobenzaprine 10 MG tablet    FLEXERIL    30 tablet    Take 0.5-1 tablets (5-10 mg) by mouth 3 times daily as needed for muscle spasms    Back muscle spasm       estradiol 0.5 MG tablet    ESTRACE    90 tablet    Take 1 tablet (0.5 mg) by mouth daily    Symptomatic menopausal or female climacteric states       ibuprofen 200 MG capsule      Take 1,200 mg by mouth 2 times daily        ICAPS Caps      Take 1 capsule by mouth daily        ipratropium - albuterol 0.5 mg/2.5 mg/3 mL 0.5-2.5 (3) MG/3ML neb solution    DUONEB    30 vial    Take 1 vial (3 mLs) by nebulization every 6 hours as needed for shortness of breath / dyspnea or wheezing    Acute bronchitis with symptoms greater than 10 days       lovastatin 40 MG tablet    MEVACOR    90 tablet    TAKE ONE TABLET BY MOUTH EVERY NIGHT AT BEDTIME    Hyperlipidemia LDL goal <130       MAGNESIUM  PO      Take 250 mg by mouth daily        medroxyPROGESTERone 2.5 MG tablet    PROVERA    90 tablet    Take 1 tablet (2.5 mg) by mouth daily    Symptomatic menopausal or female climacteric states       order for DME     1 Device    Equipment being ordered: Nebulizer    Acute bronchitis with symptoms greater than 10 days       order for DME     2 Units    Equipment being ordered: Dynaflex insert    Capsulitis of right foot       scopolamine 72 hr patch    TRANSDERM    2 patch    Apply 1 patch to hairless area behind one ear at least 4 hours before travel.  Remove old patch and change every 3 days (72 hours).    H/O motion sickness

## 2018-11-12 ENCOUNTER — TELEPHONE (OUTPATIENT)
Dept: FAMILY MEDICINE | Facility: CLINIC | Age: 68
End: 2018-11-12

## 2018-11-13 NOTE — TELEPHONE ENCOUNTER
Prior Authorization Approval    Authorization Effective Date: 11/13/2018  Authorization Expiration Date: 11/13/2020  Medication: Estrace 0.5mg tab-APPROVED  Approved Dose/Quantity:   Reference #:     Insurance Company: [employer] - Phone [phone] Fax [phone]  Expected CoPay:       CoPay Card Available:      Foundation Assistance Needed:    Which Pharmacy is filling the prescription (Not needed for infusion/clinic administered): [facility] [place] [address]  Pharmacy Notified: Yes  Patient Notified: No    Pharmacy will notify patient when medication is ready.  Called pharmacy and the patient picked up the medication yesterday.  PA started today, called insurance to have it back dated to 11/12/18.  The request has been submitted but can take up to an hour to take effect.

## 2018-11-13 NOTE — TELEPHONE ENCOUNTER
Central Prior Authorization Team   Phone: [phone]    PA Initiation    Medication: Estrace 0.5mg tab-Initiated  Insurance Company: [employer] - Phone [phone] Fax [phone]  Pharmacy Filling the Rx: [facility] [place] [address]  Filling Pharmacy Phone: [phone]  Filling Pharmacy Fax:    Start Date: 11/13/2018

## 2018-11-13 NOTE — TELEPHONE ENCOUNTER
Prior Authorization Retail Medication Request    Medication/Dose: Estrace 0.5mg; 1QD   ICD code (if different than what is on RX):    Previously Tried and Failed:    Rationale:      Insurance Name:  [employer] Part D  Insurance ID:  [id number]      Jazmin Brown CPhT  [id number]  [address] [place] [address]  Phone: [phone]  Fax: [phone]

## 2019-01-07 ENCOUNTER — OFFICE VISIT (OUTPATIENT)
Dept: FAMILY MEDICINE | Facility: CLINIC | Age: 69
End: 2019-01-07
Payer: COMMERCIAL

## 2019-01-07 VITALS
HEIGHT: 67 IN | SYSTOLIC BLOOD PRESSURE: 127 MMHG | HEART RATE: 58 BPM | BODY MASS INDEX: 31.79 KG/M2 | TEMPERATURE: 97.7 F | DIASTOLIC BLOOD PRESSURE: 79 MMHG

## 2019-01-07 DIAGNOSIS — N95.1 SYMPTOMATIC MENOPAUSAL OR FEMALE CLIMACTERIC STATES: ICD-10-CM

## 2019-01-07 PROCEDURE — 99213 OFFICE O/P EST LOW 20 MIN: CPT | Performed by: PHYSICIAN ASSISTANT

## 2019-01-07 ASSESSMENT — PAIN SCALES - GENERAL: PAINLEVEL: NO PAIN (0)

## 2019-01-07 NOTE — PROGRESS NOTES
SUBJECTIVE:   Tri Ingram is a 69 year old female who presents to clinic today for the following health issues:      Medication Followup of Estrace 0.5 mg     Taking Medication as prescribed: yes    Side Effects:  None    Medication Helping Symptoms:  NO-she feels like it is not helping        Medication Followup of Provera 2.5 mg     Taking Medication as prescribed: yes    Side Effects:  None    Medication Helping Symptoms:  NO-she feels like it is not helping      Here with her   Would like to go back on prempro - was switched from prempro to estrace and provera due to insurance. Says it is not working  Irritable  Feeling hot all the time  Brain fog  Both she and her  state they are willing to pay out of pocket if needed     Problem list and histories reviewed & adjusted, as indicated.  Additional history: as documented    Current Outpatient Medications   Medication Sig Dispense Refill     acetaminophen (ARTHRITIS PAIN RELIEF) 650 MG CR tablet Take 2 tablets by mouth daily. Takes in pm 100 tablet 11     albuterol (PROAIR HFA/PROVENTIL HFA/VENTOLIN HFA) 108 (90 BASE) MCG/ACT Inhaler Inhale 2 puffs into the lungs every 4 hours as needed for shortness of breath / dyspnea or wheezing 1 Inhaler 1     ASPIRIN 81 MG OR TABS ONE DAILY 100 3     calcium carb 1250 mg, 500 mg [place],/vitamin D 200 units (OSCAL WITH D) 500-200 MG-UNIT per tablet Take 1 tablet by mouth daily.       cyclobenzaprine (FLEXERIL) 10 MG tablet Take 0.5-1 tablets (5-10 mg) by mouth 3 times daily as needed for muscle spasms 30 tablet 1     estradiol (ESTRACE) 0.5 MG tablet Take 1 tablet (0.5 mg) by mouth daily 90 tablet 1     estrogen conj-medroxyPROGESTERone (PREMPRO) 0.45-1.5 MG tablet TAKE ONE TABLET BY MOUTH THREE TIMES WEEKLY OR AS NEEDED TO CONTROL HOT FLASHES/MENOPAUSAL SYMPTOMS 28 tablet 1     ibuprofen 200 MG capsule Take 1,200 mg by mouth 2 times daily        ipratropium - albuterol 0.5 mg/2.5 mg/3 mL (DUONEB) 0.5-2.5  "(3) MG/3ML neb solution Take 1 vial (3 mLs) by nebulization every 6 hours as needed for shortness of breath / dyspnea or wheezing 30 vial 1     lovastatin (MEVACOR) 40 MG tablet TAKE ONE TABLET BY MOUTH EVERY NIGHT AT BEDTIME 90 tablet 1     MAGNESIUM PO Take 250 mg by mouth daily        medroxyPROGESTERone (PROVERA) 2.5 MG tablet Take 1 tablet (2.5 mg) by mouth daily 90 tablet 1     Multiple Vitamins-Minerals (ICAPS) CAPS Take 1 capsule by mouth daily       order for DME Equipment being ordered: Dynaflex insert 2 Units 0     order for DME Equipment being ordered: Nebulizer 1 Device 0     scopolamine (TRANSDERM) 72 hr patch Apply 1 patch to hairless area behind one ear at least 4 hours before travel.  Remove old patch and change every 3 days (72 hours). 2 patch 0     Allergies   Allergen Reactions     Adhesive Tape      Iodine Anaphylaxis     contrast dye     Penicillins Hives     BP Readings from Last 3 Encounters:   01/07/19 127/79   09/17/18 126/76   09/13/18 144/79    Wt Readings from Last 3 Encounters:   09/13/18 92.1 kg (203 lb)   04/12/18 92.1 kg (203 lb)   03/07/18 92.1 kg (203 lb)                    Reviewed and updated as needed this visit by clinical staff       Reviewed and updated as needed this visit by Provider         ROS:  Remainder of ROS obtained and found to be negative other than that which was documented above      OBJECTIVE:     /79   Pulse 58   Temp 97.7  F (36.5  C) (Tympanic)   Ht 1.702 m (5' 7\")   BMI 31.79 kg/m    Body mass index is 31.79 kg/m .  GENERAL: healthy, alert and no distress  EYES: Eyes grossly normal to inspection  RESP: lungs clear to auscultation - no rales, rhonchi or wheezes  CV: regular rates and rhythm, normal S1 S2, no S3 or S4, no murmur, click or rub and no peripheral edema  MS: no gross musculoskeletal defects noted, no edema  SKIN: no suspicious lesions or rashes    Diagnostic Test Results:  none     ASSESSMENT/PLAN:     (N95.1) SYMPTOMATIC FEMALE " CLIMACTERIC STATE  Comment: patient had been on prempro for years but recently had to switch due to insurance. Requesting to switch back as the estrace/provera are not working. We did discuss non hormonal options today including effexor given some of her symptoms (flushed feeling, irritability) may improve with effexor however neither her nor her  are interested and would rather go back to the prempro as they know it has worked. She is up to date on her mammograms. We did discuss risks of hormone therapy and need to stay up to date on regular screenign which patient was aware of  Plan: estrogen conj-medroxyPROGESTERone (PREMPRO)         0.45-1.5 MG tablet                Charlene Cain PA-C  [facility]

## 2019-01-14 DIAGNOSIS — E78.5 HYPERLIPIDEMIA LDL GOAL <130: ICD-10-CM

## 2019-01-14 NOTE — TELEPHONE ENCOUNTER
"LOVASTATIN 40MG TABS      Last Written Prescription Date:  7/25/18  Last Fill Quantity: 90,   # refills: 1  Last Office Visit: 1/7/19  Future Office visit:       Requested Prescriptions   Pending Prescriptions Disp Refills     lovastatin (MEVACOR) 40 MG tablet [Pharmacy Med Name: LOVASTATIN 40MG TABS] 90 tablet 1     Sig: TAKE ONE TABLET BY MOUTH EVERY NIGHT AT BEDTIME    Statins Protocol Passed - 1/14/2019 11:02 AM       Passed - LDL on file in past 12 months    Recent Labs   Lab Test 04/10/18  0818   LDL 79            Passed - No abnormal creatine kinase in past 12 months    No lab results found.            Passed - Recent (12 mo) or future (30 days) visit within the authorizing provider's specialty    Patient had office visit in the last 12 months or has a visit in the next 30 days with authorizing provider or within the authorizing provider's specialty.  See \"Patient Info\" tab in inbasket, or \"Choose Columns\" in Meds & Orders section of the refill encounter.             Passed - Medication is active on med list       Passed - Patient is age 18 or older       Passed - No active pregnancy on record       Passed - No positive pregnancy test in past 12 months          "

## 2019-01-15 RX ORDER — LOVASTATIN 40 MG
TABLET ORAL
Qty: 90 TABLET | Refills: 0 | Status: SHIPPED | OUTPATIENT
Start: 2019-01-15 | End: 2019-04-23

## 2019-01-15 NOTE — TELEPHONE ENCOUNTER
Prescription approved per [facility] Refill Protocol.  Michele Frank RN

## 2019-01-24 ENCOUNTER — TELEPHONE (OUTPATIENT)
Dept: FAMILY MEDICINE | Facility: CLINIC | Age: 69
End: 2019-01-24

## 2019-01-24 NOTE — TELEPHONE ENCOUNTER
Prior Authorization Retail Medication Request    Medication/Dose: prior auth for prempro  ICD code (if different than what is on RX):     Previously Tried and Failed:     Rationale:       Insurance Name:  [id number]  Insurance ID:  [employer] PART D      Pharmacy Information (if different than what is on RX)  Name:     Phone:       THANK YOU          Anay Sumner [facility]   Certified Pharmacy Technician

## 2019-01-30 NOTE — TELEPHONE ENCOUNTER
Central Prior Authorization Team   Phone: [phone]    PA Initiation    Medication: prior auth for prempro  Insurance Company: [employer] - Phone [phone] Fax [phone]  Pharmacy Filling the Rx: [place] [facility] [place] [address]  Filling Pharmacy Phone: [phone]  Filling Pharmacy Fax: [phone]  Start Date: 1/30/2019

## 2019-01-30 NOTE — TELEPHONE ENCOUNTER
Prior Authorization Approval    Authorization Effective Date: 1/30/2019  Authorization Expiration Date: 12/31/2019  Medication: prempro-APPROVED  Approved Dose/Quantity:    Reference #:     Insurance Company: [employer] - Phone [phone] Fax [phone]  Expected CoPay:       CoPay Card Available:      Foundation Assistance Needed:    Which Pharmacy is filling the prescription (Not needed for infusion/clinic administered): [facility] [place] - [address] [facility] [address] [facility]  Pharmacy Notified: Yes  Patient Notified: Yes

## 2019-03-26 ENCOUNTER — TELEPHONE (OUTPATIENT)
Dept: FAMILY MEDICINE | Facility: CLINIC | Age: 69
End: 2019-03-26

## 2019-03-26 DIAGNOSIS — Z13.6 CARDIOVASCULAR SCREENING; LDL GOAL LESS THAN 160: Primary | ICD-10-CM

## 2019-04-23 ENCOUNTER — OFFICE VISIT (OUTPATIENT)
Dept: FAMILY MEDICINE | Facility: CLINIC | Age: 69
End: 2019-04-23
Payer: COMMERCIAL

## 2019-04-23 VITALS
DIASTOLIC BLOOD PRESSURE: 72 MMHG | OXYGEN SATURATION: 99 % | RESPIRATION RATE: 18 BRPM | BODY MASS INDEX: 31.79 KG/M2 | HEART RATE: 68 BPM | HEIGHT: 67 IN | TEMPERATURE: 98.6 F | SYSTOLIC BLOOD PRESSURE: 138 MMHG

## 2019-04-23 DIAGNOSIS — E78.5 HYPERLIPIDEMIA LDL GOAL <130: ICD-10-CM

## 2019-04-23 DIAGNOSIS — R11.2 NAUSEA AND VOMITING, INTRACTABILITY OF VOMITING NOT SPECIFIED, UNSPECIFIED VOMITING TYPE: ICD-10-CM

## 2019-04-23 DIAGNOSIS — R42 DIZZINESS: ICD-10-CM

## 2019-04-23 DIAGNOSIS — J30.89 ALLERGIC RHINITIS DUE TO OTHER ALLERGIC TRIGGER, UNSPECIFIED SEASONALITY: ICD-10-CM

## 2019-04-23 DIAGNOSIS — R26.89 IMBALANCE: Primary | ICD-10-CM

## 2019-04-23 PROCEDURE — 99214 OFFICE O/P EST MOD 30 MIN: CPT | Performed by: PHYSICIAN ASSISTANT

## 2019-04-23 RX ORDER — MECLIZINE HYDROCHLORIDE 25 MG/1
25 TABLET ORAL 3 TIMES DAILY PRN
Qty: 30 TABLET | Refills: 0 | Status: SHIPPED | OUTPATIENT
Start: 2019-04-23 | End: 2019-12-16

## 2019-04-23 RX ORDER — LOVASTATIN 40 MG
TABLET ORAL
Qty: 90 TABLET | Refills: 1 | Status: SHIPPED | OUTPATIENT
Start: 2019-04-23 | End: 2019-10-29

## 2019-04-23 RX ORDER — ONDANSETRON 4 MG/1
4-8 TABLET, ORALLY DISINTEGRATING ORAL EVERY 8 HOURS PRN
Qty: 30 TABLET | Refills: 0 | Status: SHIPPED | OUTPATIENT
Start: 2019-04-23 | End: 2019-12-16

## 2019-04-23 ASSESSMENT — PAIN SCALES - GENERAL: PAINLEVEL: NO PAIN (0)

## 2019-04-23 NOTE — PROGRESS NOTES
"  SUBJECTIVE:   Tri Ingram is a 69 year old female who presents to clinic today for the following   health issues:      Dizziness  Onset: This morning at 4:30am    Description:   Do you feel faint:  YES  Does it feel like the surroundings (bed, room) are moving: YES  Unsteady/off balance: YES  Have you passed out or fallen: no     Intensity: moderate    Progression of Symptoms:  worsening    Accompanying Signs & Symptoms:  Heart palpitations: no   Nausea, vomiting: YES- both, as well as loose stools   Weakness in arms or legs: YES- she said they all feel \"weird\", she also feels shaky   Fatigue: YES  Vision or speech changes: YES- she said her vision is tilted   Ringing in ears (Tinnitus): YES- buzzing sound   Hearing Loss: no     History:   Head trauma/concussion hx: no   Previous similar symptoms: YES- 3 years ago. She thought she was having a heart attack or stroke but it was vertigo   Recent bleeding history: no     Precipitating factors:   Worse with activity or head movement: YES  Any new medications (BP?): no   Alcohol/drug abuse/withdrawal: no     Alleviating factors:   Does staying in a fixed position give relief:  YES- somewhat     Therapies Tried and outcome: None        Additional history:     Woke up early this morning to go to the bathroom and felt a little \"off balance\"   Went back to bed not thinking much of it until she woke in the morning and felt like everything we spinning  Nauseated - having both diarrhea and vomiting  Feels her nausea is triggered by the vertigo and \"off balance\" sensation  No headache  No chest pain or pressure  Arms and legs feel \"heavy\" but not numb or tingly  She feels very off balance -  drove her here and came back in wheelchair  She was scheduled to come in for labs today and was actually scheduled for a physical on Thursday - was going to discuss issues with her balance that she has noticed more often in the last 6 months  IN particular - notices it in " "the morning when she first wakes up   Doesn't really notice it much during the day and in fact her and her  have started going to the gym and she has been feeling stronger from that. Denies any feelings of being 'off balance\" in the gym    She had her first episode of BPPV about 3 years ago  Ended up in the ED at the time because she thought she was having a stroke or heart attack but work up was negative  Has not had an episode since but her  and she note that her \"motion sickness\" has seemed to get worse in the last several months  They went down to [place] for a few weeks and opted to drive vs fly because of her motion sickness. She wore bracelets to help with motion sickness while driving but still had a hard time and took her several days after driving to feel back to her self     Does have h/o allergies but says she does not tolerate oral allergy medications so does not treat them    Reviewed  and updated as needed this visit by clinical staff         Reviewed and updated as needed this visit by Provider         Current Outpatient Medications   Medication Sig Dispense Refill     acetaminophen (ARTHRITIS PAIN RELIEF) 650 MG CR tablet Take 2 tablets by mouth daily. Takes in pm 100 tablet 11     albuterol (PROAIR HFA/PROVENTIL HFA/VENTOLIN HFA) 108 (90 BASE) MCG/ACT Inhaler Inhale 2 puffs into the lungs every 4 hours as needed for shortness of breath / dyspnea or wheezing 1 Inhaler 1     ASPIRIN 81 MG OR TABS ONE DAILY 100 3     calcium carb 1250 mg, 500 mg Samish,/vitamin D 200 units (OSCAL WITH D) 500-200 MG-UNIT per tablet Take 1 tablet by mouth daily.       cyclobenzaprine (FLEXERIL) 10 MG tablet Take 0.5-1 tablets (5-10 mg) by mouth 3 times daily as needed for muscle spasms 30 tablet 1     estradiol (ESTRACE) 0.5 MG tablet Take 1 tablet (0.5 mg) by mouth daily 90 tablet 1     estrogen conj-medroxyPROGESTERone (PREMPRO) 0.45-1.5 MG tablet TAKE ONE TABLET BY MOUTH THREE TIMES WEEKLY OR AS NEEDED TO " "CONTROL HOT FLASHES/MENOPAUSAL SYMPTOMS 28 tablet 1     ibuprofen 200 MG capsule Take 1,200 mg by mouth 2 times daily        ipratropium - albuterol 0.5 mg/2.5 mg/3 mL (DUONEB) 0.5-2.5 (3) MG/3ML neb solution Take 1 vial (3 mLs) by nebulization every 6 hours as needed for shortness of breath / dyspnea or wheezing 30 vial 1     lovastatin (MEVACOR) 40 MG tablet TAKE ONE TABLET BY MOUTH EVERY NIGHT AT BEDTIME 90 tablet 0     MAGNESIUM PO Take 250 mg by mouth daily        medroxyPROGESTERone (PROVERA) 2.5 MG tablet Take 1 tablet (2.5 mg) by mouth daily 90 tablet 1     Multiple Vitamins-Minerals (ICAPS) CAPS Take 1 capsule by mouth daily       order for DME Equipment being ordered: Dynaflex insert 2 Units 0     order for DME Equipment being ordered: Nebulizer 1 Device 0     scopolamine (TRANSDERM) 72 hr patch Apply 1 patch to hairless area behind one ear at least 4 hours before travel.  Remove old patch and change every 3 days (72 hours). 2 patch 0     Allergies   Allergen Reactions     Adhesive Tape      Iodine Anaphylaxis     contrast dye     Penicillins Hives     BP Readings from Last 3 Encounters:   04/23/19 138/72   01/07/19 127/79   09/17/18 126/76    Wt Readings from Last 3 Encounters:   09/13/18 92.1 kg (203 lb)   04/12/18 92.1 kg (203 lb)   03/07/18 92.1 kg (203 lb)                    ROS:  Remainder of ROS obtained and found to be negative other than that which was documented above      OBJECTIVE:     /72   Pulse 68   Temp 98.6  F (37  C) (Tympanic)   Resp 18   Ht 1.702 m (5' 7\")   SpO2 99%   BMI 31.79 kg/m    Body mass index is 31.79 kg/m .  GENERAL: mild distress, dry heaved/vomited 2 times over course of visit reporting waves of dizziness  EYES: Eyes grossly normal to inspection, PERRL and conjunctivae and sclerae normal  HENT: ear canals and TM's normal, nose and mouth without ulcers or lesions  RESP: lungs clear to auscultation - no rales, rhonchi or wheezes  CV: regular rates and rhythm, " normal S1 S2, no S3 or S4, no murmur, click or rub and no peripheral edema  NEURO: Normal strength and tone, sensory exam grossly normal, mentation intact, speech normal and cranial nerves 2-12 intact  Unsteady gait  Diagnostic Test Results:  none     ASSESSMENT/PLAN:     (R26.89) Imbalance  (primary encounter diagnosis)  Comment: discussed referral to PT for assessment. Could also consider dizzy and balance center  Plan: PHYSICAL THERAPY REFERRAL            (J30.89) Allergic rhinitis due to other allergic trigger, unspecified seasonality  Comment: only her 2nd episode in 3 years but would need to consider poorly controlled allergies as possible trigger if she gets more frequent episodes or as a possible factor in her sense of imbalance.   Plan: ALLERGY/ASTHMA ADULT REFERRAL            (R42) Dizziness  Comment: will try meclizine for dizziness as needed. Zofran to use prn for nausea. Would expect that symptoms slowly resolve over the next few days. I see no red flags on exam today to warrant further testing/imaging but we did discuss that should her symptoms worsen rather than improve or her nausea is such that she is unable to keep any liquids downa dn dehydration becomes a factor that she would need to be seen in the ED  Plan: meclizine (ANTIVERT) 25 MG tablet            (R11.2) Nausea and vomiting, intractability of vomiting not specified, unspecified vomiting type  Comment:   Plan: ondansetron (ZOFRAN-ODT) 4 MG ODT tab            (E78.5) Hyperlipidemia LDL goal <130  Comment:   Plan: lovastatin (MEVACOR) 40 MG tablet                Charlene Cain PA-C  [facility]

## 2019-04-24 ENCOUNTER — NURSE TRIAGE (OUTPATIENT)
Dept: NURSING | Facility: CLINIC | Age: 69
End: 2019-04-24

## 2019-04-25 ENCOUNTER — TELEPHONE (OUTPATIENT)
Dept: FAMILY MEDICINE | Facility: CLINIC | Age: 69
End: 2019-04-25

## 2019-04-25 NOTE — TELEPHONE ENCOUNTER
calling. Wife was diagnosed with vertigo yesterday and prescribed meclizine 25mg three times a day. He is asking if they can increase the dose of meclizine? I advised that the dosing of this med is not written as a range so I would not recommend giving more than prescribed. Verbalized understanding

## 2019-04-25 NOTE — TELEPHONE ENCOUNTER
Please call and check in with patient - it sounds like she is still not great but maybe a little better than she was 2 days ago which is the right direction. I would hope that each day she notices more improvement - if not, I would like her to notify me.     Charlene

## 2019-04-25 NOTE — TELEPHONE ENCOUNTER
"Call placed to patient.  Patient is sleeping.   reports patient is doing \"a little better today\", feels she is better than she was 2 days ago.  Patient has PT appointment Monday, hoping this will help.  Advised to call back tomorrow if she is worse.   will relay message and verbalizes understanding of plan.  Addie Gustafson RN     "

## 2019-04-25 NOTE — TELEPHONE ENCOUNTER
Reason for call:  Patient reporting a symptom    Symptom or request: Wendy states that she canceled appointment with Charlene today.  She is bedridden and still a bit dizzy.  She has not vomited since Tuesday night.  She has managed to down 1/2 bottle of water and 1 piece of dry toast yesterday.  She had thought that maybe Charlene wanted to speak with her today?  Please review and advise. Thank you..Maureen Valdivia    Duration (how long have symptoms been present): since 4/23/19.      Have you been treated for this before? Yes seen on 4/23/19    Phone Number patient can be reached at:  Home number on file [phone] (home)    Best Time:  Any time    Can we leave a detailed message on this number:  YES    Call taken on 4/25/2019 at 8:15 AM by Maureen Valdivia

## 2019-04-26 ENCOUNTER — TELEPHONE (OUTPATIENT)
Dept: FAMILY MEDICINE | Facility: CLINIC | Age: 69
End: 2019-04-26

## 2019-04-26 NOTE — TELEPHONE ENCOUNTER
Call placed to patient at 1145.  Patient reports waking at 0800, drank 16-20 oz since waking.  Mouth dry.  Void x1 today, dark in color small amount.  Discussed concern with risk of dehydration.  Encouraged increase/push fluids as able, call back at 3pm to review symptoms and tolerance of increased fluids.    Patient returned call at 1500:  Patient drank additional 16 oz fluid over past 3 hrs.  Poor tolerance of this, C/O feeling bloated, full, more nausea.  No emesis.  Patient voidx1, still dark in color, small volume.  Remains dizzy, no change in symptom description since 0853 call.    Today is day 4 of symptoms, poor po intake, poor output.  Advised ED/Urgent care eval ?IVFluid support, vertigo symptoms.  Patient agrees with plan,  to transport.  Addie Gustafson RN

## 2019-04-26 NOTE — TELEPHONE ENCOUNTER
This writer called yesterday for update.  Please advise, then will call patient back.  Addie Gustafson RN

## 2019-04-26 NOTE — TELEPHONE ENCOUNTER
No change in recommendations.   It sounds like they would call back if she was worse. They have appointment with PT on Monday and during our visit we discussed following up if symptoms do not start improving  Charlene

## 2019-04-26 NOTE — TELEPHONE ENCOUNTER
Reason for call:  Patient reporting a symptom    Symptom or request: Tri calling with update.  She is still dizzy - it is a 8 today instead of a 10.  She has managed to keep a biscuit with jelly down and she did drink 36 oz. Of water yesterday, also a little pop.  She is trying to sit up a little more this morning - but not able to move about unless she has a hold of something - has no balance.  Please call and assess. Thank you..Maureen Valdivia    Duration (how long have symptoms been present): on going for past few days    Have you been treated for this before? unknown      Phone Number patient can be reached at:      [phone]       Best Time:  Any time    Can we leave a detailed message on this number:  YES    Call taken on 4/26/2019 at 8:53 AM by Maureen Valdivia

## 2019-04-29 ENCOUNTER — HOSPITAL ENCOUNTER (OUTPATIENT)
Dept: PHYSICAL THERAPY | Facility: CLINIC | Age: 69
Setting detail: THERAPIES SERIES
End: 2019-04-29
Attending: PHYSICIAN ASSISTANT
Payer: COMMERCIAL

## 2019-04-29 DIAGNOSIS — R26.89 IMBALANCE: ICD-10-CM

## 2019-04-29 PROCEDURE — 97112 NEUROMUSCULAR REEDUCATION: CPT | Mod: GP | Performed by: PHYSICAL THERAPIST

## 2019-04-29 PROCEDURE — 97161 PT EVAL LOW COMPLEX 20 MIN: CPT | Mod: GP | Performed by: PHYSICAL THERAPIST

## 2019-05-06 ENCOUNTER — HOSPITAL ENCOUNTER (OUTPATIENT)
Dept: PHYSICAL THERAPY | Facility: CLINIC | Age: 69
Setting detail: THERAPIES SERIES
End: 2019-05-06
Attending: PHYSICIAN ASSISTANT
Payer: COMMERCIAL

## 2019-05-06 DIAGNOSIS — N95.1 SYMPTOMATIC MENOPAUSAL OR FEMALE CLIMACTERIC STATES: ICD-10-CM

## 2019-05-06 PROCEDURE — 97112 NEUROMUSCULAR REEDUCATION: CPT | Mod: GP | Performed by: PHYSICAL THERAPIST

## 2019-05-07 NOTE — TELEPHONE ENCOUNTER
Prescription approved per [facility] [place] [place] Refill Protocol.    Arabella PRIETO RN

## 2019-05-07 NOTE — TELEPHONE ENCOUNTER
"PREMPRO 0.45-1.5MG TABS      Last Written Prescription Date:  1/7/19  Last Fill Quantity: 28,   # refills: 1  Last Office Visit: 4/23/19  Future Office visit:       Requested Prescriptions   Pending Prescriptions Disp Refills     estrogen conj-medroxyPROGESTERone (PREMPRO) 0.45-1.5 MG tablet [Pharmacy Med Name: PREMPRO 0.45-1.5MG TABS] 28 tablet 1     Sig: TAKE 1 TABLET BY MOUTH THREE TIMES WEEKLY OR AS NEEDED TO CONTROL HOT FLASHES/ MENOPAUSAL SYMPTOMS       Hormone Replacement Therapy Passed - 5/6/2019  5:55 PM        Passed - Blood pressure under 140/90 in past 12 months     BP Readings from Last 3 Encounters:   04/23/19 138/72   01/07/19 127/79   09/17/18 126/76                 Passed - Recent (12 mo) or future (30 days) visit within the authorizing provider's specialty     Patient had office visit in the last 12 months or has a visit in the next 30 days with authorizing provider or within the authorizing provider's specialty.  See \"Patient Info\" tab in inbasket, or \"Choose Columns\" in Meds & Orders section of the refill encounter.              Passed - Patient has mammogram in past 2 years on file if age 50-75        Passed - Medication is active on med list        Passed - Patient is 18 years of age or older        Passed - No active pregnancy on record        Passed - No positive pregnancy test on record in past 12 months          "

## 2019-05-10 ENCOUNTER — HOSPITAL ENCOUNTER (OUTPATIENT)
Dept: MAMMOGRAPHY | Facility: CLINIC | Age: 69
Discharge: HOME | End: 2019-05-10
Attending: PHYSICIAN ASSISTANT | Admitting: PHYSICIAN ASSISTANT
Payer: COMMERCIAL

## 2019-05-10 DIAGNOSIS — Z12.31 VISIT FOR SCREENING MAMMOGRAM: ICD-10-CM

## 2019-05-10 PROCEDURE — 77067 SCR MAMMO BI INCL CAD: CPT

## 2019-05-13 ENCOUNTER — HOSPITAL ENCOUNTER (OUTPATIENT)
Dept: PHYSICAL THERAPY | Facility: CLINIC | Age: 69
Setting detail: THERAPIES SERIES
End: 2019-05-13
Attending: PHYSICIAN ASSISTANT
Payer: COMMERCIAL

## 2019-05-13 PROCEDURE — 97112 NEUROMUSCULAR REEDUCATION: CPT | Mod: GP | Performed by: PHYSICAL THERAPIST

## 2019-05-31 ENCOUNTER — HOSPITAL ENCOUNTER (OUTPATIENT)
Dept: PHYSICAL THERAPY | Facility: CLINIC | Age: 69
Setting detail: THERAPIES SERIES
End: 2019-05-31
Attending: PHYSICIAN ASSISTANT
Payer: COMMERCIAL

## 2019-05-31 PROCEDURE — 97112 NEUROMUSCULAR REEDUCATION: CPT | Mod: GP | Performed by: PHYSICAL THERAPIST

## 2019-05-31 PROCEDURE — 97110 THERAPEUTIC EXERCISES: CPT | Mod: GP | Performed by: PHYSICAL THERAPIST

## 2019-06-04 ENCOUNTER — HOSPITAL ENCOUNTER (OUTPATIENT)
Dept: PHYSICAL THERAPY | Facility: CLINIC | Age: 69
Setting detail: THERAPIES SERIES
End: 2019-06-04
Attending: PHYSICIAN ASSISTANT
Payer: COMMERCIAL

## 2019-06-04 PROCEDURE — 97112 NEUROMUSCULAR REEDUCATION: CPT | Mod: GP | Performed by: PHYSICAL THERAPIST

## 2019-06-10 ENCOUNTER — HOSPITAL ENCOUNTER (OUTPATIENT)
Dept: PHYSICAL THERAPY | Facility: CLINIC | Age: 69
Setting detail: THERAPIES SERIES
End: 2019-06-10
Attending: PHYSICIAN ASSISTANT
Payer: COMMERCIAL

## 2019-06-10 PROCEDURE — 97112 NEUROMUSCULAR REEDUCATION: CPT | Mod: GP | Performed by: PHYSICAL THERAPIST

## 2019-06-28 ENCOUNTER — HOSPITAL ENCOUNTER (OUTPATIENT)
Dept: PHYSICAL THERAPY | Facility: CLINIC | Age: 69
Setting detail: THERAPIES SERIES
End: 2019-06-28
Attending: PHYSICIAN ASSISTANT
Payer: COMMERCIAL

## 2019-06-28 PROCEDURE — 97112 NEUROMUSCULAR REEDUCATION: CPT | Mod: GP | Performed by: PHYSICAL THERAPIST

## 2019-07-11 ENCOUNTER — HOSPITAL ENCOUNTER (OUTPATIENT)
Dept: PHYSICAL THERAPY | Facility: CLINIC | Age: 69
Setting detail: THERAPIES SERIES
End: 2019-07-11
Attending: PHYSICIAN ASSISTANT
Payer: COMMERCIAL

## 2019-07-11 PROCEDURE — 97112 NEUROMUSCULAR REEDUCATION: CPT | Mod: GP | Performed by: PHYSICAL THERAPIST

## 2019-07-11 NOTE — PROGRESS NOTES
PHYSICAL THERAPY PROGRESS NOTE  07/11/19 1100   Signing Clinician's Name / Credentials   Signing clinician's name / credentials Megan Basilio, PT, DPT, OCS   Session Number   Session Number 8 [employer]   Progress Note/Recertification   Progress Note Due Date 09/05/19   Adult Goals   PT Eval Goals 1;2;3;4   Goal 1   Goal Identifier 1   Goal Description Patient will be able to walk without assistance without LOB.   Target Date 06/10/19   Date Met 06/04/19   Goal 2   Goal Identifier 2   Goal Description Patient will be able to turn head quickly with symptoms.    Target Date 09/05/19   Date Met   (able when slow, not yet quickly)   Goal 3   Goal Identifier 3   Goal Description Patient will be able to bend over without symptoms or LOB.   Target Date 09/05/19   Date Met   (gets nauseous and slight LOB)   Goal 4   Goal Identifier 4   Goal Description Patient will be independent and compliant with HEP to aid functional recovery.   Target Date 09/05/19   Date Met   (continuing to progress, compliant)   Subjective Report   Subjective Report Pt reports things are still improving. Still gets bad days and will need to lay down.    Treatment Interventions   Interventions Neuromuscular Re-education;Therapeutic Procedure/Exercise   Neuromuscular Re-education   Neuromuscular re-ed of mvmt, balance, coord, kinesthetic sense, posture, proprioception minutes (26870) 30   Skilled Intervention HEP instruction, vestibular and balance ex to decrease dizziness and improve function   Patient Response see below    Treatment Detail FT EO head turns to on foam, FA on foam EC. FT EC on firm prog to addition of head turns. Gait with head turns horizontal and verticle. Gaze stab x 1 while walking. instr pt in FT bending cross over cone touches - tolerated 10 total and was very naseous. Pt reports she would really like to ge tback to golfing, instr pt in focusing on letter on ground with slow 1/4 swing of golf club or broomstick.    Assessments  Completed   Assessments Completed Patient continues to show slow progression. She no longer needs and AD with walking and is able to walk and turn head slowly without LOB, still difficult with a quicker head turn. She still has difficulty with bending or quick movements with losing her balance. She is also still reporting things continue to move when she sits still. Recommend continue skilled PT to progress balance and vestibular exercises.   Education   Learner Patient   Readiness Acceptance   Method Booklet/handout;Explanation;Demonstration   Response Verbalizes Understanding;Demonstrates Understanding   Plan   Home program above ex   Updates to plan of care 1x every other week for 8 weeks    Plan for next session progress balance and vestibular ex    Total Session Time   Timed Code Treatment Minutes 30   Total Treatment Time (sum of timed and untimed services) 30, nmr2   AMBULATORY CLINICS ONLY-MEDICAL AND TREATMENT DIAGNOSIS   Medical Diagnosis imbalance   PT Diagnosis dizziness       Please contact me with any questions or concerns.  Thank you for your referral.    Megan Basilio, PT, DPT, OCS  Physical Therapist, Orthopedic Certified Specialist  [facility]  [phone]  '

## 2019-07-23 ENCOUNTER — HOSPITAL ENCOUNTER (OUTPATIENT)
Dept: PHYSICAL THERAPY | Facility: CLINIC | Age: 69
Setting detail: THERAPIES SERIES
End: 2019-07-23
Attending: PHYSICIAN ASSISTANT
Payer: COMMERCIAL

## 2019-07-23 ENCOUNTER — OFFICE VISIT (OUTPATIENT)
Dept: ALLERGY | Facility: CLINIC | Age: 69
End: 2019-07-23
Payer: COMMERCIAL

## 2019-07-23 VITALS
HEART RATE: 71 BPM | HEIGHT: 66 IN | WEIGHT: 207.23 LBS | SYSTOLIC BLOOD PRESSURE: 136 MMHG | TEMPERATURE: 97.7 F | BODY MASS INDEX: 33.3 KG/M2 | OXYGEN SATURATION: 99 % | DIASTOLIC BLOOD PRESSURE: 74 MMHG

## 2019-07-23 DIAGNOSIS — R06.2 WHEEZING: ICD-10-CM

## 2019-07-23 DIAGNOSIS — J31.0 CHRONIC RHINITIS: Primary | ICD-10-CM

## 2019-07-23 DIAGNOSIS — Z88.0 H/O ALLERGY TO PENICILLIN: ICD-10-CM

## 2019-07-23 LAB
FEF 25/75: NORMAL
FEV-1: NORMAL
FEV1/FVC: NORMAL
FVC: NORMAL

## 2019-07-23 PROCEDURE — 97112 NEUROMUSCULAR REEDUCATION: CPT | Mod: GP | Performed by: PHYSICAL THERAPIST

## 2019-07-23 PROCEDURE — 99204 OFFICE O/P NEW MOD 45 MIN: CPT | Mod: 25 | Performed by: ALLERGY & IMMUNOLOGY

## 2019-07-23 PROCEDURE — 94010 BREATHING CAPACITY TEST: CPT | Performed by: ALLERGY & IMMUNOLOGY

## 2019-07-23 RX ORDER — FLUTICASONE PROPIONATE 50 MCG
2 SPRAY, SUSPENSION (ML) NASAL DAILY
Qty: 16 G | Refills: 3 | Status: SHIPPED | OUTPATIENT
Start: 2019-07-23 | End: 2022-03-09

## 2019-07-23 ASSESSMENT — ENCOUNTER SYMPTOMS
ACTIVITY CHANGE: 1
EYE DISCHARGE: 1
EYE REDNESS: 0
EYE ITCHING: 1
FACIAL SWELLING: 0
CHILLS: 0
MYALGIAS: 0
SHORTNESS OF BREATH: 0
DIZZINESS: 1
NAUSEA: 0
RHINORRHEA: 1
SINUS PRESSURE: 1
WHEEZING: 1
HEADACHES: 0
DIARRHEA: 0
CHEST TIGHTNESS: 0
COUGH: 1
ADENOPATHY: 0
JOINT SWELLING: 0
VOMITING: 0
FEVER: 0

## 2019-07-23 ASSESSMENT — MIFFLIN-ST. JEOR: SCORE: 1489

## 2019-07-23 NOTE — PROGRESS NOTES
SUBJECTIVE:                                                                   Tri Ingram is a 69 year old female who presents today to our Allergy Clinic at [facility]; she is being seen in consultation at the request of Charlene Cain PA-C for evaluation of allergic rhinitis.    When she was in college, she began to have perennial chronic nasal symptoms (itch, clear rhinorrhea, stuffiness, and sneezing) and ocular symptoms (itching).  She is not worse around dogs/cats.   Intranasal fluticasone was used in the past, and it was helpful, but from some reason, she stopped it. Oral antihistamines (loratadine and diphenhydramine) were used in the past, and she could not tolerate them because they made her wired.    There is no history of PE tubes, sinus surgeries, or tonsillectomy/adenoidectomy.    She thinks she has 2 sinus infections per year. One episode would require an oral antibiotic, which usually would happen in the Winter. She has never had a sinus CT.  She develops 1 episode of bronchitis preceded by a sinus infection. It is manifested by dyspnea, persistent cough, and wheezing. She thinks, she frequently wheezes even when she is not sick. She thinks she is wheezing now, but her lungs sound clear on the exam.  May develop chest tightness and cough with exertion.  Albuterol partially helps when she is sick. Last bronchitis was in 2018.  Treated with doxy, albuterol, [place], and Robitussin with codeine.     She is a former smoker, 36 years about 2 ppd, last 5 years of smoking, 0.5 ppd. She quit about 10 years ago.     She states she had hives or rash when she was a child after taking penicillin. She does not remember timing or the exact nature of the reaction.      Patient Active Problem List   Diagnosis     Other malignant neoplasm of skin of trunk, except scrotum     Generalized osteoarthrosis, unspecified site     Disorder of bone and cartilage     Symptomatic menopausal or  female climacteric states     Plantar fasciitis     Hyperlipidemia LDL goal <130     Obesity     Advanced directives, counseling/discussion     Family history of abdominal aortic aneurysm     Former moderate cigarette smoker (10-19 per day)       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     Vestibular neuritis       Problem (# of Occurrences) Relation (Name,Age of Onset)    Arthritis (1) Sister    Asthma (1) Brother    C.A.D. (1) Father: MIs    Cancer (1) Father: colon /prostate    Cardiovascular (2) Father: small AAA, Brother: large 7 cm AAA    Circulatory (1) Father: amputee    Coronary Artery Disease (1) Brother: MI    Diabetes (1) Father: type II    Eye Disorder (2) Mother: glaucoma, wet macular degeneration, Father: dry macular degeneration    Gastrointestinal Disease (1) Paternal Grandmother:  from hepatitis    Gynecology (2) Mother: hyst, Sister: partial hyst  fibrous sheaths on ovary egg sized polyp uterine removed    Lipids (2) Mother, Sister    Melanoma (2) Mother, Father    Neurologic Disorder (1) Sister: migraines    Skin Cancer (1) Mother        Past Surgical History:   Procedure Laterality Date     C NONSPECIFIC PROCEDURE      Bowen's disease removed X 2     COLONOSCOPY  ,      COLONOSCOPY N/A 11/3/2017    Procedure: COLONOSCOPY;  Colonoscopy  ;  Surgeon: Lg Guerrero MD;  Location: [facility]     Social History     Socioeconomic History     Marital status:      Spouse name: None     Number of children: None     Years of education: None     Highest education level: None   Occupational History     Occupation: RETIRED   Social Needs      Financial resource strain: None     Food insecurity:     Worry: None     Inability: None     Transportation needs:     Medical: None     Non-medical: None   Tobacco Use     Smoking status: Former Smoker     Packs/day: 0.50     Years: 37.00     Pack years: 18.50     Types: Cigarettes     Last attempt to quit: 10/20/2007     Years since quittin.7     Smokeless tobacco: Never Used   Substance and Sexual Activity     Alcohol use: Yes     Comment: minimal     Drug use: No     Sexual activity: Yes     Partners: Male     Comment: menopause   Lifestyle     Physical activity:     Days per week: None     Minutes per session: None     Stress: None   Relationships     Social connections:     Talks on phone: None     Gets together: None     Attends Yazdanism service: None     Active member of club or organization: None     Attends meetings of clubs or organizations: None     Relationship status: None     Intimate partner violence:     Fear of current or ex partner: None     Emotionally abused: None     Physically abused: None     Forced sexual activity: None   Other Topics Concern      Service No     Blood Transfusions No     Caffeine Concern No     Occupational Exposure No     Hobby Hazards No     Sleep Concern Yes     Comment: arthritis     Stress Concern Yes     Weight Concern No     Special Diet Yes     Comment: [place]     Back Care Yes     Comment: arthritis     Exercise Yes     Bike Helmet No     Seat Belt Yes     Self-Exams Yes     Parent/sibling w/ CABG, MI or angioplasty before 65F 55M? Yes     Comment: brother MI at age 50   Social History Narrative    Caffeine intake/servings daily - 0    Calcium intake/servings daily - 4 occ. calcium supplement    Exercise 7 times weekly - describe  walking    Sunscreen used - No    Seatbelts used - Yes    Guns stored in the home - No    Self Breast Exam - Yes    Pap test up to date -  Yes    Eye exam up to date -  Yes    Dental exam up to date -  No    DEXA scan up  "to date -  Yes, last done 5/4/04    Flex Sig/Colonoscopy up to date -  Yes, at age 52 \"normal\"    Mammography up to date - 8/18/06    Immunizations reviewed and up to date - unknown last tetanus shot    Abuse: Current or Past (Physical, Sexual or Emotional) - No    Do you feel safe in your environment - Yes    Do you cope well with stress - Yes    Do you suffer from insomnia - Yes    Last updated by: Mary Beth Katz 8/28/06 July 23, 2019            ENVIRONMENTAL HISTORY: The family [place] a[place] year old [place] The home is heated with a gas furnace. They do have central air conditioning. The patient's bedroom is furnished with Indoor plants, fabric window coverings, carpet in bedroom. No pets . There is no history of cockroach or mice infestation. There is/are 0 smokers in the house.  The house does not have a basement.            Review of Systems   Constitutional: Positive for activity change (due to vestibular neuritis). Negative for chills and fever.   HENT: Positive for congestion, postnasal drip, rhinorrhea, sinus pressure and sneezing. Negative for dental problem, ear pain, facial swelling and nosebleeds.    Eyes: Positive for discharge and itching. Negative for redness.   Respiratory: Positive for cough and wheezing. Negative for chest tightness and shortness of breath.    Cardiovascular: Negative for chest pain.   Gastrointestinal: Negative for diarrhea, nausea and vomiting.   Musculoskeletal: Negative for joint swelling and myalgias.   Skin: Negative for rash.   Neurological: Positive for dizziness (vestibular neuritis). Negative for headaches.   Hematological: Negative for adenopathy.   Psychiatric/Behavioral: Negative for behavioral problems and self-injury.           Current Outpatient Medications:      ASPIRIN 81 MG OR TABS, ONE DAILY, Disp: 100, Rfl: 3     calcium carb 1250 mg, 500 mg [employer] [place],/vitamin D 200 units (OSCAL WITH D) 500-200 MG-UNIT per tablet, Take 1 tablet by mouth " daily., Disp: , Rfl:      estrogen conj-medroxyPROGESTERone (PREMPRO) 0.45-1.5 MG tablet, Take 1 tablet by mouth three times a week, Disp: , Rfl:      fluticasone (FLONASE) 50 MCG/ACT nasal spray, Spray 2 sprays into both nostrils daily, Disp: 16 g, Rfl: 3     ibuprofen 200 MG capsule, Take 1,000 mg by mouth 2 times daily , Disp: , Rfl:      lovastatin (MEVACOR) 40 MG tablet, TAKE ONE TABLET BY MOUTH EVERY NIGHT AT BEDTIME, Disp: 90 tablet, Rfl: 1     Multiple Vitamins-Minerals (ICAPS) CAPS, Take 1 capsule by mouth daily, Disp: , Rfl:      order for DME, Equipment being ordered: Dynaflex insert, Disp: 2 Units, Rfl: 0     acetaminophen (ARTHRITIS PAIN RELIEF) 650 MG CR tablet, Take 1,300 mg by mouth as needed Takes in pm, Disp: 100 tablet, Rfl: 11     albuterol (PROAIR HFA/PROVENTIL HFA/VENTOLIN HFA) 108 (90 BASE) MCG/ACT Inhaler, Inhale 2 puffs into the lungs every 4 hours as needed for shortness of breath / dyspnea or wheezing (Patient not taking: Reported on 7/23/2019), Disp: 1 Inhaler, Rfl: 1     cyclobenzaprine (FLEXERIL) 10 MG tablet, Take 0.5-1 tablets (5-10 mg) by mouth 3 times daily as needed for muscle spasms (Patient not taking: Reported on 7/23/2019), Disp: 30 tablet, Rfl: 1     estradiol (ESTRACE) 0.5 MG tablet, Take 1 tablet (0.5 mg) by mouth daily (Patient not taking: Reported on 7/23/2019), Disp: 90 tablet, Rfl: 1     estrogen conj-medroxyPROGESTERone (PREMPRO) 0.45-1.5 MG tablet, TAKE 1 TABLET BY MOUTH THREE TIMES WEEKLY OR AS NEEDED TO CONTROL HOT FLASHES/ MENOPAUSAL SYMPTOMS (Patient not taking: Reported on 7/23/2019), Disp: 28 tablet, Rfl: 1     ipratropium - albuterol 0.5 mg/2.5 mg/3 mL (DUONEB) 0.5-2.5 (3) MG/3ML neb solution, Take 1 vial (3 mLs) by nebulization every 6 hours as needed for shortness of breath / dyspnea or wheezing (Patient not taking: Reported on 7/23/2019), Disp: 30 vial, Rfl: 1     MAGNESIUM PO, Take 250 mg by mouth daily , Disp: , Rfl:      meclizine (ANTIVERT) 25 MG tablet,  "Take 1 tablet (25 mg) by mouth 3 times daily as needed for dizziness (Patient not taking: Reported on 7/23/2019), Disp: 30 tablet, Rfl: 0     medroxyPROGESTERone (PROVERA) 2.5 MG tablet, Take 1 tablet (2.5 mg) by mouth daily (Patient not taking: Reported on 7/23/2019), Disp: 90 tablet, Rfl: 1     ondansetron (ZOFRAN-ODT) 4 MG ODT tab, Take 1-2 tablets (4-8 mg) by mouth every 8 hours as needed for nausea (Patient not taking: Reported on 7/23/2019), Disp: 30 tablet, Rfl: 0     order for DME, Equipment being ordered: Nebulizer (Patient not taking: Reported on 7/23/2019), Disp: 1 Device, Rfl: 0     scopolamine (TRANSDERM) 72 hr patch, Apply 1 patch to hairless area behind one ear at least 4 hours before travel.  Remove old patch and change every 3 days (72 hours). (Patient not taking: Reported on 7/23/2019), Disp: 2 patch, Rfl: 0  Immunization History   Administered Date(s) Administered     Influenza (High Dose) 3 valent vaccine 10/01/2018     Influenza (IIV3) PF 11/04/2008     Pneumo Conj 13-V (2010&after) 05/15/2017     Pneumococcal 23 valent 05/16/2018     TD (ADULT, 7+) 02/12/2007     TDAP Vaccine (Adacel) 04/18/2016     Zoster vaccine recombinant adjuvanted (SHINGRIX) 05/16/2018, 07/16/2018     Allergies   Allergen Reactions     Adhesive Tape      Iodine Anaphylaxis     contrast dye     Penicillins Hives     OBJECTIVE:                                                                 /74 (BP Location: Left arm, Patient Position: Sitting, Cuff Size: Adult Large)   Pulse 71   Temp 97.7  F (36.5  C) (Tympanic)   Ht 1.672 m (5' 5.83\")   Wt 94 kg (207 lb 3.7 oz)   SpO2 99%   BMI 33.62 kg/m          Physical Exam   Constitutional: She is oriented to person, place, and time. No distress.   HENT:   Head: Normocephalic and atraumatic.   Right Ear: Tympanic membrane, external ear and ear canal normal.   Left Ear: Tympanic membrane, external ear and ear canal normal.   Nose: Rhinorrhea (scant, clear) and septal " deviation present. No mucosal edema.   Mouth/Throat: Oropharynx is clear and moist and mucous membranes are normal.   Eyes: Conjunctivae are normal. Right eye exhibits no discharge. Left eye exhibits no discharge.   Neck: Normal range of motion.   Cardiovascular: Normal rate, regular rhythm and normal heart sounds.   No murmur heard.  Pulmonary/Chest: Effort normal and breath sounds normal. No respiratory distress. She has no decreased breath sounds. She has no wheezes. She has no rhonchi. She has no rales.   Musculoskeletal: Normal range of motion.   Lymphadenopathy:     She has no cervical adenopathy.   Neurological: She is alert and oriented to person, place, and time.   Skin: Skin is warm. No rash noted. She is not diaphoretic.   Nursing note and vitals reviewed.        WORKUP:   SPIROMETRY       FVC 2.63L (79% of predicted).     FEV1 1.96L (77% of predicted).     FEV1/FVC 74%     FEF 25%-75%  1.52L/s (71% of predicted).    My interpretation: The office spirometry performed today does not suggest an obstruction.        ASSESSMENT/PLAN:    1. Chronic rhinitis  (primary encounter diagnosis)  The patient would like to reschedule the skin test because she has an appointment within the next 30 minutes.  I recommend starting intranasal fluticasone 2 sprays in each nostril once daily since it was helpful in the past.    fluticasone (FLONASE) 50 MCG/ACT nasal spray      2. Wheezing  The patient thinks that she wheezes all the time but on my exam, I do not hear any wheezing.  The office spirometry is pretty much unchanged from PFTs done in 2018.  At that time, there is no reversibility after nebulized albuterol.  When she has bronchitis, she has dyspnea, cough, and wheezing.  The patient thinks that when she was on intranasal fluticasone, it would solve postnasal drainage and cough, and probably wheezing too.  Since she has a benign spirometry and physical exam, I would not recommend starting a daily ICS at this point.   She does not feel that her respiration is compromised by chest tightness or dyspnea.    Spirometry, Breathing Capacity      3. H/O allergy to penicillin    The time elapsed since the last reaction is important because penicillin-specific IgE antibodies decrease over time, and therefore patients with recent reactions are more likely to be allergic than patients with distant reactions. Approximately 80 percent of patients with IgE-mediated penicillin allergy lose the sensitivity after 10 years.  -Recommend penicillin testing with subsequent amoxicillin oral challenge in office settings if the test is negative.     The patient will set up an appointment for percutaneous skin puncture testing at her convenience.      Thank you for allowing us to participate in the care of this patient. Please feel free to contact us if there are any questions or concerns about the patient.    Disclaimer: This note consists of symbols derived from keyboarding, dictation and/or voice recognition software. As a result, there may be errors in the script that have gone undetected. Please consider this when interpreting information found in this chart.    Rick Haskins MD, FAAAAI, FACAAI  Allergy, Asthma and Immunology  [place], [place] and [place]

## 2019-07-23 NOTE — LETTER
7/23/2019         RE: Tri Ingram  [address]  [facility] [address]        Dear Colleague,    Thank you for referring your patient, Tri Ingram, to the [facility]. Please see a copy of my visit note below.    SUBJECTIVE:                                                                   Tri Ingram is a 69 year old female who presents today to our Allergy Clinic at [facility]; she is being seen in consultation at the request of Charlene Cain PA-C for evaluation of allergic rhinitis.    When she was in college, she began to have perennial chronic nasal symptoms (itch, clear rhinorrhea, stuffiness, and sneezing) and ocular symptoms (itching).  She is not worse around dogs/cats.   Intranasal fluticasone was used in the past, and it was helpful, but from some reason, she stopped it. Oral antihistamines (loratadine and diphenhydramine) were used in the past, and she could not tolerate them because they made her wired.    There is no history of PE tubes, sinus surgeries, or tonsillectomy/adenoidectomy.    She thinks she has 2 sinus infections per year. One episode would require an oral antibiotic, which usually would happen in the Winter. She has never had a sinus CT.  She develops 1 episode of bronchitis preceded by a sinus infection. It is manifested by dyspnea, persistent cough, and wheezing. She thinks, she frequently wheezes even when she is not sick. She thinks she is wheezing now, but her lungs sound clear on the exam.  May develop chest tightness and cough with exertion.  Albuterol partially helps when she is sick. Last bronchitis was in 2018.  Treated with doxy, albuterol, [place], and Robitussin with codeine.     She is a former smoker, 36 years about 2 ppd, last 5 years of smoking, 0.5 ppd. She quit about 10 years ago.     She states she had hives or rash when she was a child after taking penicillin. She does not remember timing or  the exact nature of the reaction.      Patient Active Problem List   Diagnosis     Other malignant neoplasm of skin of trunk, except scrotum     Generalized osteoarthrosis, unspecified site     Disorder of bone and cartilage     Symptomatic menopausal or female climacteric states     Plantar fasciitis     Hyperlipidemia LDL goal <130     Obesity     Advanced directives, counseling/discussion     Family history of abdominal aortic aneurysm     Former moderate cigarette smoker (10-19 per day)       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     Vestibular neuritis       Problem (# of Occurrences) Relation (Name,Age of Onset)    Arthritis (1) Sister    Asthma (1) Brother    C.A.D. (1) Father: MIs    Cancer (1) Father: colon /prostate    Cardiovascular (2) Father: small AAA, Brother: large 7 cm AAA    Circulatory (1) Father: amputee    Coronary Artery Disease (1) Brother: MI    Diabetes (1) Father: type II    Eye Disorder (2) Mother: glaucoma, wet macular degeneration, Father: dry macular degeneration    Gastrointestinal Disease (1) Paternal Grandmother:  from hepatitis    Gynecology (2) Mother: hyst, Sister: partial hyst  fibrous sheaths on ovary egg sized polyp uterine removed    Lipids (2) Mother, Sister    Melanoma (2) Mother, Father    Neurologic Disorder (1) Sister: migraines    Skin Cancer (1) Mother        Past Surgical History:   Procedure Laterality Date     C NONSPECIFIC PROCEDURE      Bowen's disease removed X 2     COLONOSCOPY  ,      COLONOSCOPY N/A 11/3/2017    Procedure: COLONOSCOPY;  Colonoscopy  ;  Surgeon: Lg Guerrero MD;  Location:  [facility]     Social History     Socioeconomic History     Marital status:      Spouse name: None     Number of children: None     Years of education: None     Highest education level: None   Occupational History     Occupation: RETIRED   Social Needs     Financial resource strain: None     Food insecurity:     Worry: None     Inability: None     Transportation needs:     Medical: None     Non-medical: None   Tobacco Use     Smoking status: Former Smoker     Packs/day: 0.50     Years: 37.00     Pack years: 18.50     Types: Cigarettes     Last attempt to quit: 10/20/2007     Years since quittin.7     Smokeless tobacco: Never Used   Substance and Sexual Activity     Alcohol use: Yes     Comment: minimal     Drug use: No     Sexual activity: Yes     Partners: Male     Comment: menopause   Lifestyle     Physical activity:     Days per week: None     Minutes per session: None     Stress: None   Relationships     Social connections:     Talks on phone: None     Gets together: None     Attends Hinduism service: None     Active member of club or organization: None     Attends meetings of clubs or organizations: None     Relationship status: None     Intimate partner violence:     Fear of current or ex partner: None     Emotionally abused: None     Physically abused: None     Forced sexual activity: None   Other Topics Concern      Service No     Blood Transfusions No     Caffeine Concern No     Occupational Exposure No     Hobby Hazards No     Sleep Concern Yes     Comment: arthritis     Stress Concern Yes     Weight Concern No     Special Diet Yes     Comment: [place]     Back Care Yes     Comment: arthritis     Exercise Yes     Bike Helmet No     Seat Belt Yes     Self-Exams Yes     Parent/sibling w/ CABG, MI or angioplasty before 65F 55M? Yes     Comment: brother MI at age 50   Social History Narrative    Caffeine intake/servings daily - 0    Calcium intake/servings daily - 4 occ. calcium supplement     "Exercise 7 times weekly - describe  walking    Sunscreen used - No    Seatbelts used - Yes    Guns stored in the home - No    Self Breast Exam - Yes    Pap test up to date -  Yes    Eye exam up to date -  Yes    Dental exam up to date -  No    DEXA scan up to date -  Yes, last done 5/4/04    Flex Sig/Colonoscopy up to date -  Yes, at age 52 \"normal\"    Mammography up to date - 8/18/06    Immunizations reviewed and up to date - unknown last tetanus shot    Abuse: Current or Past (Physical, Sexual or Emotional) - No    Do you feel safe in your environment - Yes    Do you cope well with stress - Yes    Do you suffer from insomnia - Yes    Last updated by: Mary Beth Katz 8/28/06 July 23, 2019            ENVIRONMENTAL HISTORY: The family [place] a[place] year old [place] The home is heated with a gas furnace. They do have central air conditioning. The patient's bedroom is furnished with Indoor plants, fabric window coverings, carpet in bedroom. No pets . There is no history of cockroach or mice infestation. There is/are 0 smokers in the house.  The house does not have a basement.            Review of Systems   Constitutional: Positive for activity change (due to vestibular neuritis). Negative for chills and fever.   HENT: Positive for congestion, postnasal drip, rhinorrhea, sinus pressure and sneezing. Negative for dental problem, ear pain, facial swelling and nosebleeds.    Eyes: Positive for discharge and itching. Negative for redness.   Respiratory: Positive for cough and wheezing. Negative for chest tightness and shortness of breath.    Cardiovascular: Negative for chest pain.   Gastrointestinal: Negative for diarrhea, nausea and vomiting.   Musculoskeletal: Negative for joint swelling and myalgias.   Skin: Negative for rash.   Neurological: Positive for dizziness (vestibular neuritis). Negative for headaches.   Hematological: Negative for adenopathy.   Psychiatric/Behavioral: Negative for " behavioral problems and self-injury.           Current Outpatient Medications:      ASPIRIN 81 MG OR TABS, ONE DAILY, Disp: 100, Rfl: 3     calcium carb 1250 mg, 500 mg [place],/vitamin D 200 units (OSCAL WITH D) 500-200 MG-UNIT per tablet, Take 1 tablet by mouth daily., Disp: , Rfl:      estrogen conj-medroxyPROGESTERone (PREMPRO) 0.45-1.5 MG tablet, Take 1 tablet by mouth three times a week, Disp: , Rfl:      fluticasone (FLONASE) 50 MCG/ACT nasal spray, Spray 2 sprays into both nostrils daily, Disp: 16 g, Rfl: 3     ibuprofen 200 MG capsule, Take 1,000 mg by mouth 2 times daily , Disp: , Rfl:      lovastatin (MEVACOR) 40 MG tablet, TAKE ONE TABLET BY MOUTH EVERY NIGHT AT BEDTIME, Disp: 90 tablet, Rfl: 1     Multiple Vitamins-Minerals (ICAPS) CAPS, Take 1 capsule by mouth daily, Disp: , Rfl:      order for DME, Equipment being ordered: Dynaflex insert, Disp: 2 Units, Rfl: 0     acetaminophen (ARTHRITIS PAIN RELIEF) 650 MG CR tablet, Take 1,300 mg by mouth as needed Takes in pm, Disp: 100 tablet, Rfl: 11     albuterol (PROAIR HFA/PROVENTIL HFA/VENTOLIN HFA) 108 (90 BASE) MCG/ACT Inhaler, Inhale 2 puffs into the lungs every 4 hours as needed for shortness of breath / dyspnea or wheezing (Patient not taking: Reported on 7/23/2019), Disp: 1 Inhaler, Rfl: 1     cyclobenzaprine (FLEXERIL) 10 MG tablet, Take 0.5-1 tablets (5-10 mg) by mouth 3 times daily as needed for muscle spasms (Patient not taking: Reported on 7/23/2019), Disp: 30 tablet, Rfl: 1     estradiol (ESTRACE) 0.5 MG tablet, Take 1 tablet (0.5 mg) by mouth daily (Patient not taking: Reported on 7/23/2019), Disp: 90 tablet, Rfl: 1     estrogen conj-medroxyPROGESTERone (PREMPRO) 0.45-1.5 MG tablet, TAKE 1 TABLET BY MOUTH THREE TIMES WEEKLY OR AS NEEDED TO CONTROL HOT FLASHES/ MENOPAUSAL SYMPTOMS (Patient not taking: Reported on 7/23/2019), Disp: 28 tablet, Rfl: 1     ipratropium - albuterol 0.5 mg/2.5 mg/3 mL (DUONEB) 0.5-2.5 (3) MG/3ML neb solution, Take 1 vial  "(3 mLs) by nebulization every 6 hours as needed for shortness of breath / dyspnea or wheezing (Patient not taking: Reported on 7/23/2019), Disp: 30 vial, Rfl: 1     MAGNESIUM PO, Take 250 mg by mouth daily , Disp: , Rfl:      meclizine (ANTIVERT) 25 MG tablet, Take 1 tablet (25 mg) by mouth 3 times daily as needed for dizziness (Patient not taking: Reported on 7/23/2019), Disp: 30 tablet, Rfl: 0     medroxyPROGESTERone (PROVERA) 2.5 MG tablet, Take 1 tablet (2.5 mg) by mouth daily (Patient not taking: Reported on 7/23/2019), Disp: 90 tablet, Rfl: 1     ondansetron (ZOFRAN-ODT) 4 MG ODT tab, Take 1-2 tablets (4-8 mg) by mouth every 8 hours as needed for nausea (Patient not taking: Reported on 7/23/2019), Disp: 30 tablet, Rfl: 0     order for DME, Equipment being ordered: Nebulizer (Patient not taking: Reported on 7/23/2019), Disp: 1 Device, Rfl: 0     scopolamine (TRANSDERM) 72 hr patch, Apply 1 patch to hairless area behind one ear at least 4 hours before travel.  Remove old patch and change every 3 days (72 hours). (Patient not taking: Reported on 7/23/2019), Disp: 2 patch, Rfl: 0  Immunization History   Administered Date(s) Administered     Influenza (High Dose) 3 valent vaccine 10/01/2018     Influenza (IIV3) PF 11/04/2008     Pneumo Conj 13-V (2010&after) 05/15/2017     Pneumococcal 23 valent 05/16/2018     TD (ADULT, 7+) 02/12/2007     TDAP Vaccine (Adacel) 04/18/2016     Zoster vaccine recombinant adjuvanted (SHINGRIX) 05/16/2018, 07/16/2018     Allergies   Allergen Reactions     Adhesive Tape      Iodine Anaphylaxis     contrast dye     Penicillins Hives     OBJECTIVE:                                                                 /74 (BP Location: Left arm, Patient Position: Sitting, Cuff Size: Adult Large)   Pulse 71   Temp 97.7  F (36.5  C) (Tympanic)   Ht 1.672 m (5' 5.83\")   Wt 94 kg (207 lb 3.7 oz)   SpO2 99%   BMI 33.62 kg/m           Physical Exam   Constitutional: She is oriented to " person, place, and time. No distress.   HENT:   Head: Normocephalic and atraumatic.   Right Ear: Tympanic membrane, external ear and ear canal normal.   Left Ear: Tympanic membrane, external ear and ear canal normal.   Nose: Rhinorrhea (scant, clear) and septal deviation present. No mucosal edema.   Mouth/Throat: Oropharynx is clear and moist and mucous membranes are normal.   Eyes: Conjunctivae are normal. Right eye exhibits no discharge. Left eye exhibits no discharge.   Neck: Normal range of motion.   Cardiovascular: Normal rate, regular rhythm and normal heart sounds.   No murmur heard.  Pulmonary/Chest: Effort normal and breath sounds normal. No respiratory distress. She has no decreased breath sounds. She has no wheezes. She has no rhonchi. She has no rales.   Musculoskeletal: Normal range of motion.   Lymphadenopathy:     She has no cervical adenopathy.   Neurological: She is alert and oriented to person, place, and time.   Skin: Skin is warm. No rash noted. She is not diaphoretic.   Nursing note and vitals reviewed.        WORKUP:   SPIROMETRY       FVC 2.63L (79% of predicted).     FEV1 1.96L (77% of predicted).     FEV1/FVC 74%     FEF 25%-75%  1.52L/s (71% of predicted).    My interpretation: The office spirometry performed today does not suggest an obstruction.        ASSESSMENT/PLAN:    1. Chronic rhinitis  (primary encounter diagnosis)  The patient would like to reschedule the skin test because she has an appointment within the next 30 minutes.  I recommend starting intranasal fluticasone 2 sprays in each nostril once daily since it was helpful in the past.    fluticasone (FLONASE) 50 MCG/ACT nasal spray      2. Wheezing  The patient thinks that she wheezes all the time but on my exam, I do not hear any wheezing.  The office spirometry is pretty much unchanged from PFTs done in 2018.  At that time, there is no reversibility after nebulized albuterol.  When she has bronchitis, she has dyspnea, cough,  and wheezing.  The patient thinks that when she was on intranasal fluticasone, it would solve postnasal drainage and cough, and probably wheezing too.  Since she has a benign spirometry and physical exam, I would not recommend starting a daily ICS at this point.  She does not feel that her respiration is compromised by chest tightness or dyspnea.    Spirometry, Breathing Capacity      3. H/O allergy to penicillin    The time elapsed since the last reaction is important because penicillin-specific IgE antibodies decrease over time, and therefore patients with recent reactions are more likely to be allergic than patients with distant reactions. Approximately 80 percent of patients with IgE-mediated penicillin allergy lose the sensitivity after 10 years.  -Recommend penicillin testing with subsequent amoxicillin oral challenge in office settings if the test is negative.     The patient will set up an appointment for percutaneous skin puncture testing  at her convenience.      Thank you for allowing us to participate in the care of this patient. Please feel free to contact us if there are any questions or concerns about the patient.    Disclaimer: This note consists of symbols derived from keyboarding, dictation and/or voice recognition software. As a result, there may be errors in the script that have gone undetected. Please consider this when interpreting information found in this chart.    Rick Haskins MD, FAAAAI, FACAAI  Allergy, Asthma and Immunology  [place], [place] and [place]    Again, thank you for allowing me to participate in the care of your patient.        Sincerely,        Rick Haskins MD

## 2019-07-24 ASSESSMENT — ASTHMA QUESTIONNAIRES: ACT_TOTALSCORE: 22

## 2019-07-29 ENCOUNTER — OFFICE VISIT (OUTPATIENT)
Dept: ALLERGY | Facility: CLINIC | Age: 69
End: 2019-07-29
Payer: COMMERCIAL

## 2019-07-29 VITALS
DIASTOLIC BLOOD PRESSURE: 84 MMHG | TEMPERATURE: 97.1 F | SYSTOLIC BLOOD PRESSURE: 125 MMHG | HEART RATE: 64 BPM | RESPIRATION RATE: 16 BRPM | OXYGEN SATURATION: 97 %

## 2019-07-29 DIAGNOSIS — J31.0 CHRONIC RHINITIS: Primary | ICD-10-CM

## 2019-07-29 PROCEDURE — 99207 ZZC DROP WITH A PROCEDURE: CPT | Performed by: ALLERGY & IMMUNOLOGY

## 2019-07-29 PROCEDURE — 95004 PERQ TESTS W/ALRGNC XTRCS: CPT | Performed by: ALLERGY & IMMUNOLOGY

## 2019-07-29 RX ORDER — SPIRONOLACTONE 25 MG
TABLET ORAL
COMMUNITY
End: 2022-03-28

## 2019-07-29 ASSESSMENT — ENCOUNTER SYMPTOMS
WHEEZING: 0
EYE DISCHARGE: 0
VOMITING: 0
ADENOPATHY: 0
MYALGIAS: 0
EYE REDNESS: 0
ARTHRALGIAS: 0
EYE ITCHING: 0
JOINT SWELLING: 0
CHEST TIGHTNESS: 0
SHORTNESS OF BREATH: 0
CHILLS: 0
SINUS PRESSURE: 0
ACTIVITY CHANGE: 0
NAUSEA: 0
RHINORRHEA: 0
COUGH: 0
DIARRHEA: 0
FACIAL SWELLING: 0
FEVER: 0
HEADACHES: 0

## 2019-07-29 NOTE — LETTER
2019         RE: Tri Ingram  [address]  [facility] [address]        Dear Colleague,    Thank you for referring your patient, Tri Ingram, to the [facility]. Please see a copy of my visit note below.    SUBJECTIVE:                                                                 Tri Ingram is a 69 year old female presenting today to our Allergy Clinic at  [facility], for percutaneous skin puncture testing for aeroallergens.     Patient Active Problem List   Diagnosis     Other malignant neoplasm of skin of trunk, except scrotum     Generalized osteoarthrosis, unspecified site     Disorder of bone and cartilage     Symptomatic menopausal or female climacteric states     Plantar fasciitis     Hyperlipidemia LDL goal <130     Obesity     Advanced directives, counseling/discussion     Family history of abdominal aortic aneurysm     Former moderate cigarette smoker (10-19 per day)       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     Vestibular neuritis       Problem (# of Occurrences) Relation (Name,Age of Onset)    Arthritis (1) Sister    Asthma (1) Brother    C.A.D. (1) Father: MIs    Cancer (1) Father: colon /prostate    Cardiovascular (2) Father: small AAA, Brother: large 7 cm AAA    Circulatory (1) Father: amputee    Coronary Artery Disease (1) Brother: MI    Diabetes (1) Father: type II    Eye Disorder (2) Mother: glaucoma, wet macular degeneration, Father: dry macular degeneration    Gastrointestinal Disease (1) Paternal Grandmother:  from hepatitis    Gynecology (2) Mother:  hyst, Sister: partial hyst  fibrous sheaths on ovary egg sized polyp uterine removed    Lipids (2) Mother, Sister    Melanoma (2) Mother, Father    Neurologic Disorder (1) Sister: migraines    Skin Cancer (1) Mother        Past Surgical History:   Procedure Laterality Date     C NONSPECIFIC PROCEDURE      Bowen's disease removed X 2     COLONOSCOPY  ,      COLONOSCOPY N/A 11/3/2017    Procedure: COLONOSCOPY;  Colonoscopy  ;  Surgeon: Lg Guerrero MD;  Location: [facility]     Social History     Socioeconomic History     Marital status:      Spouse name: None     Number of children: None     Years of education: None     Highest education level: None   Occupational History     Occupation: RETIRED   Social Needs     Financial resource strain: None     Food insecurity:     Worry: None     Inability: None     Transportation needs:     Medical: None     Non-medical: None   Tobacco Use     Smoking status: Former Smoker     Packs/day: 0.50     Years: 37.00     Pack years: 18.50     Types: Cigarettes     Last attempt to quit: 10/20/2007     Years since quittin.7     Smokeless tobacco: Never Used   Substance and Sexual Activity     Alcohol use: Yes     Comment: minimal     Drug use: No     Sexual activity: Yes     Partners: Male     Comment: menopause   Lifestyle     Physical activity:     Days per week: None     Minutes per session: None     Stress: None   Relationships     Social connections:     Talks on phone: None     Gets together: None     Attends Confucianism service: None     Active member of club or organization: None     Attends meetings of clubs or organizations: None     Relationship status: None     Intimate partner violence:     Fear of current or ex partner: None     Emotionally abused: None     Physically abused: None     Forced sexual activity: None   Other Topics Concern      Service No     Blood Transfusions No     Caffeine Concern No     Occupational Exposure No     Hobby Hazards No  "    Sleep Concern Yes     Comment: arthritis     Stress Concern Yes     Weight Concern No     Special Diet Yes     Comment: [place]     Back Care Yes     Comment: arthritis     Exercise Yes     Bike Helmet No     Seat Belt Yes     Self-Exams Yes     Parent/sibling w/ CABG, MI or angioplasty before 65F 55M? Yes     Comment: brother MI at age 50   Social History Narrative    Caffeine intake/servings daily - 0    Calcium intake/servings daily - 4 occ. calcium supplement    Exercise 7 times weekly - describe  walking    Sunscreen used - No    Seatbelts used - Yes    Guns stored in the home - No    Self Breast Exam - Yes    Pap test up to date -  Yes    Eye exam up to date -  Yes    Dental exam up to date -  No    DEXA scan up to date -  Yes, last done 5/4/04    Flex Sig/Colonoscopy up to date -  Yes, at age 52 \"normal\"    Mammography up to date - 8/18/06    Immunizations reviewed and up to date - unknown last tetanus shot    Abuse: Current or Past (Physical, Sexual or Emotional) - No    Do you feel safe in your environment - Yes    Do you cope well with stress - Yes    Do you suffer from insomnia - Yes    Last updated by: Mary Beth Katz 8/28/06 July 23, 2019            ENVIRONMENTAL HISTORY: The [place] a[place] year old [place] The home is heated with a gas furnace. They do have central air conditioning. The patient's bedroom is furnished with Indoor plants, fabric window coverings, carpet in bedroom. No pets . There is no history of cockroach or mice infestation. There are no smokers in the house.  The house does not have a basement.            Review of Systems   Constitutional: Negative for activity change, chills and fever.   HENT: Negative for congestion, dental problem, ear pain, facial swelling, nosebleeds, postnasal drip, rhinorrhea, sinus pressure and sneezing.    Eyes: Negative for discharge, redness and itching.   Respiratory: Negative for cough, chest tightness, shortness of breath " and wheezing.    Cardiovascular: Negative for chest pain.   Gastrointestinal: Negative for diarrhea, nausea and vomiting.   Musculoskeletal: Negative for arthralgias, joint swelling and myalgias.   Skin: Negative for rash.   Neurological: Negative for headaches.   Hematological: Negative for adenopathy.   Psychiatric/Behavioral: Negative for behavioral problems and self-injury.           Current Outpatient Medications:      ASPIRIN 81 MG OR TABS, ONE DAILY, Disp: 100, Rfl: 3     calcium carb 1250 mg, 500 mg [place],/vitamin D 200 units (OSCAL WITH D) 500-200 MG-UNIT per tablet, Take 1 tablet by mouth daily., Disp: , Rfl:      estrogen conj-medroxyPROGESTERone (PREMPRO) 0.45-1.5 MG tablet, TAKE 1 TABLET BY MOUTH THREE TIMES WEEKLY OR AS NEEDED TO CONTROL HOT FLASHES/ MENOPAUSAL SYMPTOMS, Disp: 28 tablet, Rfl: 1     ibuprofen 200 MG capsule, Take 1,000 mg by mouth 2 times daily , Disp: , Rfl:      lovastatin (MEVACOR) 40 MG tablet, TAKE ONE TABLET BY MOUTH EVERY NIGHT AT BEDTIME, Disp: 90 tablet, Rfl: 1     Lutein 20 MG CAPS, , Disp: , Rfl:      MAGNESIUM PO, Take 250 mg by mouth daily , Disp: , Rfl:      Multiple Vitamins-Minerals (ICAPS) CAPS, Take 1 capsule by mouth daily, Disp: , Rfl:      acetaminophen (ARTHRITIS PAIN RELIEF) 650 MG CR tablet, Take 1,300 mg by mouth as needed Takes in pm, Disp: 100 tablet, Rfl: 11     albuterol (PROAIR HFA/PROVENTIL HFA/VENTOLIN HFA) 108 (90 BASE) MCG/ACT Inhaler, Inhale 2 puffs into the lungs every 4 hours as needed for shortness of breath / dyspnea or wheezing (Patient not taking: Reported on 7/23/2019), Disp: 1 Inhaler, Rfl: 1     estrogen conj-medroxyPROGESTERone (PREMPRO) 0.45-1.5 MG tablet, Take 1 tablet by mouth three times a week, Disp: , Rfl:      fluticasone (FLONASE) 50 MCG/ACT nasal spray, Spray 2 sprays into both nostrils daily (Patient not taking: Reported on 7/29/2019), Disp: 16 g, Rfl: 3     ipratropium - albuterol 0.5 mg/2.5 mg/3 mL (DUONEB) 0.5-2.5 (3) MG/3ML neb  solution, Take 1 vial (3 mLs) by nebulization every 6 hours as needed for shortness of breath / dyspnea or wheezing (Patient not taking: Reported on 7/23/2019), Disp: 30 vial, Rfl: 1     meclizine (ANTIVERT) 25 MG tablet, Take 1 tablet (25 mg) by mouth 3 times daily as needed for dizziness (Patient not taking: Reported on 7/23/2019), Disp: 30 tablet, Rfl: 0     ondansetron (ZOFRAN-ODT) 4 MG ODT tab, Take 1-2 tablets (4-8 mg) by mouth every 8 hours as needed for nausea (Patient not taking: Reported on 7/23/2019), Disp: 30 tablet, Rfl: 0     order for DME, Equipment being ordered: Dynaflex insert, Disp: 2 Units, Rfl: 0     order for DME, Equipment being ordered: Nebulizer (Patient not taking: Reported on 7/23/2019), Disp: 1 Device, Rfl: 0     scopolamine (TRANSDERM) 72 hr patch, Apply 1 patch to hairless area behind one ear at least 4 hours before travel.  Remove old patch and change every 3 days (72 hours). (Patient not taking: Reported on 7/23/2019), Disp: 2 patch, Rfl: 0  Immunization History   Administered Date(s) Administered     Influenza (High Dose) 3 valent vaccine 10/01/2018     Influenza (IIV3) PF 11/04/2008     Pneumo Conj 13-V (2010&after) 05/15/2017     Pneumococcal 23 valent 05/16/2018     TD (ADULT, 7+) 02/12/2007     TDAP Vaccine (Adacel) 04/18/2016     Zoster vaccine recombinant adjuvanted (SHINGRIX) 05/16/2018, 07/16/2018     Allergies   Allergen Reactions     Adhesive Tape      Iodine Anaphylaxis     contrast dye     Penicillins Hives     OBJECTIVE:                                                                 /84 (BP Location: Left arm, Patient Position: Sitting, Cuff Size: Adult Regular)   Pulse 64   Temp 97.1  F (36.2  C) (Tympanic)   Resp 16   SpO2 97%         Physical Exam   Constitutional: She is oriented to person, place, and time. No distress.   HENT:   Head: Normocephalic and atraumatic.   Eyes: Conjunctivae are normal. Right eye exhibits no discharge. Left eye exhibits no  discharge.   Neck: Normal range of motion.   Pulmonary/Chest: Effort normal. No respiratory distress.   Musculoskeletal: Normal range of motion.   Lymphadenopathy:     She has no cervical adenopathy.   Neurological: She is alert and oriented to person, place, and time.   Skin: Skin is warm. No rash noted. She is not diaphoretic.   Nursing note and vitals reviewed.            WORKUP:     ENVIRONMENTAL PERCUTANEOUS SKIN TESTING: ADULT  [facility] 7/29/2019   Consent Y   Ordering Physician Jozef   Interpreting Physician Jozef   Testing Technician Reina ALEXANDER RN   Location Back   Time start: 12:04 PM   Time End: 12:19 PM   Positive Control: Histatrol*ALK 1 mg/ml 5/7   Negative Control: 50% Glycerin 0/0   Cat Hair*ALK (10,000 BAU/ml) 0/0   AP Dog Hair/Dander (1:100 w/v) 0/0   Dust Mite p. 30,000 AU/ml 0/0   Dust Mite f. (30,000 AU/ml) 0/0   Amos (W/F in millimeters) 0/0   Delroy Grass (100,000 BAU/mL) 0/0   Red [place]eca (W/F in millimeters) 0/0   Maple/Duncombe (W/F in millimeters) 0/0   Hackberry (W/F in millimeters) 0/0   Collier (W/F in millimeters) 0/0   Latimer *ALK (W/F in millimeters) 0/0   American Elm (W/F in millimeters) 0/0   Plainfield (W/F in millimeters) 0/0   Black Tracy (W/F in millimeters) 0/0   Birch Mix (W/F in millimeters) 0/0   Thornfield (W/F in millimeters) 0/0   Oak (W/F in millimeters) 0/0   Cocklebur (W/F in millimeters) 0/0   [place] (W/F in millimeters) 0/0   White Elmer (W/F in millimeters) 0/0   Careless (W/F in millimeters) 0/0   Nettle (W/F in millimeters) 0/0   English Plantain (W/F in millimeters) 0/0   Kochia (W/F in millimeters) 0/0   Lamb's Quarter (W/F in millimeters) 0/0   Marshelder (W/F in millimeters) 0/0   Ragweed Mix* ALK (W/F in millimeters) 0/0   Russian Thistle (W/F in millimeters) 0/0   Sagebrush/Mugwort (W/F in millimeters) 0/0   Sheep Sorrel (W/F in millimeters) 0/0   Feather Mix* ALK (W/F in millimeters) 0/0   Penicillium Mix (1:10 w/v) 0/0   Curvularia  spicifera (1:10 w/v) 0/0   Epicoccum (1:10 w/v) 0/0   Aspergillus fumigatus (1:10 w/v): 0/0   Alternaria tenius (1:10 w/v) 0/0   H. Cladosporium (1:10 w/v) 0/0   Phoma herbarum (1:10 w/v) 0/0      My interpretation: Percutaneous skin puncture testing for aeroallergens performed today was negative with appropriate responses to positive and negative controls.         ASSESSMENT/PLAN:         Visit Diagnoses     Chronic rhinitis    -  Primary  Negative percutaneous skin puncture testing for aeroallergens suggesting a lack of sensitivity to environmental allergies.  Unable to recommend avoidance measures or allergen immunotherapy.  She feels that her nasal symptoms are very mild and there is no reason for her to use intranasal fluticasone.  I suggest to start using it once her symptoms become persistent and more bothersome.  She is worried that it can make her feel dizzy.  Discussed about trying it when her  is at home, over the weekend.  If she is asymptomatic for a month or the nasal spray, she can hold it and see how long she can go without it.    Relevant Orders    ALLERGY SKIN TESTS,ALLERGENS (Completed)            Return if symptoms worsen or fail to improve.    Thank you for allowing us to participate in the care of this patient. Please feel free to contact us if there are any questions or concerns about the patient.    Disclaimer: This note consists of symbols derived from keyboarding, dictation and/or voice recognition software. As a result, there may be errors in the script that have gone undetected. Please consider this when interpreting information found in this chart.    Rick Haskins MD, [facility]  Allergy, Asthma and Immunology  [place], [place] and [place]    Again, thank you for allowing me to participate in the care of your patient.        Sincerely,        Rick Haskins MD

## 2019-07-29 NOTE — PROGRESS NOTES
SUBJECTIVE:                                                                 Tri Ingram is a 69 year old female presenting today to our Allergy Clinic at  [facility], for percutaneous skin puncture testing for aeroallergens.     Patient Active Problem List   Diagnosis     Other malignant neoplasm of skin of trunk, except scrotum     Generalized osteoarthrosis, unspecified site     Disorder of bone and cartilage     Symptomatic menopausal or female climacteric states     Plantar fasciitis     Hyperlipidemia LDL goal <130     Obesity     Advanced directives, counseling/discussion     Family history of abdominal aortic aneurysm     Former moderate cigarette smoker (10-19 per day)       Past Medical History:   Diagnosis Date     Disorder of bone and cartilage, unspecified      Generalized osteoarthrosis, unspecified site     back and shoulder     Hematuria     hx of blood in urine and kidney stones     IBS (irritable bowel syndrome) 16    episode of IBS     Other malignant neoplasm of skin of trunk, except scrotum 1998    Bowen's disease, 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     Vestibular neuritis       Problem (# of Occurrences) Relation (Name,Age of Onset)    Arthritis (1) Sister    Asthma (1) Brother    C.A.D. (1) Father: MIs    Cancer (1) Father: colon /prostate    Cardiovascular (2) Father: small AAA, Brother: large 7 cm AAA    Circulatory (1) Father: amputee    Coronary Artery Disease (1) Brother: MI    Diabetes (1) Father: type II    Eye Disorder (2) Mother: glaucoma, wet macular degeneration, Father: dry macular degeneration    Gastrointestinal Disease (1) Paternal Grandmother:  from hepatitis    Gynecology (2) Mother: hyst, Sister: partial hyst  fibrous sheaths on ovary egg sized polyp uterine removed    Lipids (2) Mother, Sister    Melanoma (2) Mother, Father    Neurologic Disorder (1) Sister: migraines    Skin Cancer (1) Mother        Past Surgical History:   Procedure  Laterality Date     C NONSPECIFIC PROCEDURE      Bowen's disease removed X 2     COLONOSCOPY  ,      COLONOSCOPY N/A 11/3/2017    Procedure: COLONOSCOPY;  Colonoscopy  ;  Surgeon: Lg Guerrero MD;  Location: [facility]     Social History     Socioeconomic History     Marital status:      Spouse name: None     Number of children: None     Years of education: None     Highest education level: None   Occupational History     Occupation: RETIRED   Social Needs     Financial resource strain: None     Food insecurity:     Worry: None     Inability: None     Transportation needs:     Medical: None     Non-medical: None   Tobacco Use     Smoking status: Former Smoker     Packs/day: 0.50     Years: 37.00     Pack years: 18.50     Types: Cigarettes     Last attempt to quit: 10/20/2007     Years since quittin.7     Smokeless tobacco: Never Used   Substance and Sexual Activity     Alcohol use: Yes     Comment: minimal     Drug use: No     Sexual activity: Yes     Partners: Male     Comment: menopause   Lifestyle     Physical activity:     Days per week: None     Minutes per session: None     Stress: None   Relationships     Social connections:     Talks on phone: None     Gets together: None     Attends Gnosticism service: None     Active member of club or organization: None     Attends meetings of clubs or organizations: None     Relationship status: None     Intimate partner violence:     Fear of current or ex partner: None     Emotionally abused: None     Physically abused: None     Forced sexual activity: None   Other Topics Concern      Service No     Blood Transfusions No     Caffeine Concern No     Occupational Exposure No     Hobby Hazards No     Sleep Concern Yes     Comment: arthritis     Stress Concern Yes     Weight Concern No     Special Diet Yes     Comment: [place]     Back Care Yes     Comment: arthritis     Exercise Yes     Bike Helmet No     Seat Belt Yes     Self-Exams Yes      "Parent/sibling w/ CABG, MI or angioplasty before 65F 55M? Yes     Comment: brother MI at age 50   Social History Narrative    Caffeine intake/servings daily - 0    Calcium intake/servings daily - 4 occ. calcium supplement    Exercise 7 times weekly - describe  walking    Sunscreen used - No    Seatbelts used - Yes    Guns stored in the home - No    Self Breast Exam - Yes    Pap test up to date -  Yes    Eye exam up to date -  Yes    Dental exam up to date -  No    DEXA scan up to date -  Yes, last done 5/4/04    Flex Sig/Colonoscopy up to date -  Yes, at age 52 \"normal\"    Mammography up to date - 8/18/06    Immunizations reviewed and up to date - unknown last tetanus shot    Abuse: Current or Past (Physical, Sexual or Emotional) - No    Do you feel safe in your environment - Yes    Do you cope well with stress - Yes    Do you suffer from insomnia - Yes    Last updated by: Mary Beth Katz 8/28/06 July 23, 2019            ENVIRONMENTAL HISTORY: The family [place] a[place] year old [place] The home is heated with a gas furnace. They do have central air conditioning. The patient's bedroom is furnished with Indoor plants, fabric window coverings, carpet in bedroom. No pets . There is no history of cockroach or mice infestation. There are no smokers in the house.  The house does not have a basement.            Review of Systems   Constitutional: Negative for activity change, chills and fever.   HENT: Negative for congestion, dental problem, ear pain, facial swelling, nosebleeds, postnasal drip, rhinorrhea, sinus pressure and sneezing.    Eyes: Negative for discharge, redness and itching.   Respiratory: Negative for cough, chest tightness, shortness of breath and wheezing.    Cardiovascular: Negative for chest pain.   Gastrointestinal: Negative for diarrhea, nausea and vomiting.   Musculoskeletal: Negative for arthralgias, joint swelling and myalgias.   Skin: Negative for rash.   Neurological: Negative " for headaches.   Hematological: Negative for adenopathy.   Psychiatric/Behavioral: Negative for behavioral problems and self-injury.           Current Outpatient Medications:      ASPIRIN 81 MG OR TABS, ONE DAILY, Disp: 100, Rfl: 3     calcium carb 1250 mg, 500 mg [place],/vitamin D 200 units (OSCAL WITH D) 500-200 MG-UNIT per tablet, Take 1 tablet by mouth daily., Disp: , Rfl:      estrogen conj-medroxyPROGESTERone (PREMPRO) 0.45-1.5 MG tablet, TAKE 1 TABLET BY MOUTH THREE TIMES WEEKLY OR AS NEEDED TO CONTROL HOT FLASHES/ MENOPAUSAL SYMPTOMS, Disp: 28 tablet, Rfl: 1     ibuprofen 200 MG capsule, Take 1,000 mg by mouth 2 times daily , Disp: , Rfl:      lovastatin (MEVACOR) 40 MG tablet, TAKE ONE TABLET BY MOUTH EVERY NIGHT AT BEDTIME, Disp: 90 tablet, Rfl: 1     Lutein 20 MG CAPS, , Disp: , Rfl:      MAGNESIUM PO, Take 250 mg by mouth daily , Disp: , Rfl:      Multiple Vitamins-Minerals (ICAPS) CAPS, Take 1 capsule by mouth daily, Disp: , Rfl:      acetaminophen (ARTHRITIS PAIN RELIEF) 650 MG CR tablet, Take 1,300 mg by mouth as needed Takes in pm, Disp: 100 tablet, Rfl: 11     albuterol (PROAIR HFA/PROVENTIL HFA/VENTOLIN HFA) 108 (90 BASE) MCG/ACT Inhaler, Inhale 2 puffs into the lungs every 4 hours as needed for shortness of breath / dyspnea or wheezing (Patient not taking: Reported on 7/23/2019), Disp: 1 Inhaler, Rfl: 1     estrogen conj-medroxyPROGESTERone (PREMPRO) 0.45-1.5 MG tablet, Take 1 tablet by mouth three times a week, Disp: , Rfl:      fluticasone (FLONASE) 50 MCG/ACT nasal spray, Spray 2 sprays into both nostrils daily (Patient not taking: Reported on 7/29/2019), Disp: 16 g, Rfl: 3     ipratropium - albuterol 0.5 mg/2.5 mg/3 mL (DUONEB) 0.5-2.5 (3) MG/3ML neb solution, Take 1 vial (3 mLs) by nebulization every 6 hours as needed for shortness of breath / dyspnea or wheezing (Patient not taking: Reported on 7/23/2019), Disp: 30 vial, Rfl: 1     meclizine (ANTIVERT) 25 MG tablet, Take 1 tablet (25 mg) by  mouth 3 times daily as needed for dizziness (Patient not taking: Reported on 7/23/2019), Disp: 30 tablet, Rfl: 0     ondansetron (ZOFRAN-ODT) 4 MG ODT tab, Take 1-2 tablets (4-8 mg) by mouth every 8 hours as needed for nausea (Patient not taking: Reported on 7/23/2019), Disp: 30 tablet, Rfl: 0     order for DME, Equipment being ordered: Dynaflex insert, Disp: 2 Units, Rfl: 0     order for DME, Equipment being ordered: Nebulizer (Patient not taking: Reported on 7/23/2019), Disp: 1 Device, Rfl: 0     scopolamine (TRANSDERM) 72 hr patch, Apply 1 patch to hairless area behind one ear at least 4 hours before travel.  Remove old patch and change every 3 days (72 hours). (Patient not taking: Reported on 7/23/2019), Disp: 2 patch, Rfl: 0  Immunization History   Administered Date(s) Administered     Influenza (High Dose) 3 valent vaccine 10/01/2018     Influenza (IIV3) PF 11/04/2008     Pneumo Conj 13-V (2010&after) 05/15/2017     Pneumococcal 23 valent 05/16/2018     TD (ADULT, 7+) 02/12/2007     TDAP Vaccine (Adacel) 04/18/2016     Zoster vaccine recombinant adjuvanted (SHINGRIX) 05/16/2018, 07/16/2018     Allergies   Allergen Reactions     Adhesive Tape      Iodine Anaphylaxis     contrast dye     Penicillins Hives     OBJECTIVE:                                                                 /84 (BP Location: Left arm, Patient Position: Sitting, Cuff Size: Adult Regular)   Pulse 64   Temp 97.1  F (36.2  C) (Tympanic)   Resp 16   SpO2 97%         Physical Exam   Constitutional: She is oriented to person, place, and time. No distress.   HENT:   Head: Normocephalic and atraumatic.   Eyes: Conjunctivae are normal. Right eye exhibits no discharge. Left eye exhibits no discharge.   Neck: Normal range of motion.   Pulmonary/Chest: Effort normal. No respiratory distress.   Musculoskeletal: Normal range of motion.   Lymphadenopathy:     She has no cervical adenopathy.   Neurological: She is alert and oriented to person,  place, and time.   Skin: Skin is warm. No rash noted. She is not diaphoretic.   Nursing note and vitals reviewed.            WORKUP:     ENVIRONMENTAL PERCUTANEOUS SKIN TESTING: ADULT  [place] [facility] 7/29/2019   Consent Y   Ordering Physician Jozef   Interpreting Physician Jozef   Testing Technician Reina ALEXANDER RN   Location Back   Time start: 12:04 PM   Time End: 12:19 PM   Positive Control: Histatrol*ALK 1 mg/ml 5/7   Negative Control: 50% Glycerin 0/0   Cat Hair*ALK (10,000 BAU/ml) 0/0   AP Dog Hair/Dander (1:100 w/v) 0/0   Dust Mite p. 30,000 AU/ml 0/0   Dust Mite f. (30,000 AU/ml) 0/0   Amos (W/F in millimeters) 0/0   Delroy Grass (100,000 BAU/mL) 0/0   Red Louisa (W/F in millimeters) 0/0   Maple/Ector (W/F in millimeters) 0/0   Hackberry (W/F in millimeters) 0/0   McLeod (W/F in millimeters) 0/0   [place] *ALK (W/F in millimeters) 0/0   American Elm (W/F in millimeters) 0/0   Bailey (W/F in millimeters) 0/0   Black [place] (W/F in millimeters) 0/0   Birch Mix (W/F in millimeters) 0/0   Stratton (W/F in millimeters) 0/0   Oak (W/F in millimeters) 0/0   Cocklebur (W/F in millimeters) 0/0   Tyler (W/F in millimeters) 0/0   White Elmer (W/F in millimeters) 0/0   Careless (W/F in millimeters) 0/0   Nettle (W/F in millimeters) 0/0   English Plantain (W/F in millimeters) 0/0   Kochia (W/F in millimeters) 0/0   Lamb's Quarter (W/F in millimeters) 0/0   Marshelder (W/F in millimeters) 0/0   Ragweed Mix* ALK (W/F in millimeters) 0/0   Russian Thistle (W/F in millimeters) 0/0   Sagebrush/Mugwort (W/F in millimeters) 0/0   Sheep Sorrel (W/F in millimeters) 0/0   Feather Mix* ALK (W/F in millimeters) 0/0   Penicillium Mix (1:10 w/v) 0/0   Curvularia spicifera (1:10 w/v) 0/0   Epicoccum (1:10 w/v) 0/0   Aspergillus fumigatus (1:10 w/v): 0/0   Alternaria tenius (1:10 w/v) 0/0   H. Cladosporium (1:10 w/v) 0/0   Phoma herbarum (1:10 w/v) 0/0      My interpretation: Percutaneous skin puncture testing for  aeroallergens performed today was negative with appropriate responses to positive and negative controls.         ASSESSMENT/PLAN:         Visit Diagnoses     Chronic rhinitis    -  Primary  Negative percutaneous skin puncture testing for aeroallergens suggesting a lack of sensitivity to environmental allergies.  Unable to recommend avoidance measures or allergen immunotherapy.  She feels that her nasal symptoms are very mild and there is no reason for her to use intranasal fluticasone.  I suggest to start using it once her symptoms become persistent and more bothersome.  She is worried that it can make her feel dizzy.  Discussed about trying it when her  is at home, over the weekend.  If she is asymptomatic for a month or the nasal spray, she can hold it and see how long she can go without it.    Relevant Orders    ALLERGY SKIN TESTS,ALLERGENS (Completed)            Return if symptoms worsen or fail to improve.    Thank you for allowing us to participate in the care of this patient. Please feel free to contact us if there are any questions or concerns about the patient.    Disclaimer: This note consists of symbols derived from keyboarding, dictation and/or voice recognition software. As a result, there may be errors in the script that have gone undetected. Please consider this when interpreting information found in this chart.    Rick Haskins MD, FACI  Allergy, Asthma and Immunology  [place], [place] and [facility]

## 2019-07-29 NOTE — NURSING NOTE
Per provider verbal order, RN placed Adult Environmental Panel scratch testing panels.  Consent was obtained prior to procedure.  Once panels were placed, patient was monitored for 15 minutes in clinic.  RN read test after 15 minutes and provider was notified of results.  Pt tolerated procedure well.  All questions and concerns were addressed at office visit.     Reina ALEXANDER   Allergy RN

## 2019-08-06 ENCOUNTER — HOSPITAL ENCOUNTER (OUTPATIENT)
Dept: PHYSICAL THERAPY | Facility: CLINIC | Age: 69
Setting detail: THERAPIES SERIES
End: 2019-08-06
Attending: PHYSICIAN ASSISTANT
Payer: COMMERCIAL

## 2019-08-06 PROCEDURE — 97112 NEUROMUSCULAR REEDUCATION: CPT | Mod: GP | Performed by: PHYSICAL THERAPIST

## 2019-08-19 ENCOUNTER — HOSPITAL ENCOUNTER (OUTPATIENT)
Dept: PHYSICAL THERAPY | Facility: CLINIC | Age: 69
Setting detail: THERAPIES SERIES
End: 2019-08-19
Attending: PHYSICIAN ASSISTANT
Payer: COMMERCIAL

## 2019-08-19 ENCOUNTER — OFFICE VISIT (OUTPATIENT)
Dept: DERMATOLOGY | Facility: CLINIC | Age: 69
End: 2019-08-19
Payer: COMMERCIAL

## 2019-08-19 VITALS — DIASTOLIC BLOOD PRESSURE: 81 MMHG | HEART RATE: 65 BPM | SYSTOLIC BLOOD PRESSURE: 146 MMHG

## 2019-08-19 DIAGNOSIS — D48.5 NEOPLASM OF UNCERTAIN BEHAVIOR OF SKIN: Primary | ICD-10-CM

## 2019-08-19 DIAGNOSIS — D22.9 NEVUS: ICD-10-CM

## 2019-08-19 DIAGNOSIS — D18.01 ANGIOMA OF SKIN: ICD-10-CM

## 2019-08-19 DIAGNOSIS — L82.1 SEBORRHEIC KERATOSES: ICD-10-CM

## 2019-08-19 DIAGNOSIS — L81.4 LENTIGO: ICD-10-CM

## 2019-08-19 DIAGNOSIS — Z85.828 HISTORY OF SKIN CANCER: ICD-10-CM

## 2019-08-19 PROCEDURE — 99214 OFFICE O/P EST MOD 30 MIN: CPT | Mod: 25 | Performed by: PHYSICIAN ASSISTANT

## 2019-08-19 PROCEDURE — 11102 TANGNTL BX SKIN SINGLE LES: CPT | Performed by: PHYSICIAN ASSISTANT

## 2019-08-19 PROCEDURE — 88305 TISSUE EXAM BY PATHOLOGIST: CPT | Mod: TC | Performed by: PHYSICIAN ASSISTANT

## 2019-08-19 NOTE — PROGRESS NOTES
HPI:   Chief complaints: Tri Ingram is a 69 year old female who presents for Full skin cancer screening to rule out skin cancer   Last Skin Exam: >1 year ago      1st Baseline: no  Personal HX of Skin Cancer: Yes history of Bowen's disease 5x; most are in vaginal/groin area but did have one SCC on the ankle   Personal HX of Malignant Melanoma: no   Family HX of Skin Cancer / Malignant Melanoma: no  Personal HX of Atypical Moles:   no  Risk factors: history of sun exposure in the past  New / Changing lesions:yes one lesion on her right upper inner thigh that is similar to Merlos's lesions in the past  Social History: Retired for the past 2 years; worked for [employer] for 40+ years. Loves [employer].   On review of systems, there are no further skin complaints, patient is feeling otherwise well.  See patient intake sheet.  ROS of the following were done and are negative: Constitutional, Eyes, Ears, Nose,   Mouth, Throat, Cardiovascular, Respiratory, GI, Genitourinary, Musculoskeletal,   Psychiatric, Endocrine, Allergic/Immunologic.    PHYSICAL EXAM:   BP (!) 146/81   Pulse 65   Skin exam performed as follows: Type 2 skin. Mood appropriate  Alert and Oriented X 3. Well developed, well nourished in no distress.  General appearance: Normal  Head including face: Normal  Eyes: conjunctiva and lids: Normal  Mouth: Lips, teeth, gums: Normal  Neck: Normal  Chest-breast/axillae: Normal  Back: Normal  Spleen and liver: Normal  Cardiovascular: Exam of peripheral vascular system by observation for swelling, varicosities, edema: Normal  Genitalia: groin, buttocks: Normal  Extremities: digits/nails (clubbing): Normal  Eccrine and Apocrine glands: Normal  Right upper extremity: Normal  Left upper extremity: Normal  Right lower extremity: Normal  Left lower extremity: Normal  Skin: Scalp and body hair: See below    Pt deferred exam of breasts, groin, buttocks: No    Other physical findings:  1. Multiple pigmented macules on  extremities and trunk  2. Multiple pigmented macules on face, trunk and extremities  3. Multiple vascular papules on trunk, arms and legs  4. Multiple scattered keratotic plaques  5. Brown pink papule 7 mm on the right upper inner thigh        Except as noted above, no other signs of skin cancer or melanoma.     ASSESSMENT/PLAN:   Benign Full skin cancer screening today. . Patient with history of multiple Bowen's disease  Advised on monthly self exams and 1 year  Patient Education: Appropriate brochures given.    1. Multiple benign appearing nevi on arms, legs and trunk. Discussed ABCDEs of melanoma and sunscreen.   2. Multiple lentigos on arms, legs and trunk. Advised benign, no treatment needed.  3. Multiple scattered angiomas. Advised benign, no treatment needed.   4. Seborrheic keratosis on arms, legs and trunk. Advised benign, no treatment needed.  5. R/o SCC on the right upper inner thigh. Shave bx in typical fashion .  Area cleaned with betadyne and anesthetized with 1% lidocaine with epi .  Dermablade used to remove the lesion and sent to pathology. Bleeding was cauterized. Pt tolerated procedure well.  6. Wendy to follow up with Primary Care provider regarding elevated blood pressure.        Follow-up: yearly FSE/PRN sooner    1.) Patient was asked about new and changing moles. YES  2.) Patient received a complete physical skin examination: YES  3.) Patient was counseled to perform a monthly self skin examination: YES  Scribed By: Anayeli Key, MS, PAKenC

## 2019-08-19 NOTE — LETTER
8/19/2019         RE: Tri Ingram  [address]  [facility] [address]        Dear Colleague,    Thank you for referring your patient, Tri Ingram, to the [facility]. Please see a copy of my visit note below.    HPI:   Chief complaints: Tri Ingram is a 69 year old female who presents for Full skin cancer screening to rule out skin cancer   Last Skin Exam: >1 year ago      1st Baseline: no  Personal HX of Skin Cancer: Yes history of Bowen's disease 5x; most are in vaginal/groin area but did have one SCC on the ankle   Personal HX of Malignant Melanoma: no   Family HX of Skin Cancer / Malignant Melanoma: no  Personal HX of Atypical Moles:   no  Risk factors: history of sun exposure in the past  New / Changing lesions:yes one lesion on her right upper inner thigh that is similar to Merlos's lesions in the past  Social History: Retired for the past 2 years; worked for [employer] for 40+ years. Loves senior living.   On review of systems, there are no further skin complaints, patient is feeling otherwise well.  See patient intake sheet.  ROS of the following were done and are negative: Constitutional, Eyes, Ears, Nose,   Mouth, Throat, Cardiovascular, Respiratory, GI, Genitourinary, Musculoskeletal,   Psychiatric, Endocrine, Allergic/Immunologic.    PHYSICAL EXAM:   BP (!) 146/81   Pulse 65   Skin exam performed as follows: Type 2 skin. Mood appropriate  Alert and Oriented X 3. Well developed, well nourished in no distress.  General appearance: Normal  Head including face: Normal  Eyes: conjunctiva and lids: Normal  Mouth: Lips, teeth, gums: Normal  Neck: Normal  Chest-breast/axillae: Normal  Back: Normal  Spleen and liver: Normal  Cardiovascular: Exam of peripheral vascular system by observation for swelling, varicosities, edema: Normal  Genitalia: groin, buttocks: Normal  Extremities: digits/nails (clubbing): Normal  Eccrine and Apocrine glands: Normal  Right upper  extremity: Normal  Left upper extremity: Normal  Right lower extremity: Normal  Left lower extremity: Normal  Skin: Scalp and body hair: See below    Pt deferred exam of breasts, groin, buttocks: No    Other physical findings:  1. Multiple pigmented macules on extremities and trunk  2. Multiple pigmented macules on face, trunk and extremities  3. Multiple vascular papules on trunk, arms and legs  4. Multiple scattered keratotic plaques  5. Brown pink papule 7 mm on the right upper inner thigh        Except as noted above, no other signs of skin cancer or melanoma.     ASSESSMENT/PLAN:   Benign Full skin cancer screening today. . Patient with history of multiple Bowen's disease  Advised on monthly self exams and 1 year  Patient Education: Appropriate brochures given.    1. Multiple benign appearing nevi on arms, legs and trunk. Discussed ABCDEs of melanoma and sunscreen.   2. Multiple lentigos on arms, legs and trunk. Advised benign, no treatment needed.  3. Multiple scattered angiomas. Advised benign, no treatment needed.   4. Seborrheic keratosis on arms, legs and trunk. Advised benign, no treatment needed.  5. R/o SCC on the right upper inner thigh. Shave bx in typical fashion .  Area cleaned with betadyne and anesthetized with 1% lidocaine with epi .  Dermablade used to remove the lesion and sent to pathology. Bleeding was cauterized. Pt tolerated procedure well.  6. Wendy to follow up with Primary Care provider regarding elevated blood pressure.        Follow-up: yearly FSE/PRN sooner    1.) Patient was asked about new and changing moles. YES  2.) Patient received a complete physical skin examination: YES  3.) Patient was counseled to perform a monthly self skin examination: YES  Scribed By: Anayeli Key, MS, PANANCY        Again, thank you for allowing me to participate in the care of your patient.        Sincerely,        Anayeli Key PA-C

## 2019-08-19 NOTE — PATIENT INSTRUCTIONS
Wound Care Instructions     FOR SUPERFICIAL WOUNDS     [facility] [phone]    [facility] [phone]                       AFTER 24 HOURS YOU SHOULD REMOVE THE BANDAGE AND BEGIN DAILY DRESSING CHANGES AS FOLLOWS:     1) Remove Dressing.     2) Clean and dry the area with tap water using a Q-tip or sterile gauze pad.     3) Apply Vaseline, Aquaphor, Polysporin ointment or Bacitracin ointment over entire wound.  Do NOT use Neosporin ointment.     4) Cover the wound with a band-aid, or a sterile non-stick gauze pad and micropore paper tape      REPEAT THESE INSTRUCTIONS AT LEAST ONCE A DAY UNTIL THE WOUND HAS COMPLETELY HEALED.    It is an old wives tale that a wound heals better when it is exposed to air and allowed to dry out. The wound will heal faster with a better cosmetic result if it is kept moist with ointment and covered with a bandage.    **Do not let the wound dry out.**      Supplies Needed:      *Cotton tipped applicators (Q-tips)    *Polysporin Ointment or Bacitracin Ointment (NOT NEOSPORIN)    *Band-aids or non-stick gauze pads and micropore paper tape.      PATIENT INFORMATION:    During the healing process you will notice a number of changes. All wounds develop a small halo of redness surrounding the wound.  This means healing is occurring. Severe itching with extensive redness usually indicates sensitivity to the ointment or bandage tape used to dress the wound.  You should call our office if this develops.      Swelling  and/or discoloration around your surgical site is common, particularly when performed around the eye.    All wounds normally drain.  The larger the wound the more drainage there will be.  After 7-10 days, you will notice the wound beginning to shrink and new skin will begin to grow.  The wound is healed when you can see skin has formed over the entire area.  A healed wound has a healthy, shiny look to the surface and is red to dark pink in color  to normalize.  Wounds may take approximately 4-6 weeks to heal.  Larger wounds may take 6-8 weeks.  After the wound is healed you may discontinue dressing changes.    You may experience a sensation of tightness as your wound heals. This is normal and will gradually subside.    Your healed wound may be sensitive to temperature changes. This sensitivity improves with time, but if you re having a lot of discomfort, try to avoid temperature extremes.    Patients frequently experience itching after their wound appears to have healed because of the continue healing under the skin.  Plain Vaseline will help relieve the itching.        POSSIBLE COMPLICATIONS    BLEEDIN. Leave the bandage in place.  2. Use tightly rolled up gauze or a cloth to apply direct pressure over the bandage for 30  minutes.  3. Reapply pressure for an additional 30 minutes if necessary  4. Use additional gauze and tape to maintain pressure once the bleeding has stopped.    WOUND CARE INSTRUCTIONS   FOR CRYOSURGERY   This area treated with liquid nitrogen should form a blister (areas treated may or may not blister-skin may just turn dark and slough off). You do not need to bandage the area unless a blister forms and breaks (which may be a few days). When the blister breaks, begin daily dressing changes as follows:  1) Clean and dry the area with tap water using clean Q-tip or sterile gauze pad.   2) Apply Polysporin ointment or Bacitracin ointment over entire wound. Do NOT use Neosporin ointment.   3) Cover the wound with a band-aid or sterile non-stick gauze pad and micropore paper tape.   REPEAT THESE INSTRUCTIONS AT LEAST ONCE A DAY UNTIL THE WOUND HAS COMPLETELY HEALED.   It is an old wives tale that a wound heals better when it is exposed to air and allowed to dry out. The wound will heal faster with a better cosmetic result if it is kept moist with ointment and covered with a bandage.   Do not let the wound dry out.   IMPORTANT  INFORMATION ON REVERSE SIDE   Supplies Needed:   *Cotton tipped applicators (Q-tips)   *Polysporin ointment or Bacitracin ointment (NOT NEOSPORIN)   *Band-aids, or non stick gauze pads and micropore paper tape   PATIENT INFORMATION   During the healing process you will notice a number of changes. All wounds develop a small halo of redness surrounding the wound. This means healing is occurring. Severe itching with extensive redness usually indicates sensitivity to the ointment or bandage tape used to dress the wound. You should call our office if this develops.   Swelling and/or discoloration around your surgical site is common, particularly when performed around the eye.   All wounds normally drain. The larger the wound the more drainage there will be. After 7-10 days, you will notice the wound beginning to shrink and new skin will begin to grow. The wound is healed when you can see skin has formed over the entire area. A healed wound has a healthy, shiny look to the surface and is red to dark pink in color to normalize. Wounds may take approximately 4-6 weeks to heal. Larger wounds may take 6-8 weeks. After the wound is healed you may discontinue dressing changes.   You may experience a sensation of tightness as your wound heals. This is normal and will gradually subside.   Your healed wound may be sensitive to temperature changes. This sensitivity improves with time, but if you re having a lot of discomfort, try to avoid temperature extremes.   Patients frequently experience itching after their wound appears to have healed because of the continue healing under the skin. Plain Vaseline will help relieve the itching.

## 2019-08-21 LAB — COPATH REPORT: NORMAL

## 2019-09-04 ENCOUNTER — TELEPHONE (OUTPATIENT)
Dept: FAMILY MEDICINE | Facility: CLINIC | Age: 69
End: 2019-09-04

## 2019-09-04 DIAGNOSIS — R42 VERTIGO: Primary | ICD-10-CM

## 2019-09-09 ENCOUNTER — OFFICE VISIT (OUTPATIENT)
Dept: DERMATOLOGY | Facility: CLINIC | Age: 69
End: 2019-09-09
Payer: COMMERCIAL

## 2019-09-09 VITALS — DIASTOLIC BLOOD PRESSURE: 78 MMHG | HEART RATE: 70 BPM | SYSTOLIC BLOOD PRESSURE: 129 MMHG | OXYGEN SATURATION: 97 %

## 2019-09-09 DIAGNOSIS — D04.9 SQUAMOUS CELL CARCINOMA IN SITU OF SKIN: Primary | ICD-10-CM

## 2019-09-09 PROCEDURE — 17313 MOHS 1 STAGE T/A/L: CPT | Performed by: DERMATOLOGY

## 2019-09-09 NOTE — NURSING NOTE
"Initial /78   Pulse 70   SpO2 97%  Estimated body mass index is 33.62 kg/m  as calculated from the following:    Height as of 7/23/19: 1.672 m (5' 5.83\").    Weight as of 7/23/19: 94 kg (207 lb 3.7 oz). .    Yumiko Wu LPN    "

## 2019-09-09 NOTE — PATIENT INSTRUCTIONS
Open Wound Care         ? No strenuous activity for 48 hours    ? Take Tylenol as needed for discomfort.                                                .         ? Do not drink alcoholic beverages for 48 hours.    ? Keep the pressure bandage in place for 24 hours. If the bandage becomes blood tinged or loose, reinforce it with gauze and tape.        (Refer to the reverse side of this page for management of bleeding).    ? Remove bandage in 24 hours and begin wound care as follows:     1. Clean area with tap water using a Q tip or gauze pad, (shower / bathe normally)  2. Dry wound with Q tip or gauze pad  3. Apply Aquaphor, Vaseline, Polysporin or Bacitracin Ointment with a Q tip    Do NOT use Neosporin Ointment *  4. Cover the wound with a band-aid or nonstick gauze pad and paper tape.  5. Repeat wound care once a day until wound is completely healed.    It is an old wives tale that a wound heals better when it is exposed to air and allowed to dry out. The wound will heal faster with a better cosmetic result if it is kept moist with ointment and covered with a bandage.  Do not let the wound dry out.      Supplies Needed:                Qtips or gauze pads                Polysporin or Bacitracin Ointment                Bandaids or nonstick gauze pads and paper tape    Wound care kits and brown paper tape are available for purchase at   the pharmacy.    BLEEDIN. Use tightly rolled up gauze or cloth to apply direct pressure over the bandage for 20   minutes.  2. Reapply pressure for an additional 20 minutes if necessary  3. Call the office or go to the nearest emergency room if pressure fails to stop the bleeding.  4. Use additional gauze and tape to maintain pressure once the bleeding has stopped.  5. Begin wound care 24 hours after surgery as directed.                  WOUND HEALING    1. One week after surgery a pink / red halo will form around the outside of the wound.   This is new skin.  2. The center of  the wound will appear yellowish white and produce some drainage.  3. The pink halo will slowly migrate in toward the center of the wound until the wound is covered with new shiny pink skin.  4. There will be no more drainage when the wound is completely healed.  5. It will take six months to one year for the redness to fade.  6. The scar may be itchy, tight and sensitive to extreme temperatures for a year after the surgery.  7. Massaging the area several times a day for several minutes after the wound is completely healed will help the scar soften and normalize faster. Begin massage only after healing is complete.      In case of emergency call: Dr Good: [phone]     [facility]: [phone]    [facility]: [phone]

## 2019-09-09 NOTE — PROGRESS NOTES
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  Patient reports the following modifying factors none.  Associated symptoms: none.  Patient has no other skin complaints today.  Remainder of the HPI, Meds, PMH, Allergies, FH, and SH was reviewed in chart.      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     Squamous cell carcinoma      Vestibular neuritis        Past Surgical History:   Procedure Laterality Date     C NONSPECIFIC PROCEDURE      Bowen's disease removed X 2     COLONOSCOPY  2001, 2007     COLONOSCOPY N/A 11/3/2017    Procedure: COLONOSCOPY;  Colonoscopy  ;  Surgeon: Lg Guerrero MD;  Location: [facility]        Family History   Problem Relation Age of Onset     Eye Disorder Mother         glaucoma, wet macular degeneration     Lipids Mother      Gynecology Mother         hyst     Melanoma Mother      Skin Cancer Mother      Cancer Father         colon /prostate     Circulatory Father         amputee     Diabetes Father         type II     C.A.D. Father         MIs     Eye Disorder Father         dry macular degeneration     Cardiovascular Father         small AAA     Melanoma Father      Arthritis Sister      Gynecology Sister         partial hyst  fibrous sheaths on ovary egg sized polyp uterine removed     Lipids Sister      Neurologic Disorder Sister         migraines     Cardiovascular Brother         large 7 cm AAA     Coronary Artery Disease Brother         MI     Asthma Brother      Gastrointestinal Disease Paternal  Grandmother          from hepatitis       Social History     Socioeconomic History     Marital status:      Spouse name: Not on file     Number of children: Not on file     Years of education: Not on file     Highest education level: Not on file   Occupational History     Occupation: RETIRED   Social Needs     Financial resource strain: Not on file     Food insecurity:     Worry: Not on file     Inability: Not on file     Transportation needs:     Medical: Not on file     Non-medical: Not on file   Tobacco Use     Smoking status: Former Smoker     Packs/day: 0.50     Years: 37.00     Pack years: 18.50     Types: Cigarettes     Last attempt to quit: 10/20/2007     Years since quittin.8     Smokeless tobacco: Never Used   Substance and Sexual Activity     Alcohol use: Yes     Comment: minimal     Drug use: No     Sexual activity: Yes     Partners: Male     Comment: menopause   Lifestyle     Physical activity:     Days per week: Not on file     Minutes per session: Not on file     Stress: Not on file   Relationships     Social connections:     Talks on phone: Not on file     Gets together: Not on file     Attends Muslim service: Not on file     Active member of club or organization: Not on file     Attends meetings of clubs or organizations: Not on file     Relationship status: Not on file     Intimate partner violence:     Fear of current or ex partner: Not on file     Emotionally abused: Not on file     Physically abused: Not on file     Forced sexual activity: Not on file   Other Topics Concern      Service No     Blood Transfusions No     Caffeine Concern No     Occupational Exposure No     Hobby Hazards No     Sleep Concern Yes     Comment: arthritis     Stress Concern Yes     Weight Concern No     Special Diet Yes     Comment: [place]     Back Care Yes     Comment: arthritis     Exercise Yes     Bike Helmet No     Seat Belt Yes     Self-Exams Yes     Parent/sibling w/ CABG, MI or  "angioplasty before 65F 55M? Yes     Comment: brother MI at age 50   Social History Narrative    Caffeine intake/servings daily - 0    Calcium intake/servings daily - 4 occ. calcium supplement    Exercise 7 times weekly - describe  walking    Sunscreen used - No    Seatbelts used - Yes    Guns stored in the home - No    Self Breast Exam - Yes    Pap test up to date -  Yes    Eye exam up to date -  Yes    Dental exam up to date -  No    DEXA scan up to date -  Yes, last done 5/4/04    Flex Sig/Colonoscopy up to date -  Yes, at age 52 \"normal\"    Mammography up to date - 8/18/06    Immunizations reviewed and up to date - unknown last tetanus shot    Abuse: Current or Past (Physical, Sexual or Emotional) - No    Do you feel safe in your environment - Yes    Do you cope well with stress - Yes    Do you suffer from insomnia - Yes    Last updated by: Mary Beth Katz 8/28/06 July 23, 2019            ENVIRONMENTAL HISTORY: The family [place] a[place] year old [place] The home is heated with a gas furnace. They do have central air conditioning. The patient's bedroom is furnished with Indoor plants, fabric window coverings, carpet in bedroom. No pets . There is no history of cockroach or mice infestation. There are no smokers in the house.  The house does not have a basement.        Outpatient Encounter Medications as of 9/9/2019   Medication Sig Dispense Refill     acetaminophen (ARTHRITIS PAIN RELIEF) 650 MG CR tablet Take 1,300 mg by mouth as needed Takes in pm 100 tablet 11     ASPIRIN 81 MG OR TABS ONE DAILY 100 3     calcium carb 1250 mg, 500 mg [place],/vitamin D 200 units (OSCAL WITH D) 500-200 MG-UNIT per tablet Take 1 tablet by mouth daily.       estrogen conj-medroxyPROGESTERone (PREMPRO) 0.45-1.5 MG tablet Take 1 tablet by mouth three times a week       estrogen conj-medroxyPROGESTERone (PREMPRO) 0.45-1.5 MG tablet TAKE 1 TABLET BY MOUTH THREE TIMES WEEKLY OR AS NEEDED TO CONTROL HOT FLASHES/ " MENOPAUSAL SYMPTOMS 28 tablet 1     ibuprofen 200 MG capsule Take 1,000 mg by mouth 2 times daily        lovastatin (MEVACOR) 40 MG tablet TAKE ONE TABLET BY MOUTH EVERY NIGHT AT BEDTIME 90 tablet 1     Lutein 20 MG CAPS        MAGNESIUM PO Take 250 mg by mouth daily        Multiple Vitamins-Minerals (ICAPS) CAPS Take 1 capsule by mouth daily       order for DME Equipment being ordered: Dynaflex insert 2 Units 0     albuterol (PROAIR HFA/PROVENTIL HFA/VENTOLIN HFA) 108 (90 BASE) MCG/ACT Inhaler Inhale 2 puffs into the lungs every 4 hours as needed for shortness of breath / dyspnea or wheezing (Patient not taking: Reported on 7/23/2019) 1 Inhaler 1     fluticasone (FLONASE) 50 MCG/ACT nasal spray Spray 2 sprays into both nostrils daily (Patient not taking: Reported on 7/29/2019) 16 g 3     ipratropium - albuterol 0.5 mg/2.5 mg/3 mL (DUONEB) 0.5-2.5 (3) MG/3ML neb solution Take 1 vial (3 mLs) by nebulization every 6 hours as needed for shortness of breath / dyspnea or wheezing (Patient not taking: Reported on 7/23/2019) 30 vial 1     meclizine (ANTIVERT) 25 MG tablet Take 1 tablet (25 mg) by mouth 3 times daily as needed for dizziness (Patient not taking: Reported on 7/23/2019) 30 tablet 0     ondansetron (ZOFRAN-ODT) 4 MG ODT tab Take 1-2 tablets (4-8 mg) by mouth every 8 hours as needed for nausea (Patient not taking: Reported on 7/23/2019) 30 tablet 0     order for DME Equipment being ordered: Nebulizer (Patient not taking: Reported on 7/23/2019) 1 Device 0     scopolamine (TRANSDERM) 72 hr patch Apply 1 patch to hairless area behind one ear at least 4 hours before travel.  Remove old patch and change every 3 days (72 hours). (Patient not taking: Reported on 7/23/2019) 2 patch 0     No facility-administered encounter medications on file as of 9/9/2019.              Review Of Systems  Skin: As above  Eyes: negative  Ears/Nose/Throat: negative  Respiratory: No shortness of breath, dyspnea on exertion, cough, or  hemoptysis  Cardiovascular: negative  Gastrointestinal: negative  Genitourinary: negative  Musculoskeletal: negative  Neurologic: negative  Psychiatric: negative  Hematologic/Lymphatic/Immunologic: negative  Endocrine: negative      O:   NAD, WDWN, Alert & Oriented, Mood & Affect wnl, Vitals stable   Here today alone   /78   Pulse 70   SpO2 97%    General appearance normal   Vitals stable   Alert, oriented and in no acute distress     L medial buttocks/groin ill-deifned 1.1x0.7cm red scaly plaque      Eyes: Conjunctivae/lids:Normal     ENT: Lips, buccal mucosa, tongue: normal    MSK:Normal    Cardiovascular: peripheral edema none    Pulm: Breathing Normal    Lymph Nodes: No femoral / inguinal lad    Neuro/Psych: Orientation:Normal; Mood/Affect:Normal      A/P:  1. L medial groin squamous cell carcinoma in situ  MOHS:   Location    After PGACAC discussed with patient, decision for Mohs surgery was made. Indication for Mohs was Location. Patient confirmed the site with Dr. Good.  After anesthesia with LEC, the tumor was excised using standard Mohs technique in 1 stages(s).  CLEAR MARGINS OBTAINED and Final defect size was 1.5 x 1.2 cm.       REPAIR SECOND INTENT: We discussed the options for wound management in full with the patient including risks/benefits/possible outcomes. Decision made to allow the wound to heal by second intention. EBL minimal; complications none; wound care routine.  The patient was discharged in good condition and will return in one month or prn for wound evaluation.  BENIGN LESIONS DISCUSSED WITH PATIENT:  I discussed the specifics of tumor, prognosis, and genetics of benign lesions.  I explained that treatment of these lesions would be purely cosmetic and not medically neccessary.  I discussed with patient different removal options including excision, cautery and /or laser.      Nature and genetics of benign skin lesions dicussed with patient.  Signs and Symptoms of skin cancer  discussed with patient.  Patient encouraged to perform monthly skin exams.  UV precautions reviewed with patient.  Skin care regimen reviewed with patient: Eliminate harsh soaps, i.e. Dial, zest, irsih spring; Mild soaps such as Cetaphil or Dove sensitive skin, avoid hot or cold showers, aggressive use of emollients including vanicream, cetaphil or cerave discussed with patient.    Risks of non-melanoma skin cancer discussed with patient   Return to clinic 6 months

## 2019-09-09 NOTE — LETTER
9/9/2019         RE: Tri Ingram  [address]  [facility] [address]        Dear Colleague,    Thank you for referring your patient, Tri Ingram, to the [facility]. Please see a copy of my visit note below.    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  Patient reports the following modifying factors none.  Associated symptoms: none.  Patient has no other skin complaints today.  Remainder of the HPI, Meds, PMH, Allergies, FH, and SH was reviewed in chart.      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     Squamous cell carcinoma      Vestibular neuritis        Past Surgical History:   Procedure Laterality Date     C NONSPECIFIC PROCEDURE      Bowen's disease removed X 2     COLONOSCOPY  2001, 2007     COLONOSCOPY N/A 11/3/2017    Procedure: COLONOSCOPY;  Colonoscopy  ;  Surgeon: Lg Guerrero MD;  Location: [facility]        Family History   Problem Relation Age of Onset     Eye Disorder Mother         glaucoma, wet macular degeneration     Lipids Mother      Gynecology Mother         hyst     Melanoma Mother      Skin Cancer Mother      Cancer Father         colon /prostate     Circulatory Father         amputee     Diabetes Father         type II     C.A.D. Father         MIs     Eye Disorder Father         dry macular degeneration     Cardiovascular Father         small AAA     Melanoma Father       Arthritis Sister      Gynecology Sister         partial hyst  fibrous sheaths on ovary egg sized polyp uterine removed     Lipids Sister      Neurologic Disorder Sister         migraines     Cardiovascular Brother         large 7 cm AAA     Coronary Artery Disease Brother         MI     Asthma Brother      Gastrointestinal Disease Paternal Grandmother          from hepatitis       Social History     Socioeconomic History     Marital status:      Spouse name: Not on file     Number of children: Not on file     Years of education: Not on file     Highest education level: Not on file   Occupational History     Occupation: RETIRED   Social Needs     Financial resource strain: Not on file     Food insecurity:     Worry: Not on file     Inability: Not on file     Transportation needs:     Medical: Not on file     Non-medical: Not on file   Tobacco Use     Smoking status: Former Smoker     Packs/day: 0.50     Years: 37.00     Pack years: 18.50     Types: Cigarettes     Last attempt to quit: 10/20/2007     Years since quittin.8     Smokeless tobacco: Never Used   Substance and Sexual Activity     Alcohol use: Yes     Comment: minimal     Drug use: No     Sexual activity: Yes     Partners: Male     Comment: menopause   Lifestyle     Physical activity:     Days per week: Not on file     Minutes per session: Not on file     Stress: Not on file   Relationships     Social connections:     Talks on phone: Not on file     Gets together: Not on file     Attends Mandaeism service: Not on file     Active member of club or organization: Not on file     Attends meetings of clubs or organizations: Not on file     Relationship status: Not on file     Intimate partner violence:     Fear of current or ex partner: Not on file     Emotionally abused: Not on file     Physically abused: Not on file     Forced sexual activity: Not on file   Other Topics Concern      Service No     Blood Transfusions No     Caffeine  "Concern No     Occupational Exposure No     Hobby Hazards No     Sleep Concern Yes     Comment: arthritis     Stress Concern Yes     Weight Concern No     Special Diet Yes     Comment: [place]     Back Care Yes     Comment: arthritis     Exercise Yes     Bike Helmet No     Seat Belt Yes     Self-Exams Yes     Parent/sibling w/ CABG, MI or angioplasty before 65F 55M? Yes     Comment: brother MI at age 50   Social History Narrative    Caffeine intake/servings daily - 0    Calcium intake/servings daily - 4 occ. calcium supplement    Exercise 7 times weekly - describe  walking    Sunscreen used - No    Seatbelts used - Yes    Guns stored in the home - No    Self Breast Exam - Yes    Pap test up to date -  Yes    Eye exam up to date -  Yes    Dental exam up to date -  No    DEXA scan up to date -  Yes, last done 5/4/04    Flex Sig/Colonoscopy up to date -  Yes, at age 52 \"normal\"    Mammography up to date - 8/18/06    Immunizations reviewed and up to date - unknown last tetanus shot    Abuse: Current or Past (Physical, Sexual or Emotional) - No    Do you feel safe in your environment - Yes    Do you cope well with stress - Yes    Do you suffer from insomnia - Yes    Last updated by: Mary Beth Katz 8/28/06 July 23, 2019            ENVIRONMENTAL HISTORY: The family [place] in a[place] year old [place]. The home is heated with a gas furnace. They do have central air conditioning. The patient's bedroom is furnished with Indoor plants, fabric window coverings, carpet in bedroom. No pets . There is no history of cockroach or mice infestation. There are no smokers in the house.  The house does not have a basement.        Outpatient Encounter Medications as of 9/9/2019   Medication Sig Dispense Refill     acetaminophen (ARTHRITIS PAIN RELIEF) 650 MG CR tablet Take 1,300 mg by mouth as needed Takes in pm 100 tablet 11     ASPIRIN 81 MG OR TABS ONE DAILY 100 3     calcium carb 1250 mg, 500 mg Lime,/vitamin D 200 units " (OSCAL WITH D) 500-200 MG-UNIT per tablet Take 1 tablet by mouth daily.       estrogen conj-medroxyPROGESTERone (PREMPRO) 0.45-1.5 MG tablet Take 1 tablet by mouth three times a week       estrogen conj-medroxyPROGESTERone (PREMPRO) 0.45-1.5 MG tablet TAKE 1 TABLET BY MOUTH THREE TIMES WEEKLY OR AS NEEDED TO CONTROL HOT FLASHES/ MENOPAUSAL SYMPTOMS 28 tablet 1     ibuprofen 200 MG capsule Take 1,000 mg by mouth 2 times daily        lovastatin (MEVACOR) 40 MG tablet TAKE ONE TABLET BY MOUTH EVERY NIGHT AT BEDTIME 90 tablet 1     Lutein 20 MG CAPS        MAGNESIUM PO Take 250 mg by mouth daily        Multiple Vitamins-Minerals (ICAPS) CAPS Take 1 capsule by mouth daily       order for DME Equipment being ordered: Dynaflex insert 2 Units 0     albuterol (PROAIR HFA/PROVENTIL HFA/VENTOLIN HFA) 108 (90 BASE) MCG/ACT Inhaler Inhale 2 puffs into the lungs every 4 hours as needed for shortness of breath / dyspnea or wheezing (Patient not taking: Reported on 7/23/2019) 1 Inhaler 1     fluticasone (FLONASE) 50 MCG/ACT nasal spray Spray 2 sprays into both nostrils daily (Patient not taking: Reported on 7/29/2019) 16 g 3     ipratropium - albuterol 0.5 mg/2.5 mg/3 mL (DUONEB) 0.5-2.5 (3) MG/3ML neb solution Take 1 vial (3 mLs) by nebulization every 6 hours as needed for shortness of breath / dyspnea or wheezing (Patient not taking: Reported on 7/23/2019) 30 vial 1     meclizine (ANTIVERT) 25 MG tablet Take 1 tablet (25 mg) by mouth 3 times daily as needed for dizziness (Patient not taking: Reported on 7/23/2019) 30 tablet 0     ondansetron (ZOFRAN-ODT) 4 MG ODT tab Take 1-2 tablets (4-8 mg) by mouth every 8 hours as needed for nausea (Patient not taking: Reported on 7/23/2019) 30 tablet 0     order for DME Equipment being ordered: Nebulizer (Patient not taking: Reported on 7/23/2019) 1 Device 0     scopolamine (TRANSDERM) 72 hr patch Apply 1 patch to hairless area behind one ear at least 4 hours before travel.  Remove old patch  and change every 3 days (72 hours). (Patient not taking: Reported on 7/23/2019) 2 patch 0     No facility-administered encounter medications on file as of 9/9/2019.              Review Of Systems  Skin: As above  Eyes: negative  Ears/Nose/Throat: negative  Respiratory: No shortness of breath, dyspnea on exertion, cough, or hemoptysis  Cardiovascular: negative  Gastrointestinal: negative  Genitourinary: negative  Musculoskeletal: negative  Neurologic: negative  Psychiatric: negative  Hematologic/Lymphatic/Immunologic: negative  Endocrine: negative      O:   NAD, WDWN, Alert & Oriented, Mood & Affect wnl, Vitals stable   Here today alone   /78   Pulse 70   SpO2 97%    General appearance normal   Vitals stable   Alert, oriented and in no acute distress     L medial buttocks/groin ill-deifned 1.1x0.7cm red scaly plaque      Eyes: Conjunctivae/lids:Normal     ENT: Lips, buccal mucosa, tongue: normal    MSK:Normal    Cardiovascular: peripheral edema none    Pulm: Breathing Normal    Lymph Nodes: No femoral / inguinal lad    Neuro/Psych: Orientation:Normal; Mood/Affect:Normal      A/P:  1. L medial groin squamous cell carcinoma in situ  MOHS:   Location    After PGACAC discussed with patient, decision for Mohs surgery was made. Indication for Mohs was Location. Patient confirmed the site with Dr. Good.  After anesthesia with LEC, the tumor was excised using standard Mohs technique in 1 stages(s).  CLEAR MARGINS OBTAINED and Final defect size was 1.5 x 1.2 cm.       REPAIR SECOND INTENT: We discussed the options for wound management in full with the patient including risks/benefits/possible outcomes. Decision made to allow the wound to heal by second intention. EBL minimal; complications none; wound care routine.  The patient was discharged in good condition and will return in one month or prn for wound evaluation.  BENIGN LESIONS DISCUSSED WITH PATIENT:  I discussed the specifics of tumor, prognosis, and  genetics of benign lesions.  I explained that treatment of these lesions would be purely cosmetic and not medically neccessary.  I discussed with patient different removal options including excision, cautery and /or laser.      Nature and genetics of benign skin lesions dicussed with patient.  Signs and Symptoms of skin cancer discussed with patient.  Patient encouraged to perform monthly skin exams.  UV precautions reviewed with patient.  Skin care regimen reviewed with patient: Eliminate harsh soaps, i.e. Dial, zest, irsih spring; Mild soaps such as Cetaphil or Dove sensitive skin, avoid hot or cold showers, aggressive use of emollients including vanicream, cetaphil or cerave discussed with patient.    Risks of non-melanoma skin cancer discussed with patient   Return to clinic 6 months      Again, thank you for allowing me to participate in the care of your patient.        Sincerely,        Harris Good MD

## 2019-09-10 ENCOUNTER — TRANSFERRED RECORDS (OUTPATIENT)
Dept: HEALTH INFORMATION MANAGEMENT | Facility: CLINIC | Age: 69
End: 2019-09-10

## 2019-09-17 DIAGNOSIS — N95.1 SYMPTOMATIC MENOPAUSAL OR FEMALE CLIMACTERIC STATES: ICD-10-CM

## 2019-09-17 NOTE — TELEPHONE ENCOUNTER
Prescription approved per [facility] [facility].  Addie Gustafson RN

## 2019-09-17 NOTE — TELEPHONE ENCOUNTER
"PREMPRO 0.45-1.5MG TABS      Last Written Prescription Date:  5/7/19  Last Fill Quantity: 28,   # refills: 1  Last Office Visit: 4/23/19  Future Office visit:       Requested Prescriptions   Pending Prescriptions Disp Refills     estrogen conj-medroxyPROGESTERone (PREMPRO) 0.45-1.5 MG tablet [Pharmacy Med Name: PREMPRO 0.45-1.5MG TABS] 28 tablet 1     Sig: TAKE ONE TABLET BY MOUTH THREE TIMES WEEKLY OR AS NEEDED TO CONTROL HOT FLASHES / MENOPAUSAL SYMPTOMS.       Hormone Replacement Therapy Passed - 9/17/2019  9:59 AM        Passed - Blood pressure under 140/90 in past 12 months     BP Readings from Last 3 Encounters:   09/09/19 129/78   08/19/19 (!) 146/81   07/29/19 125/84                 Passed - Recent (12 mo) or future (30 days) visit within the authorizing provider's specialty     Patient had office visit in the last 12 months or has a visit in the next 30 days with authorizing provider or within the authorizing provider's specialty.  See \"Patient Info\" tab in inbasket, or \"Choose Columns\" in Meds & Orders section of the refill encounter.              Passed - Patient has mammogram in past 2 years on file if age 50-75        Passed - Medication is active on med list        Passed - Patient is 18 years of age or older        Passed - No active pregnancy on record        Passed - No positive pregnancy test on record in past 12 months          "

## 2019-10-07 ENCOUNTER — TRANSFERRED RECORDS (OUTPATIENT)
Dept: HEALTH INFORMATION MANAGEMENT | Facility: CLINIC | Age: 69
End: 2019-10-07

## 2019-10-29 DIAGNOSIS — E78.5 HYPERLIPIDEMIA LDL GOAL <130: ICD-10-CM

## 2019-10-29 RX ORDER — LOVASTATIN 40 MG
TABLET ORAL
Qty: 30 TABLET | Refills: 0 | Status: SHIPPED | OUTPATIENT
Start: 2019-10-29 | End: 2019-12-03

## 2019-10-29 NOTE — TELEPHONE ENCOUNTER
"LOVASTATIN 40MG TABS      Last Written Prescription Date:  4/23/19  Last Fill Quantity: 90,   # refills: 1  Last Office Visit: 4/23/19  Future Office visit:       Requested Prescriptions   Pending Prescriptions Disp Refills     lovastatin (MEVACOR) 40 MG tablet [Pharmacy Med Name: LOVASTATIN 40MG TABS] 90 tablet 1     Sig: TAKE ONE TABLET BY MOUTH EVERY NIGHT AT BEDTIME       Statins Protocol Failed - 10/29/2019 12:15 PM        Failed - LDL on file in past 12 months     Recent Labs   Lab Test 04/10/18  0818   LDL 79             Passed - No abnormal creatine kinase in past 12 months     No lab results found.             Passed - Recent (12 mo) or future (30 days) visit within the authorizing provider's specialty     Patient has had an office visit with the authorizing provider or a provider within the authorizing providers department within the previous 12 mos or has a future within next 30 days. See \"Patient Info\" tab in inbasket, or \"Choose Columns\" in Meds & Orders section of the refill encounter.              Passed - Medication is active on med list        Passed - Patient is age 18 or older        Passed - No active pregnancy on record        Passed - No positive pregnancy test in past 12 months          "

## 2019-11-12 DIAGNOSIS — Z13.6 CARDIOVASCULAR SCREENING; LDL GOAL LESS THAN 160: ICD-10-CM

## 2019-11-12 LAB
CHOLEST SERPL-MCNC: 158 MG/DL
GLUCOSE SERPL-MCNC: 127 MG/DL (ref 70–99)
HDLC SERPL-MCNC: 55 MG/DL
LDLC SERPL CALC-MCNC: 66 MG/DL
NONHDLC SERPL-MCNC: 103 MG/DL
TRIGL SERPL-MCNC: 187 MG/DL

## 2019-11-12 PROCEDURE — 82947 ASSAY GLUCOSE BLOOD QUANT: CPT | Performed by: PHYSICIAN ASSISTANT

## 2019-11-12 PROCEDURE — 36415 COLL VENOUS BLD VENIPUNCTURE: CPT | Performed by: PHYSICIAN ASSISTANT

## 2019-11-12 PROCEDURE — 80061 LIPID PANEL: CPT | Performed by: PHYSICIAN ASSISTANT

## 2019-12-03 DIAGNOSIS — E78.5 HYPERLIPIDEMIA LDL GOAL <130: ICD-10-CM

## 2019-12-03 DIAGNOSIS — R73.09 ELEVATED GLUCOSE: Primary | ICD-10-CM

## 2019-12-03 RX ORDER — LOVASTATIN 40 MG
TABLET ORAL
Qty: 90 TABLET | Refills: 0 | Status: SHIPPED | OUTPATIENT
Start: 2019-12-03 | End: 2019-12-16

## 2019-12-03 NOTE — TELEPHONE ENCOUNTER
Medication is being filled for 1 time refill only due to:  Patient needs labs cmp and hgba1c. Future labs ordered yes.   Michele Frank RN

## 2019-12-16 ENCOUNTER — OFFICE VISIT (OUTPATIENT)
Dept: FAMILY MEDICINE | Facility: CLINIC | Age: 69
End: 2019-12-16
Payer: COMMERCIAL

## 2019-12-16 VITALS
WEIGHT: 210 LBS | SYSTOLIC BLOOD PRESSURE: 120 MMHG | DIASTOLIC BLOOD PRESSURE: 68 MMHG | BODY MASS INDEX: 33.75 KG/M2 | HEIGHT: 66 IN | HEART RATE: 72 BPM | TEMPERATURE: 97.5 F

## 2019-12-16 DIAGNOSIS — E78.5 HYPERLIPIDEMIA LDL GOAL <130: ICD-10-CM

## 2019-12-16 DIAGNOSIS — Z00.00 ENCOUNTER FOR MEDICARE ANNUAL WELLNESS EXAM: Primary | ICD-10-CM

## 2019-12-16 PROCEDURE — 99397 PER PM REEVAL EST PAT 65+ YR: CPT | Performed by: PHYSICIAN ASSISTANT

## 2019-12-16 RX ORDER — LOVASTATIN 40 MG
TABLET ORAL
Qty: 90 TABLET | Refills: 3 | Status: SHIPPED | OUTPATIENT
Start: 2019-12-16 | End: 2021-03-16

## 2019-12-16 ASSESSMENT — MIFFLIN-ST. JEOR: SCORE: 1508.27

## 2019-12-16 ASSESSMENT — PAIN SCALES - GENERAL: PAINLEVEL: NO PAIN (0)

## 2019-12-16 NOTE — PROGRESS NOTES
"  SUBJECTIVE:   Tri Ingram is a 69 year old female who presents for Preventive Visit.    Are you in the first 12 months of your Medicare Part B coverage?  No    Physical Health:    In general, how would you rate your overall physical health? good    Outside of work, how many days during the week do you exercise? none at the moment     Outside of work, approximately how many minutes a day do you exercise?not applicable    If you drink alcohol do you typically have >3 drinks per day or >7 drinks per week? No    Do you usually eat at least 4 servings of fruit and vegetables a day, include whole grains & fiber and avoid regularly eating high fat or \"junk\" foods? Yes    Do you have any problems taking medications regularly?  No    Do you have any side effects from medications? none    Needs assistance for the following daily activities: no assistance needed    Which of the following safety concerns are present in your home?  none identified     Hearing impairment: No    In the past 6 months, have you been bothered by leaking of urine? Yes, she says it comes with age     Mental Health:    In general, how would you rate your overall mental or emotional health? good  PHQ-2 Score:      Do you feel safe in your environment? Yes    Have you ever done Advance Care Planning? (For example, a Health Directive, POLST, or a discussion with a medical provider or your loved ones about your wishes): No, advance care planning information given to patient to review.  Patient plans to discuss their wishes with loved ones or provider.      Additional concerns to address?  No    Fall risk:  Fallen 2 or more times in the past year?: Yes  Any fall with injury in the past year?: No    Cognitive Screenin) Repeat 3 items (Leader, Season, Table)    2) Clock draw: NORMAL  3) 3 item recall: Recalls 3 objects  Results: 3 items recalled: COGNITIVE IMPAIRMENT LESS LIKELY    Mini-CogTM Copyright S Yulissa. Licensed by the author for use " in [facility]; reprinted with permission (sotimmy@[facility]). All rights reserved.      Do you have sleep apnea, excessive snoring or daytime drowsiness?: yes, she thinks she probably has sleep apnea. She knows she needs a sleep study.             Reviewed and updated as needed this visit by clinical staff  Tobacco  Allergies  Meds  Problems  Med Hx  Surg Hx  Fam Hx  Soc Hx          Reviewed and updated as needed this visit by Provider  Tobacco  Allergies  Meds  Problems  Med Hx  Surg Hx  Fam Hx        Social History     Tobacco Use     Smoking status: Former Smoker     Packs/day: 0.50     Years: 37.00     Pack years: 18.50     Types: Cigarettes     Last attempt to quit: 10/20/2007     Years since quittin.1     Smokeless tobacco: Never Used   Substance Use Topics     Alcohol use: Yes     Comment: minimal                           Current providers sharing in care for this patient include:   Patient Care Team:  Charlene Cain PA-C as PCP - General (Physician Assistant)  Charlene Cain PA-C as Assigned PCP    The following health maintenance items are reviewed in Epic and correct as of today:  Health Maintenance   Topic Date Due     ADVANCE CARE PLANNING  2017     FALL RISK ASSESSMENT  05/15/2018     MEDICARE ANNUAL WELLNESS VISIT  2019     MAMMO SCREENING  05/10/2020     LIPID  2024     DTAP/TDAP/TD IMMUNIZATION (3 - Td) 2026     COLONOSCOPY  2027     DEXA  Completed     HEPATITIS C SCREENING  Completed     PHQ-2  Completed     INFLUENZA VACCINE  Completed     PNEUMOCOCCAL IMMUNIZATION 65+ LOW/MEDIUM RISK  Completed     ZOSTER IMMUNIZATION  Completed     IPV IMMUNIZATION  Aged Out     MENINGITIS IMMUNIZATION  Aged Out     BP Readings from Last 3 Encounters:   19 120/68   19 129/78   19 (!) 146/81    Wt Readings from Last 3 Encounters:   19 95.3 kg (210 lb)   19 94 kg (207 lb 3.7 oz)   18 92.1 kg (203  "lb)                  Pneumonia Vaccine: up to date  Mammogram Screening: Mammogram Screening: Patient over age 50, mutual decision to screen reflected in health maintenance.  Last 3 Pap and HPV Results:   PAP / HPV Latest Ref Rng & Units 3/17/2015 7/26/2011 7/22/2010   PAP - NIL NIL NIL   HPV 16 DNA NEG Negative - -   HPV 18 DNA NEG Negative - -   OTHER HR HPV NEG Negative - -       ROS:  Constitutional, HEENT, cardiovascular, pulmonary, GI, , musculoskeletal, neuro, skin, endocrine and psych systems are negative, except as otherwise noted.    OBJECTIVE:   /68   Pulse 72   Temp 97.5  F (36.4  C) (Tympanic)   Ht 1.683 m (5' 6.25\")   Wt 95.3 kg (210 lb)   BMI 33.64 kg/m   Estimated body mass index is 33.64 kg/m  as calculated from the following:    Height as of this encounter: 1.683 m (5' 6.25\").    Weight as of this encounter: 95.3 kg (210 lb).  EXAM:   GENERAL APPEARANCE: healthy, alert and no distress  EYES: Eyes grossly normal to inspection, PERRL and conjunctivae and sclerae normal  HENT: ear canals and TM's normal, nose and mouth without ulcers or lesions, oropharynx clear and oral mucous membranes moist  NECK: no adenopathy, no asymmetry, masses, or scars and thyroid normal to palpation  RESP: lungs clear to auscultation - no rales, rhonchi or wheezes  CV: regular rate and rhythm, normal S1 S2, no S3 or S4, no murmur, click or rub, no peripheral edema and peripheral pulses strong  ABDOMEN: soft, nontender, no hepatosplenomegaly, no masses and bowel sounds normal  MS: no musculoskeletal defects are noted and gait is age appropriate without ataxia  SKIN: no suspicious lesions or rashes  NEURO: Normal strength and tone, sensory exam grossly normal, mentation intact and speech normal  PSYCH: mentation appears normal and affect normal/bright    Diagnostic Test Results:  Labs reviewed in Epic    ASSESSMENT / PLAN:       ICD-10-CM    1. Encounter for Medicare annual wellness exam Z00.00    2. " "Hyperlipidemia LDL goal <130 E78.5 lovastatin (MEVACOR) 40 MG tablet       COUNSELING:  Reviewed preventive health counseling, as reflected in patient instructions       Regular exercise       Healthy diet/nutrition    Estimated body mass index is 33.64 kg/m  as calculated from the following:    Height as of this encounter: 1.683 m (5' 6.25\").    Weight as of this encounter: 95.3 kg (210 lb).    Weight management plan: Discussed healthy diet and exercise guidelines     reports that she quit smoking about 12 years ago. Her smoking use included cigarettes. She has a 18.50 pack-year smoking history. She has never used smokeless tobacco.      Appropriate preventive services were discussed with this patient, including applicable screening as appropriate for cardiovascular disease, diabetes, osteopenia/osteoporosis, and glaucoma.  As appropriate for age/gender, discussed screening for colorectal cancer, prostate cancer, breast cancer, and cervical cancer. Checklist reviewing preventive services available has been given to the patient.    Reviewed patients plan of care and provided an AVS. The Basic Care Plan (routine screening as documented in Health Maintenance) for Tri meets the Care Plan requirement. This Care Plan has been established and reviewed with the Patient.    Counseling Resources:  ATP IV Guidelines  Pooled Cohorts Equation Calculator  Breast Cancer Risk Calculator  FRAX Risk Assessment  ICSI Preventive Guidelines  Dietary Guidelines for Americans, 2010  USDA's MyPlate  ASA Prophylaxis  Lung CA Screening    Charlene Cain PA-C  [facility])"

## 2019-12-16 NOTE — PATIENT INSTRUCTIONS
Patient Education   Personalized Prevention Plan  You are due for the preventive services outlined below.  Your care team is available to assist you in scheduling these services.  If you have already completed any of these items, please share that information with your care team to update in your medical record.  Health Maintenance Due   Topic Date Due     Discuss Advance Care Planning  07/31/2017     FALL RISK ASSESSMENT  05/15/2018     PHQ-2  01/01/2019     Annual Wellness Visit  04/12/2019

## 2020-01-20 ENCOUNTER — TELEPHONE (OUTPATIENT)
Dept: FAMILY MEDICINE | Facility: CLINIC | Age: 70
End: 2020-01-20

## 2020-01-20 DIAGNOSIS — N95.1 SYMPTOMATIC MENOPAUSAL OR FEMALE CLIMACTERIC STATES: ICD-10-CM

## 2020-01-20 NOTE — TELEPHONE ENCOUNTER
I sent it in to [place]. For both 3 months and   For 7 nettles in case e she needs to pay out of pocket

## 2020-01-20 NOTE — TELEPHONE ENCOUNTER
Reason for Call:  Other prescription    Detailed comments: Wendy is in [place] for 2 months.  She states that she needs a authorization from Charlene to have Prempro filled in [place] and not by [employer].  States she spoke to her about this and Charlene told her to just call us.  Need to call [employer] [phone] to get it approved filling at a different pharmacy.   Not sure if this is a prior authorization or what exactly this is.  She can not take anything but Prempro.  Can not be a different drug as they tried that and it doesn't work.  She needs new script called/send to [address] [employer] [place] [address]  [phone]  .  Please review and advise. Thank you..Maureen Valdivia    Phone Number Patient can be reached at: Other phone number:  [phone]  ('s cell)*    Best Time: any time    Can we leave a detailed message on this number? YES    Call taken on 1/20/2020 at 12:09 PM by Maureen Valdivia

## 2020-01-20 NOTE — LETTER
March 10, 2020            RE: Tri Ingram  [address] [facility] [address]  : 1950  MRN: [id number]        To Whom It May Concern,    I am writing on behalf of my patient, Tri Ingram, to document the medical necessity of Prempro 0.45-1.5 MG tablet for the symptomatic menopausal syndrome    Patient History and Diagnosis  Tri Ingarm has been followed closely in this clinic where we have been managing her menopausal symptoms. She had previously had good results on the prempro. When insurance denied the prempro, we did attempt to prescribe the estrogen and progesterone separately but patient did not have good control of her symptoms. Per the most recent denial letter, it was recommended that patient try the vaginal ring, however this is not an option as patient is limited by arthritis in her hands and given her body habitus makes inserting and removing the ring very difficult.     Summary  Prempro is medically necessary for this patient s medical condition and the only option which works that is conducive to her current state of health     Please call my office at [phone] if I can provide additional information. I look forward to receiving your timely response and approval of this request.     Sincerely,    Charlene Cain PA-C

## 2020-01-21 NOTE — TELEPHONE ENCOUNTER
Insurance is requesting further information. They need this info by 1/22/2020 11:15am- please route back to me when finished. Thanks!

## 2020-01-21 NOTE — TELEPHONE ENCOUNTER
Prior Authorization Retail Medication Request    Medication/Dose: prempro  ICD code (if different than what is on RX):  SYMPTOMATIC FEMALE CLIMACTERIC STATE [N95.1]  Previously Tried and Failed:    Rationale:  [employer] Requiring Prior Authorization    Insurance Name:  [employer]  Insurance ID:  Not Provided  SYMPTOMATIC FEMALE CLIMACTERIC STATE [N95.1]      Pharmacy Information (if different than what is on RX)  Name:  [place], [place]  Phone:  [phone]

## 2020-01-21 NOTE — TELEPHONE ENCOUNTER
Central Prior Authorization Team   Phone: [phone]    PA Initiation    Medication: prempro  Insurance Company: [employer] - Phone [phone] Fax [phone]  Ph[facility]illing [id number] [place] [address]  Filling Pharmacy Phone: [phone]  Filling Pharmacy Fax: [phone]  Start Date: 1/21/2020

## 2020-01-22 NOTE — TELEPHONE ENCOUNTER
Central Prior Authorization Team   Phone: [phone]    PRIOR AUTHORIZATION DENIED    Medication: prempro    Denial Date: 1/22/2020    Denial Rational: Patient has to first try/fail: Estring vaginal ring      Appeal Information: If provider would like to appeal we will need a detailed letter of medical necessity to start the process. Then re-route this request back to the PA pool.

## 2020-01-23 NOTE — TELEPHONE ENCOUNTER
I know patient was tried on estrace and provera which did not help her symptoms so is why we went back to prempro.     I do not know if she has tried the estrace vaginal ring - can we call and ask her and explain insurance is denying the prempro unless we have tried and failed the ring    Charlene

## 2020-01-23 NOTE — TELEPHONE ENCOUNTER
Call placed to patient.  Voicemail message left requesting call back to discuss insurance/medication coverage.  Addie Gustafson RN

## 2020-01-24 NOTE — TELEPHONE ENCOUNTER
Pt has called back and would like a call back on her mobile number of; [phone]    Katina Norman  [facility]

## 2020-01-24 NOTE — TELEPHONE ENCOUNTER
Call placed to patient.  Reports that she called [employer] and was told per PCP had to personally call to explain reasons that she needs premro.  Patient states she will not try the estrace vaginal ring.    Patient reports, her emotional stability and concentration were greatly affected when she was on estrace.    Patient is currently caring for grandchildren in [place], then going to [place]. She will not return home until April.  She does not want to try a new medication when she is unable to have visit with PCP if needed.  Patient informed of PA process, appeal is our next step.    Patient is requesting Prempro order placed and she will pay out of pocket. Verified preferred pharmacy.  Addie Gustafson RN

## 2020-01-30 NOTE — TELEPHONE ENCOUNTER
Patient has tried other medications and did not have symptoms relief so went back to prempro.     To prior auth team - is this something I would need to write an appeal letter for?     Charlene

## 2020-02-05 ENCOUNTER — TELEPHONE (OUTPATIENT)
Dept: FAMILY MEDICINE | Facility: CLINIC | Age: 70
End: 2020-02-05

## 2020-02-05 NOTE — TELEPHONE ENCOUNTER
S-(situation): cough    B-(background): 9 nine days ago    A-(assessment): Patient is in [place] now taking care of grandkids as daughter is deployed in the . Patient says she has had strong barky cough for 9 days that causes sore throat, has hoarse sound to her cough and voice, patient denies fever, says cough gets so bad that patient throw up from this causing abdominal upset, not short of breath, denies wheezing, has sinus pain and pressure.    R-(recommendations): Advised patient to be seen in [place] [facility] to rule out strep/Resp infection, patient agrees, wanted to have confirmation on what she has had for cough in the past, told patient has had Tessalon and Robitussin with Codeine, patient says the Tessalon was more helpful. Patient agrees to go to Urgent Care today for evaluation. Encouraged fluids.    KATHRINE Teran

## 2020-02-05 NOTE — TELEPHONE ENCOUNTER
Reason for Call:  Other prescription    Detailed comments: PT is calling and is out of town in [place], [facility], she has had a terrible cough for nine days now and stating this has make her Throw up like in the past.  [phone]. Please advise  She is looking for a medication for coughing but cannot remember the name    Phone Number Patient can be reached at: Cell number on file:    Telephone Information:   Mobile [phone]       Best Time: any    Can we leave a detailed message on this number? YES    Call taken on 2/5/2020 at 2:44 PM by Katina Norman

## 2020-03-09 ENCOUNTER — OFFICE VISIT (OUTPATIENT)
Dept: DERMATOLOGY | Facility: CLINIC | Age: 70
End: 2020-03-09
Payer: COMMERCIAL

## 2020-03-09 VITALS — OXYGEN SATURATION: 98 % | HEART RATE: 77 BPM | SYSTOLIC BLOOD PRESSURE: 128 MMHG | DIASTOLIC BLOOD PRESSURE: 85 MMHG

## 2020-03-09 DIAGNOSIS — D23.9 DERMAL NEVUS: ICD-10-CM

## 2020-03-09 DIAGNOSIS — D18.01 ANGIOMA OF SKIN: ICD-10-CM

## 2020-03-09 DIAGNOSIS — L82.1 SEBORRHEIC KERATOSIS: ICD-10-CM

## 2020-03-09 DIAGNOSIS — D23.9 DERMATOFIBROMA: ICD-10-CM

## 2020-03-09 DIAGNOSIS — Z85.828 HISTORY OF SKIN CANCER: Primary | ICD-10-CM

## 2020-03-09 DIAGNOSIS — L81.4 LENTIGO: ICD-10-CM

## 2020-03-09 PROCEDURE — 99214 OFFICE O/P EST MOD 30 MIN: CPT | Performed by: DERMATOLOGY

## 2020-03-09 NOTE — PROGRESS NOTES
Tri Ingram is a 70 year old year old female patient here today for hx of non-melanoma skin cancer  She notes spot o left shin.   .  Patient states this has been present for a while.  Patient reports the following symptoms:  none.  Patient reports the following previous treatments none.  These treatments did not work.  Patient reports the following modifying factors none.  Associated symptoms: none.  Patient has no other skin complaints today.  Remainder of the HPI, Meds, PMH, Allergies, FH, and SH was reviewed in chart.      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     Squamous cell carcinoma      Vestibular neuritis        Past Surgical History:   Procedure Laterality Date     C NONSPECIFIC PROCEDURE      Bowen's disease removed X 2     COLONOSCOPY  2001, 2007     COLONOSCOPY N/A 11/3/2017    Procedure: COLONOSCOPY;  Colonoscopy  ;  Surgeon: Lg Guerrero MD;  Location: [facility]        Family History   Problem Relation Age of Onset     Eye Disorder Mother         glaucoma, wet macular degeneration     Lipids Mother      Gynecology Mother         hyst     Melanoma Mother      Skin Cancer Mother      Cancer Father         colon /prostate     Circulatory Father         amputee     Diabetes Father         type II     C.A.D. Father         MIs     Eye Disorder Father         dry macular degeneration     Cardiovascular Father         small AAA     Melanoma Father      Arthritis Sister      Gynecology Sister         partial hyst  fibrous sheaths on ovary egg sized polyp uterine removed     Lipids Sister      Neurologic Disorder Sister         migraines      Cardiovascular Brother         large 7 cm AAA     Coronary Artery Disease Brother         MI     Asthma Brother      Gastrointestinal Disease Paternal Grandmother          from hepatitis       Social History     Socioeconomic History     Marital status:      Spouse name: Not on file     Number of children: Not on file     Years of education: Not on file     Highest education level: Not on file   Occupational History     Occupation: RETIRED   Social Needs     Financial resource strain: Not on file     Food insecurity     Worry: Not on file     Inability: Not on file     Transportation needs     Medical: Not on file     Non-medical: Not on file   Tobacco Use     Smoking status: Former Smoker     Packs/day: 0.50     Years: 37.00     Pack years: 18.50     Types: Cigarettes     Last attempt to quit: 10/20/2007     Years since quittin.3     Smokeless tobacco: Never Used   Substance and Sexual Activity     Alcohol use: Yes     Comment: minimal     Drug use: No     Sexual activity: Yes     Partners: Male     Comment: menopause   Lifestyle     Physical activity     Days per week: Not on file     Minutes per session: Not on file     Stress: Not on file   Relationships     Social connections     Talks on phone: Not on file     Gets together: Not on file     Attends Yarsanism service: Not on file     Active member of club or organization: Not on file     Attends meetings of clubs or organizations: Not on file     Relationship status: Not on file     Intimate partner violence     Fear of current or ex partner: Not on file     Emotionally abused: Not on file     Physically abused: Not on file     Forced sexual activity: Not on file   Other Topics Concern      Service No     Blood Transfusions No     Caffeine Concern No     Occupational Exposure No     Hobby Hazards No     Sleep Concern Yes     Comment: arthritis     Stress Concern Yes     Weight Concern No     Special Diet Yes     Comment: [place]     Back  "Care Yes     Comment: arthritis     Exercise Yes     Bike Helmet No     Seat Belt Yes     Self-Exams Yes     Parent/sibling w/ CABG, MI or angioplasty before 65F 55M? Yes     Comment: brother MI at age 50   Social History Narrative    Caffeine intake/servings daily - 0    Calcium intake/servings daily - 4 occ. calcium supplement    Exercise 7 times weekly - describe  walking    Sunscreen used - No    Seatbelts used - Yes    Guns stored in the home - No    Self Breast Exam - Yes    Pap test up to date -  Yes    Eye exam up to date -  Yes    Dental exam up to date -  No    DEXA scan up to date -  Yes, last done 5/4/04    Flex Sig/Colonoscopy up to date -  Yes, at age 52 \"normal\"    Mammography up to date - 8/18/06    Immunizations reviewed and up to date - unknown last tetanus shot    Abuse: Current or Past (Physical, Sexual or Emotional) - No    Do you feel safe in your environment - Yes    Do you cope well with stress - Yes    Do you suffer from insomnia - Yes    Last updated by: Mary Beth Ktaz 8/28/06 July 23, 2019            ENVIRONMENTAL HISTORY: The family [place] a[place] year old [place]. The home is heated with a gas furnace. They do have central air conditioning. The patient's bedroom is furnished with Indoor plants, fabric window coverings, carpet in bedroom. No pets . There is no history of cockroach or mice infestation. There are no smokers in the house.  The house does not have a basement.        Outpatient Encounter Medications as of 3/9/2020   Medication Sig Dispense Refill     acetaminophen (ARTHRITIS PAIN RELIEF) 650 MG CR tablet Take 1,300 mg by mouth as needed Takes in pm 100 tablet 11     ASPIRIN 81 MG OR TABS ONE DAILY 100 3     calcium carb 1250 mg, 500 mg [place],/vitamin D 200 units (OSCAL WITH D) 500-200 MG-UNIT per tablet Take 1 tablet by mouth daily.       estrogen conj-medroxyPROGESTERone (PREMPRO) 0.45-1.5 MG tablet TAKE ONE TABLET BY MOUTH THREE TIMES WEEKLY OR AS NEEDED TO " CONTROL HOT FLASHES / MENOPAUSAL SYMPTOMS. 7 tablet 0     ibuprofen 200 MG capsule Take 1,000 mg by mouth 2 times daily        lovastatin (MEVACOR) 40 MG tablet TAKE ONE TABLET BY MOUTH EVERY NIGHT AT BEDTIME 90 tablet 3     Lutein 20 MG CAPS        MAGNESIUM PO Take 250 mg by mouth daily        Multiple Vitamins-Minerals (ICAPS) CAPS Take 1 capsule by mouth daily       order for DME Equipment being ordered: Dynaflex insert 2 Units 0     order for DME Equipment being ordered: Nebulizer 1 Device 0     albuterol (PROAIR HFA/PROVENTIL HFA/VENTOLIN HFA) 108 (90 BASE) MCG/ACT Inhaler Inhale 2 puffs into the lungs every 4 hours as needed for shortness of breath / dyspnea or wheezing (Patient not taking: Reported on 7/23/2019) 1 Inhaler 1     fluticasone (FLONASE) 50 MCG/ACT nasal spray Spray 2 sprays into both nostrils daily (Patient not taking: Reported on 7/29/2019) 16 g 3     No facility-administered encounter medications on file as of 3/9/2020.              Review Of Systems  Skin: As above  Eyes: negative  Ears/Nose/Throat: negative  Respiratory: No shortness of breath, dyspnea on exertion, cough, or hemoptysis  Cardiovascular: negative  Gastrointestinal: negative  Genitourinary: negative  Musculoskeletal: negative  Neurologic: negative  Psychiatric: negative  Hematologic/Lymphatic/Immunologic: negative  Endocrine: negative      O:   NAD, WDWN, Alert & Oriented, Mood & Affect wnl, Vitals stable   Here today alone   /85   Pulse 77   SpO2 98%    General appearance normal   Vitals stable   Alert, oriented and in no acute distress      Following lymph nodes palpated: Occipital, Cervical, Supraclavicular no lad   R groin well healed   L lower leg firm papule      Stuck on papules and brown macules on trunk and ext   Red papules on trunk  Flesh colored papules on trunk     The remainder of the full exam was normal ; the following areas were examined:  conjunctiva/lids, oral mucosa, neck, peripheral vascular system,  abdomen, lymph nodes, digits/nails, eccrine and apocrine glands, scalp/hair, face, neck, chest, abdomen, buttocks, back, RUE, LUE, RLE, LLE       Eyes: Conjunctivae/lids:Normal     ENT: Lips, buccal mucosa, tongue: normal    MSK:Normal    Cardiovascular: peripheral edema none    Pulm: Breathing Normal    Lymph Nodes: No Head and Neck Lymphadenopathy     Neuro/Psych: Orientation:Alert and Orientedx3 ; Mood/Affect:normal       A/P:  1. Seborrheic keratosis, lentigo, angioma, dermal nevus, hx of non-melanoma skin cancer, dermatofibroma   BENIGN LESIONS DISCUSSED WITH PATIENT:  I discussed the specifics of tumor, prognosis, and genetics of benign lesions.  I explained that treatment of these lesions would be purely cosmetic and not medically neccessary.  I discussed with patient different removal options including excision, cautery and /or laser.      Nature and genetics of benign skin lesions dicussed with patient.  Signs and Symptoms of skin cancer discussed with patient.  ABCDEs of melanoma reviewed with patient.  Patient encouraged to perform monthly skin exams.  UV precautions reviewed with patient.  Patient to follow up with Primary Care provider regarding elevated blood pressure.  Skin care regimen reviewed with patient: Eliminate harsh soaps, i.e. Dial, zest, irsih spring; Mild soaps such as Cetaphil or Dove sensitive skin, avoid hot or cold showers, aggressive use of emollients including vanicream, cetaphil or cerave discussed with patient.    Risks of non-melanoma skin cancer discussed with patient   Return to clinic 12 months

## 2020-03-09 NOTE — LETTER
3/9/2020         RE: Tri Ingram  [address]  [facility] [address]        Dear Colleague,    Thank you for referring your patient, Tri Ingram, to the [facility]. Please see a copy of my visit note below.    Tri Ingram is a 70 year old year old female patient here today for hx of non-melanoma skin cancer  She notes spot o left shin.   .  Patient states this has been present for a while.  Patient reports the following symptoms:  none.  Patient reports the following previous treatments none.  These treatments did not work.  Patient reports the following modifying factors none.  Associated symptoms: none.  Patient has no other skin complaints today.  Remainder of the HPI, Meds, PMH, Allergies, FH, and SH was reviewed in chart.      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     Squamous cell carcinoma      Vestibular neuritis        Past Surgical History:   Procedure Laterality Date     C NONSPECIFIC PROCEDURE      Bowen's disease removed X 2     COLONOSCOPY  2001, 2007     COLONOSCOPY N/A 11/3/2017    Procedure: COLONOSCOPY;  Colonoscopy  ;  Surgeon: Lg Guerrero MD;  Location: [facility]        Family History   Problem Relation Age of Onset     Eye Disorder Mother         glaucoma, wet macular degeneration     Lipids Mother      Gynecology Mother         hyst     Melanoma Mother      Skin Cancer Mother      Cancer Father         colon /prostate     Circulatory Father         amputee     Diabetes Father         type II     C.A.D. Father         MIs     Eye Disorder Father         dry macular degeneration      Cardiovascular Father         small AAA     Melanoma Father      Arthritis Sister      Gynecology Sister         partial hyst  fibrous sheaths on ovary egg sized polyp uterine removed     Lipids Sister      Neurologic Disorder Sister         migraines     Cardiovascular Brother         large 7 cm AAA     Coronary Artery Disease Brother         MI     Asthma Brother      Gastrointestinal Disease Paternal Grandmother          from hepatitis       Social History     Socioeconomic History     Marital status:      Spouse name: Not on file     Number of children: Not on file     Years of education: Not on file     Highest education level: Not on file   Occupational History     Occupation: RETIRED   Social Needs     Financial resource strain: Not on file     Food insecurity     Worry: Not on file     Inability: Not on file     Transportation needs     Medical: Not on file     Non-medical: Not on file   Tobacco Use     Smoking status: Former Smoker     Packs/day: 0.50     Years: 37.00     Pack years: 18.50     Types: Cigarettes     Last attempt to quit: 10/20/2007     Years since quittin.3     Smokeless tobacco: Never Used   Substance and Sexual Activity     Alcohol use: Yes     Comment: minimal     Drug use: No     Sexual activity: Yes     Partners: Male     Comment: menopause   Lifestyle     Physical activity     Days per week: Not on file     Minutes per session: Not on file     Stress: Not on file   Relationships     Social connections     Talks on phone: Not on file     Gets together: Not on file     Attends Muslim service: Not on file     Active member of club or organization: Not on file     Attends meetings of clubs or organizations: Not on file     Relationship status: Not on file     Intimate partner violence     Fear of current or ex partner: Not on file     Emotionally abused: Not on file     Physically abused: Not on file     Forced sexual activity: Not on file   Other Topics Concern      " Service No     Blood Transfusions No     Caffeine Concern No     Occupational Exposure No     Hobby Hazards No     Sleep Concern Yes     Comment: arthritis     Stress Concern Yes     Weight Concern No     Special Diet Yes     Comment: [place]     Back Care Yes     Comment: arthritis     Exercise Yes     Bike Helmet No     Seat Belt Yes     Self-Exams Yes     Parent/sibling w/ CABG, MI or angioplasty before 65F 55M? Yes     Comment: brother MI at age 50   Social History Narrative    Caffeine intake/servings daily - 0    Calcium intake/servings daily - 4 occ. calcium supplement    Exercise 7 times weekly - describe  walking    Sunscreen used - No    Seatbelts used - Yes    Guns stored in the home - No    Self Breast Exam - Yes    Pap test up to date -  Yes    Eye exam up to date -  Yes    Dental exam up to date -  No    DEXA scan up to date -  Yes, last done 5/4/04    Flex Sig/Colonoscopy up to date -  Yes, at age 52 \"normal\"    Mammography up to date - 8/18/06    Immunizations reviewed and up to date - unknown last tetanus shot    Abuse: Current or Past (Physical, Sexual or Emotional) - No    Do you feel safe in your environment - Yes    Do you cope well with stress - Yes    Do you suffer from insomnia - Yes    Last updated by: Mary Beth Katz 8/28/06 July 23, 2019            ENVIRONMENTAL HISTORY: The family [place] in a[place] year old [place]. The home is heated with a gas furnace. They do have central air conditioning. The patient's bedroom is furnished with Indoor plants, fabric window coverings, carpet in bedroom. No pets . There is no history of cockroach or mice infestation. There are no smokers in the house.  The house does not have a basement.        Outpatient Encounter Medications as of 3/9/2020   Medication Sig Dispense Refill     acetaminophen (ARTHRITIS PAIN RELIEF) 650 MG CR tablet Take 1,300 mg by mouth as needed Takes in pm 100 tablet 11     ASPIRIN 81 MG OR TABS ONE DAILY 100 3 "     calcium carb 1250 mg, 500 mg [place],/vitamin D 200 units (OSCAL WITH D) 500-200 MG-UNIT per tablet Take 1 tablet by mouth daily.       estrogen conj-medroxyPROGESTERone (PREMPRO) 0.45-1.5 MG tablet TAKE ONE TABLET BY MOUTH THREE TIMES WEEKLY OR AS NEEDED TO CONTROL HOT FLASHES / MENOPAUSAL SYMPTOMS. 7 tablet 0     ibuprofen 200 MG capsule Take 1,000 mg by mouth 2 times daily        lovastatin (MEVACOR) 40 MG tablet TAKE ONE TABLET BY MOUTH EVERY NIGHT AT BEDTIME 90 tablet 3     Lutein 20 MG CAPS        MAGNESIUM PO Take 250 mg by mouth daily        Multiple Vitamins-Minerals (ICAPS) CAPS Take 1 capsule by mouth daily       order for DME Equipment being ordered: Dynaflex insert 2 Units 0     order for DME Equipment being ordered: Nebulizer 1 Device 0     albuterol (PROAIR HFA/PROVENTIL HFA/VENTOLIN HFA) 108 (90 BASE) MCG/ACT Inhaler Inhale 2 puffs into the lungs every 4 hours as needed for shortness of breath / dyspnea or wheezing (Patient not taking: Reported on 7/23/2019) 1 Inhaler 1     fluticasone (FLONASE) 50 MCG/ACT nasal spray Spray 2 sprays into both nostrils daily (Patient not taking: Reported on 7/29/2019) 16 g 3     No facility-administered encounter medications on file as of 3/9/2020.              Review Of Systems  Skin: As above  Eyes: negative  Ears/Nose/Throat: negative  Respiratory: No shortness of breath, dyspnea on exertion, cough, or hemoptysis  Cardiovascular: negative  Gastrointestinal: negative  Genitourinary: negative  Musculoskeletal: negative  Neurologic: negative  Psychiatric: negative  Hematologic/Lymphatic/Immunologic: negative  Endocrine: negative      O:   NAD, WDWN, Alert & Oriented, Mood & Affect wnl, Vitals stable   Here today alone   /85   Pulse 77   SpO2 98%    General appearance normal   Vitals stable   Alert, oriented and in no acute distress      Following lymph nodes palpated: Occipital, Cervical, Supraclavicular no lad   R groin well healed   L lower leg firm papule       Stuck on papules and brown macules on trunk and ext   Red papules on trunk  Flesh colored papules on trunk     The remainder of the full exam was normal ; the following areas were examined:  conjunctiva/lids, oral mucosa, neck, peripheral vascular system, abdomen, lymph nodes, digits/nails, eccrine and apocrine glands, scalp/hair, face, neck, chest, abdomen, buttocks, back, RUE, LUE, RLE, LLE       Eyes: Conjunctivae/lids:Normal     ENT: Lips, buccal mucosa, tongue: normal    MSK:Normal    Cardiovascular: peripheral edema none    Pulm: Breathing Normal    Lymph Nodes: No Head and Neck Lymphadenopathy     Neuro/Psych: Orientation:Alert and Orientedx3 ; Mood/Affect:normal       A/P:  1. Seborrheic keratosis, lentigo, angioma, dermal nevus, hx of non-melanoma skin cancer, dermatofibroma   BENIGN LESIONS DISCUSSED WITH PATIENT:  I discussed the specifics of tumor, prognosis, and genetics of benign lesions.  I explained that treatment of these lesions would be purely cosmetic and not medically neccessary.  I discussed with patient different removal options including excision, cautery and /or laser.      Nature and genetics of benign skin lesions dicussed with patient.  Signs and Symptoms of skin cancer discussed with patient.  ABCDEs of melanoma reviewed with patient.  Patient encouraged to perform monthly skin exams.  UV precautions reviewed with patient.  Patient to follow up with Primary Care provider regarding elevated blood pressure.  Skin care regimen reviewed with patient: Eliminate harsh soaps, i.e. Dial, zest, irsih spring; Mild soaps such as Cetaphil or Dove sensitive skin, avoid hot or cold showers, aggressive use of emollients including vanicream, cetaphil or cerave discussed with patient.    Risks of non-melanoma skin cancer discussed with patient   Return to clinic 12 months      Again, thank you for allowing me to participate in the care of your patient.        Sincerely,        Harris Michel  MD [facility]

## 2020-03-10 ENCOUNTER — OFFICE VISIT (OUTPATIENT)
Dept: FAMILY MEDICINE | Facility: CLINIC | Age: 70
End: 2020-03-10
Payer: COMMERCIAL

## 2020-03-10 VITALS
SYSTOLIC BLOOD PRESSURE: 124 MMHG | TEMPERATURE: 97 F | HEART RATE: 72 BPM | WEIGHT: 204 LBS | HEIGHT: 66 IN | OXYGEN SATURATION: 97 % | DIASTOLIC BLOOD PRESSURE: 70 MMHG | BODY MASS INDEX: 32.78 KG/M2

## 2020-03-10 DIAGNOSIS — R05.9 COUGH: ICD-10-CM

## 2020-03-10 DIAGNOSIS — N95.1 SYMPTOMATIC MENOPAUSAL OR FEMALE CLIMACTERIC STATES: ICD-10-CM

## 2020-03-10 DIAGNOSIS — J06.9 UPPER RESPIRATORY TRACT INFECTION, UNSPECIFIED TYPE: Primary | ICD-10-CM

## 2020-03-10 PROCEDURE — 99214 OFFICE O/P EST MOD 30 MIN: CPT | Performed by: PHYSICIAN ASSISTANT

## 2020-03-10 RX ORDER — BENZONATATE 200 MG/1
200 CAPSULE ORAL 3 TIMES DAILY PRN
Qty: 30 CAPSULE | Refills: 0 | Status: SHIPPED | OUTPATIENT
Start: 2020-03-10 | End: 2021-03-16

## 2020-03-10 ASSESSMENT — MIFFLIN-ST. JEOR: SCORE: 1476.06

## 2020-03-10 NOTE — PROGRESS NOTES
Subjective     Tri Ingram is a 70 year old female who presents to clinic today for the following health issues:    HPI   ED/UC Followup:    Facility:  [facility] in [place]   Date of visit: She can't recall exactly what date   Reason for visit: Cough   Current Status: They didn't hear any pneumonia. She did get an x-ray and it showed fluid in her lungs. The cough is still bothering her. They gave her antibiotics and steroids. She is finished with those. They also told her she had a sinus infection as well on top of everything.        Medication Followup of Prempro 0.45-1.5 mg tablet     Taking Medication as prescribed: yes    Side Effects:  None    Medication Helping Symptoms:  yes     -She said she is needing a new authorization for the Prempro.     -She is requesting some Tessalon pearls for her cough.     -She is going to [place] next week. Worried about traveling since she still has the cough and she is older.     BP Readings from Last 3 Encounters:   03/10/20 124/70   03/09/20 128/85   12/16/19 120/68    Wt Readings from Last 3 Encounters:   03/10/20 92.5 kg (204 lb)   12/16/19 95.3 kg (210 lb)   07/23/19 94 kg (207 lb 3.7 oz)        Was in [place] caring for her grandchildren  Says it was exhausting   Her granddaughter got sick and a few days later she also got sick  Tried to call in here for tessalon but was advised to be seen given the sound of her cough  Went in to clinic - was told it wasn't pneumonia but bronchitis and provider was concerned with sound of her cough so she was put on a medrol dose pack and an antitibiotic she was not familiar with   She notes that she is feeling much better overall - more energy, more rested (although she feels that is because she is no longer caring for all her grandkids)   Still has a cough that is worse at night and in the morning  No SOB or chest tightness  No fevers    Needs a prior auth for prempro  HAd tried the estrogen and progesterone separately but did not  "work   Insurance requesting she try the ring but she states it is impossible for her to place and remove the ring given he arthritis and body habitus             Reviewed and updated as needed this visit by Provider         Review of Systems   Remainder of ROS obtained and found to be negative other than that which was documented above        Objective    /70   Pulse 72   Temp 97  F (36.1  C)   Ht 1.683 m (5' 6.25\")   Wt 92.5 kg (204 lb)   SpO2 97%   BMI 32.68 kg/m    Body mass index is 32.68 kg/m .  Physical Exam   GENERAL: healthy, alert and no distress  EYES: Eyes grossly normal to inspection  HENT: ear canals and TM's normal, nose and mouth without ulcers or lesions  RESP: lungs clear to auscultation - no rales, rhonchi or wheezes  CV: regular rates and rhythm, normal S1 S2, no S3 or S4 and no murmur, click or rub    Diagnostic Test Results:  none         Assessment & Plan     (J06.9) Upper respiratory tract infection, unspecified type  (primary encounter diagnosis)  Comment: overall much improved other than residual cough. Exam unremarkable   Plan: Continue supportive cares (tessalon), rest, fluids. Discussed reasons for which she should be seen again    (R05) Cough  Comment:   Plan: benzonatate (TESSALON) 200 MG capsule            (N95.1) Symptomatic menopausal or female climacteric states  Comment: symptoms noticeably improved on prempro. Has tried and failed other options. Cannot do the ring  Plan: appeal letter completed to be mailed to address on forms          Return in about 1 week (around 3/17/2020) for If not improving or worsening.    Charlene Cain PA-C  [facility]        "

## 2020-03-10 NOTE — TELEPHONE ENCOUNTER
Appeal letter written and placed at [facility] desk to mail to appeal/insurance company    Charlene

## 2020-03-16 ENCOUNTER — TELEPHONE (OUTPATIENT)
Dept: FAMILY MEDICINE | Facility: CLINIC | Age: 70
End: 2020-03-16

## 2020-03-16 NOTE — TELEPHONE ENCOUNTER
Reason for Call:  Update    Detailed comments: Patient was told to call today to give an update.  Cancelled her trip to [place], still using pearls 2xday along with inhaler.  Still coughing but is feeling a little better. Also stating she was supposed to get a PT referral.  Asking if we received her xray disk she dropped off last week?    Phone Number Patient can be reached at: Home number on file [phone] (home)    Best Time: Any    Can we leave a detailed message on this number? YES    Call taken on 3/16/2020 at 9:19 AM by Louise Fernandes

## 2020-03-17 NOTE — TELEPHONE ENCOUNTER
"Yes - I did get the disc and looked it over. I agree with the \"congestion\" consistent with a viral process - no sign if infiltrate or focal area of consolidation and no edema or fluid at the bases of the lungs.   We are unable to hold on to the discs or get them in to our system so I will keep it and if you would like to grab it when you are in clinic next you can hold on to it for your own records.     I apologize for the PT referral - remind me where it is easiest for you to go to? [place]?     Sorry about [place] but given the ever changing environment right now, I think that is safest for you and others.     Charlene  "

## 2020-05-29 ENCOUNTER — HOSPITAL ENCOUNTER (OUTPATIENT)
Dept: MAMMOGRAPHY | Facility: CLINIC | Age: 70
Discharge: HOME | End: 2020-05-29
Attending: PHYSICIAN ASSISTANT | Admitting: PHYSICIAN ASSISTANT
Payer: COMMERCIAL

## 2020-05-29 DIAGNOSIS — Z12.31 VISIT FOR SCREENING MAMMOGRAM: ICD-10-CM

## 2020-05-29 PROCEDURE — 77067 SCR MAMMO BI INCL CAD: CPT

## 2020-06-24 ENCOUNTER — TELEPHONE (OUTPATIENT)
Dept: FAMILY MEDICINE | Facility: CLINIC | Age: 70
End: 2020-06-24

## 2020-06-24 DIAGNOSIS — G89.29 CHRONIC BILATERAL LOW BACK PAIN WITHOUT SCIATICA: Primary | ICD-10-CM

## 2020-06-24 DIAGNOSIS — M54.50 CHRONIC BILATERAL LOW BACK PAIN WITHOUT SCIATICA: Primary | ICD-10-CM

## 2020-06-24 NOTE — TELEPHONE ENCOUNTER
Reason for Call: Request for an order or referral:    Order or referral being requested: Wendy is calling about PT order.  States she still hasn't been able to schedule it or they have not called her.  PT in [place] is open and seeing patients (I called them).  Could you please review encounter from 3/16/20 and place order. She is also wondering if further x-rays are needed of her lumbar spine.   Thank you..Maureen Valdivia    Date needed: at your convenience    Has the patient been seen by the PCP for this problem? YES    Phone number Patient can be reached at:  Home number on file [phone] (home)    Best Time:  Any time    Can we leave a detailed message on this number?  YES    Call taken on 6/24/2020 at 1:39 PM by Maureen Valdivia

## 2020-06-24 NOTE — TELEPHONE ENCOUNTER
The only physical therapy order was from 4/2019 and this was for dizziness. There was nothing in the 3/20 visit about anything physical therapy related. Please have her schedule an virtual visit as she is addressing something new. .then Charlene can assess whether or not she needs imaging. .Preethi Ibarra M.D.

## 2020-06-26 NOTE — TELEPHONE ENCOUNTER
I just want to make sure I tell her the current thing.  On telephone encounter dated 3/10/20,  it states that you  needed to know which PT location she wanted  to go to -  I believe it's in reference to that, but I'm not sure.  ..Maureen Valdivia

## 2020-06-26 NOTE — TELEPHONE ENCOUNTER
I guess I am also unclear as to what this is for? The last referral was 9/2019 in a telephone encounter - she wanted to go to the [facility] - is this what she needs?   And I don't see a conversation about her lumbar spine so am not sure what she is referring to regarding imaging. She may need a visit to discuss this since I don't see a discussion about it on 3/2020 and that was the most recent visit. Virtual is okjonnathan Chang

## 2020-06-26 NOTE — TELEPHONE ENCOUNTER
Sorry - I just saw the telephone back and forth now    Referral to physical therapy in [place] placed.   Let's start with them and then we can discuss imaging depending on how therapy is going    Charlene

## 2020-07-09 ENCOUNTER — HOSPITAL ENCOUNTER (OUTPATIENT)
Dept: PHYSICAL THERAPY | Facility: CLINIC | Age: 70
Setting detail: THERAPIES SERIES
End: 2020-07-09
Attending: PHYSICIAN ASSISTANT
Payer: COMMERCIAL

## 2020-07-09 DIAGNOSIS — G89.29 CHRONIC BILATERAL LOW BACK PAIN WITHOUT SCIATICA: ICD-10-CM

## 2020-07-09 DIAGNOSIS — M54.50 CHRONIC BILATERAL LOW BACK PAIN WITHOUT SCIATICA: ICD-10-CM

## 2020-07-09 NOTE — PROGRESS NOTES
"   07/09/20 0800   General Information   Type of Visit Initial OP Ortho PT Evaluation   Start of Care Date 07/09/20   Referring Physician Dr Cain   Patient/Family Goals Statement decrease LBP   Orders Evaluate and Treat   Date of Order 06/25/20   Certification Required? Yes   Medical Diagnosis LBP chronic   Surgical/Medical history reviewed Yes   Precautions/Limitations no known precautions/limitations   Body Part(s)   Body Part(s) Lumbar Spine/SI   Presentation and Etiology   Pertinent history of current problem (include personal factors and/or comorbidities that impact the POC) hx of LBP for years.  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     Impairments A. Pain;B. Decreased WB tolerance;D. Decreased ROM;E. Decreased flexibility;H. Impaired gait;G. Impaired balance   Functional Limitations perform activities of daily living;perform desired leisure / sports activities   Symptom Location whole spine   How/Where did it occur From insidious onset;With repetition/overuse;From Degenerative Joint Disease   Onset date of current episode/exacerbation 02/09/20   Chronicity Chronic   Pain rating (0-10 point scale) Best (/10);Worst (/10)   Best (/10) 2   Worst (/10) 8   Pain quality A. Sharp;D. Burning;B. Dull;C. Aching   Frequency of pain/symptoms A. Constant   Pain/symptoms are: The same all the time   Pain/symptoms exacerbated by A. Sitting;B. Walking;C. Lifting;D. Carrying;I. Bending   Pain/symptoms eased by C. Rest;G. Heat;H. Cold   Progression of symptoms since onset: Improved   Current / Previous Interventions   Diagnostic Tests: X-ray   Fall Risk Screen   Have you fallen 2 or more times in the past year? No   Have you fallen and had an injury in the past year? No   Is patient a fall risk? No  (but we will focus on balance)   Abuse Screen (yes response referral indicated)   Feels Unsafe at Home or Work/School no   Feels Threatened by Someone no   Does Anyone Try to Keep You From Having Contact with Others or Doing Things " "Outside Your Home? no   Lumbar Spine/SI Objective Findings   Posture forward rounded shoulders and head, dowagers hump, swayback   Gait/Locomotion 3\" wide DARRIUS with gait   Flexion ROM floor   Extension ROM pressure to pain lumbar, mod stiff   Right Side Bending ROM pain tension    Left Side Bending ROM pain tension   Hip Screen flexion to 110L 100R   Hip Flexion (L2) Strength 4   Hip Abduction Strength 4   Hip Extension Strength 4   Knee Extension (L3) Strength 5   Ankle Dorsiflexion (L4) Strength 5   Great Toe Extension (L5) Strength 5   Hamstring Flexibility 80 bilat   SLR -   Andrea Test + bilat, 7 deg    Crossover SLR -   Slump Test -   Spring Test -   Lumbar/SI Special Tests Comments supine pec 6cm   Planned Therapy Interventions   Planned Therapy Interventions ROM;strengthening;stretching;manual therapy;joint mobilization;balance training   Clinical Impression   Criteria for Skilled Therapeutic Interventions Met yes, treatment indicated   PT Diagnosis back pain, AS   Clinical Presentation Evolving/Changing   Clinical Presentation Rationale cancer, arthritis, OP, smoker, chronic pain, autoimmune   Clinical Decision Making (Complexity) Moderate complexity   Therapy Frequency 1 time/week   Predicted Duration of Therapy Intervention (days/wks) 8wk   Risk & Benefits of therapy have been explained Yes   Patient, Family & other staff in agreement with plan of care Yes   Education Assessment   Preferred Learning Style Demonstration   Barriers to Learning No barriers   ORTHO GOALS   PT Ortho Eval Goals 1;2;3   Ortho Goal 1   Goal Identifier 1   Goal Description pt will be able to stand 30 minutes without needing to sit   Target Date 08/09/20   Ortho Goal 2   Goal Identifier 2   Goal Description pt will be able to sit 1 hour without needing to stand   Target Date 08/20/20   Ortho Goal 3   Goal Identifier 3   Goal Description  pt will be able to lift granchild   Target Date 09/09/20   Total Evaluation Time   PT Eval, Low " Complexity Minutes (46815) 40   Therapy Certification   Certification date from 07/09/20   Certification date to 09/09/20   Medical Diagnosis LBP, AS

## 2020-07-15 ENCOUNTER — TELEPHONE (OUTPATIENT)
Dept: DERMATOLOGY | Facility: CLINIC | Age: 70
End: 2020-07-15

## 2020-07-15 ENCOUNTER — OFFICE VISIT (OUTPATIENT)
Dept: DERMATOLOGY | Facility: CLINIC | Age: 70
End: 2020-07-15
Payer: COMMERCIAL

## 2020-07-15 VITALS — HEART RATE: 75 BPM | SYSTOLIC BLOOD PRESSURE: 129 MMHG | DIASTOLIC BLOOD PRESSURE: 78 MMHG

## 2020-07-15 DIAGNOSIS — D18.01 ANGIOMA OF SKIN: ICD-10-CM

## 2020-07-15 DIAGNOSIS — L81.4 LENTIGO: ICD-10-CM

## 2020-07-15 DIAGNOSIS — C44.529 SQUAMOUS CELL CARCINOMA OF SKIN OF CHEST: ICD-10-CM

## 2020-07-15 DIAGNOSIS — L82.1 SEBORRHEIC KERATOSIS: ICD-10-CM

## 2020-07-15 DIAGNOSIS — Z85.828 HISTORY OF SKIN CANCER: Primary | ICD-10-CM

## 2020-07-15 PROCEDURE — 99214 OFFICE O/P EST MOD 30 MIN: CPT | Mod: 25 | Performed by: DERMATOLOGY

## 2020-07-15 PROCEDURE — 11102 TANGNTL BX SKIN SINGLE LES: CPT | Performed by: DERMATOLOGY

## 2020-07-15 PROCEDURE — 88331 PATH CONSLTJ SURG 1 BLK 1SPC: CPT | Performed by: DERMATOLOGY

## 2020-07-15 NOTE — TELEPHONE ENCOUNTER
----- Message from Harris Good MD sent at 7/15/2020  1:52 PM CDT -----  Mid chest squamous cell carcinoma schedule excision

## 2020-07-15 NOTE — PROGRESS NOTES
Tri Ingram is a 70 year old year old female patient here today for tender spot on chest.   .  Patient states this has been present for a while.  Patient reports the following symptoms:  tender.  Patient reports the following previous treatments none.  These treatments did not work.  Patient reports the following modifying factors none.  Associated symptoms: caught on clothing.  .  Patient has no other skin complaints today.  Remainder of the HPI, Meds, PMH, Allergies, FH, and SH was reviewed in chart.      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     Squamous cell carcinoma      Vestibular neuritis        Past Surgical History:   Procedure Laterality Date     C NONSPECIFIC PROCEDURE      Bowen's disease removed X 2     COLONOSCOPY  2001, 2007     COLONOSCOPY N/A 11/3/2017    Procedure: COLONOSCOPY;  Colonoscopy  ;  Surgeon: Lg Guerrero MD;  Location: [facility]        Family History   Problem Relation Age of Onset     Eye Disorder Mother         glaucoma, wet macular degeneration     Lipids Mother      Gynecology Mother         hyst     Melanoma Mother      Skin Cancer Mother      Cancer Father         colon /prostate     Circulatory Father         amputee     Diabetes Father         type II     C.A.D. Father         MIs     Eye Disorder Father         dry macular degeneration     Cardiovascular Father         small AAA     Melanoma Father      Arthritis Sister      Gynecology Sister         partial hyst  fibrous sheaths on ovary egg sized polyp uterine removed     Lipids Sister      Neurologic Disorder Sister         migraines     Cardiovascular  Brother         large 7 cm AAA     Coronary Artery Disease Brother         MI     Asthma Brother      Gastrointestinal Disease Paternal Grandmother          from hepatitis       Social History     Socioeconomic History     Marital status:      Spouse name: Not on file     Number of children: Not on file     Years of education: Not on file     Highest education level: Not on file   Occupational History     Occupation: RETIRED   Social Needs     Financial resource strain: Not on file     Food insecurity     Worry: Not on file     Inability: Not on file     Transportation needs     Medical: Not on file     Non-medical: Not on file   Tobacco Use     Smoking status: Former Smoker     Packs/day: 0.50     Years: 37.00     Pack years: 18.50     Types: Cigarettes     Last attempt to quit: 10/20/2007     Years since quittin.7     Smokeless tobacco: Never Used   Substance and Sexual Activity     Alcohol use: Yes     Comment: minimal     Drug use: No     Sexual activity: Yes     Partners: Male     Comment: menopause   Lifestyle     Physical activity     Days per week: Not on file     Minutes per session: Not on file     Stress: Not on file   Relationships     Social connections     Talks on phone: Not on file     Gets together: Not on file     Attends Ep[employer] service: Not on file     Active member of club or organization: Not on file     Attends meetings of clubs or organizations: Not on file     Relationship status: Not on file     Intimate partner violence     Fear of current or ex partner: Not on file     Emotionally abused: Not on file     Physically abused: Not on file     Forced sexual activity: Not on file   Other Topics Concern      Service No     Blood Transfusions No     Caffeine Concern No     Occupational Exposure No     Hobby Hazards No     Sleep Concern Yes     Comment: arthritis     Stress Concern Yes     Weight Concern No     Special Diet Yes     Comment: [place]ier     Back Care Yes      "Comment: arthritis     Exercise Yes     Bike Helmet No     Seat Belt Yes     Self-Exams Yes     Parent/sibling w/ CABG, MI or angioplasty before 65F 55M? Yes     Comment: brother MI at age 50   Social History Narrative    Caffeine intake/servings daily - 0    Calcium intake/servings daily - 4 occ. calcium supplement    Exercise 7 times weekly - describe  walking    Sunscreen used - No    Seatbelts used - Yes    Guns stored in the home - No    Self Breast Exam - Yes    Pap test up to date -  Yes    Eye exam up to date -  Yes    Dental exam up to date -  No    DEXA scan up to date -  Yes, last done 5/4/04    Flex Sig/Colonoscopy up to date -  Yes, at age 52 \"normal\"    Mammography up to date - 8/18/06    Immunizations reviewed and up to date - unknown last tetanus shot    Abuse: Current or Past (Physical, Sexual or Emotional) - No    Do you feel safe in your environment - Yes    Do you cope well with stress - Yes    Do you suffer from insomnia - Yes    Last updated by: Mary Beth Katz 8/28/06 July 23, 2019            ENVIRONMENTAL HISTORY: The [place] a[place] year [place] The home is heated with a gas furnace. They do have central air conditioning. The patient's bedroom is furnished with Indoor plants, fabric window coverings, carpet in bedroom. No pets . There is no history of cockroach or mice infestation. There are no smokers in the house.  The house does not have a basement.        Outpatient Encounter Medications as of 7/15/2020   Medication Sig Dispense Refill     acetaminophen (ARTHRITIS PAIN RELIEF) 650 MG CR tablet Take 1,300 mg by mouth as needed Takes in pm 100 tablet 11     albuterol (PROAIR HFA/PROVENTIL HFA/VENTOLIN HFA) 108 (90 BASE) MCG/ACT Inhaler Inhale 2 puffs into the lungs every 4 hours as needed for shortness of breath / dyspnea or wheezing (Patient not taking: Reported on 7/23/2019) 1 Inhaler 1     ASPIRIN 81 MG OR TABS ONE DAILY 100 3     benzonatate (TESSALON) 200 " MG capsule Take 1 capsule (200 mg) by mouth 3 times daily as needed for cough 30 capsule 0     calcium carb 1250 mg, 500 mg [place],/vitamin D 200 units (OSCAL WITH D) 500-200 MG-UNIT per tablet Take 1 tablet by mouth daily.       estrogen conj-medroxyPROGESTERone (PREMPRO) 0.45-1.5 MG tablet TAKE ONE TABLET BY MOUTH THREE TIMES WEEKLY OR AS NEEDED TO CONTROL HOT FLASHES / MENOPAUSAL SYMPTOMS. 7 tablet 0     fluticasone (FLONASE) 50 MCG/ACT nasal spray Spray 2 sprays into both nostrils daily (Patient not taking: Reported on 7/29/2019) 16 g 3     ibuprofen 200 MG capsule Take 1,000 mg by mouth 2 times daily        lovastatin (MEVACOR) 40 MG tablet TAKE ONE TABLET BY MOUTH EVERY NIGHT AT BEDTIME 90 tablet 3     Lutein 20 MG CAPS        MAGNESIUM PO Take 250 mg by mouth daily        Multiple Vitamins-Minerals (ICAPS) CAPS Take 1 capsule by mouth daily       order for DME Equipment being ordered: Dynaflex insert (Patient not taking: Reported on 3/10/2020) 2 Units 0     order for DME Equipment being ordered: Nebulizer (Patient not taking: Reported on 3/10/2020) 1 Device 0     No facility-administered encounter medications on file as of 7/15/2020.              Review Of Systems  Skin: As above  Eyes: negative  Ears/Nose/Throat: negative  Respiratory: No shortness of breath, dyspnea on exertion, cough, or hemoptysis  Cardiovascular: negative  Gastrointestinal: negative  Genitourinary: negative  Musculoskeletal: negative  Neurologic: negative  Psychiatric: negative  Hematologic/Lymphatic/Immunologic: negative  Endocrine: negative      O:   NAD, WDWN, Alert & Oriented, Mood & Affect wnl, Vitals stable   Here today alone   There were no vitals taken for this visit.   General appearance normal   Vitals stable   Alert, oriented and in no acute distress     Mid chest 6mm indurated scaly papule      Stuck on papules and brown macules on trunk and ext   Red papules on trunk     The remainder of expanded problem focused exam was  normal; the following areas were examined:  , conjunctiva/lids, face, neck, lips, chest, digits/nails, RUE, LUE.      Eyes: Conjunctivae/lids:Normal     ENT: Lips, buccal mucosa, tongue: normal    MSK:Normal    Cardiovascular: peripheral edema none    Pulm: Breathing Normal    Neuro/Psych: Orientation:Alert and Orientedx3 ; Mood/Affect:normal       MICRO:   Mid chest:There is a proliferation of irregular nests of abnormal squamous cells arising from the epidermis and invading the dermis. These are well differentiated. The dermis shows a variable superficial perivascular inflammatory infiltrate.   A/P:  1. Seborrheic keratosis, lentigo, angioma, hx of non-melanoma skin cancer   2. Mid chest r/o squamous cell carcinoma v inflamed seborrheic keratosis   TANGENTIAL BIOPSY IN HOUSE:  After consent, anesthesia with LEC and prep, tangential excision performed and dx above confirmed with frozen section histology.  No complications and routine wound care.      I have personally reviewed all specimens and/or slides and used them with my medical judgement to determine or confirm the final diagnosis.     Patient told result squamous cell carcinoma schedule excision .      BENIGN LESIONS DISCUSSED WITH PATIENT:  I discussed the specifics of tumor, prognosis, and genetics of benign lesions.  I explained that treatment of these lesions would be purely cosmetic and not medically neccessary.  I discussed with patient different removal options including excision, cautery and /or laser.      Nature and genetics of benign skin lesions dicussed with patient.  Signs and Symptoms of skin cancer discussed with patient.  Patient encouraged to perform monthly skin exams.  UV precautions reviewed with patient.  Skin care regimen reviewed with patient: Eliminate harsh soaps, i.e. Dial, zest, irsih spring; Mild soaps such as Cetaphil or Dove sensitive skin, avoid hot or cold showers, aggressive use of emollients including vanicream, cetaphil or  cerave discussed with patient.    Risks of non-melanoma skin cancer discussed with patient   Return to clinic next appt

## 2020-07-15 NOTE — LETTER
7/15/2020         RE: Tri Ingram  [address]  [facility] [address]        Dear Colleague,    Thank you for referring your patient, Tri Ingram, to the [facility]. Please see a copy of my visit note below.    Tri Ingram is a 70 year old year old female patient here today for tender spot on chest.   .  Patient states this has been present for a while.  Patient reports the following symptoms:  tender.  Patient reports the following previous treatments none.  These treatments did not work.  Patient reports the following modifying factors none.  Associated symptoms: caught on clothing.  .  Patient has no other skin complaints today.  Remainder of the HPI, Meds, PMH, Allergies, FH, and SH was reviewed in chart.      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     Squamous cell carcinoma      Vestibular neuritis        Past Surgical History:   Procedure Laterality Date     C NONSPECIFIC PROCEDURE      Bowen's disease removed X 2     COLONOSCOPY  2001, 2007     COLONOSCOPY N/A 11/3/2017    Procedure: COLONOSCOPY;  Colonoscopy  ;  Surgeon: Lg Guerrero MD;  Location: [facility]        Family History   Problem Relation Age of Onset     Eye Disorder Mother         glaucoma, wet macular degeneration     Lipids Mother      Gynecology Mother         hyst     Melanoma Mother      Skin Cancer Mother      Cancer Father         colon /prostate     Circulatory Father         amputee     Diabetes Father         type II     C.A.D. Father         MIs     Eye Disorder Father         dry macular degeneration     Cardiovascular  Father         small AAA     Melanoma Father      Arthritis Sister      Gynecology Sister         partial hyst  fibrous sheaths on ovary egg sized polyp uterine removed     Lipids Sister      Neurologic Disorder Sister         migraines     Cardiovascular Brother         large 7 cm AAA     Coronary Artery Disease Brother         MI     Asthma Brother      Gastrointestinal Disease Paternal Grandmother          from hepatitis       Social History     Socioeconomic History     Marital status:      Spouse name: Not on file     Number of children: Not on file     Years of education: Not on file     Highest education level: Not on file   Occupational History     Occupation: RETIRED   Social Needs     Financial resource strain: Not on file     Food insecurity     Worry: Not on file     Inability: Not on file     Transportation needs     Medical: Not on file     Non-medical: Not on file   Tobacco Use     Smoking status: Former Smoker     Packs/day: 0.50     Years: 37.00     Pack years: 18.50     Types: Cigarettes     Last attempt to quit: 10/20/2007     Years since quittin.7     Smokeless tobacco: Never Used   Substance and Sexual Activity     Alcohol use: Yes     Comment: minimal     Drug use: No     Sexual activity: Yes     Partners: Male     Comment: menopause   Lifestyle     Physical activity     Days per week: Not on file     Minutes per session: Not on file     Stress: Not on file   Relationships     Social connections     Talks on phone: Not on file     Gets together: Not on file     Attends Yazidism service: Not on file     Active member of club or organization: Not on file     Attends meetings of clubs or organizations: Not on file     Relationship status: Not on file     Intimate partner violence     Fear of current or ex partner: Not on file     Emotionally abused: Not on file     Physically abused: Not on file     Forced sexual activity: Not on file   Other Topics Concern      Service No  "    Blood Transfusions No     Caffeine Concern No     Occupational Exposure No     Hobby Hazards No     Sleep Concern Yes     Comment: arthritis     Stress Concern Yes     Weight Concern No     Special Diet Yes     Comment: [place]     Back Care Yes     Comment: arthritis     Exercise Yes     Bike Helmet No     Seat Belt Yes     Self-Exams Yes     Parent/sibling w/ CABG, MI or angioplasty before 65F 55M? Yes     Comment: brother MI at age 50   Social History Narrative    Caffeine intake/servings daily - 0    Calcium intake/servings daily - 4 occ. calcium supplement    Exercise 7 times weekly - describe  walking    Sunscreen used - No    Seatbelts used - Yes    Guns stored in the home - No    Self Breast Exam - Yes    Pap test up to date -  Yes    Eye exam up to date -  Yes    Dental exam up to date -  No    DEXA scan up to date -  Yes, last done 5/4/04    Flex Sig/Colonoscopy up to date -  Yes, at age 52 \"normal\"    Mammography up to date - 8/18/06    Immunizations reviewed and up to date - unknown last tetanus shot    Abuse: Current or Past (Physical, Sexual or Emotional) - No    Do you feel safe in your environment - Yes    Do you cope well with stress - Yes    Do you suffer from insomnia - Yes    Last updated by: Mary Beth Katz 8/28/06 July 23, 2019            ENVIRONMENTAL HISTORY: The family [place] a[place] year [place] The home is heated with a gas furnace. They do have central air conditioning. The patient's bedroom is furnished with Indoor plants, fabric window coverings, carpet in bedroom. No pets . There is no history of cockroach or mice infestation. There are no smokers in the house.  The house does not have a basement.        Outpatient Encounter Medications as of 7/15/2020   Medication Sig Dispense Refill     acetaminophen (ARTHRITIS PAIN RELIEF) 650 MG CR tablet Take 1,300 mg by mouth as needed Takes in pm 100 tablet 11     albuterol (PROAIR HFA/PROVENTIL HFA/VENTOLIN HFA) 108 " (90 BASE) MCG/ACT Inhaler Inhale 2 puffs into the lungs every 4 hours as needed for shortness of breath / dyspnea or wheezing (Patient not taking: Reported on 7/23/2019) 1 Inhaler 1     ASPIRIN 81 MG OR TABS ONE DAILY 100 3     benzonatate (TESSALON) 200 MG capsule Take 1 capsule (200 mg) by mouth 3 times daily as needed for cough 30 capsule 0     calcium carb 1250 mg, 500 mg [place],/vitamin D 200 units (OSCAL WITH D) 500-200 MG-UNIT per tablet Take 1 tablet by mouth daily.       estrogen conj-medroxyPROGESTERone (PREMPRO) 0.45-1.5 MG tablet TAKE ONE TABLET BY MOUTH THREE TIMES WEEKLY OR AS NEEDED TO CONTROL HOT FLASHES / MENOPAUSAL SYMPTOMS. 7 tablet 0     fluticasone (FLONASE) 50 MCG/ACT nasal spray Spray 2 sprays into both nostrils daily (Patient not taking: Reported on 7/29/2019) 16 g 3     ibuprofen 200 MG capsule Take 1,000 mg by mouth 2 times daily        lovastatin (MEVACOR) 40 MG tablet TAKE ONE TABLET BY MOUTH EVERY NIGHT AT BEDTIME 90 tablet 3     Lutein 20 MG CAPS        MAGNESIUM PO Take 250 mg by mouth daily        Multiple Vitamins-Minerals (ICAPS) CAPS Take 1 capsule by mouth daily       order for DME Equipment being ordered: Dynaflex insert (Patient not taking: Reported on 3/10/2020) 2 Units 0     order for DME Equipment being ordered: Nebulizer (Patient not taking: Reported on 3/10/2020) 1 Device 0     No facility-administered encounter medications on file as of 7/15/2020.              Review Of Systems  Skin: As above  Eyes: negative  Ears/Nose/Throat: negative  Respiratory: No shortness of breath, dyspnea on exertion, cough, or hemoptysis  Cardiovascular: negative  Gastrointestinal: negative  Genitourinary: negative  Musculoskeletal: negative  Neurologic: negative  Psychiatric: negative  Hematologic/Lymphatic/Immunologic: negative  Endocrine: negative      O:   NAD, WDWN, Alert & Oriented, Mood & Affect wnl, Vitals stable   Here today alone   There were no vitals taken for this visit.   General  appearance normal   Vitals stable   Alert, oriented and in no acute distress     Mid chest 6mm indurated scaly papule      Stuck on papules and brown macules on trunk and ext   Red papules on trunk     The remainder of expanded problem focused exam was normal; the following areas were examined:  , conjunctiva/lids, face, neck, lips, chest, digits/nails, RUE, LUE.      Eyes: Conjunctivae/lids:Normal     ENT: Lips, buccal mucosa, tongue: normal    MSK:Normal    Cardiovascular: peripheral edema none    Pulm: Breathing Normal    Neuro/Psych: Orientation:Alert and Orientedx3 ; Mood/Affect:normal       MICRO:   Mid chest:There is a proliferation of irregular nests of abnormal squamous cells arising from the epidermis and invading the dermis. These are well differentiated. The dermis shows a variable superficial perivascular inflammatory infiltrate.   A/P:  1. Seborrheic keratosis, lentigo, angioma, hx of non-melanoma skin cancer   2. Mid chest r/o squamous cell carcinoma v inflamed seborrheic keratosis   TANGENTIAL BIOPSY IN HOUSE:  After consent, anesthesia with LEC and prep, tangential excision performed and dx above confirmed with frozen section histology.  No complications and routine wound care.      I have personally reviewed all specimens and/or slides and used them with my medical judgement to determine or confirm the final diagnosis.     Patient told result squamous cell carcinoma schedule excision .      BENIGN LESIONS DISCUSSED WITH PATIENT:  I discussed the specifics of tumor, prognosis, and genetics of benign lesions.  I explained that treatment of these lesions would be purely cosmetic and not medically neccessary.  I discussed with patient different removal options including excision, cautery and /or laser.      Nature and genetics of benign skin lesions dicussed with patient.  Signs and Symptoms of skin cancer discussed with patient.  Patient encouraged to perform monthly skin exams.  UV precautions  reviewed with patient.  Skin care regimen reviewed with patient: Eliminate harsh soaps, i.e. Dial, zest, irsih spring; Mild soaps such as Cetaphil or Dove sensitive skin, avoid hot or cold showers, aggressive use of emollients including vanicream, cetaphil or cerave discussed with patient.    Risks of non-melanoma skin cancer discussed with patient   Return to clinic next appt      Again, thank you for allowing me to participate in the care of your patient.        Sincerely,        Harris Good MD

## 2020-07-15 NOTE — PATIENT INSTRUCTIONS
Wound Care Instructions     FOR SUPERFICIAL WOUNDS     [facility] [phone]    [facility] [phone]                       AFTER 24 HOURS YOU SHOULD REMOVE THE BANDAGE AND BEGIN DAILY DRESSING CHANGES AS FOLLOWS:     1) Remove Dressing.     2) Clean and dry the area with tap water using a Q-tip or sterile gauze pad.     3) Apply Vaseline, Aquaphor, Polysporin ointment or Bacitracin ointment over entire wound.  Do NOT use Neosporin ointment.     4) Cover the wound with a band-aid, or a sterile non-stick gauze pad and micropore paper tape      REPEAT THESE INSTRUCTIONS AT LEAST ONCE A DAY UNTIL THE WOUND HAS COMPLETELY HEALED.    It is an old wives tale that a wound heals better when it is exposed to air and allowed to dry out. The wound will heal faster with a better cosmetic result if it is kept moist with ointment and covered with a bandage.    **Do not let the wound dry out.**      Supplies Needed:      *Cotton tipped applicators (Q-tips)    *Polysporin Ointment or Bacitracin Ointment (NOT NEOSPORIN)    *Band-aids or non-stick gauze pads and micropore paper tape.      PATIENT INFORMATION:    During the healing process you will notice a number of changes. All wounds develop a small halo of redness surrounding the wound.  This means healing is occurring. Severe itching with extensive redness usually indicates sensitivity to the ointment or bandage tape used to dress the wound.  You should call our office if this develops.      Swelling  and/or discoloration around your surgical site is common, particularly when performed around the eye.    All wounds normally drain.  The larger the wound the more drainage there will be.  After 7-10 days, you will notice the wound beginning to shrink and new skin will begin to grow.  The wound is healed when you can see skin has formed over the entire area.  A healed wound has a healthy, shiny look to the surface and is red to dark pink in color  to normalize.  Wounds may take approximately 4-6 weeks to heal.  Larger wounds may take 6-8 weeks.  After the wound is healed you may discontinue dressing changes.    You may experience a sensation of tightness as your wound heals. This is normal and will gradually subside.    Your healed wound may be sensitive to temperature changes. This sensitivity improves with time, but if you re having a lot of discomfort, try to avoid temperature extremes.    Patients frequently experience itching after their wound appears to have healed because of the continue healing under the skin.  Plain Vaseline will help relieve the itching.        POSSIBLE COMPLICATIONS    BLEEDIN. Leave the bandage in place.  2. Use tightly rolled up gauze or a cloth to apply direct pressure over the bandage for 30  minutes.  3. Reapply pressure for an additional 30 minutes if necessary  4. Use additional gauze and tape to maintain pressure once the bleeding has stopped.

## 2020-07-15 NOTE — LETTER
[facility]  [address] [facility] [address]  Phone: [phone]    July 21, 2020        Tri Ingram  [address]  [facility] [address]          Dear Wendy:  You are scheduled for Mohs Surgery on  Monday 7/27/2020 At 7:30am.    Please check in at Dermatology Clinic.   (2nd Floor, last  Clinic on right up staircase or elevator -past OB/GYN clinic)    You don't need to arrive more than 5-10 minutes prior to your appointment time.     Be sure to eat a good breakfast and bathe and wash your hair prior to Surgery.    If you are taking any anti-coagulants that are prescribed by your Doctor (such as Coumadin/warfarin, Plavix, Aspirin, Ibuprofen), please continue taking them.     However, If you are taking anti-coagulants over the counter without  a Doctor's order for a Medical condition, please discontinue them 10 days prior to Procedure.      Please wear loose comfortable clothing as it could possibly be 4-6 hours until your surgery is completed depending upon how many layers of tissue need to be removed.     Wi-fi access is available.         Thank you,      Harris Good MD  [phone]

## 2020-07-15 NOTE — LETTER
[facility]  [address]  [facility] [address]  Phone: [phone]    July 20, 2020    Tri Ingram                                                                                                       [address]  [facility] [address]            Dear Ms. Ingram,    You are scheduled for Mohs Surgery on     Please check in at Dermatology Clinic.   (2nd Floor, last  Clinic on right up staircase or elevator -past OB/GYN clinic)    You don't need to arrive more than 5-10 minutes prior to your appointment time.     Be sure to eat a good breakfast and bathe and wash your hair prior to Surgery.    If you are taking any anti-coagulants that are prescribed by your Doctor (such as Coumadin/warfarin, Plavix, Aspirin, Ibuprofen), please continue taking them.     However, If you are taking anti-coagulants over the counter without  a Doctor's order for a Medical condition, please discontinue them 10 days prior to Surgery.      Please wear loose comfortable clothing as it could possibly be 4-6 hours until your surgery is completed depending upon how many layers of tissue need to be removed.     Wi-fi access is available.     Thank you,      Harris Good MD/ Karen Grace RN

## 2020-07-20 NOTE — TELEPHONE ENCOUNTER
Message left to return call.     Needs one Mohs surgery appt. Pre op letter drafted.     Karen Grace RN

## 2020-07-22 ENCOUNTER — TELEPHONE (OUTPATIENT)
Dept: DERMATOLOGY | Facility: CLINIC | Age: 70
End: 2020-07-22

## 2020-07-22 NOTE — TELEPHONE ENCOUNTER
Reason for Call:  Other call back    Detailed comments: pt calling stating she noticed a large dark spot on the side of her tongue. It is not raised and not sure how long she has had it. Would this be something Dr. Good can look or who is recommended?    Phone Number Patient can be reached at: Home number on file [phone] (home)    Best Time: any    Can we leave a detailed message on this number? YES    Call taken on 7/22/2020 at 10:54 AM by Gela Perry

## 2020-07-24 NOTE — PROGRESS NOTES
Surgical Office Location :   [facility]  [address] [place] [address]

## 2020-07-27 ENCOUNTER — OFFICE VISIT (OUTPATIENT)
Dept: DERMATOLOGY | Facility: CLINIC | Age: 70
End: 2020-07-27
Payer: COMMERCIAL

## 2020-07-27 VITALS
SYSTOLIC BLOOD PRESSURE: 123 MMHG | BODY MASS INDEX: 31.95 KG/M2 | HEART RATE: 79 BPM | HEIGHT: 67 IN | DIASTOLIC BLOOD PRESSURE: 81 MMHG | OXYGEN SATURATION: 99 %

## 2020-07-27 DIAGNOSIS — C44.529 SQUAMOUS CELL CARCINOMA OF SKIN OF CHEST: Primary | ICD-10-CM

## 2020-07-27 PROCEDURE — 17313 MOHS 1 STAGE T/A/L: CPT | Performed by: DERMATOLOGY

## 2020-07-27 NOTE — NURSING NOTE
"Initial /81   Pulse 79   Ht 1.702 m (5' 7\")   SpO2 99%   BMI 31.95 kg/m   Estimated body mass index is 31.95 kg/m  as calculated from the following:    Height as of this encounter: 1.702 m (5' 7\").    Weight as of 3/10/20: 92.5 kg (204 lb). .      "

## 2020-07-27 NOTE — PROGRESS NOTES
Chief Complaint   Patient presents with     Ent Problem     Check spot on tongue - right side/has improved     History of Present Illness   Tri Ingram is a 70 year old female who presents today for evaluation.  The patient presents with a lesion on the right lateral tongue.  This started about a week ago and she noticed it when brushing her teeth.  She really did not feel like it was painful at all.  She stated that her lesion initially looked black but then started to look more pink and healthy underneath.  She denies having an injury in the area.  She denied any bleeding or ulceration in the area.  The area have been getting smaller so she took a picture of it a couple days ago.  She states that this morning she no longer sees the area, may be a very small area still present. No citrus or spicy intolerance. No bleeding. Patient is a former smoker, roughly 30 pack-years.  No history of chewing tobacco. No lumps or swollen glands in the neck.     Past Medical History  Patient Active Problem List   Diagnosis     Other malignant neoplasm of skin of trunk, except scrotum     Generalized osteoarthrosis, unspecified site     Disorder of bone and cartilage     Symptomatic menopausal or female climacteric states     Plantar fasciitis     Hyperlipidemia LDL goal <130     Obesity     Advanced directives, counseling/discussion     Family history of abdominal aortic aneurysm     Former moderate cigarette smoker (10-19 per day)     Current Medications     Current Outpatient Medications:      acetaminophen (ARTHRITIS PAIN RELIEF) 650 MG CR tablet, Take 1,300 mg by mouth as needed Takes in pm, Disp: 100 tablet, Rfl: 11     albuterol (PROAIR HFA/PROVENTIL HFA/VENTOLIN HFA) 108 (90 BASE) MCG/ACT Inhaler, Inhale 2 puffs into the lungs every 4 hours as needed for shortness of breath / dyspnea or wheezing, Disp: 1 Inhaler, Rfl: 1     ASPIRIN 81 MG OR TABS, ONE DAILY, Disp: 100, Rfl: 3     calcium carb 1250 mg, 500 mg  [place],/vitamin D 200 units (OSCAL WITH D) 500-200 MG-UNIT per tablet, Take 1 tablet by mouth daily., Disp: , Rfl:      estrogen conj-medroxyPROGESTERone (PREMPRO) 0.45-1.5 MG tablet, TAKE ONE TABLET BY MOUTH THREE TIMES WEEKLY OR AS NEEDED TO CONTROL HOT FLASHES / MENOPAUSAL SYMPTOMS., Disp: 7 tablet, Rfl: 0     fluticasone (FLONASE) 50 MCG/ACT nasal spray, Spray 2 sprays into both nostrils daily, Disp: 16 g, Rfl: 3     ibuprofen 200 MG capsule, Take 1,000 mg by mouth 2 times daily , Disp: , Rfl:      lovastatin (MEVACOR) 40 MG tablet, TAKE ONE TABLET BY MOUTH EVERY NIGHT AT BEDTIME, Disp: 90 tablet, Rfl: 3     Lutein 20 MG CAPS, , Disp: , Rfl:      MAGNESIUM PO, Take 250 mg by mouth daily , Disp: , Rfl:      Multiple Vitamins-Minerals (ICAPS) CAPS, Take 1 capsule by mouth daily, Disp: , Rfl:      order for DME, Equipment being ordered: Dynaflex insert, Disp: 2 Units, Rfl: 0     order for DME, Equipment being ordered: Nebulizer, Disp: 1 Device, Rfl: 0     benzonatate (TESSALON) 200 MG capsule, Take 1 capsule (200 mg) by mouth 3 times daily as needed for cough (Patient not taking: Reported on 7/29/2020), Disp: 30 capsule, Rfl: 0    Allergies  Allergies   Allergen Reactions     Adhesive Tape      Iodine Anaphylaxis     contrast dye     Penicillins Hives       Social History   Social History     Socioeconomic History     Marital status:      Spouse name: Not on file     Number of children: Not on file     Years of education: Not on file     Highest education level: Not on file   Occupational History     Occupation: RETIRED   Social Needs     Financial resource strain: Not on file     Food insecurity     Worry: Not on file     Inability: Not on file     Transportation needs     Medical: Not on file     Non-medical: Not on file   Tobacco Use     Smoking status: Former Smoker     Packs/day: 0.50     Years: 37.00     Pack years: 18.50     Types: Cigarettes     Last attempt to quit: 10/20/2007     Years since  "quittin.7     Smokeless tobacco: Never Used   Substance and Sexual Activity     Alcohol use: Yes     Comment: minimal     Drug use: No     Sexual activity: Yes     Partners: Male     Comment: menopause   Lifestyle     Physical activity     Days per week: Not on file     Minutes per session: Not on file     Stress: Not on file   Relationships     Social connections     Talks on phone: Not on file     Gets together: Not on file     Attends [employer] service: Not on file     Active member of club or organization: Not on file     Attends meetings of clubs or organizations: Not on file     Relationship status: Not on file     Intimate partner violence     Fear of current or ex partner: Not on file     Emotionally abused: Not on file     Physically abused: Not on file     Forced sexual activity: Not on file   Other Topics Concern      Service No     Blood Transfusions No     Caffeine Concern No     Occupational Exposure No     Hobby Hazards No     Sleep Concern Yes     Comment: arthritis     Stress Concern Yes     Weight Concern No     Special Diet Yes     Comment: [place]     Back Care Yes     Comment: arthritis     Exercise Yes     Bike Helmet No     Seat Belt Yes     Self-Exams Yes     Parent/sibling w/ CABG, MI or angioplasty before 65F 55M? Yes     Comment: brother MI at age 50   Social History Narrative    Caffeine intake/servings daily - 0    Calcium intake/servings daily - 4 occ. calcium supplement    Exercise 7 times weekly - describe  walking    Sunscreen used - No    Seatbelts used - Yes    Guns stored in the home - No    Self Breast Exam - Yes    Pap test up to date -  Yes    Eye exam up to date -  Yes    Dental exam up to date -  No    DEXA scan up to date -  Yes, last done 04    Flex Sig/Colonoscopy up to date -  Yes, at age 52 \"normal\"    Mammography up to date - 06    Immunizations reviewed and up to date - unknown last tetanus shot    Abuse: Current or Past (Physical, Sexual or " "Emotional) - No    Do you feel safe in your environment - Yes    Do you cope well with stress - Yes    Do you suffer from insomnia - Yes    Last updated by: Mary Beth Katz 06            ENVIRONMENTAL HISTORY: The family [place] a[place] year [place]. The home is heated with a gas furnace. They do have central air conditioning. The patient's bedroom is furnished with Indoor plants, fabric window coverings, carpet in bedroom. No pets . There is no history of cockroach or mice infestation. There are no smokers in the house.  The house does not have a basement.        Family History  Family History   Problem Relation Age of Onset     Eye Disorder Mother         glaucoma, wet macular degeneration     Lipids Mother      Gynecology Mother         hyst     Melanoma Mother      Skin Cancer Mother      Cancer Father         colon /prostate     Circulatory Father         amputee     Diabetes Father         type II     C.A.D. Father         MIs     Eye Disorder Father         dry macular degeneration     Cardiovascular Father         small AAA     Melanoma Father      Arthritis Sister      Gynecology Sister         partial hyst  fibrous sheaths on ovary egg sized polyp uterine removed     Lipids Sister      Neurologic Disorder Sister         migraines     Cardiovascular Brother         large 7 cm AAA     Coronary Artery Disease Brother         MI     Asthma Brother      Gastrointestinal Disease Paternal Grandmother          from hepatitis       Review of Systems  As per HPI and PMHx, otherwise 10+ comprehensive system review is negative.    Physical Exam  /79 (BP Location: Right arm, Patient Position: Sitting, Cuff Size: Adult Regular)   Pulse 67   Temp 98.1  F (36.7  C) (Tympanic)   Ht 1.683 m (5' 6.25\")   Wt 93.9 kg (207 lb)   BMI 33.16 kg/m    GENERAL: Patient is a pleasant, cooperative 70 year old female in no acute distress.  HEAD: Normocephalic, atraumatic.  Hair and " scalp are normal.  EYES: Pupils are equal, round, reactive to light and accommodation.  Extraocular movements are intact.  The sclera nonicteric without injection.  The extraocular structures are normal.  EARS: Normal shape and symmetry.  No tenderness when palpating the mastoid or tragal areas bilaterally.  Otoscopic exam reveals a minimal amount of cerumen bilaterally.  The bilateral tympanic membranes are round, intact without evidence of effusion, good landmarks.  No retraction, granulation, or drainage.  NOSE: Nares are patent.  Nasal mucosa is pink and moist.  Negative anterior rhinoscopy.  ORAL CAVITY: Dentition is in good repair.  Mucous membranes are moist.  Examination of the oral cavity the area that the patient noticed, there appears to be normal healthy mucosa without any active lesion or ulceration. Tongue is mobile, protrudes to the midline.  Palate elevates symmetrically.  No erythema or exudate.  No additional oral cavity or oropharyngeal masses, lesions, ulcerations, leukoplakia.  NECK: Supple, trachea is midline.  There no palpable cervical lymphadenopathy or masses bilaterally.  Palpation of the bilateral parotid and submandibular areas reveal no masses.  No thyromegaly.    NEUROLOGIC: Cranial nerves II through XII are grossly intact.  Voice is strong.  Patient is House-Brackmann I/VI bilaterally.  CARDIOVASCULAR: Extremities are warm and well-perfused.  No significant peripheral edema.  RESPIRATORY: Patient has nonlabored breathing without cough, wheeze, stridor.  PSYCHIATRIC: Patient is alert and oriented.  Mood and affect appear normal.  SKIN: Warm and dry.  No scalp, face, or neck lesions noted.    Assessment and Plan     ICD-10-CM    1. History of oral lesions  Z87.19    2. History of tobacco use  Z87.891      It was my pleasure seeing Tri Ingram today in clinic.  The patient presents today with a roughly 1 week history of a lateral tongue lesion and a past history of smoking.   Fortunately, the lesion has now resolved.  Based on the patient's photograph, this was likely inflammatory.  We discussed red flag signs for oral cavity cancer including lesions lasting longer than few weeks, significant pain, lesions that continue to grow.  I encouraged the patient to return if she notices any lesions that are present for more than a few weeks.      Dale Brunner MD  Department of Otolarygology-Head and Neck Surgery  [facility]

## 2020-07-27 NOTE — PROGRESS NOTES
Tri Ingram is a 70 year old year old female patient here today for evaluation and managment of squamous cell carcinoma on chest. Patient has no other skin complaints today.  Remainder of the HPI, Meds, PMH, Allergies, FH, and SH was reviewed in chart.      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     Squamous cell carcinoma      Vestibular neuritis        Past Surgical History:   Procedure Laterality Date     C NONSPECIFIC PROCEDURE      Bowen's disease removed X 2     COLONOSCOPY  ,      COLONOSCOPY N/A 11/3/2017    Procedure: COLONOSCOPY;  Colonoscopy  ;  Surgeon: Lg Guerrero MD;  Location: [facility]        Family History   Problem Relation Age of Onset     Eye Disorder Mother         glaucoma, wet macular degeneration     Lipids Mother      Gynecology Mother         hyst     Melanoma Mother      Skin Cancer Mother      Cancer Father         colon /prostate     Circulatory Father         amputee     Diabetes Father         type II     C.A.D. Father         MIs     Eye Disorder Father         dry macular degeneration     Cardiovascular Father         small AAA     Melanoma Father      Arthritis Sister      Gynecology Sister         partial hyst  fibrous sheaths on ovary egg sized polyp uterine removed     Lipids Sister      Neurologic Disorder Sister         migraines     Cardiovascular Brother         large 7 cm AAA     Coronary Artery Disease Brother         MI     Asthma Brother      Gastrointestinal Disease Paternal Grandmother          from hepatitis       Social History     Socioeconomic History     Marital status:       Spouse name: Not on file     Number of children: Not on file     Years of education: Not on file     Highest education level: Not on file   Occupational History     Occupation: RETIRED   Social Needs     Financial resource strain: Not on file     Food insecurity     Worry: Not on file     Inability: Not on file     Transportation needs     Medical: Not on file     Non-medical: Not on file   Tobacco Use     Smoking status: Former Smoker     Packs/day: 0.50     Years: 37.00     Pack years: 18.50     Types: Cigarettes     Last attempt to quit: 10/20/2007     Years since quittin.7     Smokeless tobacco: Never Used   Substance and Sexual Activity     Alcohol use: Yes     Comment: minimal     Drug use: No     Sexual activity: Yes     Partners: Male     Comment: menopause   Lifestyle     Physical activity     Days per week: Not on file     Minutes per session: Not on file     Stress: Not on file   Relationships     Social connections     Talks on phone: Not on file     Gets together: Not on file     Attends [employer] service: Not on file     Active member of club or organization: Not on file     Attends meetings of clubs or organizations: Not on file     Relationship status: Not on file     Intimate partner violence     Fear of current or ex partner: Not on file     Emotionally abused: Not on file     Physically abused: Not on file     Forced sexual activity: Not on file   Other Topics Concern      Service No     Blood Transfusions No     Caffeine Concern No     Occupational Exposure No     Hobby Hazards No     Sleep Concern Yes     Comment: arthritis     Stress Concern Yes     Weight Concern No     Special Diet Yes     Comment: [place]     Back Care Yes     Comment: arthritis     Exercise Yes     Bike Helmet No     Seat Belt Yes     Self-Exams Yes     Parent/sibling w/ CABG, MI or angioplasty before 65F 55M? Yes     Comment: brother MI at age 50   Social History Narrative    Caffeine intake/servings daily - 0  "   Calcium intake/servings daily - 4 occ. calcium supplement    Exercise 7 times weekly - describe  walking    Sunscreen used - No    Seatbelts used - Yes    Guns stored in the home - No    Self Breast Exam - Yes    Pap test up to date -  Yes    Eye exam up to date -  Yes    Dental exam up to date -  No    DEXA scan up to date -  Yes, last done 5/4/04    Flex Sig/Colonoscopy up to date -  Yes, at age 52 \"normal\"    Mammography up to date - 8/18/06    Immunizations reviewed and up to date - unknown last tetanus shot    Abuse: Current or Past (Physical, Sexual or Emotional) - No    Do you feel safe in your environment - Yes    Do you cope well with stress - Yes    Do you suffer from insomnia - Yes    Last updated by: Mary Beth Katz 8/28/06 July 23, 2019            ENVIRONMENTAL HISTORY: The [place] a[place] year old [place]. The home is heated with a gas furnace. They do have central air conditioning. The patient's bedroom is furnished with Indoor plants, fabric window coverings, carpet in bedroom. No pets . There is no history of cockroach or mice infestation. There are no smokers in the house.  The house does not have a basement.        Outpatient Encounter Medications as of 7/27/2020   Medication Sig Dispense Refill     acetaminophen (ARTHRITIS PAIN RELIEF) 650 MG CR tablet Take 1,300 mg by mouth as needed Takes in pm 100 tablet 11     albuterol (PROAIR HFA/PROVENTIL HFA/VENTOLIN HFA) 108 (90 BASE) MCG/ACT Inhaler Inhale 2 puffs into the lungs every 4 hours as needed for shortness of breath / dyspnea or wheezing (Patient not taking: Reported on 7/23/2019) 1 Inhaler 1     ASPIRIN 81 MG OR TABS ONE DAILY 100 3     benzonatate (TESSALON) 200 MG capsule Take 1 capsule (200 mg) by mouth 3 times daily as needed for cough 30 capsule 0     calcium carb 1250 mg, 500 mg [place],/vitamin D 200 units (OSCAL WITH D) 500-200 MG-UNIT per tablet Take 1 tablet by mouth daily.       estrogen " "conj-medroxyPROGESTERone (PREMPRO) 0.45-1.5 MG tablet TAKE ONE TABLET BY MOUTH THREE TIMES WEEKLY OR AS NEEDED TO CONTROL HOT FLASHES / MENOPAUSAL SYMPTOMS. 7 tablet 0     fluticasone (FLONASE) 50 MCG/ACT nasal spray Spray 2 sprays into both nostrils daily (Patient not taking: Reported on 7/29/2019) 16 g 3     ibuprofen 200 MG capsule Take 1,000 mg by mouth 2 times daily        lovastatin (MEVACOR) 40 MG tablet TAKE ONE TABLET BY MOUTH EVERY NIGHT AT BEDTIME 90 tablet 3     Lutein 20 MG CAPS        MAGNESIUM PO Take 250 mg by mouth daily        Multiple Vitamins-Minerals (ICAPS) CAPS Take 1 capsule by mouth daily       order for DME Equipment being ordered: Dynaflex insert (Patient not taking: Reported on 3/10/2020) 2 Units 0     order for DME Equipment being ordered: Nebulizer (Patient not taking: Reported on 3/10/2020) 1 Device 0     No facility-administered encounter medications on file as of 7/27/2020.              Review Of Systems  Skin: As above  Eyes: negative  Ears/Nose/Throat: negative  Respiratory: No shortness of breath, dyspnea on exertion, cough, or hemoptysis  Cardiovascular: negative  Gastrointestinal: negative  Genitourinary: negative  Musculoskeletal: negative  Neurologic: negative  Psychiatric: negative  Hematologic/Lymphatic/Immunologic: negative  Endocrine: negative      O:   NAD, WDWN, Alert & Oriented, Mood & Affect wnl, Vitals stable   Here today alone   /81   Pulse 79   Ht 1.702 m (5' 7\")   SpO2 99%   BMI 31.95 kg/m     General appearance normal   Vitals stable   Alert, oriented and in no acute distress      Following lymph nodes palpated: Occipital, Cervical, Supraclavicular no lad  Mid upper chest 8mm red scaly papule       Eyes: Conjunctivae/lids:Normal     ENT: Lips, buccal mucosa, tongue: normal    MSK:Normal    Cardiovascular: peripheral edema none    Pulm: Breathing Normal    Lymph Nodes: No Head and Neck Lymphadenopathy     Neuro/Psych: Orientation:Alert and Orientedx3 ; " Mood/Affect:normal       A/P:  1. Mid chest squamous cell carcinoma   MOHS:   Size    The rationale for Mohs surgery was discussed with the patient and consent was obtained.  The risks and benefits as well as alternatives to therapy were discussed, in detail.  Specifically, the risks of infection, scarring, bleeding, prolonged wound healing, incomplete removal, allergy to anesthesia, nerve injury and recurrence were addressed.  Indication for Mohs was Size. Prior to the procedure, the treatment site was clearly identified and, if available, confirmed with previous photos and confirmed by the patient   All components of the Universal Protocol/PAUSE rule were completed.  The Mohs surgeon operated in two distinct and integrated capacities as the surgeon and pathologist.      The area was prepped with Betasept.  A rim of normal appearing skin was marked circumferentially around the lesion.  The area was infiltrated with local anesthesia.  The tumor was first debulked to remove all clinically apparent tumor.  An incision following the standard Mohs approach was done and the specimen was oriented,mapped and placed in 1 block(s).  Each specimen was then chromacoded and processed in the Mohs laboratory using standard Mohs technique and submitted for frozen section histology.  Frozen section analysis showed no residual tumor but CLEAR MARGINS.      The tumor was excised using standard Mohs technique in 1 stages(s).  CLEAR MARGINS OBTAINED and Final defect size was 1.5 x 1.3 cm.     We discussed the options for wound management in full with the patient including risks/benefits/ possible outcomes.        REPAIR SECOND INTENT: We discussed the options for wound management in full with the patient including risks/benefits/possible outcomes. Decision made to allow the wound to heal by second intention. EBL minimal; complications none; wound care routine.  The patient was discharged in good condition and will return in one month  or prn for wound evaluation.  BENIGN LESIONS DISCUSSED WITH PATIENT:  I discussed the specifics of tumor, prognosis, and genetics of benign lesions.  I explained that treatment of these lesions would be purely cosmetic and not medically neccessary.  I discussed with patient different removal options including excision, cautery and /or laser.      Nature and genetics of benign skin lesions dicussed with patient.  Signs and Symptoms of skin cancer discussed with patient.  Patient encouraged to perform monthly skin exams.  UV precautions reviewed with patient.  Skin care regimen reviewed with patient: Eliminate harsh soaps, i.e. Dial, zest, irsih spring; Mild soaps such as Cetaphil or Dove sensitive skin, avoid hot or cold showers, aggressive use of emollients including vanicream, cetaphil or cerave discussed with patient.    Risks of non-melanoma skin cancer discussed with patient   Return to clinic 6 months

## 2020-07-27 NOTE — PATIENT INSTRUCTIONS
Open Wound Care     for ______________        ? No strenuous activity for 48 hours    ? Take Tylenol as needed for discomfort.                                                .         ? Do not drink alcoholic beverages for 48 hours.    ? Keep the pressure bandage in place for 24 hours. If the bandage becomes blood tinged or loose, reinforce it with gauze and tape.        (Refer to the reverse side of this page for management of bleeding).    ? Remove bandage in 24 hours and begin wound care as follows:     1. Clean area with tap water using a Q tip or gauze pad, (shower / bathe normally)  2. Dry wound with Q tip or gauze pad  3. Apply Aquaphor, Vaseline, Polysporin or Bacitracin Ointment with a Q tip    Do NOT use Neosporin Ointment *  4. Cover the wound with a band-aid or nonstick gauze pad and paper tape.  5. Repeat wound care once a day until wound is completely healed.    It is an old wives tale that a wound heals better when it is exposed to air and allowed to dry out. The wound will heal faster with a better cosmetic result if it is kept moist with ointment and covered with a bandage.  Do not let the wound dry out.      Supplies Needed:                Qtips or gauze pads                Polysporin or Bacitracin Ointment                Bandaids or nonstick gauze pads and paper tape    Wound care kits and brown paper tape are available for purchase at   the pharmacy.    BLEEDIN. Use tightly rolled up gauze or cloth to apply direct pressure over the bandage for 20   minutes.  2. Reapply pressure for an additional 20 minutes if necessary  3. Call the office or go to the nearest emergency room if pressure fails to stop the bleeding.  4. Use additional gauze and tape to maintain pressure once the bleeding has stopped.  5. Begin wound care 24 hours after surgery as directed.                  WOUND HEALING    1. One week after surgery a pink / red halo will form around the outside of the wound.   This is new  skin.  2. The center of the wound will appear yellowish white and produce some drainage.  3. The pink halo will slowly migrate in toward the center of the wound until the wound is covered with new shiny pink skin.  4. There will be no more drainage when the wound is completely healed.  5. It will take six months to one year for the redness to fade.  6. The scar may be itchy, tight and sensitive to extreme temperatures for a year after the surgery.  7. Massaging the area several times a day for several minutes after the wound is completely healed will help the scar soften and normalize faster. Begin massage only after healing is complete.      In case of emergency call: Dr Good: [phone]     [facility]: [phone]    [facility]: [phone]

## 2020-07-27 NOTE — LETTER
7/27/2020         RE: Tri Ingram  [address]  [facility] [address]        Dear Colleague,    Thank you for referring your patient, Tri Ingram, to the [facility]. Please see a copy of my visit note below.    Surgical Office Location :   [facility]  [address] [place] [address]      Tri Ingram is a 70 year old year old female patient here today for evaluation and managment of squamous cell carcinoma on chest. Patient has no other skin complaints today.  Remainder of the HPI, Meds, PMH, Allergies, FH, and SH was reviewed in chart.      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     Squamous cell carcinoma      Vestibular neuritis        Past Surgical History:   Procedure Laterality Date     C NONSPECIFIC PROCEDURE      Bowen's disease removed X 2     COLONOSCOPY  2001, 2007     COLONOSCOPY N/A 11/3/2017    Procedure: COLONOSCOPY;  Colonoscopy  ;  Surgeon: Lg Guerrero MD;  Location: [facility]        Family History   Problem Relation Age of Onset     Eye Disorder Mother         glaucoma, wet macular degeneration     Lipids Mother      Gynecology Mother         hyst     Melanoma Mother      Skin Cancer Mother      Cancer Father         colon /prostate     Circulatory Father         amputee     Diabetes Father         type II     C.A.D. Father         MIs     Eye Disorder Father         dry macular degeneration     Cardiovascular Father         small AAA     Melanoma Father      Arthritis Sister      Gynecology Sister         partial hyst  fibrous sheaths on ovary egg sized polyp  uterine removed     Lipids Sister      Neurologic Disorder Sister         migraines     Cardiovascular Brother         large 7 cm AAA     Coronary Artery Disease Brother         MI     Asthma Brother      Gastrointestinal Disease Paternal Grandmother          from hepatitis       Social History     Socioeconomic History     Marital status:      Spouse name: Not on file     Number of children: Not on file     Years of education: Not on file     Highest education level: Not on file   Occupational History     Occupation: RETIRED   Social Needs     Financial resource strain: Not on file     Food insecurity     Worry: Not on file     Inability: Not on file     Transportation needs     Medical: Not on file     Non-medical: Not on file   Tobacco Use     Smoking status: Former Smoker     Packs/day: 0.50     Years: 37.00     Pack years: 18.50     Types: Cigarettes     Last attempt to quit: 10/20/2007     Years since quittin.7     Smokeless tobacco: Never Used   Substance and Sexual Activity     Alcohol use: Yes     Comment: minimal     Drug use: No     Sexual activity: Yes     Partners: Male     Comment: menopause   Lifestyle     Physical activity     Days per week: Not on file     Minutes per session: Not on file     Stress: Not on file   Relationships     Social connections     Talks on phone: Not on file     Gets together: Not on file     Attends Restorationism service: Not on file     Active member of club or organization: Not on file     Attends meetings of clubs or organizations: Not on file     Relationship status: Not on file     Intimate partner violence     Fear of current or ex partner: Not on file     Emotionally abused: Not on file     Physically abused: Not on file     Forced sexual activity: Not on file   Other Topics Concern      Service No     Blood Transfusions No     Caffeine Concern No     Occupational Exposure No     Hobby Hazards No     Sleep Concern Yes     Comment: arthritis      "Stress Concern Yes     Weight Concern No     Special Diet Yes     Comment: Frazier     Back Care Yes     Comment: arthritis     Exercise Yes     Bike Helmet No     Seat Belt Yes     Self-Exams Yes     Parent/sibling w/ CABG, MI or angioplasty before 65F 55M? Yes     Comment: brother MI at age 50   Social History Narrative    Caffeine intake/servings daily - 0    Calcium intake/servings daily - 4 occ. calcium supplement    Exercise 7 times weekly - describe  walking    Sunscreen used - No    Seatbelts used - Yes    Guns stored in the home - No    Self Breast Exam - Yes    Pap test up to date -  Yes    Eye exam up to date -  Yes    Dental exam up to date -  No    DEXA scan up to date -  Yes, last done 5/4/04    Flex Sig/Colonoscopy up to date -  Yes, at age 52 \"normal\"    Mammography up to date - 8/18/06    Immunizations reviewed and up to date - unknown last tetanus shot    Abuse: Current or Past (Physical, Sexual or Emotional) - No    Do you feel safe in your environment - Yes    Do you cope well with stress - Yes    Do you suffer from insomnia - Yes    Last updated by: Mary Beth Katz 8/28/06 July 23, 2019            ENVIRONMENTAL HISTORY: The family [place] in a[place] year [place] The home is heated with a gas furnace. They do have central air conditioning. The patient's bedroom is furnished with Indoor plants, fabric window coverings, carpet in bedroom. No pets . There is no history of cockroach or mice infestation. There are no smokers in the house.  The house does not have a basement.        Outpatient Encounter Medications as of 7/27/2020   Medication Sig Dispense Refill     acetaminophen (ARTHRITIS PAIN RELIEF) 650 MG CR tablet Take 1,300 mg by mouth as needed Takes in pm 100 tablet 11     albuterol (PROAIR HFA/PROVENTIL HFA/VENTOLIN HFA) 108 (90 BASE) MCG/ACT Inhaler Inhale 2 puffs into the lungs every 4 hours as needed for shortness of breath / dyspnea or wheezing (Patient not taking: " "Reported on 7/23/2019) 1 Inhaler 1     ASPIRIN 81 MG OR TABS ONE DAILY 100 3     benzonatate (TESSALON) 200 MG capsule Take 1 capsule (200 mg) by mouth 3 times daily as needed for cough 30 capsule 0     calcium carb 1250 mg, 500 mg [place],/vitamin D 200 units (OSCAL WITH D) 500-200 MG-UNIT per tablet Take 1 tablet by mouth daily.       estrogen conj-medroxyPROGESTERone (PREMPRO) 0.45-1.5 MG tablet TAKE ONE TABLET BY MOUTH THREE TIMES WEEKLY OR AS NEEDED TO CONTROL HOT FLASHES / MENOPAUSAL SYMPTOMS. 7 tablet 0     fluticasone (FLONASE) 50 MCG/ACT nasal spray Spray 2 sprays into both nostrils daily (Patient not taking: Reported on 7/29/2019) 16 g 3     ibuprofen 200 MG capsule Take 1,000 mg by mouth 2 times daily        lovastatin (MEVACOR) 40 MG tablet TAKE ONE TABLET BY MOUTH EVERY NIGHT AT BEDTIME 90 tablet 3     Lutein 20 MG CAPS        MAGNESIUM PO Take 250 mg by mouth daily        Multiple Vitamins-Minerals (ICAPS) CAPS Take 1 capsule by mouth daily       order for DME Equipment being ordered: Dynaflex insert (Patient not taking: Reported on 3/10/2020) 2 Units 0     order for DME Equipment being ordered: Nebulizer (Patient not taking: Reported on 3/10/2020) 1 Device 0     No facility-administered encounter medications on file as of 7/27/2020.              Review Of Systems  Skin: As above  Eyes: negative  Ears/Nose/Throat: negative  Respiratory: No shortness of breath, dyspnea on exertion, cough, or hemoptysis  Cardiovascular: negative  Gastrointestinal: negative  Genitourinary: negative  Musculoskeletal: negative  Neurologic: negative  Psychiatric: negative  Hematologic/Lymphatic/Immunologic: negative  Endocrine: negative      O:   NAD, WDWN, Alert & Oriented, Mood & Affect wnl, Vitals stable   Here today alone   /81   Pulse 79   Ht 1.702 m (5' 7\")   SpO2 99%   BMI 31.95 kg/m     General appearance normal   Vitals stable   Alert, oriented and in no acute distress      Following lymph nodes palpated: " Occipital, Cervical, Supraclavicular no lad  Mid upper chest 8mm red scaly papule       Eyes: Conjunctivae/lids:Normal     ENT: Lips, buccal mucosa, tongue: normal    MSK:Normal    Cardiovascular: peripheral edema none    Pulm: Breathing Normal    Lymph Nodes: No Head and Neck Lymphadenopathy     Neuro/Psych: Orientation:Alert and Orientedx3 ; Mood/Affect:normal       A/P:  1. Mid chest squamous cell carcinoma   MOHS:   Size    The rationale for Mohs surgery was discussed with the patient and consent was obtained.  The risks and benefits as well as alternatives to therapy were discussed, in detail.  Specifically, the risks of infection, scarring, bleeding, prolonged wound healing, incomplete removal, allergy to anesthesia, nerve injury and recurrence were addressed.  Indication for Mohs was Size. Prior to the procedure, the treatment site was clearly identified and, if available, confirmed with previous photos and confirmed by the patient   All components of the Universal Protocol/PAUSE rule were completed.  The Mohs surgeon operated in two distinct and integrated capacities as the surgeon and pathologist.      The area was prepped with Betasept.  A rim of normal appearing skin was marked circumferentially around the lesion.  The area was infiltrated with local anesthesia.  The tumor was first debulked to remove all clinically apparent tumor.  An incision following the standard Mohs approach was done and the specimen was oriented,mapped and placed in 1 block(s).  Each specimen was then chromacoded and processed in the Mohs laboratory using standard Mohs technique and submitted for frozen section histology.  Frozen section analysis showed no residual tumor but CLEAR MARGINS.      The tumor was excised using standard Mohs technique in 1 stages(s).  CLEAR MARGINS OBTAINED and Final defect size was 1.5 x 1.3 cm.     We discussed the options for wound management in full with the patient including risks/benefits/ possible  outcomes.        REPAIR SECOND INTENT: We discussed the options for wound management in full with the patient including risks/benefits/possible outcomes. Decision made to allow the wound to heal by second intention. EBL minimal; complications none; wound care routine.  The patient was discharged in good condition and will return in one month or prn for wound evaluation.  BENIGN LESIONS DISCUSSED WITH PATIENT:  I discussed the specifics of tumor, prognosis, and genetics of benign lesions.  I explained that treatment of these lesions would be purely cosmetic and not medically neccessary.  I discussed with patient different removal options including excision, cautery and /or laser.      Nature and genetics of benign skin lesions dicussed with patient.  Signs and Symptoms of skin cancer discussed with patient.  Patient encouraged to perform monthly skin exams.  UV precautions reviewed with patient.  Skin care regimen reviewed with patient: Eliminate harsh soaps, i.e. Dial, zest, irsih spring; Mild soaps such as Cetaphil or Dove sensitive skin, avoid hot or cold showers, aggressive use of emollients including vanicream, cetaphil or cerave discussed with patient.    Risks of non-melanoma skin cancer discussed with patient   Return to clinic 6 months      Again, thank you for allowing me to participate in the care of your patient.        Sincerely,        Harris Good MD

## 2020-07-29 ENCOUNTER — OFFICE VISIT (OUTPATIENT)
Dept: OTOLARYNGOLOGY | Facility: CLINIC | Age: 70
End: 2020-07-29
Payer: COMMERCIAL

## 2020-07-29 VITALS
SYSTOLIC BLOOD PRESSURE: 118 MMHG | BODY MASS INDEX: 33.27 KG/M2 | TEMPERATURE: 98.1 F | HEART RATE: 67 BPM | HEIGHT: 66 IN | DIASTOLIC BLOOD PRESSURE: 79 MMHG | WEIGHT: 207 LBS

## 2020-07-29 DIAGNOSIS — Z87.891 HISTORY OF TOBACCO USE: ICD-10-CM

## 2020-07-29 DIAGNOSIS — Z87.19 HISTORY OF ORAL LESIONS: Primary | ICD-10-CM

## 2020-07-29 PROCEDURE — 99202 OFFICE O/P NEW SF 15 MIN: CPT | Performed by: OTOLARYNGOLOGY

## 2020-07-29 ASSESSMENT — MIFFLIN-ST. JEOR: SCORE: 1489.67

## 2020-07-29 NOTE — LETTER
7/29/2020         RE: Tri Ingram  [address]  [facility] [address]        Dear Colleague,    Thank you for referring your patient, Tri Ingram, to the [facility]. Please see a copy of my visit note below.    Chief Complaint   Patient presents with     Ent Problem     Check spot on tongue - right side/has improved     History of Present Illness   Tri Ingram is a 70 year old female who presents today for evaluation.  The patient presents with a lesion on the right lateral tongue.  This started about a week ago and she noticed it when brushing her teeth.  She really did not feel like it was painful at all.  She stated that her lesion initially looked black but then started to look more pink and healthy underneath.  She denies having an injury in the area.  She denied any bleeding or ulceration in the area.  The area have been getting smaller so she took a picture of it a couple days ago.  She states that this morning she no longer sees the area, may be a very small area still present. No citrus or spicy intolerance. No bleeding. Patient is a former smoker, roughly 30 pack-years.  No history of chewing tobacco. No lumps or swollen glands in the neck.     Past Medical History  Patient Active Problem List   Diagnosis     Other malignant neoplasm of skin of trunk, except scrotum     Generalized osteoarthrosis, unspecified site     Disorder of bone and cartilage     Symptomatic menopausal or female climacteric states     Plantar fasciitis     Hyperlipidemia LDL goal <130     Obesity     Advanced directives, counseling/discussion     Family history of abdominal aortic aneurysm     Former moderate cigarette smoker (10-19 per day)     Current Medications     Current Outpatient Medications:      acetaminophen (ARTHRITIS PAIN RELIEF) 650 MG CR tablet, Take 1,300 mg by mouth as needed Takes in pm, Disp: 100 tablet, Rfl: 11     albuterol (PROAIR HFA/PROVENTIL HFA/VENTOLIN  Reason for Call:  Other call back    Detailed comments: pt cesario (Mirlande) called to discuss appt with nurse or . States that appt was made for pt and doesn't know why wondering if she even needs appt. Thanks!    Phone Number Patient can be reached at: Other phone number:  [phone]    Best Time: anytime    Can we leave a detailed message on this number? YES    Call taken on 1/9/2017 at 10:28 AM by Rosario Miller       HFA) 108 (90 BASE) MCG/ACT Inhaler, Inhale 2 puffs into the lungs every 4 hours as needed for shortness of breath / dyspnea or wheezing, Disp: 1 Inhaler, Rfl: 1     ASPIRIN 81 MG OR TABS, ONE DAILY, Disp: 100, Rfl: 3     calcium carb 1250 mg, 500 mg [place],/vitamin D 200 units (OSCAL WITH D) 500-200 MG-UNIT per tablet, Take 1 tablet by mouth daily., Disp: , Rfl:      estrogen conj-medroxyPROGESTERone (PREMPRO) 0.45-1.5 MG tablet, TAKE ONE TABLET BY MOUTH THREE TIMES WEEKLY OR AS NEEDED TO CONTROL HOT FLASHES / MENOPAUSAL SYMPTOMS., Disp: 7 tablet, Rfl: 0     fluticasone (FLONASE) 50 MCG/ACT nasal spray, Spray 2 sprays into both nostrils daily, Disp: 16 g, Rfl: 3     ibuprofen 200 MG capsule, Take 1,000 mg by mouth 2 times daily , Disp: , Rfl:      lovastatin (MEVACOR) 40 MG tablet, TAKE ONE TABLET BY MOUTH EVERY NIGHT AT BEDTIME, Disp: 90 tablet, Rfl: 3     Lutein 20 MG CAPS, , Disp: , Rfl:      MAGNESIUM PO, Take 250 mg by mouth daily , Disp: , Rfl:      Multiple Vitamins-Minerals (ICAPS) CAPS, Take 1 capsule by mouth daily, Disp: , Rfl:      order for DME, Equipment being ordered: Dynaflex insert, Disp: 2 Units, Rfl: 0     order for DME, Equipment being ordered: Nebulizer, Disp: 1 Device, Rfl: 0     benzonatate (TESSALON) 200 MG capsule, Take 1 capsule (200 mg) by mouth 3 times daily as needed for cough (Patient not taking: Reported on 7/29/2020), Disp: 30 capsule, Rfl: 0    Allergies  Allergies   Allergen Reactions     Adhesive Tape      Iodine Anaphylaxis     contrast dye     Penicillins Hives       Social History   Social History     Socioeconomic History     Marital status:      Spouse name: Not on file     Number of children: Not on file     Years of education: Not on file     Highest education level: Not on file   Occupational History     Occupation: RETIRED   Social Needs     Financial resource strain: Not on file     Food insecurity     Worry: Not on file     Inability: Not on file     Transportation  needs     Medical: Not on file     Non-medical: Not on file   Tobacco Use     Smoking status: Former Smoker     Packs/day: 0.50     Years: 37.00     Pack years: 18.50     Types: Cigarettes     Last attempt to quit: 10/20/2007     Years since quittin.7     Smokeless tobacco: Never Used   Substance and Sexual Activity     Alcohol use: Yes     Comment: minimal     Drug use: No     Sexual activity: Yes     Partners: Male     Comment: menopause   Lifestyle     Physical activity     Days per week: Not on file     Minutes per session: Not on file     Stress: Not on file   Relationships     Social connections     Talks on phone: Not on file     Gets together: Not on file     Attends Jainism service: Not on file     Active member of club or organization: Not on file     Attends meetings of clubs or organizations: Not on file     Relationship status: Not on file     Intimate partner violence     Fear of current or ex partner: Not on file     Emotionally abused: Not on file     Physically abused: Not on file     Forced sexual activity: Not on file   Other Topics Concern      Service No     Blood Transfusions No     Caffeine Concern No     Occupational Exposure No     Hobby Hazards No     Sleep Concern Yes     Comment: arthritis     Stress Concern Yes     Weight Concern No     Special Diet Yes     Comment: Frazier     Back Care Yes     Comment: arthritis     Exercise Yes     Bike Helmet No     Seat Belt Yes     Self-Exams Yes     Parent/sibling w/ CABG, MI or angioplasty before 65F 55M? Yes     Comment: brother MI at age 50   Social History Narrative    Caffeine intake/servings daily - 0    Calcium intake/servings daily - 4 occ. calcium supplement    Exercise 7 times weekly - describe  walking    Sunscreen used - No    Seatbelts used - Yes    Guns stored in the home - No    Self Breast Exam - Yes    Pap test up to date -  Yes    Eye exam up to date -  Yes    Dental exam up to date -  No    DEXA scan up to date -   "Yes, last done 04    Flex Sig/Colonoscopy up to date -  Yes, at age 52 \"normal\"    Mammography up to date - 06    Immunizations reviewed and up to date - unknown last tetanus shot    Abuse: Current or Past (Physical, Sexual or Emotional) - No    Do you feel safe in your environment - Yes    Do you cope well with stress - Yes    Do you suffer from insomnia - Yes    Last updated by: Mary Beth Katz 06            ENVIRONMENTAL HISTORY: The family [place] a[place] year [place]. The home is heated with a gas furnace. They do have central air conditioning. The patient's bedroom is furnished with Indoor plants, fabric window coverings, carpet in bedroom. No pets . There is no history of cockroach or mice infestation. There are no smokers in the house.  The house does not have a basement.        Family History  Family History   Problem Relation Age of Onset     Eye Disorder Mother         glaucoma, wet macular degeneration     Lipids Mother      Gynecology Mother         hyst     Melanoma Mother      Skin Cancer Mother      Cancer Father         colon /prostate     Circulatory Father         amputee     Diabetes Father         type II     C.A.D. Father         MIs     Eye Disorder Father         dry macular degeneration     Cardiovascular Father         small AAA     Melanoma Father      Arthritis Sister      Gynecology Sister         partial hyst  fibrous sheaths on ovary egg sized polyp uterine removed     Lipids Sister      Neurologic Disorder Sister         migraines     Cardiovascular Brother         large 7 cm AAA     Coronary Artery Disease Brother         MI     Asthma Brother      Gastrointestinal Disease Paternal Grandmother          from hepatitis       Review of Systems  As per HPI and PMHx, otherwise 10+ comprehensive system review is negative.    Physical Exam  /79 (BP Location: Right arm, Patient Position: Sitting, Cuff Size: Adult Regular)   Pulse 67 " "  Temp 98.1  F (36.7  C) (Tympanic)   Ht 1.683 m (5' 6.25\")   Wt 93.9 kg (207 lb)   BMI 33.16 kg/m    GENERAL: Patient is a pleasant, cooperative 70 year old female in no acute distress.  HEAD: Normocephalic, atraumatic.  Hair and scalp are normal.  EYES: Pupils are equal, round, reactive to light and accommodation.  Extraocular movements are intact.  The sclera nonicteric without injection.  The extraocular structures are normal.  EARS: Normal shape and symmetry.  No tenderness when palpating the mastoid or tragal areas bilaterally.  Otoscopic exam reveals a minimal amount of cerumen bilaterally.  The bilateral tympanic membranes are round, intact without evidence of effusion, good landmarks.  No retraction, granulation, or drainage.  NOSE: Nares are patent.  Nasal mucosa is pink and moist.  Negative anterior rhinoscopy.  ORAL CAVITY: Dentition is in good repair.  Mucous membranes are moist.  Examination of the oral cavity the area that the patient noticed, there appears to be normal healthy mucosa without any active lesion or ulceration. Tongue is mobile, protrudes to the midline.  Palate elevates symmetrically.  No erythema or exudate.  No additional oral cavity or oropharyngeal masses, lesions, ulcerations, leukoplakia.  NECK: Supple, trachea is midline.  There no palpable cervical lymphadenopathy or masses bilaterally.  Palpation of the bilateral parotid and submandibular areas reveal no masses.  No thyromegaly.    NEUROLOGIC: Cranial nerves II through XII are grossly intact.  Voice is strong.  Patient is House-Brackmann I/VI bilaterally.  CARDIOVASCULAR: Extremities are warm and well-perfused.  No significant peripheral edema.  RESPIRATORY: Patient has nonlabored breathing without cough, wheeze, stridor.  PSYCHIATRIC: Patient is alert and oriented.  Mood and affect appear normal.  SKIN: Warm and dry.  No scalp, face, or neck lesions noted.    Assessment and Plan     ICD-10-CM    1. History of oral lesions  " Z87.19    2. History of tobacco use  Z87.891      It was my pleasure seeing Tri Ingram today in clinic.  The patient presents today with a roughly 1 week history of a lateral tongue lesion and a past history of smoking.  Fortunately, the lesion has now resolved.  Based on the patient's photograph, this was likely inflammatory.  We discussed red flag signs for oral cavity cancer including lesions lasting longer than few weeks, significant pain, lesions that continue to grow.  I encouraged the patient to return if she notices any lesions that are present for more than a few weeks.      Dale Brunner MD  Department of Otolarygology-Head and Neck Surgery  [facility]       Again, thank you for allowing me to participate in the care of your patient.        Sincerely,        Dale Brunner MD

## 2020-07-29 NOTE — NURSING NOTE
"Initial /79 (BP Location: Right arm, Patient Position: Sitting, Cuff Size: Adult Regular)   Pulse 67   Temp 98.1  F (36.7  C) (Tympanic)   Ht 1.683 m (5' 6.25\")   Wt 93.9 kg (207 lb)   BMI 33.16 kg/m   Estimated body mass index is 33.16 kg/m  as calculated from the following:    Height as of this encounter: 1.683 m (5' 6.25\").    Weight as of this encounter: 93.9 kg (207 lb). .    Kavya Mitchell CMA    "

## 2020-07-29 NOTE — PATIENT INSTRUCTIONS
Per physician instructions      If you have questions or concerns on any instructions given to you by your provider today or if you need to schedule an appointment, you can reach us at [phone].

## 2020-07-31 ENCOUNTER — HOSPITAL ENCOUNTER (OUTPATIENT)
Dept: PHYSICAL THERAPY | Facility: CLINIC | Age: 70
Setting detail: THERAPIES SERIES
End: 2020-07-31
Attending: PHYSICIAN ASSISTANT
Payer: COMMERCIAL

## 2020-07-31 PROCEDURE — 97110 THERAPEUTIC EXERCISES: CPT | Mod: GP | Performed by: PHYSICAL THERAPIST

## 2020-08-10 DIAGNOSIS — N95.1 SYMPTOMATIC MENOPAUSAL OR FEMALE CLIMACTERIC STATES: ICD-10-CM

## 2020-08-10 RX ORDER — ESTROGEN,CON/M-PROGEST ACET 0.45-1.5MG
TABLET ORAL
Qty: 28 TABLET | Refills: 3 | Status: SHIPPED | OUTPATIENT
Start: 2020-08-10 | End: 2021-03-16

## 2020-08-10 NOTE — TELEPHONE ENCOUNTER
Routing refill request to provider for review/approval because:  Would like Provider to decide. Thank you.  Michele Frank RN

## 2020-08-14 ENCOUNTER — HOSPITAL ENCOUNTER (OUTPATIENT)
Dept: PHYSICAL THERAPY | Facility: CLINIC | Age: 70
Setting detail: THERAPIES SERIES
End: 2020-08-14
Attending: PHYSICIAN ASSISTANT
Payer: COMMERCIAL

## 2020-08-14 PROCEDURE — 97140 MANUAL THERAPY 1/> REGIONS: CPT | Mod: GP | Performed by: PHYSICAL THERAPIST

## 2020-09-04 ENCOUNTER — HOSPITAL ENCOUNTER (OUTPATIENT)
Dept: PHYSICAL THERAPY | Facility: CLINIC | Age: 70
Setting detail: THERAPIES SERIES
End: 2020-09-04
Attending: PHYSICIAN ASSISTANT
Payer: COMMERCIAL

## 2020-09-04 PROCEDURE — 97140 MANUAL THERAPY 1/> REGIONS: CPT | Mod: GP | Performed by: PHYSICAL THERAPIST

## 2020-09-08 ENCOUNTER — HOSPITAL ENCOUNTER (OUTPATIENT)
Dept: PHYSICAL THERAPY | Facility: CLINIC | Age: 70
Setting detail: THERAPIES SERIES
End: 2020-09-08
Attending: PHYSICIAN ASSISTANT
Payer: COMMERCIAL

## 2020-09-08 PROCEDURE — 97140 MANUAL THERAPY 1/> REGIONS: CPT | Mod: GP | Performed by: PHYSICAL THERAPIST

## 2020-09-15 ENCOUNTER — HOSPITAL ENCOUNTER (OUTPATIENT)
Dept: PHYSICAL THERAPY | Facility: CLINIC | Age: 70
Setting detail: THERAPIES SERIES
End: 2020-09-15
Attending: PHYSICIAN ASSISTANT
Payer: COMMERCIAL

## 2020-09-15 PROCEDURE — 97140 MANUAL THERAPY 1/> REGIONS: CPT | Mod: GP | Performed by: PHYSICAL THERAPIST

## 2020-09-25 ENCOUNTER — HOSPITAL ENCOUNTER (OUTPATIENT)
Dept: PHYSICAL THERAPY | Facility: CLINIC | Age: 70
Setting detail: THERAPIES SERIES
End: 2020-09-25
Attending: PHYSICIAN ASSISTANT
Payer: COMMERCIAL

## 2020-09-25 PROCEDURE — 97140 MANUAL THERAPY 1/> REGIONS: CPT | Mod: GP | Performed by: PHYSICAL THERAPIST

## 2020-09-29 ENCOUNTER — HOSPITAL ENCOUNTER (OUTPATIENT)
Dept: PHYSICAL THERAPY | Facility: CLINIC | Age: 70
Setting detail: THERAPIES SERIES
End: 2020-09-29
Attending: PHYSICIAN ASSISTANT
Payer: COMMERCIAL

## 2020-09-29 PROCEDURE — 97140 MANUAL THERAPY 1/> REGIONS: CPT | Mod: GP | Performed by: PHYSICAL THERAPIST

## 2020-10-09 ENCOUNTER — HOSPITAL ENCOUNTER (OUTPATIENT)
Dept: PHYSICAL THERAPY | Facility: CLINIC | Age: 70
Setting detail: THERAPIES SERIES
End: 2020-10-09
Attending: PHYSICIAN ASSISTANT
Payer: COMMERCIAL

## 2020-10-09 PROCEDURE — 97140 MANUAL THERAPY 1/> REGIONS: CPT | Mod: GP | Performed by: PHYSICAL THERAPIST

## 2020-10-16 ENCOUNTER — HOSPITAL ENCOUNTER (OUTPATIENT)
Dept: PHYSICAL THERAPY | Facility: CLINIC | Age: 70
Setting detail: THERAPIES SERIES
End: 2020-10-16
Attending: PHYSICIAN ASSISTANT
Payer: COMMERCIAL

## 2020-10-16 PROCEDURE — 97140 MANUAL THERAPY 1/> REGIONS: CPT | Mod: GP | Performed by: PHYSICAL THERAPIST

## 2020-10-16 PROCEDURE — 97110 THERAPEUTIC EXERCISES: CPT | Mod: GP | Performed by: PHYSICAL THERAPIST

## 2020-10-16 NOTE — PROGRESS NOTES
PHYSICAL THERAPY PROGRESS NOTE  10/16/20 1000   Signing Clinician's Name / Credentials   Signing clinician's name / credentials Megan Basilio, PT, DPT, OCS   Session Number   Session Number 10 [employer]   Progress Note/Recertification   Progress Note Due Date 11/27/20   Adult Goals   PT Ortho Eval Goals 1;2;3   Ortho Goal 1   Goal Identifier 1   Goal Description pt will be able to stand 30 minutes without needing to sit   Target Date 11/27/20   Date Met   (approx 5 min max)   Ortho Goal 2   Goal Identifier 2   Goal Description pt will be able to sit 1 hour without needing to stand   Target Date 11/27/20   Date Met   (still painful in the low back)   Ortho Goal 3   Goal Identifier 3   Goal Description  pt will be able to lift granchild   Target Date 11/27/20   Date Met   (hasn't done, able to lift laundry and groceries)   Subjective Report   Subjective Report Has a new pain, located on the outside of the R hip. Hurst when she stands or walks, can't place all her weight on the that leg, also can't pivot on that leg. Sitting or driving doesn't bother it.  Because of her hip, her back bothers her.   Objective Measures   Objective Measures Objective Measure 1;Objective Measure 2   Objective Measure 1   Objective Measure Palpation   Details tender greater trochanter, prox ITB   Objective Measure 2   Objective Measure Special tests   Details Hip PROM WNL and non-painful, - FADIR, + ALISSON for lateral hip pain, - scour. painful with resisted hip abduction in adducted position.    Treatment Interventions   Interventions Manual Therapy;Self Care/Home Management;Therapeutic Procedure/Exercise   Therapeutic Procedure/exercise   Therapeutic Procedures: strength, endurance, ROM, flexibillity minutes (70133) 10   Skilled Intervention HEP instruction for new sx   Patient Response demo understanding   Treatment Detail instr pt in use of rolling pin at home for MFR of the ITB. KT tape ITB.    Manual Therapy   Manual Therapy:  Mobilization, MFR, MLD, friction massage minutes (44214) 8   Skilled Intervention MT to improve mobility, decrease pain   Patient Response very tender post greater trochanter   Treatment Detail STM/TPR R gluteals, ITB.    Self Care/home Management   ADL/Home Mgmt Training (50662) 5   Skilled Intervention home pain mgmt   Patient Response verbalized understanding   Treatment Detail instr pt in use of rest and use of AD to dec pressure in the R lateral hip. icing instructions also given.    Assessments Completed   Assessments Completed new sx appear consistent with greater trochateric bursitis, will continue to monitor and refer to physician if not improving   Plan   Home program above ex   Updates to plan of care every other week for 6 weeks and reassess   Plan for next session f/u in 2 weeks   Comments   Comments 7 min assessment   Total Session Time   Timed Code Treatment Minutes 23   Total Treatment Time (sum of timed and untimed services) 30   AMBULATORY CLINICS ONLY-MEDICAL AND TREATMENT DIAGNOSIS   PT Diagnosis back pain, AS       Please contact me with any questions or concerns.  Thank you for your referral.    Megan Basilio, PT, DPT, OCS  Physical Therapist, Orthopedic Certified Specialist    [facility]  [address] [address]  [email or web address]   Office: [phone]   Employed by [facility]

## 2020-10-27 ENCOUNTER — HOSPITAL ENCOUNTER (OUTPATIENT)
Dept: PHYSICAL THERAPY | Facility: CLINIC | Age: 70
Setting detail: THERAPIES SERIES
End: 2020-10-27
Attending: PHYSICIAN ASSISTANT
Payer: COMMERCIAL

## 2020-10-27 PROCEDURE — 97110 THERAPEUTIC EXERCISES: CPT | Mod: GP | Performed by: PHYSICAL THERAPIST

## 2020-11-05 ENCOUNTER — HOSPITAL ENCOUNTER (OUTPATIENT)
Dept: PHYSICAL THERAPY | Facility: CLINIC | Age: 70
Setting detail: THERAPIES SERIES
End: 2020-11-05
Attending: PHYSICIAN ASSISTANT
Payer: COMMERCIAL

## 2020-11-05 PROCEDURE — 97140 MANUAL THERAPY 1/> REGIONS: CPT | Mod: GP | Performed by: PHYSICAL THERAPIST

## 2020-12-15 ENCOUNTER — HOSPITAL ENCOUNTER (OUTPATIENT)
Dept: PHYSICAL THERAPY | Facility: CLINIC | Age: 70
Setting detail: THERAPIES SERIES
End: 2020-12-15
Attending: PHYSICIAN ASSISTANT
Payer: COMMERCIAL

## 2020-12-15 PROCEDURE — 97110 THERAPEUTIC EXERCISES: CPT | Mod: GP | Performed by: PHYSICAL THERAPIST

## 2021-01-25 ENCOUNTER — OFFICE VISIT (OUTPATIENT)
Dept: DERMATOLOGY | Facility: CLINIC | Age: 71
End: 2021-01-25
Payer: COMMERCIAL

## 2021-01-25 VITALS — OXYGEN SATURATION: 96 % | HEART RATE: 75 BPM | DIASTOLIC BLOOD PRESSURE: 72 MMHG | SYSTOLIC BLOOD PRESSURE: 143 MMHG

## 2021-01-25 DIAGNOSIS — D23.9 DERMAL NEVUS: ICD-10-CM

## 2021-01-25 DIAGNOSIS — Z85.828 HISTORY OF SKIN CANCER: ICD-10-CM

## 2021-01-25 DIAGNOSIS — L82.1 SEBORRHEIC KERATOSIS: Primary | ICD-10-CM

## 2021-01-25 DIAGNOSIS — L81.4 LENTIGO: ICD-10-CM

## 2021-01-25 DIAGNOSIS — L82.0 INFLAMED SEBORRHEIC KERATOSIS: ICD-10-CM

## 2021-01-25 DIAGNOSIS — D18.01 ANGIOMA OF SKIN: ICD-10-CM

## 2021-01-25 PROCEDURE — 17110 DESTRUCTION B9 LES UP TO 14: CPT | Performed by: DERMATOLOGY

## 2021-01-25 PROCEDURE — 99213 OFFICE O/P EST LOW 20 MIN: CPT | Mod: 25 | Performed by: DERMATOLOGY

## 2021-01-25 NOTE — PROGRESS NOTES
Tri Ingram is an extremely pleasant 71 year old year old female patient here today for itching spot on right wrist and r cheek.  .   Patient states this has been present for a while.  Patient reports the following symptoms:  itching.  Patient reports the following previous treatments none.  These treatments did not work.  Patient reports the following modifying factors none.  Associated symptoms: none.  Patient has no other skin complaints today.  Remainder of the HPI, Meds, PMH, Allergies, FH, and SH was reviewed in chart.      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     Squamous cell carcinoma      Vestibular neuritis        Past Surgical History:   Procedure Laterality Date     COLONOSCOPY  2001, 2007     COLONOSCOPY N/A 11/3/2017    Procedure: COLONOSCOPY;  Colonoscopy  ;  Surgeon: Lg Guerrero MD;  Location: [facility] NONSPECIFIC PROCEDURE      Bowen's disease removed X 2        Family History   Problem Relation Age of Onset     Eye Disorder Mother         glaucoma, wet macular degeneration     Lipids Mother      Gynecology Mother         hyst     Melanoma Mother      Skin Cancer Mother      Cancer Father         colon /prostate     Circulatory Father         amputee     Diabetes Father         type II     C.A.D. Father         MIs     Eye Disorder Father         dry macular degeneration     Cardiovascular Father         small AAA     Melanoma Father      Arthritis Sister      Gynecology Sister         partial hyst  fibrous sheaths on ovary egg sized polyp uterine removed     Lipids Sister      Neurologic Disorder Sister          migraines     Cardiovascular Brother         large 7 cm AAA     Coronary Artery Disease Brother         MI     Asthma Brother      Gastrointestinal Disease Paternal Grandmother          from hepatitis       Social History     Socioeconomic History     Marital status:      Spouse name: Not on file     Number of children: Not on file     Years of education: Not on file     Highest education level: Not on file   Occupational History     Occupation: RETIRED   Social Needs     Financial resource strain: Not on file     Food insecurity     Worry: Not on file     Inability: Not on file     Transportation needs     Medical: Not on file     Non-medical: Not on file   Tobacco Use     Smoking status: Former Smoker     Packs/day: 0.50     Years: 37.00     Pack years: 18.50     Types: Cigarettes     Quit date: 10/20/2007     Years since quittin.2     Smokeless tobacco: Never Used   Substance and Sexual Activity     Alcohol use: Yes     Comment: minimal     Drug use: No     Sexual activity: Yes     Partners: Male     Comment: menopause   Lifestyle     Physical activity     Days per week: Not on file     Minutes per session: Not on file     Stress: Not on file   Relationships     Social connections     Talks on phone: Not on file     Gets together: Not on file     Attends Roman Catholic service: Not on file     Active member of club or organization: Not on file     Attends meetings of clubs or organizations: Not on file     Relationship status: Not on file     Intimate partner violence     Fear of current or ex partner: Not on file     Emotionally abused: Not on file     Physically abused: Not on file     Forced sexual activity: Not on file   Other Topics Concern      Service No     Blood Transfusions No     Caffeine Concern No     Occupational Exposure No     Hobby Hazards No     Sleep Concern Yes     Comment: arthritis     Stress Concern Yes     Weight Concern No     Special Diet Yes     Comment: Karin      "Back Care Yes     Comment: arthritis     Exercise Yes     Bike Helmet No     Seat Belt Yes     Self-Exams Yes     Parent/sibling w/ CABG, MI or angioplasty before 65F 55M? Yes     Comment: brother MI at age 50   Social History Narrative    Caffeine intake/servings daily - 0    Calcium intake/servings daily - 4 occ. calcium supplement    Exercise 7 times weekly - describe  walking    Sunscreen used - No    Seatbelts used - Yes    Guns stored in the home - No    Self Breast Exam - Yes    Pap test up to date -  Yes    Eye exam up to date -  Yes    Dental exam up to date -  No    DEXA scan up to date -  Yes, last done 5/4/04    Flex Sig/Colonoscopy up to date -  Yes, at age 52 \"normal\"    Mammography up to date - 8/18/06    Immunizations reviewed and up to date - unknown last tetanus shot    Abuse: Current or Past (Physical, Sexual or Emotional) - No    Do you feel safe in your environment - Yes    Do you cope well with stress - Yes    Do you suffer from insomnia - Yes    Last updated by: Mary Beth Katz 8/28/06 July 23, 2019            ENVIRONMENTAL HISTORY: The [place] a[place] year [place] The home is heated with a gas furnace. They do have central air conditioning. The patient's bedroom is furnished with Indoor plants, fabric window coverings, carpet in bedroom. No pets . There is no history of cockroach or mice infestation. There are no smokers in the house.  The house does not have a basement.        Outpatient Encounter Medications as of 1/25/2021   Medication Sig Dispense Refill     acetaminophen (ARTHRITIS PAIN RELIEF) 650 MG CR tablet Take 1,300 mg by mouth as needed Takes in pm 100 tablet 11     albuterol (PROAIR HFA/PROVENTIL HFA/VENTOLIN HFA) 108 (90 BASE) MCG/ACT Inhaler Inhale 2 puffs into the lungs every 4 hours as needed for shortness of breath / dyspnea or wheezing 1 Inhaler 1     ASPIRIN 81 MG OR TABS ONE DAILY 100 3     calcium carb 1250 mg, 500 mg [place],/vitamin D 200 " units (OSCAL WITH D) 500-200 MG-UNIT per tablet Take 1 tablet by mouth daily.       estrogen conj-medroxyPROGESTERone (PREMPRO) 0.45-1.5 MG tablet TAKE ONE TABLET BY MOUTH THREE TIMES WEEKLY OR AS NEEDED TO CONTROL HOT FLASHES/MENOPAUSAL SYMPTOMS 28 tablet 3     fluticasone (FLONASE) 50 MCG/ACT nasal spray Spray 2 sprays into both nostrils daily 16 g 3     ibuprofen 200 MG capsule Take 1,000 mg by mouth 2 times daily        lovastatin (MEVACOR) 40 MG tablet TAKE ONE TABLET BY MOUTH EVERY NIGHT AT BEDTIME 90 tablet 3     Lutein 20 MG CAPS        MAGNESIUM PO Take 250 mg by mouth daily        Multiple Vitamins-Minerals (ICAPS) CAPS Take 1 capsule by mouth daily       order for DME Equipment being ordered: Dynaflex insert 2 Units 0     order for DME Equipment being ordered: Nebulizer 1 Device 0     benzonatate (TESSALON) 200 MG capsule Take 1 capsule (200 mg) by mouth 3 times daily as needed for cough (Patient not taking: Reported on 7/29/2020) 30 capsule 0     No facility-administered encounter medications on file as of 1/25/2021.              Review Of Systems  Skin: As above  Eyes: negative  Ears/Nose/Throat: negative  Respiratory: No shortness of breath, dyspnea on exertion, cough, or hemoptysis  Cardiovascular: negative  Gastrointestinal: negative  Genitourinary: negative  Musculoskeletal: negative  Neurologic: negative  Psychiatric: negative  Hematologic/Lymphatic/Immunologic: negative  Endocrine: negative      O:   NAD, WDWN, Alert & Oriented, Mood & Affect wnl, Vitals stable   Here today alone   BP (!) 143/72   Pulse 75   SpO2 96%    General appearance normal   Vitals stable   Alert, oriented and in no acute distress      Following lymph nodes palpated: Occipital, Cervical, Supraclavicular no lad   R wrist and r cheek inflamed seborrheic keratosis      Stuck on papules and brown macules on trunk and ext   Red papules on trunk  Flesh colored papules on trunk     The remainder of the full exam was normal; the  following areas were examined:  conjunctiva/lids, oral mucosa, neck, peripheral vascular system, abdomen, lymph nodes, digits/nails, eccrine and apocrine glands, scalp/hair, face, neck, chest, abdomen, buttocks, back, RUE, LUE, RLE, LLE       Eyes: Conjunctivae/lids:Normal     ENT: Lips, buccal mucosa, tongue: normal    MSK:Normal    Cardiovascular: peripheral edema none    Pulm: Breathing Normal    Lymph Nodes: No Head and Neck Lymphadenopathy     Neuro/Psych: Orientation:Alert and Orientedx3 ; Mood/Affect:normal       A/P:  1. Seborrheic keratosis, lentigo, angioma, dermal nevus, hx of non-melanoma skin cancer   2. Inflamed seborrheic keratosis x2  LN2:  Treated with LN2 for 5s for 1-2 cycles. Warned risks of blistering, pain, pigment change, scarring, and incomplete resolution.  Advised patient to return if lesions do not completely resolve.  Wound care sheet given.    It was a pleasure speaking to Tri Ingram today.  BENIGN LESIONS DISCUSSED WITH PATIENT:  I discussed the specifics of tumor, prognosis, and genetics of benign lesions.  I explained that treatment of these lesions would be purely cosmetic and not medically neccessary.  I discussed with patient different removal options including excision, cautery and /or laser.      Nature and genetics of benign skin lesions dicussed with patient.  Signs and Symptoms of skin cancer discussed with patient.  Patient encouraged to perform monthly skin exams.  UV precautions reviewed with patient.  Patient to follow up with Primary Care provider regarding elevated blood pressure.  Skin care regimen reviewed with patient: Eliminate harsh soaps, i.e. Dial, zest, irsih spring; Mild soaps such as Cetaphil or Dove sensitive skin, avoid hot or cold showers, aggressive use of emollients including vanicream, cetaphil or cerave discussed with patient.    Risks of non-melanoma skin cancer discussed with patient   Return to clinic 12 months

## 2021-01-25 NOTE — LETTER
1/25/2021         RE: Tri Ingram  [address] [facility] [id number]        Dear Colleague,    Thank you for referring your patient, Tri Ingram, to the [facility]. Please see a copy of my visit note below.    Tri Ingram is an extremely pleasant 71 year old year old female patient here today for itching spot on right wrist and r cheek.  .   Patient states this has been present for a while.  Patient reports the following symptoms:  itching.  Patient reports the following previous treatments none.  These treatments did not work.  Patient reports the following modifying factors none.  Associated symptoms: none.  Patient has no other skin complaints today.  Remainder of the HPI, Meds, PMH, Allergies, FH, and SH was reviewed in chart.      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     Squamous cell carcinoma      Vestibular neuritis        Past Surgical History:   Procedure Laterality Date     COLONOSCOPY  2001, 2007     COLONOSCOPY N/A 11/3/2017    Procedure: COLONOSCOPY;  Colonoscopy  ;  Surgeon: Lg Guerrero MD;  [facility]: [facility] NONSPECIFIC PROCEDURE      Bowen's disease removed X 2        Family History   Problem Relation Age of Onset     Eye Disorder Mother         glaucoma, wet macular degeneration     Lipids Mother      Gynecology Mother         hyst     Melanoma Mother      Skin Cancer Mother      Cancer Father         colon /prostate     Circulatory Father         amputee     Diabetes Father         type II     C.A.D. Father         MIs     Eye Disorder Father         dry macular  degeneration     Cardiovascular Father         small AAA     Melanoma Father      Arthritis Sister      Gynecology Sister         partial hyst  fibrous sheaths on ovary egg sized polyp uterine removed     Lipids Sister      Neurologic Disorder Sister         migraines     Cardiovascular Brother         large 7 cm AAA     Coronary Artery Disease Brother         MI     Asthma Brother      Gastrointestinal Disease Paternal Grandmother          from hepatitis       Social History     Socioeconomic History     Marital status:      Spouse name: Not on file     Number of children: Not on file     Years of education: Not on file     Highest education level: Not on file   Occupational History     Occupation: RETIRED   Social Needs     Financial resource strain: Not on file     Food insecurity     Worry: Not on file     Inability: Not on file     Transportation needs     Medical: Not on file     Non-medical: Not on file   Tobacco Use     Smoking status: Former Smoker     Packs/day: 0.50     Years: 37.00     Pack years: 18.50     Types: Cigarettes     Quit date: 10/20/2007     Years since quittin.2     Smokeless tobacco: Never Used   Substance and Sexual Activity     Alcohol use: Yes     Comment: minimal     Drug use: No     Sexual activity: Yes     Partners: Male     Comment: menopause   Lifestyle     Physical activity     Days per week: Not on file     Minutes per session: Not on file     Stress: Not on file   Relationships     Social connections     Talks on phone: Not on file     Gets together: Not on file     Attends Mu-ism service: Not on file     Active member of club or organization: Not on file     Attends meetings of clubs or organizations: Not on file     Relationship status: Not on file     Intimate partner violence     Fear of current or ex partner: Not on file     Emotionally abused: Not on file     Physically abused: Not on file     Forced sexual activity: Not on file   Other Topics Concern  "     Service No     Blood Transfusions No     Caffeine Concern No     Occupational Exposure No     Hobby Hazards No     Sleep Concern Yes     Comment: arthritis     Stress Concern Yes     Weight Concern No     Special Diet Yes     Comment: [place]     Back Care Yes     Comment: arthritis     Exercise Yes     Bike Helmet No     Seat Belt Yes     Self-Exams Yes     Parent/sibling w/ CABG, MI or angioplasty before 65F 55M? Yes     Comment: brother MI at age 50   Social History Narrative    Caffeine intake/servings daily - 0    Calcium intake/servings daily - 4 occ. calcium supplement    Exercise 7 times weekly - describe  walking    Sunscreen used - No    Seatbelts used - Yes    Guns stored in the home - No    Self Breast Exam - Yes    Pap test up to date -  Yes    Eye exam up to date -  Yes    Dental exam up to date -  No    DEXA scan up to date -  Yes, last done 5/4/04    Flex Sig/Colonoscopy up to date -  Yes, at age 52 \"normal\"    Mammography up to date - 8/18/06    Immunizations reviewed and up to date - unknown last tetanus shot    Abuse: Current or Past (Physical, Sexual or Emotional) - No    Do you feel safe in your environment - Yes    Do you cope well with stress - Yes    Do you suffer from insomnia - Yes    Last updated by: Mary Beth Katz 8/28/06 July 23, 2019            ENVIRONMENTAL HISTORY: The family [place] a[place] year [place] The home is heated with a gas furnace. They do have central air conditioning. The patient's bedroom is furnished with Indoor plants, fabric window coverings, carpet in bedroom. No pets . There is no history of cockroach or mice infestation. There are no smokers in the house.  The house does not have a basement.        Outpatient Encounter Medications as of 1/25/2021   Medication Sig Dispense Refill     acetaminophen (ARTHRITIS PAIN RELIEF) 650 MG CR tablet Take 1,300 mg by mouth as needed Takes in pm 100 tablet 11     albuterol (PROAIR HFA/PROVENTIL " HFA/VENTOLIN HFA) 108 (90 BASE) MCG/ACT Inhaler Inhale 2 puffs into the lungs every 4 hours as needed for shortness of breath / dyspnea or wheezing 1 Inhaler 1     ASPIRIN 81 MG OR TABS ONE DAILY 100 3     calcium carb 1250 mg, 500 mg [place],/vitamin D 200 units (OSCAL WITH D) 500-200 MG-UNIT per tablet Take 1 tablet by mouth daily.       estrogen conj-medroxyPROGESTERone (PREMPRO) 0.45-1.5 MG tablet TAKE ONE TABLET BY MOUTH THREE TIMES WEEKLY OR AS NEEDED TO CONTROL HOT FLASHES/MENOPAUSAL SYMPTOMS 28 tablet 3     fluticasone (FLONASE) 50 MCG/ACT nasal spray Spray 2 sprays into both nostrils daily 16 g 3     ibuprofen 200 MG capsule Take 1,000 mg by mouth 2 times daily        lovastatin (MEVACOR) 40 MG tablet TAKE ONE TABLET BY MOUTH EVERY NIGHT AT BEDTIME 90 tablet 3     Lutein 20 MG CAPS        MAGNESIUM PO Take 250 mg by mouth daily        Multiple Vitamins-Minerals (ICAPS) CAPS Take 1 capsule by mouth daily       order for DME Equipment being ordered: Dynaflex insert 2 Units 0     order for DME Equipment being ordered: Nebulizer 1 Device 0     benzonatate (TESSALON) 200 MG capsule Take 1 capsule (200 mg) by mouth 3 times daily as needed for cough (Patient not taking: Reported on 7/29/2020) 30 capsule 0     No facility-administered encounter medications on file as of 1/25/2021.              Review Of Systems  Skin: As above  Eyes: negative  Ears/Nose/Throat: negative  Respiratory: No shortness of breath, dyspnea on exertion, cough, or hemoptysis  Cardiovascular: negative  Gastrointestinal: negative  Genitourinary: negative  Musculoskeletal: negative  Neurologic: negative  Psychiatric: negative  Hematologic/Lymphatic/Immunologic: negative  Endocrine: negative      O:   NAD, WDWN, Alert & Oriented, Mood & Affect wnl, Vitals stable   Here today alone   BP (!) 143/72   Pulse 75   SpO2 96%    General appearance normal   Vitals stable   Alert, oriented and in no acute distress      Following lymph nodes palpated:  Occipital, Cervical, Supraclavicular no lad   R wrist and r cheek inflamed seborrheic keratosis      Stuck on papules and brown macules on trunk and ext   Red papules on trunk  Flesh colored papules on trunk     The remainder of the full exam was normal; the following areas were examined:  conjunctiva/lids, oral mucosa, neck, peripheral vascular system, abdomen, lymph nodes, digits/nails, eccrine and apocrine glands, scalp/hair, face, neck, chest, abdomen, buttocks, back, RUE, LUE, RLE, LLE       Eyes: Conjunctivae/lids:Normal     ENT: Lips, buccal mucosa, tongue: normal    MSK:Normal    Cardiovascular: peripheral edema none    Pulm: Breathing Normal    Lymph Nodes: No Head and Neck Lymphadenopathy     Neuro/Psych: Orientation:Alert and Orientedx3 ; Mood/Affect:normal       A/P:  1. Seborrheic keratosis, lentigo, angioma, dermal nevus, hx of non-melanoma skin cancer   2. Inflamed seborrheic keratosis x2  LN2:  Treated with LN2 for 5s for 1-2 cycles. Warned risks of blistering, pain, pigment change, scarring, and incomplete resolution.  Advised patient to return if lesions do not completely resolve.  Wound care sheet given.    It was a pleasure speaking to Tri Ingram today.  BENIGN LESIONS DISCUSSED WITH PATIENT:  I discussed the specifics of tumor, prognosis, and genetics of benign lesions.  I explained that treatment of these lesions would be purely cosmetic and not medically neccessary.  I discussed with patient different removal options including excision, cautery and /or laser.      Nature and genetics of benign skin lesions dicussed with patient.  Signs and Symptoms of skin cancer discussed with patient.  Patient encouraged to perform monthly skin exams.  UV precautions reviewed with patient.  Patient to follow up with Primary Care provider regarding elevated blood pressure.  Skin care regimen reviewed with patient: Eliminate harsh soaps, i.e. Dial, zest, irsih spring; Mild soaps such as Cetaphil or  Dove sensitive skin, avoid hot or cold showers, aggressive use of emollients including vanicream, cetaphil or cerave discussed with patient.    Risks of non-melanoma skin cancer discussed with patient   Return to clinic 12 months        Again, thank you for allowing me to participate in the care of your patient.        Sincerely,        Harris Good MD

## 2021-01-25 NOTE — NURSING NOTE
Chief Complaint   Patient presents with     Skin Check       Vitals:    01/25/21 0944   BP: (!) 143/72   Pulse: 75   SpO2: 96%     Wt Readings from Last 1 Encounters:   07/29/20 93.9 kg (207 lb)       Carly Franklin LPN.................1/25/2021

## 2021-01-25 NOTE — PATIENT INSTRUCTIONS
WOUND CARE INSTRUCTIONS   FOR CRYOSURGERY   Face & arm  This area treated with liquid nitrogen should form a blister (areas treated may or may not blister-skin may just turn dark and slough off). You do not need to bandage the area unless a blister forms and breaks (which may be a few days). When the blister breaks, begin daily dressing changes as follows:  1) Clean and dry the area with tap water using clean Q-tip or sterile gauze pad.   2) Apply Polysporin ointment or Bacitracin ointment over entire wound. Do NOT use Neosporin ointment.   3) Cover the wound with a band-aid or sterile non-stick gauze pad and micropore paper tape.   REPEAT THESE INSTRUCTIONS AT LEAST ONCE A DAY UNTIL THE WOUND HAS COMPLETELY HEALED.   It is an old wives tale that a wound heals better when it is exposed to air and allowed to dry out. The wound will heal faster with a better cosmetic result if it is kept moist with ointment and covered with a bandage.   Do not let the wound dry out.   IMPORTANT INFORMATION ON REVERSE SIDE   Supplies Needed:   *Cotton tipped applicators (Q-tips)   *Polysporin ointment or Bacitracin ointment (NOT NEOSPORIN)   *Band-aids, or non stick gauze pads and micropore paper tape   PATIENT INFORMATION   During the healing process you will notice a number of changes. All wounds develop a small halo of redness surrounding the wound. This means healing is occurring. Severe itching with extensive redness usually indicates sensitivity to the ointment or bandage tape used to dress the wound. You should call our office if this develops.   Swelling and/or discoloration around your surgical site is common, particularly when performed around the eye.   All wounds normally drain. The larger the wound the more drainage there will be. After 7-10 days, you will notice the wound beginning to shrink and new skin will begin to grow. The wound is healed when you can see skin has formed over the entire area. A healed wound has a  healthy, shiny look to the surface and is red to dark pink in color to normalize. Wounds may take approximately 4-6 weeks to heal. Larger wounds may take 6-8 weeks. After the wound is healed you may discontinue dressing changes.   You may experience a sensation of tightness as your wound heals. This is normal and will gradually subside.   Your healed wound may be sensitive to temperature changes. This sensitivity improves with time, but if you re having a lot of discomfort, try to avoid temperature extremes.   Patients frequently experience itching after their wound appears to have healed because of the continue healing under the skin. Plain Vaseline will help relieve the itching.

## 2021-03-04 ENCOUNTER — TELEPHONE (OUTPATIENT)
Dept: FAMILY MEDICINE | Facility: CLINIC | Age: 71
End: 2021-03-04

## 2021-03-04 DIAGNOSIS — Z13.6 CARDIOVASCULAR SCREENING; LDL GOAL LESS THAN 160: Primary | ICD-10-CM

## 2021-03-04 NOTE — TELEPHONE ENCOUNTER
Reason for Call: Request for an order or referral:    Order or referral being requested: fasting labs    Date needed: as soon as possible    Has the patient been seen by the PCP for this problem? YES    Additional comments: Patient wants all labs done with physical appointment     Phone number Patient can be reached at:  [phone]  Best Time:  any    Can we leave a detailed message on this number?  YES    Call taken on 3/4/2021 at 2:28 PM by Bisi Alicia

## 2021-03-12 DIAGNOSIS — E78.5 HYPERLIPIDEMIA LDL GOAL <130: ICD-10-CM

## 2021-03-12 DIAGNOSIS — R73.09 ELEVATED GLUCOSE: ICD-10-CM

## 2021-03-12 DIAGNOSIS — Z13.6 CARDIOVASCULAR SCREENING; LDL GOAL LESS THAN 160: ICD-10-CM

## 2021-03-12 LAB
ANION GAP SERPL CALCULATED.3IONS-SCNC: 4 MMOL/L (ref 3–14)
BUN SERPL-MCNC: 20 MG/DL (ref 7–30)
CALCIUM SERPL-MCNC: 8.9 MG/DL (ref 8.5–10.1)
CHLORIDE SERPL-SCNC: 107 MMOL/L (ref 94–109)
CHOLEST SERPL-MCNC: 147 MG/DL
CO2 SERPL-SCNC: 27 MMOL/L (ref 20–32)
CREAT SERPL-MCNC: 0.97 MG/DL (ref 0.52–1.04)
GFR SERPL CREATININE-BSD FRML MDRD: 59 ML/MIN/{1.73_M2}
GLUCOSE SERPL-MCNC: 109 MG/DL (ref 70–99)
HBA1C MFR BLD: 5.6 % (ref 0–5.6)
HDLC SERPL-MCNC: 50 MG/DL
LDLC SERPL CALC-MCNC: 60 MG/DL
NONHDLC SERPL-MCNC: 97 MG/DL
POTASSIUM SERPL-SCNC: 4.6 MMOL/L (ref 3.4–5.3)
SODIUM SERPL-SCNC: 138 MMOL/L (ref 133–144)
TRIGL SERPL-MCNC: 186 MG/DL

## 2021-03-12 PROCEDURE — 80048 BASIC METABOLIC PNL TOTAL CA: CPT | Performed by: PHYSICIAN ASSISTANT

## 2021-03-12 PROCEDURE — 80061 LIPID PANEL: CPT | Performed by: PHYSICIAN ASSISTANT

## 2021-03-12 PROCEDURE — 36415 COLL VENOUS BLD VENIPUNCTURE: CPT | Performed by: PHYSICIAN ASSISTANT

## 2021-03-12 PROCEDURE — 83036 HEMOGLOBIN GLYCOSYLATED A1C: CPT | Performed by: PHYSICIAN ASSISTANT

## 2021-03-13 ENCOUNTER — HEALTH MAINTENANCE LETTER (OUTPATIENT)
Age: 71
End: 2021-03-13

## 2021-03-16 ENCOUNTER — OFFICE VISIT (OUTPATIENT)
Dept: FAMILY MEDICINE | Facility: CLINIC | Age: 71
End: 2021-03-16
Payer: COMMERCIAL

## 2021-03-16 VITALS
HEART RATE: 70 BPM | WEIGHT: 213 LBS | HEIGHT: 66 IN | BODY MASS INDEX: 34.23 KG/M2 | TEMPERATURE: 97.4 F | SYSTOLIC BLOOD PRESSURE: 130 MMHG | DIASTOLIC BLOOD PRESSURE: 74 MMHG

## 2021-03-16 DIAGNOSIS — M54.50 CHRONIC BILATERAL LOW BACK PAIN WITHOUT SCIATICA: Primary | ICD-10-CM

## 2021-03-16 DIAGNOSIS — E78.5 HYPERLIPIDEMIA LDL GOAL <130: ICD-10-CM

## 2021-03-16 DIAGNOSIS — N18.31 STAGE 3A CHRONIC KIDNEY DISEASE (H): ICD-10-CM

## 2021-03-16 DIAGNOSIS — G89.29 CHRONIC BILATERAL LOW BACK PAIN WITHOUT SCIATICA: Primary | ICD-10-CM

## 2021-03-16 DIAGNOSIS — N95.1 SYMPTOMATIC MENOPAUSAL OR FEMALE CLIMACTERIC STATES: ICD-10-CM

## 2021-03-16 PROCEDURE — 99214 OFFICE O/P EST MOD 30 MIN: CPT | Performed by: PHYSICIAN ASSISTANT

## 2021-03-16 RX ORDER — ESTROGEN,CON/M-PROGEST ACET 0.45-1.5MG
TABLET ORAL
Qty: 28 TABLET | Refills: 3 | Status: SHIPPED | OUTPATIENT
Start: 2021-03-16 | End: 2022-01-14

## 2021-03-16 RX ORDER — LOVASTATIN 40 MG
TABLET ORAL
Qty: 90 TABLET | Refills: 3 | Status: SHIPPED | OUTPATIENT
Start: 2021-03-16 | End: 2022-01-17

## 2021-03-16 ASSESSMENT — PAIN SCALES - GENERAL: PAINLEVEL: MODERATE PAIN (4)

## 2021-03-16 ASSESSMENT — MIFFLIN-ST. JEOR: SCORE: 1511.88

## 2021-03-16 NOTE — PROGRESS NOTES
"    Assessment & Plan     (M54.5,  G89.29) Chronic bilateral low back pain without sciatica  (primary encounter diagnosis)  Comment: longstanding issue. Has completed a full PT program (was discharged as there was little more to add to program). She has continued to do her exercises two times daily (morning and night). Despite this, back pain worsening and starting to impair day to day activities. Discussed referral to non surgical spine specialist. May need additional imaging but will defer to them  Plan: Orthopedic & Spine  Referral            (E78.5) Hyperlipidemia LDL goal <130  Comment: reviewed recent labs - stable. Refilled medication  Plan: lovastatin (MEVACOR) 40 MG tablet            (N95.1) SYMPTOMATIC FEMALE CLIMACTERIC STATE  Comment: up to date on preventative screening. Has tried to go off several times but mood greatly impacted. Has weaned down to lowest effective dose  Plan: estrogen conj-medroxyPROGESTERone (PREMPRO)         0.45-1.5 MG tablet            (N18.31) Stage 3a chronic kidney disease  Comment: stable  Plan: stable kidney function. Continue to monitor    BMI:   Estimated body mass index is 34.12 kg/m  as calculated from the following:    Height as of this encounter: 1.683 m (5' 6.25\").    Weight as of this encounter: 96.6 kg (213 lb).       Return in about 3 months (around 6/16/2021) for With specialist.    CHEMA Jacobo [facility] MARION Brown is a 71 year old who presents for the following health issues   HPI     Medication Followup of Prempro 0.45-1.5 mg tablet    Taking Medication as prescribed: yes    Side Effects:  None    Medication Helping Symptoms:  yes     Medication Followup of Lovastatin 40 mg tablet     Taking Medication as prescribed: yes    Side Effects:  None    Medication Helping Symptoms:  yes     -She would like to talk about ongoing back pain that has been bothering her more than usual.     -Wanting to talk about labs that " "she had done recently.       1st COVID shot on 3/5 - Moderna  Next due 4/2    Concerned about back  Knows that this last year has been difficult in terms of stress and inability to work out with her regular routine she was doing given gyms have been closed  She and her  recently purchased recumbent bikes to hopefully increase activity without straining her back further  She also states she has continued to do her back exercises from PT two times daily - morning and night  Despite this, her back seems to be getting worse and is impairing her day to day activities at time  She wants to be able to take care of her grandchildren and travel and is worried that if it keeps progressing she won't be able to do that  Worse in the morning when she first gets up - has to stay bent over for several minutes before she can stand up straight  Pain across low back and goes down both butt cheeks  No weakness, no falls    Review of Systems   Constitutional, HEENT, cardiovascular, pulmonary, gi and gu systems are negative, except as otherwise noted.      Objective    /74   Pulse 70   Temp 97.4  F (36.3  C) (Tympanic)   Ht 1.683 m (5' 6.25\")   Wt 96.6 kg (213 lb)   BMI 34.12 kg/m    Body mass index is 34.12 kg/m .  Physical Exam   GENERAL: healthy, alert and no distress  RESP: lungs clear to auscultation - no rales, rhonchi or wheezes  CV: regular rates and rhythm, normal S1 S2, no S3 or S4, no murmur, click or rub and no peripheral edema  Comprehensive back pain exam:  Tenderness of lower lumbar spine, Pain limits the following motions: flexion, extension, Lower extremity strength functional and equal on both sides, Lower extremity reflexes within normal limits bilaterally and Lower extremity sensation normal and equal on both sides    Diagnostic Tests:  Reviewed labs done recently with patient      "

## 2021-03-17 PROBLEM — N18.30 CHRONIC KIDNEY DISEASE, STAGE 3 (H): Status: ACTIVE | Noted: 2021-03-17

## 2021-03-18 NOTE — PROGRESS NOTES
"Tri Ingram  :  1950  DOS: 3/18/2021  MRN: [id number]    Medical Spine Clinic Visit    PCP: Charlene Cain     Tri Ingram is a 71 year old female with a history of obesity, CKD stage 3 and plantar fasciitis referred by Charlene Harper* for: chronic axial low back pain    - Balance problems started spring 2018. Was doing PT  - 2020 - back more and more sore - was doing weight training  - Went to [place] to help son with 3 daughters. THis was taxing - totally worn out  - Was using TENS on low back  - XRay of her back suggested \"bamboo spine\" - was referred for different type of PT for this but she was less active because gyms were closed down due to COVID  - Needed to care for 89 yo mother with broken foot for 2 weeks at a time.   - Doing gladis step at home for 15 minutes at a time  - Cannot walk more than 1300 steps before she needs to stop due to sharp pains in b/l LB at about SIJ bilaterall and a feeling as if legs will give way.  This is a sensation of weakness in bilateral lateral thighs.  - Pain at night when lying on either side  -Does lean on a shopping cart when shopping.  However this is more for balance.  It does not help prevent the back pain and sensation of leg weakness.    Other evaluation and/or treatments so far consists of: Tylenol, Ibuprofen and physical therapy.      Current pain medications:   -Tylenol 1300 mg nightly as needed  -Ibuprofen 1000 mg twice daily    Other pertinent medications:  -She takes a statin    Anticoagulants:  -ASA 81 mg    Injections:   - none    She has never had surgery on her low back    Social History: Lives with .  She is retired.  As above, she has been taking care of her 90-year-old mother intermittently.  Also previously had responsibility for helping with her 3 granddaughters in [place].  She and her  would like to take a cruise to [place].  She is very interested in being able to walk " sufficiently for that trip.    Review of Systems  Musculoskeletal: as above  Remainder of review of systems is negative including constitutional, CV, pulmonary, GI, Skin and Neurologic except as noted in HPI or medical history.    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     Squamous cell carcinoma      Vestibular neuritis      Past Surgical History:   Procedure Laterality Date     COLONOSCOPY  ,      COLONOSCOPY N/A 11/3/2017    Procedure: COLONOSCOPY;  Colonoscopy  ;  Surgeon: Lg Guerrero MD;  Location: [facility]     [facility] NONSPECIFIC PROCEDURE      Bowen's disease removed X 2     Family History   Problem Relation Age of Onset     Eye Disorder Mother         glaucoma, wet macular degeneration     Lipids Mother      Gynecology Mother         hyst     Melanoma Mother      Skin Cancer Mother      Cancer Father         colon /prostate     Circulatory Father         amputee     Diabetes Father         type II     C.A.D. Father         MIs     Eye Disorder Father         dry macular degeneration     Cardiovascular Father         small AAA     Melanoma Father      Arthritis Sister      Gynecology Sister         partial hyst  fibrous sheaths on ovary egg sized polyp uterine removed     Lipids Sister      Neurologic Disorder Sister         migraines     Cardiovascular Brother         large 7 cm AAA     Coronary Artery Disease Brother         MI     Asthma Brother      Gastrointestinal Disease Paternal Grandmother          from hepatitis       Objective  /84   Pulse 72       General: healthy, alert and in no distress      HEENT: no scleral icterus or  conjunctival erythema     Skin: no suspicious lesions or rash. No jaundice.     Resp: normal respiratory effort without conversational dyspnea     Psych: normal mood and affect      Gait: nonantalgic, appropriate coordination and balance     Neuro: normal light touch sensory exam of the extremities. Motor strength as noted below     Low back exam:    THORACIC/LUMBAR SPINE  Inspection:    No redness, swelling, overlying skin change, gross deformity/asymmetry,  Palpation:    Tender about the left para lumbar muscles, right para lumbar muscles, left SI joint and right SI joint, R>L. Otherwise remainder of landmarks are nontender.  Strength:    5/5 - quadriceps, tibialis anterior, gastrocsoleus, and extensor hallicus longus, hip flexion limited to 3/5 strength due to pain bilaterally  Special Tests:    Positive: BILATERAL ALISSON (bilateral), FADIR (bilateral) -not for groin pain but for buttock pain on the right.  Equivocal for groin pain on the left.  Negative: BILATERAL straight leg raise (bilateral)  She has buttock pain with passive hip flexion and hip grind bilaterally  Log roll negative bilaterally     Reflexes:       patellar (L3, L4) symmetric normal       achilles tendons (S1) symmetric normal    Sensation:      grossly intact throughout lower extremities    Skin:       well perfused       capillary refill brisk    Radiology:  No imaging available     Assessment:  1. Chronic bilateral low back pain without sciatica      Possibly facet mediated pain given axial location of the pain, tenderness to palpation of bilateral lumbar facets/paraspinal muscles and exacerbation of pain with most hip movements that were engaging/affecting lumbar musculature and facet joints.  She also has no numbness or tingling.  I think there is enough concern about possible lumbar stenosis given description of limited walking distance before bilateral proximal thighs feel weak/painful that I would like to get an MRI of her low back.  We  will meet recommendations for further management after lumbar MRI.    Plan:  Discussed the assessment with the patient.  MRI of the lumbar spine  1 time PO valium given for the MRI  Follow up: I will call her with the results of the lumbar MRI.  We will go over possible next steps at that time.    BILLING TIME DOCUMENTATION:   The total TIME spent on this patient on the date of the encounter/appointment was 36 minutes.      TOTAL TIME includes:   Time spent preparing to see the patient (reviewing records and tests) - 3 min  Time spent face to face (or over the phone) with the patient - 27 min  Time spent ordering tests, medications, procedures and referrals - 2 min  Time spent Referring and communicating with other healthcare professionals - 1 min  Time spent documenting clinical information in Epic - 3 min     Nadine Tao MD  [facility]     Disclaimer: This note consists of symbols derived from keyboarding, dictation and/or voice recognition software. As a result, there may be errors in the script that have gone undetected. Please consider this when interpreting information found in this chart.  ADDENDUM: called Pt to discuss results of MRI. 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 Pt would like order placed for Lumbar EMANI, though may change her kumar.

## 2021-03-19 ENCOUNTER — TELEPHONE (OUTPATIENT)
Dept: FAMILY MEDICINE | Facility: CLINIC | Age: 71
End: 2021-03-19

## 2021-03-19 NOTE — TELEPHONE ENCOUNTER
Call placed to patient.  Relayed message per M Ant.  Questions answered, patient verbalizes understanding.  Addie Gustafson RN

## 2021-03-19 NOTE — TELEPHONE ENCOUNTER
"This is NOT a new thing - the diagnosis codes are being much more specific.   Her estimated GFR (filtration rate) is 59. In 2018 it was 58 and 52 (we actually rechecked it after the 52)  The creatinine (kidney function) has been normal.     The classification for stage 3 is a GFR of 45-59. Anything 60 or higher is \"normal\"     She is 1 point away from not being \"flagged\" as anything and has been stable at this level for the last 2 years so I am not concerned. Her kidney function is fine and we will continue to monitor this at least yearly as we have been doing    Charlene Cain PA-C    "

## 2021-03-19 NOTE — TELEPHONE ENCOUNTER
Reason for Call:  Request for results: Pt is looking at her mychart and saw that she has chronic kidney disease stage 3 and is very worried about this and she said that the she just talked to the  And the  Didn't say anything to her about this.      Date of test of procedure: 3/16/21    OK to leave the result message on voice mail or with a family member? YES    Phone number Patient can be reached at:  Cell number on file:    Telephone Information:   Mobile [phone]       Additional comments: any time (ASPA)    Call taken on 3/19/2021 at 10:04 AM by Debi Stiles

## 2021-03-22 ENCOUNTER — OFFICE VISIT (OUTPATIENT)
Dept: PALLIATIVE MEDICINE | Facility: CLINIC | Age: 71
End: 2021-03-22
Attending: PHYSICIAN ASSISTANT
Payer: COMMERCIAL

## 2021-03-22 VITALS — SYSTOLIC BLOOD PRESSURE: 122 MMHG | HEART RATE: 72 BPM | DIASTOLIC BLOOD PRESSURE: 84 MMHG

## 2021-03-22 DIAGNOSIS — T50.8X5D ALLERGIC REACTION TO CONTRAST MATERIAL, SUBSEQUENT ENCOUNTER: ICD-10-CM

## 2021-03-22 DIAGNOSIS — M48.062 SPINAL STENOSIS OF LUMBAR REGION WITH NEUROGENIC CLAUDICATION: Primary | ICD-10-CM

## 2021-03-22 DIAGNOSIS — M54.50 CHRONIC BILATERAL LOW BACK PAIN WITHOUT SCIATICA: ICD-10-CM

## 2021-03-22 DIAGNOSIS — T50.8X5S ALLERGIC REACTION TO CONTRAST DYE, SEQUELA: ICD-10-CM

## 2021-03-22 DIAGNOSIS — G89.29 CHRONIC BILATERAL LOW BACK PAIN WITHOUT SCIATICA: ICD-10-CM

## 2021-03-22 PROCEDURE — 99203 OFFICE O/P NEW LOW 30 MIN: CPT | Performed by: STUDENT IN AN ORGANIZED HEALTH CARE EDUCATION/TRAINING PROGRAM

## 2021-03-22 RX ORDER — DIAZEPAM 2 MG
TABLET ORAL
Qty: 2 TABLET | Refills: 0 | Status: SHIPPED | OUTPATIENT
Start: 2021-03-22 | End: 2021-04-27

## 2021-03-22 ASSESSMENT — PAIN SCALES - GENERAL: PAINLEVEL: SEVERE PAIN (7)

## 2021-03-22 NOTE — PATIENT INSTRUCTIONS
1. Schedule MRI of your low back.   [facility] [phone]- please call to schedule appointment    2. Take 1 valium 30 minutes prior to MRI appointment. Only take the 2nd if you get in the tube and start having anxiety.     I will call you with results as soon as I get them.  We can do over next steps when we get the results.     Nadine Tao MD  [facility]    ----------------------------------------------------------------  Clinic Number:  [phone]     Call with any questions about your care and for scheduling assistance.     Calls are returned Monday through Friday between 8 AM and 4:30 PM. We usually get back to you within 2 business days depending on the issue/request.    If we are prescribing your medications:    For opioid medication refills, call the clinic or send a [employer] message 7 days in advance.  Please include:    Name of requested medication    Name of the pharmacy.    For non-opioid medications, call your pharmacy directly to request a refill. Please allow 3-4 days to be processed.     Per [place] State Law:    All controlled substance prescriptions must be filled within 30 days of being written.      For those controlled substances allowing refills, pickup must occur within 30 days of last fill.      We believe regular attendance is key to your success in our program!      Any time you are unable to keep your appointment we ask that you call us at least 24 hours in advance to cancel.This will allow us to offer the appointment time to another patient.     Multiple missed appointments may lead to dismissal from the clinic.

## 2021-03-26 ENCOUNTER — HOSPITAL ENCOUNTER (OUTPATIENT)
Dept: MRI IMAGING | Facility: CLINIC | Age: 71
Discharge: HOME | End: 2021-03-26
Attending: STUDENT IN AN ORGANIZED HEALTH CARE EDUCATION/TRAINING PROGRAM | Admitting: STUDENT IN AN ORGANIZED HEALTH CARE EDUCATION/TRAINING PROGRAM
Payer: COMMERCIAL

## 2021-03-26 DIAGNOSIS — M54.50 CHRONIC BILATERAL LOW BACK PAIN WITHOUT SCIATICA: ICD-10-CM

## 2021-03-26 DIAGNOSIS — G89.29 CHRONIC BILATERAL LOW BACK PAIN WITHOUT SCIATICA: ICD-10-CM

## 2021-03-26 PROCEDURE — 72148 MRI LUMBAR SPINE W/O DYE: CPT

## 2021-03-26 NOTE — RESULT ENCOUNTER NOTE
Called Pt with results. Likely lumbar stenosis with neurogenic claudication due to central narrowing at L4/5 due to spondylolithesis.     Pt would like me to place order for lumbar EMANI.

## 2021-03-29 ENCOUNTER — TELEPHONE (OUTPATIENT)
Dept: PALLIATIVE MEDICINE | Facility: CLINIC | Age: 71
End: 2021-03-29

## 2021-03-29 DIAGNOSIS — T50.8X5S ALLERGIC REACTION TO CONTRAST DYE, SEQUELA: ICD-10-CM

## 2021-03-29 DIAGNOSIS — Z91.041 CONTRAST MEDIA ALLERGY: Primary | ICD-10-CM

## 2021-03-29 DIAGNOSIS — Z20.822 ENCOUNTER FOR LABORATORY TESTING FOR COVID-19 VIRUS: ICD-10-CM

## 2021-03-29 NOTE — TELEPHONE ENCOUNTER
Screening Questions for Radiology Injections:    Injection to be done at which interventional clinic site? [facility]    If [facility] location, tell patient that this procedure requires a COVID-19 lab test be done within 4 days of the procedure. Would you still like to move forward with scheduling the procedure?  YES: Informed    If YES, let patient know that someone will call them to schedule the COVID-19 test and that they will only receive a call back if the result is positive. Route to nursing to enter order.     Instruct patient to arrive as directed prior to the scheduled appointment time:    [place] minutes before      Kristi: 30 minutes before; if IV needed 1 hour before     Procedure ordered by [facility]    Procedure ordered? L4/5 ILESI, left paramedian approach       Transforaminal Cervical EMANI - no pain provider currently performing    As a reminder, receiving steroids can decrease your body's ability to fight infection.   Would you still like to move forward with scheduling the injection?  Yes    What insurance would patient like us to bill for this procedure? BCBS       Worker's comp or MVA (motor vehicle accident) -Any injection DO NOT SCHEDULE and route to Laquita Tineo.      [employer] insurance - For SI joint injections, DO NOT SCHEDULE and route Laquita Tineo.       ALL BCBS, [employer] and [employer]-Route to Laquita for review DO NOT SCHEDULE      IF SCHEDULING IN [place] AND NEEDS A PA, IT IS OKAY TO SCHEDULE. [place] HANDLES THEIR OWN PA'S AFTER THE PATIENT IS SCHEDULED. PLEASE SCHEDULE AT LEAST 1 WEEK OUT SO A PA CAN BE OBTAINED.    Any chance of pregnancy? Not Applicable   If YES, do NOT schedule and route to RN pool    Is an  needed? No     Patient has a drive home? (mandatory) YES: Informed     Is patient taking any blood thinners (i.e. plavix, coumadin, jantoven, warfarin, heparin, pradaxa or dabigatran, etc)? No   If hold needed, do NOT schedule, route to RN  pool     Is patient taking any aspirin products (includes Excedrin and Fiorinal)? Yes - Pt takes 81mg daily; instructed to hold 0 day(s) prior to procedure.      If more than 325mg/day, OK to schedule; Instruct pt to decrease to less than 325 mg for 7 days AND route to RN pool    For CERVICAL procedures, hold all aspirin products for 6 days.     Tell pt that if aspirin product is not held for 6 days, the procedure WILL BE cancelled.      Does the patient have a bleeding or clotting disorder? No     If YES, okay to schedule AND route to RN nurse pool    For any patients with platelet count <100, must be forwarded to provider    Is patient diabetic?  No  If YES, instruct them to bring their glucometer.    Does patient have an active infection or treated for one within the past week? No     Is patient currently taking any antibiotics?  No     For patients on chronic, preventative, or prophylactic antibiotics, procedures may be scheduled.     For patients on antibiotics for active or recent infection:antibiotic course must have been completed for 4 days    Is patient currently taking any steroid medications? (i.e. Prednisone, Medrol)  No     For patients on steroid medications, course must have been completed for 4 days    Is patient actively being treated for cancer or immunocompromised? No  If YES, do NOT schedule and route to RN pool     Are you able to get on and off an exam table with minimal or no assistance? Yes  If NO, do NOT schedule and route to RN pool    Are you able to roll over and lay on your stomach with minimal or no assistance? Yes  If NO, do NOT schedule and route to RN pool     Any allergies to contrast dye, iodine, shellfish, or numbing and steroid medications? No  If YES, route to RN pool AND add allergy information to appointment notes    Allergies: Adhesive tape, Iodine, and Penicillins      Has the patient had a flu shot or any other vaccinations within 7 days before or after the procedure.  Yes  -      Have you recently had a COVID vaccine or have plans to get it in the near future? Yes - 2nd dosage ( 04/02)     If yes, explain that for the vaccine to work best they need to:       wait 1 week before and 1 week after getting Vaccine #1    wait 1 week before and 2 weeks after getting Vaccine #2    If patient has concerns about the timing, send to RN pool     Does patient have an MRI/CT?  YES: 2021  Check Procedure Scheduling Grid to see if required.      Was the MRI done within the last 3 years?  Yes    If yes, where was the MRI done i.e.[facility], [facility], [facility], [facility] etc? [facility]       If no, do not schedule and route to RN pool    If MRI was not done at [facility], [facility] or [facility] do NOT schedule and route to RN pool.      If pt has an imaging disc, the injection MAY be scheduled but pt has to bring disc to appt.     If they show up without the disc the injection cannot be done    Procedure Specific Instructions:      If celiac plexus block, informed patient NPO for 6 hours and that it is okay to take medications with sips of water, especially blood pressure medications  Not Applicable         If this is for a cervical procedure, informed patient that aspirin needs to be held for 6 days.   Not Applicable      If IV needed:    Do not schedule procedures requiring IV placement in the first appointment of the day or first appointment after lunch. Do NOT schedule at 0745, 0815 or 1245.     Instructed pt to arrive 30 minutes early for IV start if required. (Check Procedure Scheduling Grid)  Not Applicable    Reminders:      If you are started on any steroids or antibiotics between now and your appointment, you must contact us because the procedure may need to be cancelled.  No      For all procedures except radiofrequency ablations (RFAs) and spinal cord stimulator (SCS) trials, informed patient:    IV sedation is not provided for this procedure.  If you feel that an oral anti-anxiety  medication is needed, you can discuss this further with your referring provider or primary care provider.  The Pain Clinic provider will discuss specifics of what the procedure includes at your appointment.  Most procedures last 10-20 minutes.  We use numbing medications to help with any discomfort during the procedure.  Not Applicable      For patients 85 or older we recommend having an adult stay w/ them for the remainder of the day.       Does the patient have any questions?  NO  Ratna Bai  [facility]

## 2021-04-23 DIAGNOSIS — Z20.822 ENCOUNTER FOR LABORATORY TESTING FOR COVID-19 VIRUS: ICD-10-CM

## 2021-04-23 LAB
LABORATORY COMMENT REPORT: NORMAL
SARS-COV-2 RNA RESP QL NAA+PROBE: NEGATIVE
SARS-COV-2 RNA RESP QL NAA+PROBE: NORMAL
SPECIMEN SOURCE: NORMAL
SPECIMEN SOURCE: NORMAL

## 2021-04-23 PROCEDURE — U0005 INFEC AGEN DETEC AMPLI PROBE: HCPCS | Performed by: STUDENT IN AN ORGANIZED HEALTH CARE EDUCATION/TRAINING PROGRAM

## 2021-04-23 PROCEDURE — U0003 INFECTIOUS AGENT DETECTION BY NUCLEIC ACID (DNA OR RNA); SEVERE ACUTE RESPIRATORY SYNDROME CORONAVIRUS 2 (SARS-COV-2) (CORONAVIRUS DISEASE [COVID-19]), AMPLIFIED PROBE TECHNIQUE, MAKING USE OF HIGH THROUGHPUT TECHNOLOGIES AS DESCRIBED BY CMS-2020-01-R: HCPCS | Performed by: STUDENT IN AN ORGANIZED HEALTH CARE EDUCATION/TRAINING PROGRAM

## 2021-04-27 ENCOUNTER — MYC MEDICAL ADVICE (OUTPATIENT)
Dept: PALLIATIVE MEDICINE | Facility: CLINIC | Age: 71
End: 2021-04-27

## 2021-04-27 RX ORDER — METHYLPREDNISOLONE 32 MG/1
32 TABLET ORAL 2 TIMES DAILY
Qty: 2 TABLET | Refills: 0 | Status: SHIPPED | OUTPATIENT
Start: 2021-04-27 | End: 2022-01-17

## 2021-04-27 RX ORDER — DIPHENHYDRAMINE HCL 50 MG
50 CAPSULE ORAL ONCE
Qty: 1 CAPSULE | Refills: 0 | Status: SHIPPED | OUTPATIENT
Start: 2021-04-27 | End: 2021-04-27

## 2021-04-27 RX ORDER — METHYLPREDNISOLONE 32 MG/1
32 TABLET ORAL ONCE
Status: CANCELLED | OUTPATIENT
Start: 2021-04-27 | End: 2021-04-27

## 2021-04-27 RX ORDER — LIDOCAINE 40 MG/G
CREAM TOPICAL
Status: CANCELLED | OUTPATIENT
Start: 2021-04-27

## 2021-04-27 RX ORDER — DIPHENHYDRAMINE HCL 25 MG
50 CAPSULE ORAL ONCE
Status: CANCELLED | OUTPATIENT
Start: 2021-04-27 | End: 2021-04-27

## 2021-04-27 NOTE — TELEPHONE ENCOUNTER
See the 3/29/21 telephone encounter for more information.    Laura Becker RN-BSN  [facility]

## 2021-04-27 NOTE — TELEPHONE ENCOUNTER
Due to recent change in protocol pt needed to be pre-medicated for this procedure due to her contrast dye allergy therefore today's scheduled appointment needs to be rescheduled.    Called pt and informed her of the situation.  Pt understood. Spoke w/ pt at length about the process, the injection and expectations.    Pharmacy:  [facility]    She wrote down when to take the medications and the following mychart was sent:      From: Laura Becker RN      Created: 4/27/2021 9:39 AM      Vinicius Brown.  As you know your lumbar epidural steroid injection was rescheduled to Thursday, 4/29/21 @ 215pm. Arrival at 130 for IV start.     Contrast dye allergy pre-medication plan:    0215 AM: take oral Medrol 32mg     1215 pm: take oral Medrol 32mg and benadryl (diphenhydramine) 50mg.           -Note:  Benadryl usually comes in 25mg tabs so it would be 2 tabs.        A  is needed.  Okay to eat and drink before procedure  If you develop and infections and/or are on antibiotics be sure to call us as the injection will need to be rescheduled.     Location:         [address] [address]  ------  Appt line:  [phone]      Let us know if you have any questions and so sorry for the inconvenience.         Routed to Dr. Tao to review and sign off on pre-med meds.    KATHRINE Sanchez-BSN  [facility]

## 2021-04-28 RX ORDER — DIPHENHYDRAMINE HCL 50 MG
50 CAPSULE ORAL ONCE
Qty: 1 CAPSULE | Refills: 0 | Status: CANCELLED | OUTPATIENT
Start: 2021-04-28 | End: 2021-04-28

## 2021-04-28 NOTE — PROGRESS NOTES
Pre procedure Diagnosis: lumbar stenosis with neurogenic claudication  Post procedure Diagnosis: Same  Procedure performed: L4-5 interlaminar epidural steroid injection   Anesthesia: none  Complications: none  Operators: Nadine Tao MD     Indications:   Tri Ingram is a 71 year old female known to me here for L4-5 IL EMANI.  she has a history of sharp pains in her low back bilaterally at the lower lumbar levels.  She also has a sensation of weakness in her bilateral thighs.  She uses a shopping cart when walking in the store, which is substantially helpful.  Exam shows forward flexed gait and she has tried conservative treatment including medications and physical therapy.    MRI was done on 3/26/2021, which showed:   L4-L5: Broad-based disc bulging, moderate to severe facet hypertrophy,  degenerative spondylolisthesis is present resulting in moderate to  severe central canal stenosis and moderate bilateral neural foraminal  stenosis.    Options/alternatives, benefits and risks were discussed with the patient including bleeding, infection, no pain relief, tissue trauma, exposure to radiation, reaction to medications, spinal cord injury, dural puncture, weakness, numbness and headache.  Questions were answered to her satisfaction and she agrees to proceed. Voluntary informed consent was obtained and signed.     Vitals were reviewed: Yes  Allergies were reviewed:  Yes   Medications were reviewed:  Yes   Pre-procedure pain score: 7/10    Procedure:  After getting informed consent, patient was brought into the procedure suite and was placed in a prone position on the procedure table.   A Pause for the Cause was performed.  Patient was prepped and draped in sterile fashion.     The L4/5 interspace was identified with AP fluoroscopy.  A total of 3ml of 1% lidocaine was used to anesthetize the skin for a right paramedian approach.    A 20gauge 4.5inch Touhy needle was advanced under intermittent fluoroscopy.   A ANNA syringe was used to advance the needle into the epidural space.   After loss of resistance, there was no evidence of blood or CSF on aspiration. Location was verified with AP, contralateral oblique and lateral fluoroscopic imaging.    A total of 1ml of Isovue 200 was injected demonstrating appropriate epidural spread and was checked in both the AP and lateral views. 9ml was wasted. There was no evidence of intravascular or intrathecal spread.    THE PATIENT WAS PREMEDICATED with Medrol and Benadryl as per protocol prior to the procedure due to her contrast dye allergy.    2 ml of bupivacaine 0.25% with 40mg of triamcinolone and 0ml of preservative free saline was injected.  The needle was removed from the epidural space, flushed with 1% preservative free lidocaine and removed.     Hemostasis was achieved, the area was cleaned, and bandaids were placed when appropriate.  The patient tolerated the procedure well, and was taken to the recovery room.    Images were saved to PACS.    Post-procedure pain score: 0/10  Follow-up includes:   -f/u with referring provider    Nadine Tao MD  [facility]

## 2021-04-29 ENCOUNTER — HOSPITAL ENCOUNTER (OUTPATIENT)
Dept: PALLIATIVE MEDICINE | Facility: CLINIC | Age: 71
Discharge: HOME | End: 2021-04-29
Attending: STUDENT IN AN ORGANIZED HEALTH CARE EDUCATION/TRAINING PROGRAM | Admitting: STUDENT IN AN ORGANIZED HEALTH CARE EDUCATION/TRAINING PROGRAM
Payer: COMMERCIAL

## 2021-04-29 VITALS — SYSTOLIC BLOOD PRESSURE: 127 MMHG | DIASTOLIC BLOOD PRESSURE: 77 MMHG | RESPIRATION RATE: 16 BRPM | HEART RATE: 68 BPM

## 2021-04-29 DIAGNOSIS — M54.16 LUMBAR RADICULOPATHY: ICD-10-CM

## 2021-04-29 PROCEDURE — 250N000009 HC RX 250: Performed by: STUDENT IN AN ORGANIZED HEALTH CARE EDUCATION/TRAINING PROGRAM

## 2021-04-29 PROCEDURE — 255N000002 HC RX 255 OP 636: Performed by: STUDENT IN AN ORGANIZED HEALTH CARE EDUCATION/TRAINING PROGRAM

## 2021-04-29 PROCEDURE — 250N000011 HC RX IP 250 OP 636: Performed by: STUDENT IN AN ORGANIZED HEALTH CARE EDUCATION/TRAINING PROGRAM

## 2021-04-29 PROCEDURE — 62323 NJX INTERLAMINAR LMBR/SAC: CPT | Performed by: STUDENT IN AN ORGANIZED HEALTH CARE EDUCATION/TRAINING PROGRAM

## 2021-04-29 RX ORDER — BUPIVACAINE HYDROCHLORIDE 2.5 MG/ML
10 INJECTION, SOLUTION INFILTRATION; PERINEURAL ONCE
Status: COMPLETED | OUTPATIENT
Start: 2021-04-29 | End: 2021-04-29

## 2021-04-29 RX ORDER — IOPAMIDOL 408 MG/ML
10 INJECTION, SOLUTION INTRATHECAL ONCE
Status: COMPLETED | OUTPATIENT
Start: 2021-04-29 | End: 2021-04-29

## 2021-04-29 RX ORDER — TRIAMCINOLONE ACETONIDE 40 MG/ML
40 INJECTION, SUSPENSION INTRA-ARTICULAR; INTRAMUSCULAR ONCE
Status: COMPLETED | OUTPATIENT
Start: 2021-04-29 | End: 2021-04-29

## 2021-04-29 RX ADMIN — TRIAMCINOLONE ACETONIDE 40 MG: 40 INJECTION, SUSPENSION INTRA-ARTICULAR; INTRAMUSCULAR at 15:16

## 2021-04-29 RX ADMIN — LIDOCAINE HYDROCHLORIDE 5 ML: 10 INJECTION, SOLUTION EPIDURAL; INFILTRATION; INTRACAUDAL; PERINEURAL at 15:17

## 2021-04-29 RX ADMIN — BUPIVACAINE HYDROCHLORIDE 2 ML: 2.5 INJECTION, SOLUTION EPIDURAL; INFILTRATION; INTRACAUDAL; PERINEURAL at 15:17

## 2021-04-29 RX ADMIN — IOPAMIDOL 1 ML: 408 INJECTION, SOLUTION INTRATHECAL at 15:17

## 2021-04-29 NOTE — DISCHARGE INSTRUCTIONS
[facility]   Procedure Discharge Instructions    Today you saw:    Dr. Nadine Tao    You had a:   Lumbar Epidural Steroid Injection, Interlaminar L4/5    Medications used:  Lidocaine   Bupivacaine   Kenalog   Normal saline   Isovue          Be cautious when walking. Numbness and/or weakness in the lower extremities may occur for up to 6-8 hours after the procedure due to effect of the local anesthetic    Do not drive for 6 hours. The effect of the local anesthetic could slow your reflexes.     You may resume your regular activities after 24 hours    Avoid strenuous activity for the first 24 hours    You may shower, however avoid swimming, tub baths or hot tubs for 24 hours following your procedure    You may have a mild to moderate increase in pain for several days following the injection.    It may take up to 14 days for the steroid medication to start working although you may feel the effect as early as a few days after the procedure.       You may use ice packs for 10-15 minutes, 3 to 4 times a day at the injection site for comfort    Do not use heat to painful areas for 6 to 8 hours. This will give the local anesthetic time to wear off and prevent you from accidentally burning your skin.     Unless you have been directed to avoid the use of anti-inflammatory medications (NSAIDS), you may use medications such as ibuprofen, Aleve or Tylenol for pain control if needed.     If you were fasting, you may resume your normal diet and medications after the procedure    Possible side effects of steroids that you may experience include flushing, elevated blood pressure, increased appetite, mild headaches and restlessness.  All of these symptoms will get better with time.    If you experience any of the following, call the Pain Clinic during work hours (Mon-Friday 8-4:30 pm) at [phone] or the Provider Line after hours at [phone]:  -Fever over 100 degree F  -Swelling, bleeding,  redness, drainage, warmth at the injection site  -Progressive weakness or numbness in your legs   -Loss of bowel or bladder function  -Unusual headache that is not relieved by Tylenol or other pain reliever  -Unusual new onset of pain that is not improving

## 2021-04-29 NOTE — PROGRESS NOTES
Patient has history of Iodine allergy.  Pretreated with Medrol and Benadryl.  Tolerated procedure and meds without any problems.  Patient feeling fine more that 15 minutes after procedure completed.  No redness to skin.  Breathing even and unlabored.  Oxygen saturation 97% on room air.

## 2021-04-29 NOTE — PROGRESS NOTES
Pre-procedure Intake    Have you been fasting? No     If yes, for how long?     Are you taking a prescribed blood thinner such as coumadin, Plavix, Xarelto?    No    If yes, when did you take your last dose?     Do you take aspirin?  No    If cervical procedure, have you held aspirin for 6 days?   NA    Do you have any allergies to contrast dye, iodine, steroid and/or numbing medications?  YES: Iodine, had Medrol 32 mg at 0215 this morning and Medrol 32 mg with Benadryl 50 mg at 1215 today.     Are you currently taking antibiotics or have an active infection?  NO    Have you had a fever/elevated temperature within the past week? NO    Are you currently taking oral steroids? Had Medrol today for appointment.    Do you have a ? Yes       Are you pregnant or breastfeeding?  Not Applicable    Have you received the COVID-19 vaccine? Yes       If yes, was it your 1st, 2nd or only dose needed? 2nd    Date of most recent vaccine: April 2, 2021    Are the vital signs normal?  Yes

## 2021-04-29 NOTE — IP AVS SNAPSHOT
[facility]  [address] [address]  Phone: [phone]  Fax: [phone]                                    After Visit Summary   4/29/2021    Tri Ingram    MRN: [id number]           After Visit Summary Signature Page    I have received my discharge instructions, and my questions have been answered. I have discussed any challenges I see with this plan with the nurse or doctor.    ..........................................................................................................................................  Patient/Patient Representative Signature      ..........................................................................................................................................  Patient Representative Print Name and Relationship to Patient    ..................................................               ................................................  Date                                   Time    ..........................................................................................................................................  Reviewed by Signature/Title    ...................................................              ..............................................  Date                                               Time          22EPIC Rev 08/18

## 2021-04-29 NOTE — IP AVS SNAPSHOT
After Visit Summary Template Not Found    This Print Group is only intended to be used in the After Visit Summary and can only be used in a report that uses a released After Visit Summary Template.                       MRN:[id number]                      After Visit Summary   4/29/2021    Tri Ingram    MRN: [id number]           Visit Information        Provider Department      4/29/2021  2:15 PM Nadine Tao MD; [address] [facility]          Review of your medicines      UNREVIEWED medicines. Ask your doctor about these medicines       Dose / Directions   Arthritis Pain Relief 650 MG CR tablet  Generic drug: acetaminophen      Dose: 1,300 mg  Take 1,300 mg by mouth as needed Takes in pm  Quantity: 100 tablet  Refills: 11     aspirin 81 MG tablet  Commonly known as: ASA  Used for: SMOKING (TOBACCO DEPENDENCE), Other and unspecified hyperlipidemia      ONE DAILY  Quantity: 100  Refills: 3     calcium carbonate-vitamin D 500-200 MG-UNIT tablet  Commonly known as: OSCAL w/D      Dose: 1 tablet  Take 1 tablet by mouth daily.  Refills: 0     fluticasone 50 MCG/ACT nasal spray  Commonly known as: FLONASE  Used for: Chronic rhinitis      Dose: 2 spray  Spray 2 sprays into both nostrils daily  Quantity: 16 g  Refills: 3     ibuprofen 200 MG capsule  Commonly known as: ADVIL/MOTRIN      Dose: 1,000 mg  Take 1,000 mg by mouth 2 times daily  Refills: 0     ICaps Caps      Dose: 1 capsule  Take 1 capsule by mouth daily  Refills: 0     lovastatin 40 MG tablet  Commonly known as: MEVACOR  Used for: Hyperlipidemia LDL goal <130      TAKE ONE TABLET BY MOUTH EVERY NIGHT AT BEDTIME  Quantity: 90 tablet  Refills: 3     MAGNESIUM PO      Dose: 250 mg  Take 250 mg by mouth daily  Refills: 0     methylPREDNISolone 32 MG tablet  Commonly known as: MEDROL  Used for: Allergic reaction to contrast dye, sequela      Dose: 32 mg  Take 1 tablet (32 mg) by mouth 2 times daily 0215 am  4/29: take oral Medrol 32mg , then at 1215 pm 4/29: take oral Medrol 32mg  Quantity: 2 tablet  Refills: 0     Prempro 0.45-1.5 MG tablet  Used for: SYMPTOMATIC FEMALE CLIMACTERIC STATE  Generic drug: estrogen conj-medroxyPROGESTERone      TAKE ONE TABLET BY MOUTH THREE TIMES WEEKLY OR AS NEEDED TO CONTROL HOT FLASHES/MENOPAUSAL SYMPTOMS  Quantity: 28 tablet  Refills: 3        CONTINUE these medicines which have NOT CHANGED       Dose / Directions   Lutein 20 MG Caps  Indication: EYE ROGELIO      Refills: 0              Protect others around you: Learn how to safely use, store and throw away your medicines at www.disposemymeds.org.       Follow-ups after your visit       Care Instructions       Further instructions from your care team       [facility]   Procedure Discharge Instructions    Today you saw:    Dr. Nadine Tao    You had a:   Lumbar Epidural Steroid Injection, Interlaminar L4/5    Medications used:  Lidocaine   Bupivacaine   Kenalog   Normal saline   Isovue          Be cautious when walking. Numbness and/or weakness in the lower extremities may occur for up to 6-8 hours after the procedure due to effect of the local anesthetic    Do not drive for 6 hours. The effect of the local anesthetic could slow your reflexes.     You may resume your regular activities after 24 hours    Avoid strenuous activity for the first 24 hours    You may shower, however avoid swimming, tub baths or hot tubs for 24 hours following your procedure    You may have a mild to moderate increase in pain for several days following the injection.    It may take up to 14 days for the steroid medication to start working although you may feel the effect as early as a few days after the procedure.       You may use ice packs for 10-15 minutes, 3 to 4 times a day at the injection site for comfort    Do not use heat to painful areas for 6 to 8 hours. This will give the local anesthetic time to wear off and  prevent you from accidentally burning your skin.     Unless you have been directed to avoid the use of anti-inflammatory medications (NSAIDS), you may use medications such as ibuprofen, Aleve or Tylenol for pain control if needed.     If you were fasting, you may resume your normal diet and medications after the procedure    Possible side effects of steroids that you may experience include flushing, elevated blood pressure, increased appetite, mild headaches and restlessness.  All of these symptoms will get better with time.    If you experience any of the following, call the Pain Clinic during work hours (Mon-Friday 8-4:30 pm) at [phone] or the Provider Line after hours at [phone]:  -Fever over 100 degree F  -Swelling, bleeding, redness, drainage, warmth at the injection site  -Progressive weakness or numbness in your legs   -Loss of bowel or bladder function  -Unusual headache that is not relieved by Tylenol or other pain reliever  -Unusual new onset of pain that is not improving          Additional Information About Your Visit       [employer] Information    [employer] gives you secure access to your electronic health record. If you see a primary care provider, you can also send messages to your care team and make appointments. If you have questions, please call your primary care clinic.  If you do not have a primary care provider, please call [phone] and they will assist you.       Care EveryWhere ID    This is your Care EveryWhere ID. This could be used by other organizations to access your [facility] medical records  [id number]        Primary Care Provider Office Phone # Fax #    Charlene Cain PA-C [phone]      Equal Access to Services    [facility] : Favian Hanna, waaxda luqadaha, qaybta kaalmada deanne rose. So [facility] [phone].    ATENCIÓN: Si habla español, tiene a jackson disposición servicios gratuitos de  marykatianaverito Alejandro al [phone].    We comply with applicable federal and state civil rights laws, including the [place] Human Rights Act. We do not discriminate on the basis of race, color, creed, Holiness, national origin, marital status, age, disability, sex, sexual orientation, or gender identity.    If you would like an itemization of your charges they will now be available in [employer] 30 days after discharge. To access the itemized statements in [employer] go to billing/billing summary. From there select view account. There will be multiple tabs showing an overview of your account, detail, payments, and communications. From the communications tab you can see your monthly statements, your itemized statements, and any billing letters generated for your account. If you do not have a [employer] account and need help getting access please contact [employer] support at [phone].  If you would prefer to have your itemized statements mailed please contact our automated itemized bill request line at [phone] option  2.       Thank you!    Thank you for choosing [facility] for your care. Our goal is always to provide you with excellent care. Hearing back from our patients is one way we can continue to improve our services. Please take a few minutes to complete the written survey that you may receive in the mail after you visit with us. Thank you!            Medication List      Medications          Morning Afternoon Evening Bedtime As Needed    Lutein 20 MG Caps  Reason for med: EYE ROGELIO                       ASK your doctor about these medications          Morning Afternoon Evening Bedtime As Needed    Arthritis Pain Relief 650 MG CR tablet  INSTRUCTIONS: Take 1,300 mg by mouth as needed Takes in pm  Generic drug: acetaminophen                     aspirin 81 MG tablet  Also known as: ASA  INSTRUCTIONS: ONE DAILY                     calcium carbonate-vitamin D 500-200 MG-UNIT tablet  Also known as: OSCAL  w/D  INSTRUCTIONS: Take 1 tablet by mouth daily.                     fluticasone 50 MCG/ACT nasal spray  Also known as: FLONASE  INSTRUCTIONS: Spray 2 sprays into both nostrils daily                     ibuprofen 200 MG capsule  Also known as: ADVIL/MOTRIN  INSTRUCTIONS: Take 1,000 mg by mouth 2 times daily                     ICaps Caps  INSTRUCTIONS: Take 1 capsule by mouth daily                     lovastatin 40 MG tablet  Also known as: MEVACOR  INSTRUCTIONS: TAKE ONE TABLET BY MOUTH EVERY NIGHT AT BEDTIME                     MAGNESIUM PO  INSTRUCTIONS: Take 250 mg by mouth daily                     methylPREDNISolone 32 MG tablet  Also known as: MEDROL  INSTRUCTIONS: Take 1 tablet (32 mg) by mouth 2 times daily 0215 am 4/29: take oral Medrol 32mg , then at 1215 pm 4/29: take oral Medrol 32mg                     Prempro 0.45-1.5 MG tablet  INSTRUCTIONS: TAKE ONE TABLET BY MOUTH THREE TIMES WEEKLY OR AS NEEDED TO CONTROL HOT FLASHES/MENOPAUSAL SYMPTOMS  Generic drug: estrogen conj-medroxyPROGESTERone

## 2021-06-14 ENCOUNTER — HOSPITAL ENCOUNTER (OUTPATIENT)
Dept: MAMMOGRAPHY | Facility: CLINIC | Age: 71
Discharge: HOME | End: 2021-06-14
Attending: PHYSICIAN ASSISTANT | Admitting: PHYSICIAN ASSISTANT
Payer: COMMERCIAL

## 2021-06-14 DIAGNOSIS — Z12.31 VISIT FOR SCREENING MAMMOGRAM: ICD-10-CM

## 2021-06-14 PROCEDURE — 77067 SCR MAMMO BI INCL CAD: CPT

## 2021-06-28 ENCOUNTER — OFFICE VISIT (OUTPATIENT)
Dept: FAMILY MEDICINE | Facility: CLINIC | Age: 71
End: 2021-06-28
Payer: COMMERCIAL

## 2021-06-28 VITALS
HEART RATE: 69 BPM | TEMPERATURE: 97.1 F | WEIGHT: 215 LBS | BODY MASS INDEX: 34.55 KG/M2 | HEIGHT: 66 IN | OXYGEN SATURATION: 99 % | SYSTOLIC BLOOD PRESSURE: 122 MMHG | DIASTOLIC BLOOD PRESSURE: 68 MMHG

## 2021-06-28 DIAGNOSIS — M54.16 LUMBAR RADICULOPATHY: ICD-10-CM

## 2021-06-28 DIAGNOSIS — N95.0 POST-MENOPAUSAL BLEEDING: Primary | ICD-10-CM

## 2021-06-28 DIAGNOSIS — N95.1 SYMPTOMATIC MENOPAUSAL OR FEMALE CLIMACTERIC STATES: ICD-10-CM

## 2021-06-28 PROCEDURE — 99214 OFFICE O/P EST MOD 30 MIN: CPT | Performed by: PHYSICIAN ASSISTANT

## 2021-06-28 ASSESSMENT — PAIN SCALES - GENERAL: PAINLEVEL: MILD PAIN (2)

## 2021-06-28 ASSESSMENT — MIFFLIN-ST. JEOR: SCORE: 1516.98

## 2021-06-28 NOTE — PROGRESS NOTES
"    Assessment & Plan     (N95.0) Post-menopausal bleeding  (primary encounter diagnosis)  Comment: discussed concerns about bleeding in post menopausal state. Need to get imaging and then have her f/u with OB/GYN   Plan: US Pelvic Complete with Transvaginal, OB/GYN         REFERRAL            (N95.1) Symptomatic menopausal or female climacteric states  Comment:   Plan: has been on combo estrogen/progesterone for years. Tried to decrease and go off several times but patient was symptomatic and wanted to go back on as she feels much better - we have reviewed the risks/benefits of this several times    (M54.16) Lumbar radiculopathy  Comment:   Plan: follow up with Dr. Sandoval as I think this could be related to back. She had improvement with the injection on the left side      37 minutes spent on the date of the encounter doing chart review, patient visit and documentation   [id number]}     BMI:   Estimated body mass index is 34.7 kg/m  as calculated from the following:    Height as of this encounter: 1.676 m (5' 6\").    Weight as of this encounter: 97.5 kg (215 lb).     Return in about 4 weeks (around 7/26/2021) for With specialist.    CHEMA [facility] MARION Brown is a 71 year old who presents for the following health issues     HPI     Musculoskeletal problem/pain  Onset/Duration: 2 weeks  Description  Location: leg - right  Joint Swelling: no  Redness: no  Pain: YES, cramping and domingo horse type pain   Warmth: no  Intensity:  Moderate to severe  Progression of Symptoms:  intermittent  Accompanying signs and symptoms:   Fevers: no  Numbness/tingling/weakness: YES- tingling   History  Trauma to the area: no  Recent illness:  no  Previous similar problem: YES- ongoing with back issues   Previous evaluation:  No, not for this specific problem   Precipitating or alleviating factors:  Aggravating factors include: standing, walking, climbing stairs, lifting, exercise and " "overuse  Therapies tried and outcome: heat, ice and Ibuprofen    Followed by pain and palliative care for lumbar radiculopathy. MRI completed (in Epic)  Had a left epidural injection in 4/2021 with good results in left sided pain  Was at a pain level of 8-9 at the time, now 1-2 with increasing to 3-4 with activity    This pain is a little different  On her right side and feels like it starts in her left butt check and then wraps around to left outer and anterior thigh  Worried because she is compensating due to this pain and doesn't want her left side to get worse again      -She would like to follow up with irregular vaginal bleeding. She as had a solid week of spotting. Some days are a little heavier than others. Has been a little more active and lifting heavier things.   Had a week of fairly heavy bleeding- soaked through a pad  Has a lot of stress from an upcoming move and was lifting (no more than 20lbs given back issues) and twisting/bending quite a bit  Had a busy/eventful weekend this last weekend and had NO spotting      -Has some questions with medications.   Wondering about vitamins/minerals she should be taking    Review of Systems   Remainder of ROS obtained and found to be negative other than that which was documented above        Objective    /68   Pulse 69   Temp 97.1  F (36.2  C) (Tympanic)   Ht 1.676 m (5' 6\")   Wt 97.5 kg (215 lb)   SpO2 99%   BMI 34.70 kg/m    Body mass index is 34.7 kg/m .  Physical Exam   GENERAL: healthy, alert and no distress  BACK: tender to right side of low back   Normal leg strength  Sensation intact b/l in lower legs    Diagnostic Tests:  None today  Imaging ordered              "

## 2021-06-28 NOTE — PATIENT INSTRUCTIONS
Call and schedule the ultrasound - [phone]    To schedule the appointment with OB/GYN - call [facility]: [facility] [phone]

## 2021-07-06 ENCOUNTER — HOSPITAL ENCOUNTER (OUTPATIENT)
Dept: ULTRASOUND IMAGING | Facility: CLINIC | Age: 71
Discharge: HOME | End: 2021-07-06
Attending: PHYSICIAN ASSISTANT | Admitting: PHYSICIAN ASSISTANT
Payer: COMMERCIAL

## 2021-07-06 DIAGNOSIS — N95.0 POST-MENOPAUSAL BLEEDING: ICD-10-CM

## 2021-07-06 PROCEDURE — 76830 TRANSVAGINAL US NON-OB: CPT

## 2021-07-20 NOTE — PROGRESS NOTES
"                          [facility]  Follow up clinic visit    Date of visit: 7/21/21     Chief complaint:   Chief Complaint   Patient presents with     Pain     \"feeling much better\"       History:  Tri Ingram is a 71 year old female seen for:   Chronic axial low back pain likely secondary to lumbar spinal stenosis with neurogenic cladication    Last seen by me on 3/22/2021.      HPI from initial H&P 3/22/2021:  \"- Balance problems started spring 2018. Was doing PT  - Jan 2020 - back more and more sore - was doing weight training  - Went to [place] to help son with 3 daughters. THis was taxing - totally worn out  - Was using TENS on low back  - XRay of her back suggested \"bamboo spine\" - was referred for different type of PT for this but she was less active because gyms were closed down due to COVID  - Needed to care for 91 yo mother with broken foot for 2 weeks at a time.   - Doing gladis step at home for 15 minutes at a time  - Cannot walk more than 1300 steps before she needs to stop due to sharp pains in b/l LB at about SIJ bilaterall and a feeling as if legs will give way.  This is a sensation of weakness in bilateral lateral thighs.  - Pain at night when lying on either side  -Does lean on a shopping cart when shopping.  However this is more for balance.  It does not help prevent the back pain and sensation of leg weakness.     Other evaluation and/or treatments so far consists of: Tylenol, Ibuprofen and physical therapy.  \"    Recommendations/plan at the last visit included:  \"Discussed the assessment with the patient.  MRI of the lumbar spine  1 time PO valium given for the MRI  Follow up: I will call her with the results of the lumbar MRI.  We will go over possible next steps at that time.   \"    Since her last visit, Tri Ingram reports:  - Lumbar MRI demonstrated central narrowing at L4/5 due in part to spondylolithesis. At the time discussed that her Sx were likely " due to neurogenic claudication   - now s/p L4/5 ILESI on 4/29/2021. Pain that day went from a 7/10 to a 0/10 post-procedure.   - she still has right-sided buttock pain that radiates to the lateral right hip and lateral left proximal thigh. It starts as a slow ache and can progress to a domingo horse.   - she is a big proponent of the epidural steroid injection - it was so wonderful - she had no pain, but then the right butt started hurting again 5-6 weeks post injection. There are times she cannot walk because it is so painful - like trying to walk on a domingo horse  - butt hurts in bed and she can roll over to relieve the pain. Hurts when she is lying on the R side. When she wakes up it is stiff and sore. She uses aspercream and exercises. She doesn't want that pain to cause the back pain to worsen.   - The back pain is now 0-1 unless she is gardening or packing.     Pain scores:  Pain intensity on average is 2 on a scale of 0-10.     Current pain treatments:   -Tylenol 1300 mg nightly as needed  -Ibuprofen 1000 mg twice daily    Patient is using the medication as prescribed:  YES  Is your medication helpful? YES   Side Effects: no side effect    Past pain treatments:  Tylenol, Ibuprofen and physical therapy.      IMAGING:  Lumbar MRI 3/26/2021  - grade 1 degenerative spondylolithesis at L4/5  - discogenic marrow changes at L1/2, L4/5  - moderate facet hypertrophy at L2/3, L3/4, L5/S1; severe facet hypertrophy at L4/5  - L4/5 moderate to severe central stenosis and bilateral moderate foraminal stenosis   - L5-S1: Posterior osteophyte formation and disc bulging asymmetric to the left and mild to moderate facet and ligamentum flavum hypertrophy is present. There is secondary mild to moderate bilateral neural foraminal stenosis and mild central canal stenosis.    Medications:  Current Outpatient Medications   Medication Sig Dispense Refill     acetaminophen (ARTHRITIS PAIN RELIEF) 650 MG CR tablet Take 1,300 mg by  "mouth as needed Takes in pm 100 tablet 11     ASPIRIN 81 MG OR TABS ONE DAILY 100 3     calcium carb 1250 mg, 500 mg [place],/vitamin D 200 units (OSCAL WITH D) 500-200 MG-UNIT per tablet Take 1 tablet by mouth daily.       estrogen conj-medroxyPROGESTERone (PREMPRO) 0.45-1.5 MG tablet TAKE ONE TABLET BY MOUTH THREE TIMES WEEKLY OR AS NEEDED TO CONTROL HOT FLASHES/MENOPAUSAL SYMPTOMS 28 tablet 3     fluticasone (FLONASE) 50 MCG/ACT nasal spray Spray 2 sprays into both nostrils daily (Patient not taking: Reported on 3/22/2021) 16 g 3     ibuprofen 200 MG capsule Take 1,000 mg by mouth 2 times daily        lovastatin (MEVACOR) 40 MG tablet TAKE ONE TABLET BY MOUTH EVERY NIGHT AT BEDTIME 90 tablet 3     Lutein 20 MG CAPS        MAGNESIUM PO Take 250 mg by mouth daily        methylPREDNISolone (MEDROL) 32 MG tablet Take 1 tablet (32 mg) by mouth 2 times daily 0215 am 4/29: take oral Medrol 32mg , then at 1215 pm 4/29: take oral Medrol 32mg 2 tablet 0     Multiple Vitamins-Minerals (ICAPS) CAPS Take 1 capsule by mouth daily         Medical History: any changes in medical history since they were last seen? No    Social History: Reviewed; unchanged from previous consultation.  Still helping to watch her granddaughter.   has severe knee pain and is awaiting TKA.  However, unfortunately he needs to lose weight prior to the surgery.  This requires Wendy to carry out most of the household duties.  They are moving to [facility]    Family history: Reviewed; unchanged from previous consultation.     Review of Systems:  The 14 system ROS was reviewed from the intake questionnaire, and is positive for: Pain as above  Mood: Pretty good but somewhat stressed due to her 's condition    Physical Exam:  Blood pressure 134/81, pulse 68, height 1.676 m (5' 6\"), weight 97.5 kg (215 lb), not currently breastfeeding.  General: NAD  Gait: Somewhat slow, somewhat forward flexed  Psych: Mood is pretty good. Affect is " congruent. Thought content is logical.   Neuro: CNs II - XII grossly intact    Assessment:   Tri Ingram is a 71 year old female who is seen at the pain clinic for:    1. Lumbar radiculopathy       Patient with a history of chronic low back pain due to lumbar spinal stenosis at L4-5, now substantially improved status post L4-5 IL EMANI.  Approximately 5 to 6 weeks after injection she started to have right-sided buttock pain.  This is most likely due to right-sided foraminal stenosis at the same level.  Discussed that since she got such good relief from her previous injection, she would like to treat this current right sided pain with another injection.  Discussed that we would likely do a transforaminal epidural steroid injection at L4.  She would like to postpone this however due to her upcoming move and her 's poor functional mobility due to his knee pain.  She will call when she would like to have the injection.    Plan:    Procedures recommended:     -Right L4 Transforaminal epidural steroid injection when ready    -If severe midline low back pain returns in the interim, may consider repeat L4-5 IL EMANI to treat lumbar spinal stenosis with neurogenic claudicationRosa Mariaana luisa Steinberg are you doing     Follow up: She will call when ready for her injection    BILLING TIME DOCUMENTATION:   The total TIME spent on this patient on the date of the encounter/appointment was 33 minutes.      TOTAL TIME includes:   Time spent preparing to see the patient (reviewing records and tests) - 3 min  Time spent face to face (or over the phone) with the patient - 26 min  Time spent ordering tests, medications, procedures and referrals - 0 min  Time spent Referring and communicating with other healthcare professionals - 0 min  Time spent documenting clinical information in Epic - 6 min    Nadine Tao MD  [facility]

## 2021-07-21 ENCOUNTER — OFFICE VISIT (OUTPATIENT)
Dept: PALLIATIVE MEDICINE | Facility: CLINIC | Age: 71
End: 2021-07-21
Payer: COMMERCIAL

## 2021-07-21 VITALS
SYSTOLIC BLOOD PRESSURE: 134 MMHG | HEIGHT: 66 IN | WEIGHT: 215 LBS | BODY MASS INDEX: 34.55 KG/M2 | HEART RATE: 68 BPM | DIASTOLIC BLOOD PRESSURE: 81 MMHG

## 2021-07-21 DIAGNOSIS — M54.16 LUMBAR RADICULOPATHY: Primary | ICD-10-CM

## 2021-07-21 PROCEDURE — 99214 OFFICE O/P EST MOD 30 MIN: CPT | Performed by: STUDENT IN AN ORGANIZED HEALTH CARE EDUCATION/TRAINING PROGRAM

## 2021-07-21 ASSESSMENT — MIFFLIN-ST. JEOR: SCORE: 1516.98

## 2021-07-21 ASSESSMENT — PAIN SCALES - GENERAL: PAINLEVEL: MILD PAIN (2)

## 2021-07-21 NOTE — NURSING NOTE
"Chief Complaint   Patient presents with     Pain     \"feeling much better\"       Initial /81   Pulse 68   Ht 1.676 m (5' 6\")   Wt 97.5 kg (215 lb)   BMI 34.70 kg/m   Estimated body mass index is 34.7 kg/m  as calculated from the following:    Height as of this encounter: 1.676 m (5' 6\").    Weight as of this encounter: 97.5 kg (215 lb).  Medications and allergies reviewed.      Connie POWELL MA    "

## 2021-07-21 NOTE — PATIENT INSTRUCTIONS
I think your right-sided buttock/upper thigh pain is most likely due to a pinched L4 nerve in your back on the right.     For this we will:     1. Do an epidural steroid injection on the right side. We will do this when you are ready. Call or message me when you would like to schedule.     Follow up:   - Call or message when you would like to repeat the injection    Nadine Tao MD  [facility] letter usually in

## 2021-07-22 ENCOUNTER — OFFICE VISIT (OUTPATIENT)
Dept: OBGYN | Facility: CLINIC | Age: 71
End: 2021-07-22
Payer: COMMERCIAL

## 2021-07-22 VITALS
SYSTOLIC BLOOD PRESSURE: 125 MMHG | WEIGHT: 215 LBS | DIASTOLIC BLOOD PRESSURE: 75 MMHG | BODY MASS INDEX: 34.7 KG/M2 | TEMPERATURE: 97.3 F | HEART RATE: 71 BPM

## 2021-07-22 DIAGNOSIS — N93.9 ABNORMAL UTERINE BLEEDING (AUB): Primary | ICD-10-CM

## 2021-07-22 PROCEDURE — 88305 TISSUE EXAM BY PATHOLOGIST: CPT | Performed by: PATHOLOGY

## 2021-07-22 PROCEDURE — 87624 HPV HI-RISK TYP POOLED RSLT: CPT | Performed by: OBSTETRICS & GYNECOLOGY

## 2021-07-22 PROCEDURE — 99204 OFFICE O/P NEW MOD 45 MIN: CPT | Mod: 25 | Performed by: OBSTETRICS & GYNECOLOGY

## 2021-07-22 PROCEDURE — G0145 SCR C/V CYTO,THINLAYER,RESCR: HCPCS | Performed by: OBSTETRICS & GYNECOLOGY

## 2021-07-22 PROCEDURE — 57500 BIOPSY OF CERVIX: CPT | Performed by: OBSTETRICS & GYNECOLOGY

## 2021-07-22 NOTE — PROGRESS NOTES
Gynecology Consult Note    Patient Summary:  Tri Ingram is a 71 year old female seen at the request of Jessie Cain for post menopausal bleeding.    HPI: Tri Ingram is a a 71 year old P1 who presents for evaluation of ongoing postmenopausal bleeding.  The patient states that she went through menopause many years ago.  Approximately 2 years ago had rare spotting.  She was seen by her primary a few weeks ago as the spotting became more persistent and frequent.  Had also noticed an increase in volume to where she had filled a panty liner.  She had pelvic ultrasound that she was told was normal but with plan to repeat in 3 months.  Patient developed interval bleeding and she was sent here for consultation.  She does not complain of urinary or bowel issues.  She does note that she continues on hormone replacement therapy.  She reports that she has reviewed some of the risks with this with her primary and wishes to continue given mood issues and night sweats when trying to decrease previously.    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     Squamous cell carcinoma      Vestibular neuritis        PSHx:   Past Surgical History:   Procedure Laterality Date     COLONOSCOPY  2001, 2007     COLONOSCOPY N/A 11/3/2017    Procedure: COLONOSCOPY;  Colonoscopy  ;  Surgeon: Lg Guerrero MD;  Location: [facility] NONSPECIFIC PROCEDURE      Bowen's disease removed X 2       Medications:   acetaminophen (ARTHRITIS PAIN RELIEF) 650 MG CR tablet, Take 1,300 mg by mouth as needed Takes in pm  Apoaequorin  (PREVAGEN PO),   ASPIRIN 81 MG OR TABS, ONE DAILY  calcium carb 1250 mg, 500 mg [place],/vitamin D 200 units (OSCAL WITH D) 500-200 MG-UNIT per tablet, Take 1 tablet by mouth daily.  estrogen conj-medroxyPROGESTERone (PREMPRO) 0.45-1.5 MG tablet, TAKE ONE TABLET BY MOUTH THREE TIMES WEEKLY OR AS NEEDED TO CONTROL HOT FLASHES/MENOPAUSAL SYMPTOMS  fluticasone (FLONASE) 50 MCG/ACT nasal spray, Spray 2 sprays into both nostrils daily  ibuprofen 200 MG capsule, Take 1,000 mg by mouth 2 times daily   lovastatin (MEVACOR) 40 MG tablet, TAKE ONE TABLET BY MOUTH EVERY NIGHT AT BEDTIME  Lutein 20 MG CAPS,   MAGNESIUM PO, Take 250 mg by mouth daily   methylPREDNISolone (MEDROL) 32 MG tablet, Take 1 tablet (32 mg) by mouth 2 times daily 0215 am : take oral Medrol 32mg , then at 1215 pm : take oral Medrol 32mg  Multiple Vitamins-Minerals (ICAPS) CAPS, Take 1 capsule by mouth daily Taking without- Iodine    No current facility-administered medications on file prior to visit.       Allergies:    Allergies   Allergen Reactions     Adhesive Tape      Iodine Anaphylaxis     contrast dye     Penicillins Hives       Social History:  Social History     Socioeconomic History     Marital status:      Spouse name: Not on file     Number of children: Not on file     Years of education: Not on file     Highest education level: Not on file   Occupational History     Occupation: RETIRED   Tobacco Use     Smoking status: Former Smoker     Packs/day: 0.50     Years: 37.00     Pack years: 18.50     Types: Cigarettes     Quit date: 10/20/2007     Years since quittin.7     Smokeless tobacco: Never Used   Substance and Sexual Activity     Alcohol use: Yes     Comment: minimal     Drug use: No     Sexual activity: Yes     Partners: Male     Comment: menopause   Other Topics Concern      Service No     Blood Transfusions No     Caffeine Concern No     Occupational Exposure No     Hobby Hazards No     Sleep Concern Yes      "Comment: arthritis     Stress Concern Yes     Weight Concern No     Special Diet Yes     Comment: [place]     Back Care Yes     Comment: arthritis     Exercise Yes     Bike Helmet No     Seat Belt Yes     Self-Exams Yes     Parent/sibling w/ CABG, MI or angioplasty before 65F 55M? Yes     Comment: brother MI at age 50   Social History Narrative    Caffeine intake/servings daily - 0    Calcium intake/servings daily - 4 occ. calcium supplement    Exercise 7 times weekly - describe  walking    Sunscreen used - No    Seatbelts used - Yes    Guns stored in the home - No    Self Breast Exam - Yes    Pap test up to date -  Yes    Eye exam up to date -  Yes    Dental exam up to date -  No    DEXA scan up to date -  Yes, last done 5/4/04    Flex Sig/Colonoscopy up to date -  Yes, at age 52 \"normal\"    Mammography up to date - 8/18/06    Immunizations reviewed and up to date - unknown last tetanus shot    Abuse: Current or Past (Physical, Sexual or Emotional) - No    Do you feel safe in your environment - Yes    Do you cope well with stress - Yes    Do you suffer from insomnia - Yes    Last updated by: Mary Beth Katz 8/28/06 July 23, 2019            ENVIRONMENTAL HISTORY: The family [place] a[place] year [place] The home is heated with a gas furnace. They do have central air conditioning. The patient's bedroom is furnished with Indoor plants, fabric window coverings, carpet in bedroom. No pets . There is no history of cockroach or mice infestation. There are no smokers in the house.  The house does not have a basement.      Social Determinants of Health     Financial Resource Strain:      Difficulty of Paying Living Expenses:    Food Insecurity:      Worried About Running Out of Food in the Last Year:      Ran Out of Food in the Last Year:    Transportation Needs:      Lack of Transportation (Medical):      Lack of Transportation (Non-Medical):    Physical Activity:      Days of Exercise per Week:      " Minutes of Exercise per Session:    Stress:      Feeling of Stress :    Social Connections:      Frequency of Communication with Friends and Family:      Frequency of Social Gatherings with Friends and Family:      Attends Evangelical Services:      Active Member of Clubs or Organizations:      Attends Club or Organization Meetings:      Marital Status:    Intimate Partner Violence:      Fear of Current or Ex-Partner:      Emotionally Abused:      Physically Abused:      Sexually Abused:        Family History:  Family History   Problem Relation Age of Onset     Eye Disorder Mother         glaucoma, wet macular degeneration     Lipids Mother      Gynecology Mother         hyst     Melanoma Mother      Skin Cancer Mother      Cancer Father         colon /prostate     Circulatory Father         amputee     Diabetes Father         type II     C.A.D. Father         MIs     Eye Disorder Father         dry macular degeneration     Cardiovascular Father         small AAA     Melanoma Father      Arthritis Sister      Gynecology Sister         partial hyst  fibrous sheaths on ovary egg sized polyp uterine removed     Lipids Sister      Neurologic Disorder Sister         migraines     Cardiovascular Brother         large 7 cm AAA     Coronary Artery Disease Brother         MI     Asthma Brother      Gastrointestinal Disease Paternal Grandmother          from hepatitis       Physical Exam:   Vitals:    21 0824   BP: 125/75   BP Location: Right arm   Patient Position: Chair   Cuff Size: Adult Large   Pulse: 71   Temp: 97.3  F (36.3  C)   TempSrc: Tympanic   Weight: 97.5 kg (215 lb)      Gen: lying in bed, NAD  CV: Reg rate, well perfused  Pulm: no increased work of breathing  Abd: non-tender, non-distended, no masses   Pelvis: normal appearing external genitalia, vaginal mucosa, cervix, bimanual exam with normal size and contour of uterus with no adnexal masses, within the cervical os there is an approximately 6 to 7 mm  cervical polyp that was noted   Extremities: non-tender, no erythema; no edema  Psych: normal mood and affect  Neuro: no focal deficits    Imaging:  Pelvic US  reviewed images and agree with radiology interpretation  FINDINGS: The uterus measures 6.2 x 4.0 x 2.9cm. No fibroids are  present. Endometrial stripe thickness is normal at 0.4 cm. In the  endocervical canal is a complex cystic process that measures 2.4 x 2.0  x 1.0 cm. Color Doppler shows normal blood flow within this. This is  nonspecific but given the patient's postmenopausal bleeding, this may  relate to a hematoma/blood products. Neither ovary is identified due  to the patient's postmenopausal state and obscuration by overlying  bowel gas. The there is no free pelvic fluid.                                                                      IMPRESSION: Complex endocervical abnormality is present, as described  above. Follow-up pelvic ultrasound in 3 months is recommended in  reassessment.    Procedure:  [facility] Endometrial Biopsy attempt and Cervical polyp removal Procedure Note    Tri Ingram  1950  [id number]    The patient was counseled on the risks (including including risk of infection, bleeding, recurrence), benefits, and alternatives of the procedure. Verbal and written consent were obtained.      Technique: The patient was placed in the dorsal lithotomy position.  A speculum was placed in the vagina and the cervix visualized. The cervix was cleaned with betadine swabs x3.  A cervical polyp was noted and removed in standard fashion with ring forceps.  Multiple attempts were made at pushing past cervical stenosis at internal os, both with endometrial Pipelle and with os finder.  Unfortunately these were unsuccessful and endometrial biopsy portion of procedure was abandoned    A&P: Tri Ingram is a a 71 year old P1 who presents for evaluation of ongoing postmenopausal bleeding.  Reviewed with patient her ultrasound  findings.  Discussed with patient that overall her findings are reassuring with normal appearing endometrial stripe.  Did note that there was a small cystic process within the cervical canal, suspect this is likely blood products from her ongoing bleeding.  Given her ongoing postmenopausal bleeding did recommend endometrial biopsy.  Also discussed with patient at length my recommendation to try to wean from it and eventually stop her hormone replacement therapy.  Reviewed with her the risks of continuing such therapy.  Also discussed with patient the typical guidance of trying to use as little of a dose possible for a short of a time as possible and that it is best to only be used within 10 years of menopause.  Patient does state understanding and will consider weaning from her current dose over the next few months.  Did consent patient for an endometrial biopsy.  Upon completing examination did find cervical polyp that was removed in standard fashion without difficulty.  Did attempt multiple times to complete the biopsy but given cervical stenosis was unable to enter the uterine cavity.  Upon completion of the procedure discussed with patient possibility that the cervical polyp may be a cause for her ultrasound findings as well as the ongoing vaginal bleeding, particularly in the setting of normal-appearing endometrial stripe on ultrasound.  She wishes to continue with expectant management for a couple of months.  Discussed that if she has ongoing spotting or bleeding she should call right away and we can schedule her for dilation and curettage procedure to evaluate the lining of her uterus more clearly.  Patient states understanding and is in agreement with plan of care    Connie Green MD   7/22/2021 10:06 AM     Connie Green MD   7/22/2021 8:43 AM

## 2021-07-22 NOTE — NURSING NOTE
"Initial /75 (BP Location: Right arm, Patient Position: Chair, Cuff Size: Adult Large)   Pulse 71   Temp 97.3  F (36.3  C) (Tympanic)   Wt 97.5 kg (215 lb)   Breastfeeding No   BMI 34.70 kg/m   Estimated body mass index is 34.7 kg/m  as calculated from the following:    Height as of 7/21/21: 1.676 m (5' 6\").    Weight as of this encounter: 97.5 kg (215 lb). .    Indira Jaramillo MA    "

## 2021-07-26 LAB
PATH REPORT.COMMENTS IMP SPEC: NORMAL
PATH REPORT.COMMENTS IMP SPEC: NORMAL
PATH REPORT.FINAL DX SPEC: NORMAL
PATH REPORT.GROSS SPEC: NORMAL
PATH REPORT.MICROSCOPIC SPEC OTHER STN: NORMAL
PATH REPORT.RELEVANT HX SPEC: NORMAL
PHOTO IMAGE: NORMAL

## 2021-07-27 LAB
BKR LAB AP GYN ADEQUACY: NORMAL
BKR LAB AP GYN INTERPRETATION: NORMAL
BKR LAB AP HPV REFLEX: NORMAL
BKR LAB AP LMP: NORMAL
BKR LAB AP PREVIOUS ABNORMAL: NORMAL
PATH REPORT.COMMENTS IMP SPEC: NORMAL
PATH REPORT.RELEVANT HX SPEC: NORMAL

## 2021-07-29 LAB
HUMAN PAPILLOMA VIRUS 16 DNA: NEGATIVE
HUMAN PAPILLOMA VIRUS 18 DNA: NEGATIVE
HUMAN PAPILLOMA VIRUS FINAL DIAGNOSIS: NORMAL
HUMAN PAPILLOMA VIRUS OTHER HR: NEGATIVE

## 2021-08-10 NOTE — PROGRESS NOTES
PHYSICAL THERAPY DISCHARGE NOTE  Patient did not return for follow up treatments.  Goal status and current objective information is therefore unknown.  The daily note from the patient's last visit will serve as the discharge note. Discharge from PT services at this time for this episode of treatment. Please see attached documentation under this episode of care for further information including dates of service, start of care date, referring physician, Dx, treatment plan, treatments, etc.    Please contact me with any questions or concerns.  Thank you for your referral.    Megan Basilio PT, DPT, OCS  Physical Therapist, Orthopedic Certified Specialist    [facility]  [address]   [address]  [place] [address]  [email or web address]   Office: [phone]   Employed by [facility] [facility]     08/19/19 1000   Signing Clinician's Name / Credentials   Signing clinician's name / credentials Megan Basilio PT, DPT, OCS   Session Number   Session Number 11 [employer]   Progress Note/Recertification   Progress Note Due Date 09/05/19   Adult Goals   PT Eval Goals 1;2;3;4   Goal 1   Goal Identifier 1   Goal Description Patient will be able to walk without assistance without LOB.   Target Date 06/10/19   Date Met 06/04/19   Goal 2   Goal Identifier 2   Goal Description Patient will be able to turn head quickly with symptoms.    Target Date 09/05/19   Date Met 08/06/19   Goal 3   Goal Identifier 3   Goal Description Patient will be able to bend over without symptoms or LOB.   Target Date 09/05/19   Date Met 08/19/19   Goal 4   Goal Identifier 4   Goal Description Patient will be independent and compliant with HEP to aid functional recovery.   Target Date 09/05/19   Date Met 08/19/19   Subjective Report   Subjective Report Patient reports that she went to the driving range and was able to make contact with the ball. Not yet ready to play a round yet. States  that all her coping mechanisms are in place. Still feels fuzzy in the head, symptoms. Able to bend over and pick things up without issues. Went to a 3 hour high action movie and noticed she did list a little bit after. Was able to watch a 2 hour action movie at the theater and felt find after.    Objective Measures   Objective Measures Objective Measure 1   Objective Measure 1   Objective Measure DHI   Details 24/100   Treatment Interventions   Interventions Neuromuscular Re-education;Therapeutic Procedure/Exercise   Neuromuscular Re-education   Neuromuscular re-ed of mvmt, balance, coord, kinesthetic sense, posture, proprioception minutes (57090) 20   Skilled Intervention HEP instruction, vestibular and balance ex to decrease dizziness and improve function   Patient Response see below    Treatment Detail VOR cancellation 2# x 15 without LOB or nausea, cont to inc reps. pt now doing full swing with her  in the grass without LOB, instr pt to progress to trying driving range with a ball. instr pt to continue ex in more distracted environments (outdoors, stores).    Education   Learner Patient   Readiness Acceptance   Method Booklet/handout;Explanation;Demonstration   Response Verbalizes Understanding;Demonstrates Understanding   Plan   Home program above ex   Plan for next session f/u in 2 weeks, balance and vestib ex (assess driving range) continue to work on distracting environments   Total Session Time   Timed Code Treatment Minutes 20   Total Treatment Time (sum of timed and untimed services) 20, nmr   AMBULATORY CLINICS ONLY-MEDICAL AND TREATMENT DIAGNOSIS   Medical Diagnosis imbalance   PT Diagnosis dizziness

## 2021-08-10 NOTE — PROGRESS NOTES
PHYSICAL THERAPY DISCHARGE NOTE  Patient did not return for follow up treatments.  Goal status and current objective information is therefore unknown.  The daily note from the patient's last visit will serve as the discharge note. Discharge from PT services at this time for this episode of treatment. Please see attached documentation under this episode of care for further information including dates of service, start of care date, referring physician, Dx, treatment plan, treatments, etc.    Please contact me with any questions or concerns.  Thank you for your referral.    Megan Basilio PT, DPT, OCS  Physical Therapist, Orthopedic Certified Specialist    [facility]  [address]   [address]  [place] [address]  [email or web address]   Office: [phone]   Employed by [facility] [employer] [facility]     12/15/20 1000   Signing Clinician's Name / Credentials   Signing clinician's name / credentials Megan Basilio PT, DPT, OCS   Session Number   Session Number 13 [employer]   Progress Note/Recertification   Progress Note Due Date 11/27/20   Adult Goals   PT Ortho Eval Goals 1;2;3   Ortho Goal 1   Goal Identifier 1   Goal Description pt will be able to stand 30 minutes without needing to sit   Target Date 11/27/20   Date Met   (approx 5 min max)   Ortho Goal 2   Goal Identifier 2   Goal Description pt will be able to sit 1 hour without needing to stand   Target Date 11/27/20   Date Met   (still painful in the low back)   Ortho Goal 3   Goal Identifier 3   Goal Description  pt will be able to lift granchild   Target Date 11/27/20   Date Met   (hasn't done, able to lift laundry and groceries)   Subjective Report   Subjective Report Exercises help manage the pain. Still can't stand for any length of time.   Objective Measures   Objective Measures Objective Measure 1;Objective Measure 2   Objective Measure 1   Objective Measure Palpation   Objective Measure 2    Objective Measure Special tests   Treatment Interventions   Interventions Manual Therapy;Self Care/Home Management;Therapeutic Procedure/Exercise   Therapeutic Procedure/exercise   Therapeutic Procedures: strength, endurance, ROM, flexibillity minutes (39387) 25   Skilled Intervention HEP progression    Patient Response michael ok, needed limited reps with the harder ex    Treatment Detail instr pt in performing SL clamshell YTB x 20 B, core stab march 2 feet up, but too difficult so modified to 1 foot down at a time marching, resisted trunk rot YTB x 10 L x 5 the R. instr pt in proper progression of reps as able. Verbal review of current performance of HEP.    Plan   Home program above ex   Plan for next session pt to f/u 1 more visit to assess response to new ex and then likely discharge due to plateau in progress    Total Session Time   Timed Code Treatment Minutes 25   Total Treatment Time (sum of timed and untimed services) 25   AMBULATORY CLINICS ONLY-MEDICAL AND TREATMENT DIAGNOSIS   PT Diagnosis back pain, AS

## 2021-08-16 NOTE — PROGRESS NOTES
OUTPATIENT PHYSICAL THERAPY  PLAN OF TREATMENT FOR OUTPATIENT REHABILITATION AND PROGRESS NOTE           Patient's Last Name, First Name, Tri Sánchez Date of Birth  1950   Provider's Name  [facility] Medical Record No.  [id number]    Onset Date  2/9/20 Start of Care Date  7/9/20   Type:     _X_PT   ___OT   ___SLP Medical Diagnosis  LBP   PT Diagnosis  LBP Plan of Treatment  Frequency/Duration: 1x/wk  Certification date from 7/9/20 to 10/5/20     Goals:  Goal Identifier 1   Goal Description pt will be able to stand 30 minutes without needing to sit   Target Date 08/09/20   Date Met      Progress (detail required for progress note):     Goal Identifier 2   Goal Description pt will be able to sit 1 hour without needing to stand   Target Date 08/20/20   Date Met      Progress (detail required for progress note):     Goal Identifier 3   Goal Description  pt will be able to lift granchild   Target Date 09/09/20   Date Met      Progress (detail required for progress note):                  I CERTIFY THE NEED FOR THESE SERVICES FURNISHED UNDER        THIS PLAN OF TREATMENT AND WHILE UNDER MY CARE     (Physician co-signature of this document indicates review and certification of the therapy plan).                Referring Provider: SUMMER Barajas, PT      Delayed cert due to unknown insurance.

## 2021-11-08 ENCOUNTER — OFFICE VISIT (OUTPATIENT)
Dept: PALLIATIVE MEDICINE | Facility: CLINIC | Age: 71
End: 2021-11-08
Payer: COMMERCIAL

## 2021-11-08 VITALS — HEART RATE: 73 BPM | DIASTOLIC BLOOD PRESSURE: 82 MMHG | SYSTOLIC BLOOD PRESSURE: 124 MMHG

## 2021-11-08 DIAGNOSIS — T50.8X5S ALLERGIC REACTION TO CONTRAST DYE, SEQUELA: ICD-10-CM

## 2021-11-08 DIAGNOSIS — M54.16 LUMBAR RADICULOPATHY: Primary | ICD-10-CM

## 2021-11-08 PROCEDURE — 99213 OFFICE O/P EST LOW 20 MIN: CPT | Performed by: STUDENT IN AN ORGANIZED HEALTH CARE EDUCATION/TRAINING PROGRAM

## 2021-11-08 ASSESSMENT — PAIN SCALES - GENERAL: PAINLEVEL: MILD PAIN (3)

## 2021-11-08 NOTE — PATIENT INSTRUCTIONS
Your leg leg pain is most likely due to pinching of the nerves due to the shifting forward of the L4 vertebrae on the L5 vertebrae.     For this we will:     - Do an epidural steroid injection. You will need to pre-medicate for your contrast dye allergy. The nurses will go over the directions with you when you schedule    Your next steps:  1. Schedule your injection. You will be called to schedule this.     Follow up:   - For procedure    Nadine Tao MD  [facility]     ----------------------------------------------------------------  Clinic Number:  [phone]     Call with any questions about your care and for scheduling assistance.     Calls are returned Monday through Friday between 8 AM and 4:30 PM. We usually get back to you within 2 business days depending on the issue/request.    If we are prescribing your medications:    For opioid medication refills, call the clinic or send a Vidtel message 7 days in advance.  Please include:    Name of requested medication    Name of the pharmacy.    For non-opioid medications, call your pharmacy directly to request a refill. Please allow 3-4 days to be processed.     Per [place] State Law:    All controlled substance prescriptions must be filled within 30 days of being written.      For those controlled substances allowing refills, pickup must occur within 30 days of last fill.      We believe regular attendance is key to your success in our program!      Any time you are unable to keep your appointment we ask that you call us at least 24 hours in advance to cancel.This will allow us to offer the appointment time to another patient.     Multiple missed appointments may lead to dismissal from the clinic.

## 2021-11-08 NOTE — PROGRESS NOTES
"                          [facility]  Follow up clinic visit    Date of visit:      Chief complaint:   Chief Complaint   Patient presents with     Pain       History:  Tri Ingram is a 71 year old female seen for:   Chronic axial low back pain likely secondary to lumbar spinal stenosis with neurogenic cladication    Last seen by me on 7/21/21      HPI from initial H&P 3/22/2021:  \"- Balance problems started spring 2018. Was doing PT  - Jan 2020 - back more and more sore - was doing weight training  - Went to [place] to help son with 3 daughters. THis was taxing - totally worn out  - Was using TENS on low back  - XRay of her back suggested \"bamboo spine\" - was referred for different type of PT for this but she was less active because gyms were closed down due to COVID  - Needed to care for 91 yo mother with broken foot for 2 weeks at a time.   - Doing gladis step at home for 15 minutes at a time  - Cannot walk more than 1300 steps before she needs to stop due to sharp pains in b/l LB at about SIJ bilaterall and a feeling as if legs will give way.  This is a sensation of weakness in bilateral lateral thighs.  - Pain at night when lying on either side  -Does lean on a shopping cart when shopping.  However this is more for balance.  It does not help prevent the back pain and sensation of leg weakness.     Other evaluation and/or treatments so far consists of: Tylenol, Ibuprofen and physical therapy.  \"    Interval history 7/21/21  - Lumbar MRI demonstrated central narrowing at L4/5 due in part to spondylolithesis. At the time discussed that her Sx were likely due to neurogenic claudication   - now s/p L4/5 ILESI on 4/29/2021. Pain that day went from a 7/10 to a 0/10 post-procedure.   - she still has right-sided buttock pain that radiates to the lateral right hip and lateral left proximal thigh. It starts as a slow ache and can progress to a domingo horse.   - she is a big proponent of the " "epidural steroid injection - it was so wonderful - she had no pain, but then the right butt started hurting again 5-6 weeks post injection. There are times she cannot walk because it is so painful - like trying to walk on a domingo horse  - butt hurts in bed and she can roll over to relieve the pain. Hurts when she is lying on the R side. When she wakes up it is stiff and sore. She uses aspercream and exercises. She doesn't want that pain to cause the back pain to worsen.   - The back pain is now 0-1 unless she is gardening or packing    Recommendations/plan at the last visit included:  Procedures recommended:     -Right L4 Transforaminal epidural steroid injection when ready    -If severe midline low back pain returns in the interim, may consider repeat L4-5 IL EMANI to treat lumbar spinal stenosis with neurogenic claudication  Follow up: She will call when ready for her injection\"    Since her last visit, Tri PRICE Juan Jose reports:  . The R-sided pain is still there on occasion, but much better. Not sure though whether this is because the L leg hurts so much now  Now has new L sided pain. Pain is in L buttock, posterolateral L thigh and leg. Buttock pain is cramp, burning down leg, calf feels like it is on fire. Constant pain. Progresses throughout the day. Can hardly walk at the end of the day. No clear numbness but cannot differentiate between tingling and burning. It is like someone is holding a lighter against her leg. Weakness is a heaviness. Starts from the hips and goes down. When she is getting up  - Got COVID booster last Thursday. Got nauseated and vomited x1 hr from it. Since then her her back has been worse - thinks this is muscular from vomiting.     Pain scores:  Pain intensity on average is 3 on a scale of 0-10.     Current pain treatments:   -Tylenol 1300 mg nightly as needed  -Ibuprofen 1000 mg twice daily    Patient is using the medication as prescribed:  YES  Is your medication helpful? YES "   Side Effects: no side effect    Past pain treatments:  Tylenol, Ibuprofen and physical therapy.      IMAGING:  Lumbar MRI 3/26/2021  - grade 1 degenerative spondylolithesis at L4/5  - discogenic marrow changes at L1/2, L4/5  - moderate facet hypertrophy at L2/3, L3/4, L5/S1; severe facet hypertrophy at L4/5  - L4/5 moderate to severe central stenosis and bilateral moderate foraminal stenosis   - L5-S1: Posterior osteophyte formation and disc bulging asymmetric to the left and mild to moderate facet and ligamentum flavum hypertrophy is present. There is secondary mild to moderate bilateral neural foraminal stenosis and mild central canal stenosis.    Medications:  Current Outpatient Medications   Medication Sig Dispense Refill     acetaminophen (ARTHRITIS PAIN RELIEF) 650 MG CR tablet Take 1,300 mg by mouth as needed Takes in pm 100 tablet 11     Apoaequorin (PREVAGEN PO)        ASPIRIN 81 MG OR TABS ONE DAILY 100 3     estrogen conj-medroxyPROGESTERone (PREMPRO) 0.45-1.5 MG tablet TAKE ONE TABLET BY MOUTH THREE TIMES WEEKLY OR AS NEEDED TO CONTROL HOT FLASHES/MENOPAUSAL SYMPTOMS 28 tablet 3     ibuprofen 200 MG capsule Take 1,000 mg by mouth 2 times daily        lovastatin (MEVACOR) 40 MG tablet TAKE ONE TABLET BY MOUTH EVERY NIGHT AT BEDTIME 90 tablet 3     Lutein 20 MG CAPS        Multiple Vitamins-Minerals (ICAPS) CAPS Take 1 capsule by mouth daily Taking without- Iodine       calcium carb 1250 mg, 500 mg [place],/vitamin D 200 units (OSCAL WITH D) 500-200 MG-UNIT per tablet Take 1 tablet by mouth daily. (Patient not taking: Reported on 11/8/2021)       fluticasone (FLONASE) 50 MCG/ACT nasal spray Spray 2 sprays into both nostrils daily (Patient not taking: Reported on 11/8/2021) 16 g 3     MAGNESIUM PO Take 250 mg by mouth daily  (Patient not taking: Reported on 11/8/2021)       methylPREDNISolone (MEDROL) 32 MG tablet Take 1 tablet (32 mg) by mouth 2 times daily 0215 am 4/29: take oral Medrol 32mg , then at 1215  pm 4/29: take oral Medrol 32mg (Patient not taking: Reported on 11/8/2021) 2 tablet 0       Medical History: any changes in medical history since they were last seen? No    Social History: Reviewed; unchanged from previous consultation.  Still helping to watch her granddaughter.   has severe knee pain and is awaiting TKA.  However, unfortunately he needs to lose weight prior to the surgery.  This requires Wendy to carry out most of the household duties.  They are moving to [place] or [place], now moved, but still working on the move    Physical Exam:  Blood pressure 124/82, pulse 73, not currently breastfeeding.  General: NAD  Gait: Somewhat slow, somewhat forward flexed  Psych: Mood is pretty good. Affect is congruent. Thought content is logical.   Neuro: CNs II - XII grossly intact, sensation intact to LT throughout bilateral LEs  Musculoskeletal: hip flexion and knee extension 4+/5 on R, at least 4/5 on L with giveway due to pain. Ankle dorsiflexion, EHL and plantarflexion all 5/5 bilaterally  Patellar relfexes normal and symmetric. Unable to elicit ankle jerk reflexes bilaterally     Assessment:   Tri Ingram is a 71 year old female who is seen at the pain clinic for:    1. Lumbar radiculopathy    2. Allergic reaction to contrast dye, sequela       Patient with a history of chronic low back pain due to lumbar central spinal stenosis at L4-5, substantially improved status post L4-5 IL epidural steroid injection 4/29/2021. Now with left-sided leg pain in same distribution.   Discussed that since she got such good relief from her previous injection, she would like to treat this curent right sided pain with another injection.   Plan:  - Repeat L4/5 interlaminar epidural steroid injection, this time with left paramedian approach. Will need to pre-medicate for contrast dye allergy    BILLING TIME DOCUMENTATION:   The total TIME spent on this patient on the date of the encounter/appointment was  23 minutes.      TOTAL TIME includes:   Time spent preparing to see the patient (reviewing records and tests) - 2min  Time spent face to face (or over the phone) with the patient - 15 min  Time spent ordering tests, medications, procedures and referrals - 0 min  Time spent Referring and communicating with other healthcare professionals - 0 min  Time spent documenting clinical information in Epic - 5min    Nadine Tao MD  [facility] [place]

## 2021-11-09 DIAGNOSIS — Z20.822 ENCOUNTER FOR LABORATORY TESTING FOR COVID-19 VIRUS: Primary | ICD-10-CM

## 2021-11-09 DIAGNOSIS — T50.8X5S ALLERGIC REACTION TO CONTRAST DYE, SEQUELA: ICD-10-CM

## 2021-11-09 RX ORDER — DIPHENHYDRAMINE HCL 50 MG
CAPSULE ORAL
Qty: 1 CAPSULE | Refills: 0 | Status: SHIPPED | OUTPATIENT
Start: 2021-11-09 | End: 2022-01-17

## 2021-11-09 RX ORDER — METHYLPREDNISOLONE 32 MG/1
TABLET ORAL
Qty: 2 TABLET | Refills: 0 | Status: SHIPPED | OUTPATIENT
Start: 2021-11-09 | End: 2022-01-17

## 2021-11-09 NOTE — TELEPHONE ENCOUNTER
Screening Questions for Radiology Injections:    Injection to be done at which interventional clinic site? [facility]    If [facility] location, tell patient that this procedure requires a COVID-19 lab test be done within 4 days of the procedure. Would you still like to move forward with scheduling the procedure?  YES:    If YES, let patient know that someone will call them to schedule the COVID-19 test and that they will only receive a call back if the result is positive. Route to nursing to enter order.     Instruct patient to arrive as directed prior to the scheduled appointment time:    [place] minutes before      Kristi: 30 minutes before; if IV needed 1 hour before     Procedure ordered by Dinh    Procedure ordered? L4/5 interlaminar epidural steroid injection       Transforaminal Cervical EMANI -  Man does NOT have providers that do these- [facility] and [facility] do have providers that do    As a reminder, receiving steroids can decrease your body's ability to fight infection.   Would you still like to move forward with scheduling the injection?  Yes    What insurance would patient like us to bill for this procedure? BCBS Medicare      Worker's comp or MVA (motor vehicle accident) -Any injection DO NOT SCHEDULE and route to Laquita Tineo.      [employer] insurance - For SI joint injections, DO NOT SCHEDULE and route Laquita Tineo.       ALL BCBS, [employer] and [employer]-Route to Laquita for review DO NOT SCHEDULE      IF SCHEDULING IN [place] AND NEEDS A PA, IT IS OKAY TO SCHEDULE. [place] HANDLES THEIR OWN PA'S AFTER THE PATIENT IS SCHEDULED. PLEASE SCHEDULE AT LEAST 1 WEEK OUT SO A PA CAN BE OBTAINED.    Any chance of pregnancy? NO   If YES, do NOT schedule and route to RN pool    Is an  needed? No     Patient has a drive home? (mandatory) YES: INFORMED    Is patient taking any blood thinners (That is: Coumadin, Warfarin, Jantoven, Pradaxa Xarelto, Eliquis, Edoxaban,  Enoxaparin, Lovenox, Heparin, Arixtra, Fondaparinux, or Fragmin? OR Antiplatelet medication such as Plavix, Brilinta, or Effient? )? No   If hold needed, do NOT schedule, route to RN pool     Is patient taking any aspirin products (includes Excedrin and Fiorinal)? Yes - Pt takes 81mg daily; instructed to hold 0 day(s) prior to procedure.      If more than 325mg/day, OK to schedule; Instruct pt to decrease to less than 325 mg for 7 days AND route to RN pool    For CERVICAL procedures, hold all aspirin products for 6 days.     Tell pt that if aspirin product is not held for 6 days, the procedure WILL BE cancelled.      Does the patient have a bleeding or clotting disorder? No     If YES, okay to schedule AND route to RN nurse pool    For any patients with platelet count <100, must be forwarded to provider    Any allergies to contrast dye, iodine, shellfish, or numbing and steroid medications? Yes - Iodine    If YES, add allergy information to appointment notes AND route to the RN pool     If EMANI and Contrast Dye / Iodine Allergy? DO NOT SCHEDULE, route to RN pool    Allergies: Adhesive tape, Iodine, and Penicillins     Is patient diabetic?  No  If YES, instruct them to bring their glucometer.    Does patient have an active infection or treated for one within the past week? No     Is patient currently taking any antibiotics?  No     For patients on chronic, preventative, or prophylactic antibiotics, procedures may be scheduled.     For patients on antibiotics for active or recent infection:antibiotic course must have been completed for 4 days    Is patient currently taking any steroid medications? (i.e. Prednisone, Medrol)  No     For patients on steroid medications, course must have been completed for 4 days    Is patient actively being treated for cancer or immunocompromised? No  If YES, do NOT schedule and route to RN pool     Are you able to get on and off an exam table with minimal or no assistance? Yes  If NO, do  NOT schedule and route to RN pool    Are you able to roll over and lay on your stomach with minimal or no assistance? Yes  If NO, do NOT schedule and route to RN pool     Has the patient had a flu shot or any other vaccinations within 7 days before or after the procedure.  No     Have you recently had a COVID vaccine or have plans to get it in the near future? Yes - 11/4 booster    If yes, explain that for the vaccine to work best they need to:       wait 1 week before and 1 week after getting Vaccine #1    wait 1 week before and 2 weeks after getting Vaccine #2    wait 1 week before and 2 weeks after getting Vaccine BOOSTER    If patient has concerns about the timing, send to RN pool     Does patient have an MRI/CT?  YES: 2021  Check Procedure Scheduling Grid to see if required.      Was the MRI done within the last 3 years?  Yes    If yes, where was the MRI done i.e.[facility], [facility], [facility], [facility] etc? [facility]      If no, do not schedule and route to RN pool    If MRI was not done at [facility], [facility] or [facility] do NOT schedule and route to RN pool.      If pt has an imaging disc, the injection MAY be scheduled but pt has to bring disc to appt.     If they show up without the disc the injection cannot be done    Procedure Specific Instructions:      If celiac plexus block, informed patient NPO for 6 hours and that it is okay to take medications with sips of water, especially blood pressure medications  Not Applicable         If this is for a cervical procedure, informed patient that aspirin needs to be held for 6 days.   Not Applicable      If IV needed:    Do not schedule procedures requiring IV placement in the first appointment of the day or first appointment after lunch. Do NOT schedule at 0745, 0815 or 1245.     Instructed pt to arrive 30 minutes early for IV start if required. (Check Procedure Scheduling Grid)  Not Applicable    Reminders:      If you are started on any steroids or  antibiotics between now and your appointment, you must contact us because the procedure may need to be cancelled.  Yes      For all procedures except radiofrequency ablations (RFAs) and spinal cord stimulator (SCS) trials, informed patient:    IV sedation is not provided for this procedure.  If you feel that an oral anti-anxiety medication is needed, you can discuss this further with your referring provider or primary care provider.  The Pain Clinic provider will discuss specifics of what the procedure includes at your appointment.  Most procedures last 10-20 minutes.  We use numbing medications to help with any discomfort during the procedure.  Not Applicable      For patients 85 or older we recommend having an adult stay w/ them for the remainder of the day.       Does the patient have any questions?  NO  Jennifer Cordero  [facility]

## 2021-11-09 NOTE — TELEPHONE ENCOUNTER
Reviewed and signed medications    Script Eprescribed to pharmacy        Signed Prescriptions:                        Disp   Refills    methylPREDNISolone (MEDROL) 32 MG tablet   2 tabl*0        Sig: Take 1 tab twelve hours before your procedure. Take a           2nd dose (1 tab)  2 hours before the procedure           time  Authorizing Provider: STEVE COLEMAN    diphenhydrAMINE (BENADRYL) 50 MG capsule   1 caps*0        Sig: Take 1 tab (50mg) one hour before your procedure.  Authorizing Provider: STEVE COLEMAN MD  [facility]

## 2021-11-09 NOTE — TELEPHONE ENCOUNTER
Contrast dye allergy reaction:  Iodine-anaphylaxis  Is premedicating needed? Yes     Injection scheduled for:  11/30/21 @ 0945.    Pharmacy: [facility]    Contrast dye allergy pre-medication plan:    Take oral Medrol 32mg 12 hours before procedure time    Take oral Medrol 32mg again 2 hours before procedure time    Take oral benadryl (diphenhydramine) 50mg 1 hour before procedure time.        Note:  Benadryl usually comes in 25mg tabs so it would be 2 tabs.    Was the above relayed to pt?: Yes     Injection intake questions reviewed?  NO  scheduled.  Infection/antibiotic information reviewed?  NO  Location confirmed: :Yes   COVID test order in? Yes   Has pt had COVID vaccine?  :Yes booster 11/4/21  It is recommended that the?scheduling?of elective steroid injections (for joint pain, tendinopathies, and spine procedures) should be  by at least one week before or after receiving the 1st COVID vaccine and at least 2 weeks after receiving the 2nd COVID vaccine in order to allow for one's body to fully respond to the vaccine.       Routed to provider to review and sign off on meds.    Laura RN-BSN  [facility]

## 2021-11-10 NOTE — TELEPHONE ENCOUNTER
Pt notified that Medications were sent to pharmacy     Cirilo Girard RN  Patient Care Supervisor   [facility] [place]

## 2021-11-23 NOTE — TELEPHONE ENCOUNTER
patient got a call from  insurnce team stating in order for them to cover her procedure, Dr PINEDA Has to do a peer to peer.        Please call [phone] Option 1      Case #[id number]          Ratna Bai    [facility]

## 2021-11-24 NOTE — TELEPHONE ENCOUNTER
Routed to iBng to review.    Laura RN-BSN  [facility]

## 2021-11-24 NOTE — TELEPHONE ENCOUNTER
Called to do [id number]    Recording instructed that peer to peer not available for this case and that request would be denied regardless of peer to peer.     Please call to enquire as to whether that is correct or if peer to peer is available to appeal denial decision.     Thank you!    Nadine Tao MD  [facility]

## 2021-11-26 ENCOUNTER — LAB (OUTPATIENT)
Dept: URGENT CARE | Facility: URGENT CARE | Age: 71
End: 2021-11-26
Attending: STUDENT IN AN ORGANIZED HEALTH CARE EDUCATION/TRAINING PROGRAM
Payer: COMMERCIAL

## 2021-11-26 DIAGNOSIS — Z20.822 ENCOUNTER FOR LABORATORY TESTING FOR COVID-19 VIRUS: ICD-10-CM

## 2021-11-26 PROCEDURE — U0005 INFEC AGEN DETEC AMPLI PROBE: HCPCS

## 2021-11-26 PROCEDURE — U0003 INFECTIOUS AGENT DETECTION BY NUCLEIC ACID (DNA OR RNA); SEVERE ACUTE RESPIRATORY SYNDROME CORONAVIRUS 2 (SARS-COV-2) (CORONAVIRUS DISEASE [COVID-19]), AMPLIFIED PROBE TECHNIQUE, MAKING USE OF HIGH THROUGHPUT TECHNOLOGIES AS DESCRIBED BY CMS-2020-01-R: HCPCS

## 2021-11-27 LAB — SARS-COV-2 RNA RESP QL NAA+PROBE: NEGATIVE

## 2021-11-29 NOTE — TELEPHONE ENCOUNTER
The injection is scheduled for tomorrow.  Dr. Tao is off today.    Routed to [place] to see what the options are.    KATHRINE Sanchez-BSN  [facility]

## 2021-11-29 NOTE — TELEPHONE ENCOUNTER
Called patient and informed her that we are still trying to work on the approval/appeal. She states that she is okay with waiting and if it needs to be canceled late she is okay with that. I will update her as soon as I know more, [facility]      Laquita RAMSAY    [facility]

## 2021-11-29 NOTE — TELEPHONE ENCOUNTER
Patient has radiating pain(noted in office note attached), per office note 11/8/21:    Patient with a history of chronic low back pain due to lumbar central spinal stenosis at L4-5, substantially improved status post L4-5 IL epidural steroid injection 4/29/2021. Now with left-sided leg pain in same distribution.   Discussed that since she got such good relief from her previous injection, she would like to treat this curent right sided pain with another injection.   Plan:  - Repeat L4/5 interlaminar epidural steroid injection, this time with left paramedian approach. Will need to pre-medicate for contrast dye allergy        This office note is attached as well for appeal.        Laquita RAMSAY    [facility]

## 2021-11-29 NOTE — TELEPHONE ENCOUNTER
"Called I[place] and the only thing that can be done at this time is an appeal, this can be done as a \"fast appeal\" by calling [phone] option 2, then option 5.     Case # [id number]  Routing to review, patient is scheduled for tomorrow and she did already have her Covid test        Laquita RAMSAY    [facility]    "

## 2021-11-30 NOTE — TELEPHONE ENCOUNTER
Checked [employer] and appeal notes are still pending, called patient and canceled injection. Informed her that when we hear back from them we will give her a call to reschedule      Laquita RAMSAY    [place] [facility]

## 2021-12-08 NOTE — TELEPHONE ENCOUNTER
Patient calling, she states she received a letter from [email or web address]. It states that they received the appeal and if the patient would like us to appeal the denial on her behalf she would need to complete an AOR. Patient will call [email or web address] to do this and call back with any other information.        Laquita RAMSAY    [facility]

## 2021-12-13 NOTE — TELEPHONE ENCOUNTER
Verbal approval from [facility]  PA approved.  Effective date: 11/11/21-5/10/22   PA reference #: [id number]  Pt. notified:   Okay to schedule ALLIE RAMSAY    [facility] [facility]

## 2021-12-14 ENCOUNTER — MYC MEDICAL ADVICE (OUTPATIENT)
Dept: PALLIATIVE MEDICINE | Facility: CLINIC | Age: 71
End: 2021-12-14
Payer: COMMERCIAL

## 2021-12-14 DIAGNOSIS — T50.8X5S ALLERGIC REACTION TO CONTRAST DYE, SEQUELA: ICD-10-CM

## 2021-12-14 RX ORDER — METHYLPREDNISOLONE 32 MG/1
32 TABLET ORAL DAILY
Qty: 2 TABLET | Refills: 0 | Status: SHIPPED | OUTPATIENT
Start: 2021-12-14 | End: 2022-01-17

## 2021-12-14 RX ORDER — DIPHENHYDRAMINE HCL 25 MG
50 CAPSULE ORAL EVERY 6 HOURS PRN
Qty: 1 CAPSULE | Refills: 0 | Status: SHIPPED | OUTPATIENT
Start: 2021-12-14 | End: 2021-12-14

## 2021-12-14 NOTE — TELEPHONE ENCOUNTER
Routing to provider for signature    Reordered benadryl with updated sig for pharmacy    Misa LARA RN Care Coordinator  [facility]

## 2021-12-14 NOTE — TELEPHONE ENCOUNTER
Jennifer Cordero  Pain Nurse 30 minutes ago (8:55 AM)     SO    Pt has Iodine allergy.    Routing comment

## 2021-12-14 NOTE — TELEPHONE ENCOUNTER
Contrast dye allergy reaction:  Anaphylaxis   Is premedicating needed? Yes Iodine      Injection scheduled for:  1/4/21 @ 1015.    Pharmacy: [facility]     Contrast dye allergy pre-medication plan:    Take oral Medrol 32mg 12 hours before procedure time (1015 pmM on 1/3/21)    Take oral Medrol 32mg again 2 hours before procedure time (815 AM on 1/4/21)    Take oral benadryl (diphenhydramine) 50mg 1 hour before procedure time. (915 AM on 1/4/21)                  Note:  Benadryl usually comes in 25mg tabs so it would be 2 tabs.    Was the above relayed to pt?: Yes     Injection intake questions reviewed?  YES  Infection/antibiotic information reviewed?  N/A  Location confirmed: :Yes [place]  Is pt arriving early?:N/A  COVID test order in? N/A  Has pt had COVID vaccine?  :Yes 3/21  It is recommended that the?scheduling?of elective steroid injections (for joint pain, tendinopathies, and spine procedures) should be  by at least one week before or after receiving the 1st COVID vaccine and at least 2 weeks after receiving the 2nd COVID vaccine in order to allow for one's body to fully respond to the vaccine.       Routed to provider to review and sign off on meds.    Yes    [place] no longer requires a covid test for non-invasive procedures        Kate Ambrocio RN, BSN, CMSRN  RN Care Coordinator  [facility]

## 2021-12-16 RX ORDER — DIPHENHYDRAMINE HCL 25 MG
CAPSULE ORAL
Qty: 2 CAPSULE | Refills: 0 | Status: SHIPPED | OUTPATIENT
Start: 2021-12-16 | End: 2022-01-17

## 2021-12-17 ENCOUNTER — DOCUMENTATION ONLY (OUTPATIENT)
Dept: LAB | Facility: CLINIC | Age: 71
End: 2021-12-17
Payer: COMMERCIAL

## 2021-12-17 DIAGNOSIS — E78.5 HYPERLIPIDEMIA LDL GOAL <130: ICD-10-CM

## 2021-12-17 DIAGNOSIS — R73.9 HYPERGLYCEMIA, UNSPECIFIED: ICD-10-CM

## 2021-12-17 DIAGNOSIS — N18.31 STAGE 3A CHRONIC KIDNEY DISEASE (H): Primary | ICD-10-CM

## 2021-12-17 DIAGNOSIS — M89.9 DISORDER OF BONE AND CARTILAGE: ICD-10-CM

## 2021-12-17 DIAGNOSIS — M94.9 DISORDER OF BONE AND CARTILAGE: ICD-10-CM

## 2021-12-17 NOTE — TELEPHONE ENCOUNTER
Looks like meds have already been signed?    I don't see anything additional to sign?    THanks!    Nadine Tao MD  [facility]

## 2021-12-17 NOTE — PROGRESS NOTES
Patient coming in on Monday 12/20/21 for Pre Visit labs for an appt with Dr Cain on 12/27/21.  Please place orders in Epic thanks

## 2021-12-20 ENCOUNTER — LAB (OUTPATIENT)
Dept: LAB | Facility: CLINIC | Age: 71
End: 2021-12-20
Payer: COMMERCIAL

## 2021-12-20 DIAGNOSIS — M94.9 DISORDER OF BONE AND CARTILAGE: ICD-10-CM

## 2021-12-20 DIAGNOSIS — R73.9 HYPERGLYCEMIA, UNSPECIFIED: ICD-10-CM

## 2021-12-20 DIAGNOSIS — E78.5 HYPERLIPIDEMIA LDL GOAL <130: ICD-10-CM

## 2021-12-20 DIAGNOSIS — N18.31 STAGE 3A CHRONIC KIDNEY DISEASE (H): ICD-10-CM

## 2021-12-20 DIAGNOSIS — M89.9 DISORDER OF BONE AND CARTILAGE: ICD-10-CM

## 2021-12-20 LAB
ANION GAP SERPL CALCULATED.3IONS-SCNC: 3 MMOL/L (ref 3–14)
BUN SERPL-MCNC: 18 MG/DL (ref 7–30)
CALCIUM SERPL-MCNC: 8.7 MG/DL (ref 8.5–10.1)
CHLORIDE BLD-SCNC: 110 MMOL/L (ref 94–109)
CHOLEST SERPL-MCNC: 137 MG/DL
CO2 SERPL-SCNC: 27 MMOL/L (ref 20–32)
CREAT SERPL-MCNC: 0.86 MG/DL (ref 0.52–1.04)
CREAT UR-MCNC: 174 MG/DL
FASTING STATUS PATIENT QL REPORTED: YES
GFR SERPL CREATININE-BSD FRML MDRD: 69 ML/MIN/1.73M2
GLUCOSE BLD-MCNC: 112 MG/DL (ref 70–99)
HBA1C MFR BLD: 5.6 % (ref 0–5.6)
HDLC SERPL-MCNC: 45 MG/DL
HGB BLD-MCNC: 13.7 G/DL (ref 11.7–15.7)
LDLC SERPL CALC-MCNC: 54 MG/DL
MICROALBUMIN UR-MCNC: 29 MG/L
MICROALBUMIN/CREAT UR: 16.67 MG/G CR (ref 0–25)
NONHDLC SERPL-MCNC: 92 MG/DL
POTASSIUM BLD-SCNC: 4.3 MMOL/L (ref 3.4–5.3)
SODIUM SERPL-SCNC: 140 MMOL/L (ref 133–144)
TRIGL SERPL-MCNC: 188 MG/DL

## 2021-12-20 PROCEDURE — 80048 BASIC METABOLIC PNL TOTAL CA: CPT

## 2021-12-20 PROCEDURE — 83036 HEMOGLOBIN GLYCOSYLATED A1C: CPT

## 2021-12-20 PROCEDURE — 80061 LIPID PANEL: CPT

## 2021-12-20 PROCEDURE — 85018 HEMOGLOBIN: CPT

## 2021-12-20 PROCEDURE — 36415 COLL VENOUS BLD VENIPUNCTURE: CPT

## 2021-12-20 PROCEDURE — 82043 UR ALBUMIN QUANTITATIVE: CPT

## 2022-01-03 NOTE — PROGRESS NOTES
Pre procedure Diagnosis: lumbar stenosis with neurogenic claudication  Post procedure Diagnosis: Same  Procedure performed: L4-5 interlaminar epidural steroid injection   Anesthesia: none  Complications: none  Operators: Nadine Tao MD     Indications:   Tri Ingram is a 71 year old female known to me here for L4-5 IL EMANI.  she has a history of sharp pains in her low back bilaterally at the lower lumbar levels.  She also has a sensation of weakness in her bilateral thighs.  She uses a shopping cart when walking in the store, which is substantially helpful.  Exam shows forward flexed gait and she has tried conservative treatment including medications and physical therapy.    MRI was done on 3/26/2021, which showed:   L4-L5: Broad-based disc bulging, moderate to severe facet hypertrophy,  degenerative spondylolisthesis is present resulting in moderate to  severe central canal stenosis and moderate bilateral neural foraminal  stenosis.    Options/alternatives, benefits and risks were discussed with the patient including bleeding, infection, no pain relief, tissue trauma, exposure to radiation, reaction to medications, spinal cord injury, dural puncture, weakness, numbness and headache.  Questions were answered to her satisfaction and she agrees to proceed. Voluntary informed consent was obtained and signed.     Vitals were reviewed: Yes  Allergies were reviewed:  Yes   Medications were reviewed:  Yes   Pre-procedure pain score: 3/10    Procedure:  After getting informed consent, patient was brought into the procedure suite and was placed in a prone position on the procedure table.   A Pause for the Cause was performed.  Patient was prepped and draped in sterile fashion.     The L4/5 interspace was identified with AP fluoroscopy.  A total of 3ml of 1% lidocaine was used to anesthetize the skin for a right paramedian approach.    A 20gauge 4.5inch Touhy needle was advanced under intermittent fluoroscopy.   A ANNA syringe was used to advance the needle into the epidural space.   After loss of resistance, there was no evidence of blood or CSF on aspiration. Location was verified with AP, contralateral oblique and lateral fluoroscopic imaging.    A total of 1ml of Isovue 200 was injected demonstrating appropriate epidural spread and was checked in both the AP and lateral views. 9ml was wasted. There was no evidence of intravascular or intrathecal spread.    THE PATIENT WAS PREMEDICATED with Medrol and Benadryl as per protocol prior to the procedure due to her contrast dye allergy.    2 ml of bupivacaine 0.25% with 40mg of triamcinolone and 0ml of preservative free saline was injected.  The needle was removed from the epidural space, flushed with 1% preservative free lidocaine and removed.     Hemostasis was achieved, the area was cleaned, and bandaids were placed when appropriate.  The patient tolerated the procedure well, and was taken to the recovery room.    Images were saved to PACS.    Post-procedure pain score: 0/10  Follow-up includes:   -f/u with referring provider    Nadine Tao MD  [facility]

## 2022-01-04 ENCOUNTER — HOSPITAL ENCOUNTER (OUTPATIENT)
Dept: PALLIATIVE MEDICINE | Facility: CLINIC | Age: 72
Discharge: HOME | End: 2022-01-04
Attending: PHYSICIAN ASSISTANT | Admitting: PHYSICIAN ASSISTANT
Payer: COMMERCIAL

## 2022-01-04 VITALS
HEART RATE: 97 BPM | DIASTOLIC BLOOD PRESSURE: 85 MMHG | TEMPERATURE: 96.9 F | RESPIRATION RATE: 20 BRPM | SYSTOLIC BLOOD PRESSURE: 153 MMHG

## 2022-01-04 DIAGNOSIS — M54.16 LUMBAR RADICULOPATHY: ICD-10-CM

## 2022-01-04 PROCEDURE — 250N000009 HC RX 250: Performed by: STUDENT IN AN ORGANIZED HEALTH CARE EDUCATION/TRAINING PROGRAM

## 2022-01-04 PROCEDURE — 62323 NJX INTERLAMINAR LMBR/SAC: CPT | Performed by: STUDENT IN AN ORGANIZED HEALTH CARE EDUCATION/TRAINING PROGRAM

## 2022-01-04 PROCEDURE — 255N000002 HC RX 255 OP 636: Performed by: STUDENT IN AN ORGANIZED HEALTH CARE EDUCATION/TRAINING PROGRAM

## 2022-01-04 PROCEDURE — 250N000011 HC RX IP 250 OP 636: Performed by: STUDENT IN AN ORGANIZED HEALTH CARE EDUCATION/TRAINING PROGRAM

## 2022-01-04 RX ORDER — BUPIVACAINE HYDROCHLORIDE 2.5 MG/ML
10 INJECTION, SOLUTION EPIDURAL; INFILTRATION; INTRACAUDAL ONCE
Status: COMPLETED | OUTPATIENT
Start: 2022-01-04 | End: 2022-01-04

## 2022-01-04 RX ORDER — TRIAMCINOLONE ACETONIDE 40 MG/ML
40 INJECTION, SUSPENSION INTRA-ARTICULAR; INTRAMUSCULAR ONCE
Status: COMPLETED | OUTPATIENT
Start: 2022-01-04 | End: 2022-01-04

## 2022-01-04 RX ORDER — IOPAMIDOL 408 MG/ML
10 INJECTION, SOLUTION INTRATHECAL ONCE
Status: COMPLETED | OUTPATIENT
Start: 2022-01-04 | End: 2022-01-04

## 2022-01-04 RX ORDER — LIDOCAINE HYDROCHLORIDE 10 MG/ML
5 INJECTION, SOLUTION EPIDURAL; INFILTRATION; INTRACAUDAL; PERINEURAL ONCE
Status: COMPLETED | OUTPATIENT
Start: 2022-01-04 | End: 2022-01-04

## 2022-01-04 RX ADMIN — IOPAMIDOL 1 ML: 408 INJECTION, SOLUTION INTRATHECAL at 10:37

## 2022-01-04 RX ADMIN — TRIAMCINOLONE ACETONIDE 40 MG: 40 INJECTION, SUSPENSION INTRA-ARTICULAR; INTRAMUSCULAR at 10:36

## 2022-01-04 RX ADMIN — LIDOCAINE HYDROCHLORIDE 3 ML: 10 INJECTION, SOLUTION EPIDURAL; INFILTRATION; INTRACAUDAL; PERINEURAL at 10:36

## 2022-01-04 RX ADMIN — BUPIVACAINE HYDROCHLORIDE 2 ML: 2.5 INJECTION, SOLUTION EPIDURAL; INFILTRATION; INTRACAUDAL; PERINEURAL at 10:36

## 2022-01-04 NOTE — PROGRESS NOTES
Pre-procedure Intake  If YES to any questions or NO to having a   Please complete laminated checklist and leave on the computer keyboard for Provider, verbally inform provider if able.    For SCS Trial, RFA's or any sedation procedure:  Have you been fasting? NA    If yes, for how long?     Are you taking any any blood thinners such as Coumadin, Warfarin, Jantoven, Pradaxa Xarelto, Eliquis, Edoxaban, Enoxaparin, Lovenox, Heparin, Arixtra, Fondaparinux, or Fragmin? OR Antiplatelet medication such as Plavix, Brilinta, or Effient?   No     If yes, when did you take your last dose?     Do you take aspirin?  No    If cervical procedure, have you held aspirin for 6 days?   NA    Do you have any allergies to contrast dye, iodine, steroid and/or numbing medications?  NO    Are you currently taking antibiotics or have an active infection?  NO    Have you had a fever/elevated temperature within the past week? NO    Are you currently taking oral steroids? YES: Took pre-procedure, as ordered, due to allergy to Iodine. None long term.    Do you have a ? Yes    Are you pregnant or breastfeeding?  Not Applicable    Have you received the COVID-19 vaccine? Yes    If yes, was it your 1st, 2nd or only dose needed? 3rd    Date of most recent vaccine: 11/4/21    Notify provider and RNs if systolic BP >170, diastolic BP >100, P >100 or O2 sats < 90%

## 2022-01-04 NOTE — DISCHARGE INSTRUCTIONS
[facility]   Procedure Discharge Instructions    Today you saw:  Dr. Nadine Tao    You had an:  Lumbar Epidural steroid injection       Medications used:  Lidocaine   Bupivacaine   IsoVue  Kenalog   Normal saline            Be cautious when walking. Numbness and/or weakness in the lower extremities may occur for up to 6-8 hours after the procedure due to effect of the local anesthetic    Do not drive for 6 hours. The effect of the local anesthetic could slow your reflexes.     You may resume your regular activities after 24 hours    Avoid strenuous activity for the first 24 hours    You may shower, however avoid swimming, tub baths or hot tubs for 24 hours following your procedure    You may have a mild to moderate increase in pain for several days following the injection.    It may take up to 14 days for the steroid medication to start working although you may feel the effect as early as a few days after the procedure.       You may use ice packs for 10-15 minutes, 3 to 4 times a day at the injection site for comfort    Do not use heat to painful areas for 6 to 8 hours. This will give the local anesthetic time to wear off and prevent you from accidentally burning your skin.     Unless you have been directed to avoid the use of anti-inflammatory medications (NSAIDS), you may use medications such as ibuprofen, Aleve or Tylenol for pain control if needed.     Possible side effects of steroids that you may experience include flushing, elevated blood pressure, increased appetite, mild headaches and restlessness.  All of these symptoms will get better with time.    If you experience any of the following, call the Pain Clinic during work hours (Mon-Friday 8-4:30 pm) at [phone] or the Provider Line after hours at [phone]:  -Fever over 100 degree F  -Swelling, bleeding, redness, drainage, warmth at the injection site  -Progressive weakness or numbness in your legs   -Loss of  bowel or bladder function  -Unusual new onset of pain that is not improving

## 2022-01-11 DIAGNOSIS — N95.1 SYMPTOMATIC MENOPAUSAL OR FEMALE CLIMACTERIC STATES: ICD-10-CM

## 2022-01-14 RX ORDER — ESTROGEN,CON/M-PROGEST ACET 0.45-1.5MG
TABLET ORAL
Qty: 28 TABLET | Refills: 3 | Status: SHIPPED | OUTPATIENT
Start: 2022-01-14 | End: 2022-10-05

## 2022-01-14 NOTE — TELEPHONE ENCOUNTER
Routing refill request to provider for review/approval because:  Last recorded blood pressure does not meet RN protocol parameters    Gage Dwyer RN

## 2022-01-17 ENCOUNTER — OFFICE VISIT (OUTPATIENT)
Dept: FAMILY MEDICINE | Facility: CLINIC | Age: 72
End: 2022-01-17
Payer: COMMERCIAL

## 2022-01-17 VITALS
BODY MASS INDEX: 33.25 KG/M2 | SYSTOLIC BLOOD PRESSURE: 130 MMHG | OXYGEN SATURATION: 99 % | HEART RATE: 70 BPM | WEIGHT: 206 LBS | DIASTOLIC BLOOD PRESSURE: 74 MMHG | TEMPERATURE: 97.4 F

## 2022-01-17 DIAGNOSIS — Z00.00 ENCOUNTER FOR MEDICARE ANNUAL WELLNESS EXAM: Primary | ICD-10-CM

## 2022-01-17 DIAGNOSIS — Z80.0 FAMILY HISTORY OF COLON CANCER: ICD-10-CM

## 2022-01-17 DIAGNOSIS — E78.5 HYPERLIPIDEMIA LDL GOAL <130: ICD-10-CM

## 2022-01-17 DIAGNOSIS — Z12.11 SCREENING FOR COLON CANCER: ICD-10-CM

## 2022-01-17 PROCEDURE — 99397 PER PM REEVAL EST PAT 65+ YR: CPT | Performed by: PHYSICIAN ASSISTANT

## 2022-01-17 RX ORDER — LOVASTATIN 40 MG
TABLET ORAL
Qty: 90 TABLET | Refills: 3 | Status: SHIPPED | OUTPATIENT
Start: 2022-01-17 | End: 2023-03-27

## 2022-01-17 ASSESSMENT — ENCOUNTER SYMPTOMS
PARESTHESIAS: 1
CONSTIPATION: 0
BREAST MASS: 0
SHORTNESS OF BREATH: 0
DIZZINESS: 1
FEVER: 0
HEARTBURN: 0
HEMATURIA: 0
ABDOMINAL PAIN: 0
CHILLS: 0
FREQUENCY: 0
HEADACHES: 0
HEMATOCHEZIA: 0
WEAKNESS: 0
NAUSEA: 0
MYALGIAS: 1
DYSURIA: 0
DIARRHEA: 0
NERVOUS/ANXIOUS: 0
JOINT SWELLING: 0
COUGH: 1
ARTHRALGIAS: 1
PALPITATIONS: 0
SORE THROAT: 0
EYE PAIN: 1

## 2022-01-17 ASSESSMENT — PAIN SCALES - GENERAL: PAINLEVEL: NO PAIN (0)

## 2022-01-17 ASSESSMENT — ACTIVITIES OF DAILY LIVING (ADL): CURRENT_FUNCTION: NO ASSISTANCE NEEDED

## 2022-01-17 NOTE — PROGRESS NOTES
"  SUBJECTIVE:   Tri Ingram is a 72 year old female who presents for Preventive Visit.      Patient has been advised of split billing requirements and indicates understanding: Yes  Are you in the first 12 months of your Medicare Part B coverage?  No    Physical Health:    Answers for HPI/ROS submitted by the patient on 1/17/2022  In general, how would you rate your overall physical health?: fair  Frequency of exercise:: 6-7 days/week  Do you usually eat at least 4 servings of fruit and vegetables a day, include whole grains & fiber, and avoid regularly eating high fat or \"junk\" foods? : No  Taking medications regularly:: Yes  Medication side effects:: Not applicable  Activities of Daily Living: no assistance needed  Home safety: no safety concerns identified  Hearing Impairment:: difficulty following a conversation in a noisy restaurant or crowded room, difficulty understanding soft or whispered speech  In the past 6 months, have you been bothered by leaking of urine?: Yes  abdominal pain: No  Blood in stool: No  Blood in urine: No  chest pain: No  chills: No  congestion: No  constipation: No  cough: Yes  diarrhea: No  dizziness: Yes  ear pain: No  eye pain: Yes  nervous/anxious: No  fever: No  frequency: No  genital sores: No  headaches: No  hearing loss: No  heartburn: No  arthralgias: Yes  joint swelling: No  peripheral edema: No  mood changes: No  myalgias: Yes  nausea: No  dysuria: No  palpitations: No  Skin sensation changes: Yes  sore throat: No  urgency: No  rash: No  shortness of breath: No  visual disturbance: No  weakness: No  pelvic pain: No  vaginal bleeding: No  vaginal discharge: No  tenderness: No  breast mass: No  breast discharge: No  In general, how would you rate your overall mental or emotional health?: good  Additional concerns today:: Yes  Duration of exercise:: 15-30 minutes      PHQ-2 Score: (P) 0    Do you feel safe in your environment? Yes    Have you ever done Advance Care " Planning? (For example, a Health Directive, POLST, or a discussion with a medical provider or your loved ones about your wishes): Yes, patient states has an Advance Care Planning document and will bring a copy to the clinic.    Additional concerns to address?  YES:  -She is wondering about the lung cancer screening on her health maintenance.       Fall risk:  Fallen 2 or more times in the past year?: No  Any fall with injury in the past year?: No    Cognitive Screenin) Repeat 3 items (Leader, Season, Table)    2) Clock draw: NORMAL  3) 3 item recall: Recalls 3 objects  Results: 3 items recalled: COGNITIVE IMPAIRMENT LESS LIKELY    Mini-CogTM Copyright S Yulissa. Licensed by the author for use in [facility]; reprinted with permission (tarik@[facility]). All rights reserved.      Do you have sleep apnea, excessive snoring or daytime drowsiness?: no            Reviewed and updated as needed this visit by clinical staff  Tobacco  Allergies  Meds   Med Hx  Surg Hx  Fam Hx  Soc Hx       Reviewed and updated as needed this visit by Provider               Social History     Tobacco Use     Smoking status: Former Smoker     Packs/day: 0.50     Years: 37.00     Pack years: 18.50     Types: Cigarettes     Quit date: 10/20/2007     Years since quittin.2     Smokeless tobacco: Never Used   Substance Use Topics     Alcohol use: Yes     Comment: minimal                           Current providers sharing in care for this patient include:   Patient Care Team:  Charlene Cain PA-C as PCP - General (Physician Assistant)  Charlene Cain PA-C as Assigned PCP  Harris Good MD as Assigned Surgical Provider  Connie Green MD as Assigned OBGYN Provider    The following health maintenance items are reviewed in Epic and correct as of today:  Health Maintenance   Topic Date Due     ANNUAL REVIEW OF HM ORDERS  Never done     LUNG CANCER SCREENING  Never done     FALL  RISK ASSESSMENT  12/16/2020     MAMMO SCREENING  06/14/2022     BMP  12/20/2022     LIPID  12/20/2022     MICROALBUMIN  12/20/2022     HEMOGLOBIN  12/20/2022     MEDICARE ANNUAL WELLNESS VISIT  01/17/2023     ADVANCE CARE PLANNING  12/16/2024     DTAP/TDAP/TD IMMUNIZATION (3 - Td or Tdap) 04/18/2026     COLORECTAL CANCER SCREENING  11/03/2027     DEXA  05/23/2032     HEPATITIS C SCREENING  Completed     PHQ-2  Completed     INFLUENZA VACCINE  Completed     Pneumococcal Vaccine: 65+ Years  Completed     URINALYSIS  Completed     ZOSTER IMMUNIZATION  Completed     COVID-19 Vaccine  Completed     IPV IMMUNIZATION  Aged Out     MENINGITIS IMMUNIZATION  Aged Out     HEPATITIS B IMMUNIZATION  Aged Out     BP Readings from Last 3 Encounters:   01/17/22 130/74   01/04/22 (!) 153/85   11/08/21 124/82    Wt Readings from Last 3 Encounters:   01/17/22 93.4 kg (206 lb)   07/22/21 97.5 kg (215 lb)   07/21/21 97.5 kg (215 lb)                   ROS:  CONSTITUTIONAL: NEGATIVE for fever, chills, change in weight  INTEGUMENTARY/SKIN: NEGATIVE for worrisome rashes, moles or lesions  EYES: NEGATIVE for vision changes or irritation  ENT/MOUTH: NEGATIVE for ear, mouth and throat problems  RESP: NEGATIVE for significant cough or SOB  BREAST: NEGATIVE for masses, tenderness or discharge  CV: NEGATIVE for chest pain, palpitations or peripheral edema  GI: NEGATIVE for nausea, abdominal pain, heartburn, or change in bowel habits  : NEGATIVE for frequency, dysuria, or hematuria  MUSCULOSKELETAL: NEGATIVE for significant arthralgias or myalgia  NEURO: NEGATIVE for weakness, dizziness or paresthesias  ENDOCRINE: NEGATIVE for temperature intolerance, skin/hair changes  HEME: NEGATIVE for bleeding problems  PSYCHIATRIC: NEGATIVE for changes in mood or affect    OBJECTIVE:   /74   Pulse 70   Temp 97.4  F (36.3  C) (Tympanic)   Wt 93.4 kg (206 lb)   SpO2 99%   BMI 33.25 kg/m   Estimated body mass index is 33.25 kg/m  as calculated from  "the following:    Height as of 7/21/21: 1.676 m (5' 6\").    Weight as of this encounter: 93.4 kg (206 lb).  EXAM:   GENERAL APPEARANCE: healthy, alert and no distress  EYES: Eyes grossly normal to inspection, PERRL and conjunctivae and sclerae normal  HENT: ear canals and TM's normal, nose and mouth without ulcers or lesions, oropharynx clear and oral mucous membranes moist  NECK: no adenopathy, no asymmetry, masses, or scars and thyroid normal to palpation  RESP: lungs clear to auscultation - no rales, rhonchi or wheezes  BREAST: normal without masses, tenderness or nipple discharge and no palpable axillary masses or adenopathy  CV: regular rate and rhythm, normal S1 S2, no S3 or S4, no murmur, click or rub, no peripheral edema and peripheral pulses strong  ABDOMEN: soft, nontender, no hepatosplenomegaly, no masses and bowel sounds normal  MS: no musculoskeletal defects are noted and gait is age appropriate without ataxia  SKIN: no suspicious lesions or rashes  NEURO: Normal strength and tone, sensory exam grossly normal, mentation intact and speech normal  PSYCH: mentation appears normal and affect normal/bright    Diagnostic Test Results:  Labs reviewed in Epic      ASSESSMENT / PLAN:   (Z00.00) Encounter for Medicare annual wellness exam  (primary encounter diagnosis)  Comment:   Plan: referral to colonoscopy  Medications refilled    (Z80.0) Family history of colon cancer  Comment:   Plan: Adult Gastro Ref - Procedure Only            (Z12.11) Screening for colon cancer  Comment:   Plan: Adult Gastro Ref - Procedure Only            (E78.5) Hyperlipidemia LDL goal <130  Comment:   Plan: lovastatin (MEVACOR) 40 MG tablet              Patient has been advised of split billing requirements and indicates understanding: Yes    COUNSELING:  Reviewed preventive health counseling, as reflected in patient instructions    Estimated body mass index is 33.25 kg/m  as calculated from the following:    Height as of 7/21/21: " "1.676 m (5' 6\").    Weight as of this encounter: 93.4 kg (206 lb).    Weight management plan: Discussed healthy diet and exercise guidelines    She reports that she quit smoking about 14 years ago. Her smoking use included cigarettes. She has a 18.50 pack-year smoking history. She has never used smokeless tobacco.    Appropriate preventive services were discussed with this patient, including applicable screening as appropriate for cardiovascular disease, diabetes, osteopenia/osteoporosis, and glaucoma.  As appropriate for age/gender, discussed screening for colorectal cancer, prostate cancer, breast cancer, and cervical cancer. Checklist reviewing preventive services available has been given to the patient.    Reviewed patients plan of care and provided an AVS. The Basic Care Plan (routine screening as documented in Health Maintenance) for Tri meets the Care Plan requirement. This Care Plan has been established and reviewed with the Patient.    Counseling Resources:  ATP IV Guidelines  Pooled Cohorts Equation Calculator  Breast Cancer Risk Calculator  BRCA-Related Cancer Risk Assessment: FHS-7 Tool  FRAX Risk Assessment  ICSI Preventive Guidelines  Dietary Guidelines for Americans, 2010  USDA's MyPlate  ASA Prophylaxis  Lung CA Screening    Charlene Cain PA-C  [facility]  "

## 2022-01-17 NOTE — PATIENT INSTRUCTIONS
Patient Education   Personalized Prevention Plan  You are due for the preventive services outlined below.  Your care team is available to assist you in scheduling these services.  If you have already completed any of these items, please share that information with your care team to update in your medical record.  Health Maintenance Due   Topic Date Due     ANNUAL REVIEW OF HM ORDERS  Never done     LUNG CANCER SCREENING  Never done     Annual Wellness Visit  12/16/2020     FALL RISK ASSESSMENT  12/16/2020

## 2022-01-18 NOTE — PATIENT INSTRUCTIONS
Patient to follow up with Primary Care provider regarding elevated blood pressure.  Initial musculoskeletal treatment recommendation:    1.  Wear supportive foot wear (stiff soles) and/or arch supports (rigid not cushion).  2.  Stretch the calf muscles as instructed once an hour.  3.  Massage the soft tissues around the injured area in the morning to loosen the tissue  4.  Ice the injured area in the evening; 20 min on/off.  5. Take antiinflammatory medication as indicated.    If no improvement in symptoms within four to six weeks, return to clinic for reevaluation.    
No

## 2022-01-27 ENCOUNTER — TELEPHONE (OUTPATIENT)
Dept: FAMILY MEDICINE | Facility: CLINIC | Age: 72
End: 2022-01-27
Payer: COMMERCIAL

## 2022-01-27 NOTE — TELEPHONE ENCOUNTER
Wendy says she vestibular neuritis, yesterday afternoon she had a pretty bad bout of dizziness which made her pretty anxious. She was able to sleep all night but today she still has dizziness and her arms feel tingly and heavy. She denies any heaviness in her chest. This has not happened before where the symptoms were still present the next day. She is pretty anxious about this and would like some guidance on what to do if this does not resolve today and what to to if it happens in the future.

## 2022-01-27 NOTE — TELEPHONE ENCOUNTER
In general, it is not uncommon for vestibular neuritis episodes to last up to 2 days, sometimes longer so that length isn't necessarily alarming other than it is not typical for her previous episodes. If the dizziness is improving and no other new or different symptoms develop then I think it is okay to give it a little more time. However if anything worsens, new symptoms develop, etc.. then she should go to the ED    Charlene

## 2022-01-28 NOTE — TELEPHONE ENCOUNTER
Call placed to patient.  Reports she hasn't been awake very long yet.  Has done her exercises for balance this morning.  Tingling and heaviness and tingling in her arms has resolved.  Reports the anxiety lasted about 24 hr, is feeling better this morning.    Relayed Charlene's message.  Patient verbalized understanding and agrees.  If additional questions or concerns, encouraged patient to call back to the care team.  Addie Gustafson RN

## 2022-02-18 ENCOUNTER — TELEPHONE (OUTPATIENT)
Dept: GASTROENTEROLOGY | Facility: CLINIC | Age: 72
End: 2022-02-18
Payer: COMMERCIAL

## 2022-02-18 DIAGNOSIS — Z11.59 ENCOUNTER FOR SCREENING FOR OTHER VIRAL DISEASES: Primary | ICD-10-CM

## 2022-02-18 NOTE — TELEPHONE ENCOUNTER
Screening Questions  Blue=prep questions Red=location Green=sedation   1. Are you active on mychart? Y    2. What insurance is in the chart? BCBS MEDICARE ADVANTAGE     3.  Ordering/Referring Provider: Charlene Cain    4. BMI 32.7, If greater than 40 review exclusion criteria also will need EXTENDED PREP    5.  Respiratory Screening (If yes to any of the following HOSPITAL setting only):     Do you use daily home oxygen? N  Do you have mod to severe Obstructive Sleep Apnea? N (can be seen at [facility] or hospital setting)    Do you have Pulmonary Hypertension? N   Do you have UNCONTROLLED asthma? N    6. Have you had a heart or lung transplant? N  (If yes, please review exclusion criteria)    7. Are you currently on dialysis?N  (If yes, schedule in HOSPITAL setting only)(If yes, please send Golytely prep)    8. Do you have chronic kidney disease? N (If yes, please send Golytely prep)    9. Have you had a stroke or Transient ischemic attack (TIA) within 6 months? N (If yes, do not schedule at [facility])    10. In the past 6 months, have you had any heart related issues including cardiomyopathy or heart attack? N (If yes, please review exclusion criteria)           If yes, did it require cardiac stenting or other implantable device?  (If yes, please review exclusion criteria)      11. Do you have any implantable devices in your body (pacemaker, defib, LVAD)? N (If yes, schedule at [facility])    12. Do you take nitroglycerin? If yes, how often? N (if yes, schedule at HOSPITAL setting)    13. Are you currently taking any blood thinners?N (If yes- inform patient to follow up with PCP or provider for follow up instructions)     14. Are you a diabetic? N (If yes, please send Golytely prep)    15. (Females) Are you currently pregnant?   If yes, how many weeks?      16. Are you taking any prescription pain medications on a routine schedule? N If yes, MAC sedation and patient will need EXTENDED PREP.    17. Do you have any  chemical dependencies such as alcohol, street drugs, or methadone? N If yes, MAC sedation     18. Do you have any history of post-traumatic stress syndrome, severe anxiety or history of psychosis? N  If yes, MAC sedation.     19. Do you transfer independently? Y    20.  Do you have any issues with constipation? N   If yes, pt will need EXTENDED PREP     21. Preferred Pharmacy for Pre Prescription [facility] [address], [address]    Scheduling Details    Which Colonoscopy Prep was Sent?: MIRALAX  Type of Procedure Scheduled: COLON  Surgeon: BRIAN  Date of Procedure: 3/15/22  Location:   Caller (Please ask for phone number if not scheduled by patient): Tri Ingram      Sedation Type: CS  Conscious Sedation- Needs  for 6 hours after the procedure  MAC/General-Needs  for 24 hours after procedure    Pre-op Required at [facility] and OR for MAC sedation:   (if yes advise patient they will need a pre-op prior to procedure)      Informed patient they will need an adult  Y  Cannot take any type of public or medical transportation alone    Pre-Procedure Covid test to be completed at [facility] or Externally:  3/11    Confirmed Nurse will call to complete assessment Y    Additional comments: PT WOULD LIKE TO GO TO   THEY HAVE MOVED TO THAT AREA NOW AND MUCH MORE CLOSER FOR THEM ([facility] PATIENTS NEED EXTENDED PREP)

## 2022-02-20 NOTE — TELEPHONE ENCOUNTER
Tri reports that she got a letter from [facility] saying  that prempro was a temporary supply and that insurance will not cover a refill. She did get 28 pills the beginning of this month so she said that she has a 2 months supply left. She said that she talked to  [facility] and that they advised her to take estrace 0.5 mg and medroprogesterone 2.5 mg. She would like to have this ordered to [facility]  if OK with Charlene; but she would not need it for 2 months.   Michele Frank, RN       (1) Female

## 2022-03-11 ENCOUNTER — LAB (OUTPATIENT)
Dept: LAB | Facility: CLINIC | Age: 72
End: 2022-03-11
Payer: COMMERCIAL

## 2022-03-11 DIAGNOSIS — Z11.59 ENCOUNTER FOR SCREENING FOR OTHER VIRAL DISEASES: ICD-10-CM

## 2022-03-11 PROCEDURE — U0003 INFECTIOUS AGENT DETECTION BY NUCLEIC ACID (DNA OR RNA); SEVERE ACUTE RESPIRATORY SYNDROME CORONAVIRUS 2 (SARS-COV-2) (CORONAVIRUS DISEASE [COVID-19]), AMPLIFIED PROBE TECHNIQUE, MAKING USE OF HIGH THROUGHPUT TECHNOLOGIES AS DESCRIBED BY CMS-2020-01-R: HCPCS

## 2022-03-11 PROCEDURE — U0005 INFEC AGEN DETEC AMPLI PROBE: HCPCS

## 2022-03-12 LAB — SARS-COV-2 RNA RESP QL NAA+PROBE: NEGATIVE

## 2022-03-15 ENCOUNTER — HOSPITAL ENCOUNTER (OUTPATIENT)
Facility: AMBULATORY SURGERY CENTER | Age: 72
Discharge: HOME | End: 2022-03-15
Attending: SPECIALIST | Admitting: SPECIALIST
Payer: COMMERCIAL

## 2022-03-15 VITALS
SYSTOLIC BLOOD PRESSURE: 113 MMHG | RESPIRATION RATE: 16 BRPM | DIASTOLIC BLOOD PRESSURE: 76 MMHG | OXYGEN SATURATION: 95 % | HEART RATE: 81 BPM | HEIGHT: 66 IN | TEMPERATURE: 97 F | BODY MASS INDEX: 32.78 KG/M2 | WEIGHT: 204 LBS

## 2022-03-15 LAB — COLONOSCOPY: NORMAL

## 2022-03-15 PROCEDURE — G8907 PT DOC NO EVENTS ON DISCHARG: HCPCS

## 2022-03-15 PROCEDURE — 45385 COLONOSCOPY W/LESION REMOVAL: CPT | Mod: PT

## 2022-03-15 PROCEDURE — G8918 PT W/O PREOP ORDER IV AB PRO: HCPCS

## 2022-03-15 RX ORDER — FENTANYL CITRATE 50 UG/ML
INJECTION, SOLUTION INTRAMUSCULAR; INTRAVENOUS PRN
Status: DISCONTINUED | OUTPATIENT
Start: 2022-03-15 | End: 2022-03-15 | Stop reason: HOSPADM

## 2022-03-15 RX ORDER — ONDANSETRON 2 MG/ML
4 INJECTION INTRAMUSCULAR; INTRAVENOUS
Status: COMPLETED | OUTPATIENT
Start: 2022-03-15 | End: 2022-03-15

## 2022-03-15 RX ORDER — SODIUM CHLORIDE, SODIUM LACTATE, POTASSIUM CHLORIDE, CALCIUM CHLORIDE 600; 310; 30; 20 MG/100ML; MG/100ML; MG/100ML; MG/100ML
INJECTION, SOLUTION INTRAVENOUS CONTINUOUS
Status: DISCONTINUED | OUTPATIENT
Start: 2022-03-15 | End: 2022-03-16 | Stop reason: HOSPADM

## 2022-03-15 RX ORDER — LIDOCAINE 40 MG/G
CREAM TOPICAL
Status: DISCONTINUED | OUTPATIENT
Start: 2022-03-15 | End: 2022-03-16 | Stop reason: HOSPADM

## 2022-03-15 RX ADMIN — ONDANSETRON 4 MG: 2 INJECTION INTRAMUSCULAR; INTRAVENOUS at 07:11

## 2022-03-15 NOTE — H&P
Pre-Endoscopy History and Physical     Tri Ingram MRN# [id number]   YOB: 1950 Age: 72 year old     Date of Procedure: 3/15/2022  Primary care provider: Charlnee Cain  Type of Endoscopy: colonoscopy  Reason for Procedure: father had colon cancer before age 60  Type of Anesthesia Anticipated: Moderate sedation    HPI:    Tri is a 72 year old female who will be undergoing the above procedure.      A history and physical has been performed. The patient's medications and allergies have been reviewed. The risks and benefits of the procedure and the sedation options and risks were discussed with the patient.  All questions were answered and informed consent was obtained.      She denies a personal or family history of anesthesia complications or bleeding disorders.     Allergies   Allergen Reactions     Adhesive Tape      Iodine Anaphylaxis     contrast dye     Penicillins Hives        Current Outpatient Medications   Medication     acetaminophen (ARTHRITIS PAIN RELIEF) 650 MG CR tablet     Apoaequorin (PREVAGEN PO)     ASPIRIN 81 MG OR TABS     ibuprofen 200 MG capsule     lovastatin (MEVACOR) 40 MG tablet     Lutein 20 MG CAPS     Multiple Vitamins-Minerals (ICAPS) CAPS     PREMPRO 0.45-1.5 MG tablet     Current Facility-Administered Medications   Medication     lactated ringers infusion     lidocaine (LMX4) kit     lidocaine 1 % 0.1-1 mL     sodium chloride (PF) 0.9% PF flush 3 mL     sodium chloride (PF) 0.9% PF flush 3 mL       Patient Active Problem List   Diagnosis     Other malignant neoplasm of skin of trunk, except scrotum     Generalized osteoarthrosis, unspecified site     Disorder of bone and cartilage     Symptomatic menopausal or female climacteric states     Plantar fasciitis     Hyperlipidemia LDL goal <130     Obesity     Advanced directives, counseling/discussion     Family history of abdominal aortic aneurysm     Former moderate cigarette smoker (10-19 per  day)     Chronic kidney disease, stage 3 (H)     Lumbar radiculopathy        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     Squamous cell carcinoma      Vestibular neuritis prone to lose her balance or fall         Past Surgical History:   Procedure Laterality Date     COLONOSCOPY  ,      COLONOSCOPY N/A 11/3/2017    Procedure: COLONOSCOPY;  Colonoscopy  ;  Surgeon: Lg Guerrero MD;  Location: [facility] NONSPECIFIC PROCEDURE      Bowen's disease removed X 2       Social History     Tobacco Use     Smoking status: Former Smoker     Packs/day: 0.50     Years: 37.00     Pack years: 18.50     Types: Cigarettes     Quit date: 10/20/2007     Years since quittin.4     Smokeless tobacco: Never Used   Substance Use Topics     Alcohol use: Yes     Comment: minimal       Family History   Problem Relation Age of Onset     Eye Disorder Mother         glaucoma, wet macular degeneration     Lipids Mother      Gynecology Mother         hyst     Melanoma Mother      Skin Cancer Mother      Cancer Father         colon /prostate     Circulatory Father         amputee     Diabetes Father         type II     C.A.D. Father         MIs     Eye Disorder Father         dry macular degeneration     Cardiovascular Father         small AAA     Melanoma Father      Arthritis Sister      Gynecology Sister         partial hyst  fibrous sheaths on ovary egg sized polyp uterine removed     Lipids Sister      Neurologic Disorder Sister         migraines     Cardiovascular Brother         large 7 cm AAA     Coronary Artery Disease Brother         MI     Asthma Brother       "Gastrointestinal Disease Paternal Grandmother          from hepatitis       REVIEW OF SYSTEMS:   5 point ROS negative except as noted above in HPI, including Gen., Resp., CV, GI &  system review.    PHYSICAL EXAM:   BP (!) 142/92   Pulse 93   Temp 97  F (36.1  C) (Temporal)   Resp 16   Ht 1.676 m (5' 5.98\")   Wt 92.5 kg (204 lb)   SpO2 97%   BMI 32.94 kg/m   Estimated body mass index is 32.94 kg/m  as calculated from the following:    Height as of this encounter: 1.676 m (5' 5.98\").    Weight as of this encounter: 92.5 kg (204 lb).   GENERAL APPEARANCE: healthy  MENTAL STATUS: alert or interactive  AIRWAY EXAM: Mallampatti Class II (visualization of the soft palate, fauces, and uvula)  RESP: lungs clear to auscultation - no rales, rhonchi or wheezes  CV: regular rates and rhythm, normal S1 S2, no S3 or S4 and no murmur, click or rub    DIAGNOSTICS:    Not indicated    IMPRESSION   ASA Class 2 - Mild systemic disease    PLAN:   Colonoscopy    The above has been forwarded to the consulting provider.      Signed Electronically by: Carl Huggins MD  March 15, 2022        "

## 2022-03-17 PROCEDURE — 88305 TISSUE EXAM BY PATHOLOGIST: CPT | Mod: 26 | Performed by: PATHOLOGY

## 2022-03-28 ENCOUNTER — OFFICE VISIT (OUTPATIENT)
Dept: URGENT CARE | Facility: URGENT CARE | Age: 72
End: 2022-03-28
Payer: COMMERCIAL

## 2022-03-28 ENCOUNTER — HOSPITAL ENCOUNTER (EMERGENCY)
Facility: CLINIC | Age: 72
Discharge: HOME | End: 2022-03-28
Attending: EMERGENCY MEDICINE | Admitting: EMERGENCY MEDICINE
Payer: COMMERCIAL

## 2022-03-28 ENCOUNTER — APPOINTMENT (OUTPATIENT)
Dept: CT IMAGING | Facility: CLINIC | Age: 72
End: 2022-03-28
Attending: EMERGENCY MEDICINE
Payer: COMMERCIAL

## 2022-03-28 VITALS
TEMPERATURE: 98.5 F | BODY MASS INDEX: 32.78 KG/M2 | HEART RATE: 85 BPM | DIASTOLIC BLOOD PRESSURE: 79 MMHG | RESPIRATION RATE: 14 BRPM | OXYGEN SATURATION: 97 % | SYSTOLIC BLOOD PRESSURE: 125 MMHG | WEIGHT: 203 LBS

## 2022-03-28 VITALS
BODY MASS INDEX: 32.62 KG/M2 | HEART RATE: 79 BPM | RESPIRATION RATE: 18 BRPM | HEIGHT: 66 IN | TEMPERATURE: 98.7 F | DIASTOLIC BLOOD PRESSURE: 82 MMHG | OXYGEN SATURATION: 100 % | SYSTOLIC BLOOD PRESSURE: 132 MMHG | WEIGHT: 203 LBS

## 2022-03-28 DIAGNOSIS — E86.0 DEHYDRATION: ICD-10-CM

## 2022-03-28 DIAGNOSIS — N93.9 VAGINAL BLEEDING: ICD-10-CM

## 2022-03-28 DIAGNOSIS — K52.9 GASTROENTERITIS: ICD-10-CM

## 2022-03-28 DIAGNOSIS — R10.32 ABDOMINAL PAIN, LEFT LOWER QUADRANT: Primary | ICD-10-CM

## 2022-03-28 DIAGNOSIS — R11.2 NAUSEA AND VOMITING, INTRACTABILITY OF VOMITING NOT SPECIFIED, UNSPECIFIED VOMITING TYPE: ICD-10-CM

## 2022-03-28 LAB
ALBUMIN SERPL-MCNC: 3.5 G/DL (ref 3.4–5)
ALBUMIN UR-MCNC: NEGATIVE MG/DL
ALP SERPL-CCNC: 82 U/L (ref 40–150)
ALT SERPL W P-5'-P-CCNC: 36 U/L (ref 0–50)
ANION GAP SERPL CALCULATED.3IONS-SCNC: 8 MMOL/L (ref 3–14)
APPEARANCE UR: ABNORMAL
AST SERPL W P-5'-P-CCNC: 21 U/L (ref 0–45)
BACTERIA #/AREA URNS HPF: ABNORMAL /HPF
BASOPHILS # BLD AUTO: 0 10E3/UL (ref 0–0.2)
BASOPHILS NFR BLD AUTO: 1 %
BILIRUB SERPL-MCNC: 0.5 MG/DL (ref 0.2–1.3)
BILIRUB UR QL STRIP: NEGATIVE
BUN SERPL-MCNC: 14 MG/DL (ref 7–30)
CALCIUM SERPL-MCNC: 9.6 MG/DL (ref 8.5–10.1)
CHLORIDE BLD-SCNC: 107 MMOL/L (ref 94–109)
CO2 SERPL-SCNC: 26 MMOL/L (ref 20–32)
COLOR UR AUTO: YELLOW
CREAT SERPL-MCNC: 0.85 MG/DL (ref 0.52–1.04)
EOSINOPHIL # BLD AUTO: 0.1 10E3/UL (ref 0–0.7)
EOSINOPHIL NFR BLD AUTO: 2 %
ERYTHROCYTE [DISTWIDTH] IN BLOOD BY AUTOMATED COUNT: 11.9 % (ref 10–15)
GFR SERPL CREATININE-BSD FRML MDRD: 73 ML/MIN/1.73M2
GLUCOSE BLD-MCNC: 93 MG/DL (ref 70–99)
GLUCOSE UR STRIP-MCNC: NEGATIVE MG/DL
HCT VFR BLD AUTO: 43.9 % (ref 35–47)
HGB BLD-MCNC: 14.7 G/DL (ref 11.7–15.7)
HGB UR QL STRIP: ABNORMAL
HOLD SPECIMEN: NORMAL
HOLD SPECIMEN: NORMAL
IMM GRANULOCYTES # BLD: 0 10E3/UL
IMM GRANULOCYTES NFR BLD: 0 %
KETONES UR STRIP-MCNC: NEGATIVE MG/DL
LACTATE SERPL-SCNC: 1.2 MMOL/L (ref 0.7–2)
LEUKOCYTE ESTERASE UR QL STRIP: ABNORMAL
LIPASE SERPL-CCNC: 150 U/L (ref 73–393)
LYMPHOCYTES # BLD AUTO: 2.2 10E3/UL (ref 0.8–5.3)
LYMPHOCYTES NFR BLD AUTO: 34 %
MCH RBC QN AUTO: 29.5 PG (ref 26.5–33)
MCHC RBC AUTO-ENTMCNC: 33.5 G/DL (ref 31.5–36.5)
MCV RBC AUTO: 88 FL (ref 78–100)
MONOCYTES # BLD AUTO: 0.5 10E3/UL (ref 0–1.3)
MONOCYTES NFR BLD AUTO: 8 %
MUCOUS THREADS #/AREA URNS LPF: PRESENT /LPF
NEUTROPHILS # BLD AUTO: 3.7 10E3/UL (ref 1.6–8.3)
NEUTROPHILS NFR BLD AUTO: 55 %
NITRATE UR QL: NEGATIVE
NRBC # BLD AUTO: 0 10E3/UL
NRBC BLD AUTO-RTO: 0 /100
PH UR STRIP: 5 [PH] (ref 5–7)
PLATELET # BLD AUTO: 221 10E3/UL (ref 150–450)
POTASSIUM BLD-SCNC: 3.7 MMOL/L (ref 3.4–5.3)
PROT SERPL-MCNC: 7.4 G/DL (ref 6.8–8.8)
RBC # BLD AUTO: 4.99 10E6/UL (ref 3.8–5.2)
RBC URINE: 2 /HPF
SODIUM SERPL-SCNC: 141 MMOL/L (ref 133–144)
SP GR UR STRIP: 1.01 (ref 1–1.03)
SQUAMOUS EPITHELIAL: <1 /HPF
UROBILINOGEN UR STRIP-MCNC: NORMAL MG/DL
WBC # BLD AUTO: 6.6 10E3/UL (ref 4–11)
WBC URINE: 4 /HPF

## 2022-03-28 PROCEDURE — 99214 OFFICE O/P EST MOD 30 MIN: CPT | Performed by: PHYSICIAN ASSISTANT

## 2022-03-28 PROCEDURE — 99285 EMERGENCY DEPT VISIT HI MDM: CPT | Mod: 25 | Performed by: EMERGENCY MEDICINE

## 2022-03-28 PROCEDURE — 74176 CT ABD & PELVIS W/O CONTRAST: CPT

## 2022-03-28 PROCEDURE — 258N000003 HC RX IP 258 OP 636: Performed by: EMERGENCY MEDICINE

## 2022-03-28 PROCEDURE — 80053 COMPREHEN METABOLIC PANEL: CPT | Performed by: EMERGENCY MEDICINE

## 2022-03-28 PROCEDURE — 85025 COMPLETE CBC W/AUTO DIFF WBC: CPT | Performed by: EMERGENCY MEDICINE

## 2022-03-28 PROCEDURE — 96374 THER/PROPH/DIAG INJ IV PUSH: CPT | Performed by: EMERGENCY MEDICINE

## 2022-03-28 PROCEDURE — 83605 ASSAY OF LACTIC ACID: CPT | Performed by: EMERGENCY MEDICINE

## 2022-03-28 PROCEDURE — 82040 ASSAY OF SERUM ALBUMIN: CPT | Performed by: EMERGENCY MEDICINE

## 2022-03-28 PROCEDURE — 87086 URINE CULTURE/COLONY COUNT: CPT | Performed by: EMERGENCY MEDICINE

## 2022-03-28 PROCEDURE — 99284 EMERGENCY DEPT VISIT MOD MDM: CPT | Performed by: EMERGENCY MEDICINE

## 2022-03-28 PROCEDURE — 250N000011 HC RX IP 250 OP 636: Performed by: EMERGENCY MEDICINE

## 2022-03-28 PROCEDURE — 96361 HYDRATE IV INFUSION ADD-ON: CPT | Performed by: EMERGENCY MEDICINE

## 2022-03-28 PROCEDURE — 81001 URINALYSIS AUTO W/SCOPE: CPT | Performed by: EMERGENCY MEDICINE

## 2022-03-28 PROCEDURE — 36415 COLL VENOUS BLD VENIPUNCTURE: CPT | Performed by: EMERGENCY MEDICINE

## 2022-03-28 PROCEDURE — 83690 ASSAY OF LIPASE: CPT | Performed by: EMERGENCY MEDICINE

## 2022-03-28 RX ORDER — MULTIPLE VITAMINS W/ MINERALS TAB 9MG-400MCG
1 TAB ORAL DAILY
COMMUNITY

## 2022-03-28 RX ORDER — ONDANSETRON 2 MG/ML
4 INJECTION INTRAMUSCULAR; INTRAVENOUS ONCE
Status: COMPLETED | OUTPATIENT
Start: 2022-03-28 | End: 2022-03-28

## 2022-03-28 RX ORDER — VIT A/VIT C/VIT E/ZINC/COPPER 7160-113
1 TABLET, DELAYED RELEASE (ENTERIC COATED) ORAL EVERY EVENING
COMMUNITY

## 2022-03-28 RX ORDER — ONDANSETRON 4 MG/1
TABLET, ORALLY DISINTEGRATING ORAL
Qty: 10 TABLET | Refills: 0 | Status: SHIPPED | OUTPATIENT
Start: 2022-03-28

## 2022-03-28 RX ADMIN — ONDANSETRON 4 MG: 2 INJECTION INTRAMUSCULAR; INTRAVENOUS at 18:42

## 2022-03-28 RX ADMIN — SODIUM CHLORIDE, POTASSIUM CHLORIDE, SODIUM LACTATE AND CALCIUM CHLORIDE 1000 ML: 600; 310; 30; 20 INJECTION, SOLUTION INTRAVENOUS at 18:41

## 2022-03-28 ASSESSMENT — ENCOUNTER SYMPTOMS
DIARRHEA: 0
CHILLS: 0
FEVER: 0
NAUSEA: 1
FATIGUE: 1
ABDOMINAL DISTENTION: 1
DYSURIA: 0
PALPITATIONS: 0
CONSTIPATION: 0
RESPIRATORY NEGATIVE: 1
HEMATOCHEZIA: 0
HEARTBURN: 0
SHORTNESS OF BREATH: 0
FLANK PAIN: 0
HEMATURIA: 0
FREQUENCY: 0
ABDOMINAL PAIN: 1
CARDIOVASCULAR NEGATIVE: 1
VOMITING: 1
COUGH: 0
CHEST TIGHTNESS: 0
WHEEZING: 0

## 2022-03-28 NOTE — ED TRIAGE NOTES
"Had \"canes chicken\" on Friday woke on Saturday with abd pain nausea vomiting and diarrhea     Went to clinic today was told to come here    Vaginal bleeding that started yesterday as well  "

## 2022-03-28 NOTE — ED PROVIDER NOTES
History     Chief Complaint   Patient presents with     Abdominal Pain     GI Problem     HPI  Tri Ingram is a 72 year old female who presents with nausea vomiting and diarrhea ongoing for the past 4 days.  Describes some sweats and chills no measured fever.  Denies hematemesis, hematochezia or melena.  No prior abdominal surgery.  Seem to start hours after eating some chicken tenders, reports sensitivity to MSG.  Denies ill contacts.  Poor oral intake.  Taking ibuprofen 1200 mg twice daily chronic.  Colonoscopy 3/15, sigmoid polyp cold snare, diverticulosis noted sigmoid colon otherwise unremarkable study.    Allergies:  Allergies   Allergen Reactions     Adhesive Tape      Iodine Anaphylaxis     contrast dye     Monosodium Glutamate Nausea and Vomiting and Diarrhea     Penicillins Hives       Problem List:    Patient Active Problem List    Diagnosis Date Noted     Lumbar radiculopathy 06/28/2021     Priority: Medium     Chronic kidney disease, stage 3 (H) 03/17/2021     Priority: Medium     Former moderate cigarette smoker (10-19 per day) 03/18/2015     Priority: Medium     Stopped 2007       Advanced directives, counseling/discussion 07/31/2012     Priority: Medium     Patient does not have an Advance/Health Care Directive (HCD), will get on own..    Alba Cunningham  July 31, 2012         Family history of abdominal aortic aneurysm 07/31/2012     Priority: Medium     Father in late 60s, brother in late 50s, both smokers;  Patient is former smoker (36 years), discussed AAA screening at age 67 with Welcome to Medicare exam       Obesity 07/27/2011     Priority: Medium     Body mass index is 32.23 kg/(m^2).         Hyperlipidemia LDL goal <130 07/26/2011     Priority: Medium     Plantar fasciitis 11/21/2008     Priority: Medium     Symptomatic menopausal or female climacteric states 08/30/2005     Priority: Medium     Other malignant neoplasm of skin of trunk, except scrotum 09/09/2004     Priority:  Medium     Bowen's disease, 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  Problem list name updated by automated process. Provider to review and confirm       Generalized osteoarthrosis, unspecified site 09/09/2004     Priority: Medium     back and shoulder       Disorder of bone and cartilage 09/09/2004     Priority: Medium     Very mild osteopenia, was originally put on Fosamax in addition to hormones in 2002, improved diet and discontinued smoking, taken of Fosamax in 2008 (femoral T-score -1.1, lumbar scorre normal) with plans to repeat DEXA at age 63-65.          Problem list name updated by automated process. Provider to review          Past Medical History:    Past Medical History:   Diagnosis Date     Disorder of bone and cartilage, unspecified      Generalized osteoarthrosis, unspecified site      Hematuria      IBS (irritable bowel syndrome) 11/14/14     Other malignant neoplasm of skin of trunk, except scrotum 1998     Squamous cell carcinoma      Vestibular neuritis        Past Surgical History:    Past Surgical History:   Procedure Laterality Date     COLONOSCOPY  2001, 2007     COLONOSCOPY N/A 11/3/2017    Procedure: COLONOSCOPY;  Colonoscopy  ;  Surgeon: Lg Guerrero MD;  Location: [facility]     COLONOSCOPY N/A 3/15/2022    Procedure: COLONOSCOPY, FLEXIBLE, WITH LESION REMOVAL USING SNARE;  Surgeon: Carl Huggins MD;  Location: [facility]     COLONOSCOPY WITH CO2 INSUFFLATION N/A 3/15/2022    Procedure: COLONOSCOPY, WITH CO2 INSUFFLATION;  Surgeon: Carl Huggins MD;  Location: [facility]     ZZC NONSPECIFIC PROCEDURE      Bowen's disease removed X 2       Family History:    Family History   Problem Relation Age of Onset     Eye Disorder Mother         glaucoma, wet macular degeneration     Lipids Mother      Gynecology Mother         hyst     Melanoma Mother      Skin Cancer Mother      Cancer Father         colon /prostate     Circulatory Father         amputee     Diabetes Father         type II     C.A.D. Father  "        MIs     Eye Disorder Father         dry macular degeneration     Cardiovascular Father         small AAA     Melanoma Father      Arthritis Sister      Gynecology Sister         partial hyst  fibrous sheaths on ovary egg sized polyp uterine removed     Lipids Sister      Neurologic Disorder Sister         migraines     Cardiovascular Brother         large 7 cm AAA     Coronary Artery Disease Brother         MI     Asthma Brother      Gastrointestinal Disease Paternal Grandmother          from hepatitis       Social History:  Marital Status:   [2]  Social History     Tobacco Use     Smoking status: Former Smoker     Packs/day: 0.50     Years: 37.00     Pack years: 18.50     Types: Cigarettes     Quit date: 10/20/2007     Years since quittin.4     Smokeless tobacco: Never Used   Substance Use Topics     Alcohol use: Yes     Comment: minimal     Drug use: No        Medications:    acetaminophen (ARTHRITIS PAIN RELIEF) 650 MG CR tablet  Apoaequorin (PREVAGEN PO)  ASPIRIN 81 MG OR TABS  ibuprofen 200 MG capsule  lovastatin (MEVACOR) 40 MG tablet  multivitamin w/minerals (MULTI-VITAMIN) tablet  ondansetron (ZOFRAN-ODT) 4 MG ODT tab  PREMPRO 0.45-1.5 MG tablet  Specialty Vitamins Products (ICAPS LUTEIN & ZEAXANTHIN) TBEC          Review of Systems  All other systems reviewed and are negative.    Physical Exam   BP: (!) 150/90  Pulse: 74  Temp: 98.7  F (37.1  C)  Resp: 18  Height: 167.6 cm (5' 6\")  Weight: 92.1 kg (203 lb)  SpO2: 100 %      Physical Exam  Nontoxic appearing no respiratory distress alert and oriented ×3  Head atraumatic normocephalic   Neck supple full active painless range of motion  Lungs clear to auscultation  Heart regular no murmur  Abdomen soft nontender bowel sounds positive   Strength and sensation grossly intact throughout the extremities, gait and station normal  Speech is fluent, good eye contact, thought processes are rational  Lower extremities without swelling, redness " or tenderness  Pedal pulses symmetrical and strong    ED Course                 Procedures              Critical Care time:  none               Results for orders placed or performed during the hospital encounter of 03/28/22 (from the past 24 hour(s))   CBC with platelets differential    Narrative    The following orders were created for panel order CBC with platelets differential.  Procedure                               Abnormality         Status                     ---------                               -----------         ------                     CBC with platelets and d...[[id number]]                      Final result                 Please view results for these tests on the individual orders.   Comprehensive metabolic panel   Result Value Ref Range    Sodium 141 133 - 144 mmol/L    Potassium 3.7 3.4 - 5.3 mmol/L    Chloride 107 94 - 109 mmol/L    Carbon Dioxide (CO2) 26 20 - 32 mmol/L    Anion Gap 8 3 - 14 mmol/L    Urea Nitrogen 14 7 - 30 mg/dL    Creatinine 0.85 0.52 - 1.04 mg/dL    Calcium 9.6 8.5 - 10.1 mg/dL    Glucose 93 70 - 99 mg/dL    Alkaline Phosphatase 82 40 - 150 U/L    AST 21 0 - 45 U/L    ALT 36 0 - 50 U/L    Protein Total 7.4 6.8 - 8.8 g/dL    Albumin 3.5 3.4 - 5.0 g/dL    Bilirubin Total 0.5 0.2 - 1.3 mg/dL    GFR Estimate 73 >60 mL/min/1.73m2   Lipase   Result Value Ref Range    Lipase 150 73 - 393 U/L   Lactic acid whole blood   Result Value Ref Range    Lactic Acid 1.2 0.7 - 2.0 mmol/L   [place] Draw    Narrative    The following orders were created for panel order [place] Draw.  Procedure                               Abnormality         Status                     ---------                               -----------         ------                     Extra Blue Top Tube[[id number]]                              Final result               Extra Red Top Tube[[id number]]                               Final result                 Please view results for these tests on the individual orders.    CBC with platelets and differential   Result Value Ref Range    WBC Count 6.6 4.0 - 11.0 10e3/uL    RBC Count 4.99 3.80 - 5.20 10e6/uL    Hemoglobin 14.7 11.7 - 15.7 g/dL    Hematocrit 43.9 35.0 - 47.0 %    MCV 88 78 - 100 fL    MCH 29.5 26.5 - 33.0 pg    MCHC 33.5 31.5 - 36.5 g/dL    RDW 11.9 10.0 - 15.0 %    Platelet Count 221 150 - 450 10e3/uL    % Neutrophils 55 %    % Lymphocytes 34 %    % Monocytes 8 %    % Eosinophils 2 %    % Basophils 1 %    % Immature Granulocytes 0 %    NRBCs per 100 WBC 0 <1 /100    Absolute Neutrophils 3.7 1.6 - 8.3 10e3/uL    Absolute Lymphocytes 2.2 0.8 - 5.3 10e3/uL    Absolute Monocytes 0.5 0.0 - 1.3 10e3/uL    Absolute Eosinophils 0.1 0.0 - 0.7 10e3/uL    Absolute Basophils 0.0 0.0 - 0.2 10e3/uL    Absolute Immature Granulocytes 0.0 <=0.4 10e3/uL    Absolute NRBCs 0.0 10e3/uL   Extra Blue Top Tube   Result Value Ref Range    Hold Specimen JIC    Extra Red Top Tube   Result Value Ref Range    Hold Specimen JIC    CT Abdomen Pelvis w/o Contrast    Narrative    EXAM: CT ABDOMEN PELVIS W/O CONTRAST  LOCATION: [facility]  DATE/TIME: 3/28/2022 7:08 PM    INDICATION: Diverticulitis suspected  COMPARISON: CT abdomen and pelvis 08/11/2008  TECHNIQUE: CT scan of the abdomen and pelvis was performed without IV contrast. Multiplanar reformats were obtained. Dose reduction techniques were used.  CONTRAST: None.    FINDINGS:   LOWER CHEST: Normal.    HEPATOBILIARY: There are a few scattered low-attenuation lesions in the liver the largest of which measures around 8 mm (series 5, image 32) these are also present on the comparison CT and are minimally changed over 14 years indicating benign lesions.   The background liver parenchyma is decreased in attenuation consistent with steatosis. There is a fluid fluid level in the gallbladder consistent with layering biliary sludge. No gallbladder wall thickening or pericholecystic edema. No bile duct    enlargement.    PANCREAS: Normal.    SPLEEN: Normal.    ADRENAL GLANDS: Unchanged nodularity of both adrenal glands consistent with benign nodules.    KIDNEYS/BLADDER: No significant mass, stones, or hydronephrosis. There are simple or benign cysts. No follow up is needed.    BOWEL: There are numerous diverticula arising from the distal descending and sigmoid colon. The sigmoid colon is decompressed. No focal bowel wall thickening or diverticular inflammation. Stool burden in the remainder of the colon is normal. Normal   appendix. Nondistended stomach and small bowel.    LYMPH NODES: Normal.    VASCULATURE: Normal caliber abdominal aorta. Mild to moderate patchy aortoiliac atheromatous calcifications.    PELVIC ORGANS: Uterus is normal in size. No pelvic mass or free fluid.    MUSCULOSKELETAL: Moderate low thoracic and lumbar degenerative osteophytes and disc space narrowing. Vacuum disc phenomenon at a few levels. Mild, grade 1 degenerative anterolisthesis of L4 on L5. No aggressive or destructive bone lesions are present.      Impression    IMPRESSION:     1.  Distal descending and sigmoid diverticulosis but no specific findings to suggest acute diverticulitis.  2.  Hepatic steatosis.  3.  Stable adrenal nodularity over 14 years indicative of benign nodules likely adrenal adenoma.     UA with Microscopic reflex to Culture    Specimen: Urine, Clean Catch   Result Value Ref Range    Color Urine Yellow Colorless, Straw, Light Yellow, Yellow    Appearance Urine Slightly Cloudy (A) Clear    Glucose Urine Negative Negative mg/dL    Bilirubin Urine Negative Negative    Ketones Urine Negative Negative mg/dL    Specific Gravity Urine 1.015 1.003 - 1.035    Blood Urine Moderate (A) Negative    pH Urine 5.0 5.0 - 7.0    Protein Albumin Urine Negative Negative mg/dL    Urobilinogen Urine Normal Normal, 2.0 mg/dL    Nitrite Urine Negative Negative    Leukocyte Esterase Urine Moderate (A) Negative    Bacteria Urine Few (A)  None Seen /HPF    Mucus Urine Present (A) None Seen /LPF    RBC Urine 2 <=2 /HPF    WBC Urine 4 <=5 /HPF    Squamous Epithelials Urine <1 <=1 /HPF    Narrative    Urine Culture ordered based on laboratory criteria       Medications   lactated ringers BOLUS 1,000 mL (0 mLs Intravenous Stopped 3/28/22 2101)   ondansetron (ZOFRAN) injection 4 mg (4 mg Intravenous Given 3/28/22 1842)       Assessments & Plan (with Medical Decision Making)  Abdominal pain, nausea vomiting diarrhea, usual differential considered.  CT scan abdomen pelvis is negative for acute finding.  Lab work-up is unrevealing, patient feels much better after IV fluid and ondansetron.  No indication for further evaluation.  Findings consistent with gastroenteritis and dehydration.  Ondansetron prescription, advance diet as tolerated, return criteria reviewed     I have reviewed the nursing notes.    I have reviewed the findings, diagnosis, plan and need for follow up with the patient.       Discharge Medication List as of 3/28/2022  9:01 PM      START taking these medications    Details   ondansetron (ZOFRAN-ODT) 4 MG ODT tab Take 1 tablet (4 mg) by mouth every 8 hours as needed for nausea, Disp-10 tablet, R-0, [facility]             Final diagnoses:   Gastroenteritis   Dehydration       3/28/2022   [facility] EMERGENCY DEPT     Cirilo Trinidad MD  03/28/22 3287

## 2022-03-28 NOTE — PROGRESS NOTES
Heaven Nguyen is a 72 year old who presents for the following health issues   HPI   Abdominal/Flank Pain  Onset/Duration: several days  Description:   Character: dull  Location: LLQ  Radiation: None  Intensity: moderate  Progression of Symptoms:  worsening  Accompanying Signs & Symptoms:  Fever/Chills: no  Gas/Bloating: Yes  Nausea: Yes  Vomitting: Yes, no blood present  Diarrhea: Yes-resolved  Constipation: no  Dysuria or Hematuria: No dysuria, urinary frequency, urgency or hematuria.  Reports vaginal bleeding but no vaginal d/c, rashes and irritation.  No new partners.   History:   Trauma: no  Previous similar pain: no  Previous tests done: reports recent colonoscopy 2weeks ago  Precipitating factors:   Does the pain change with:     Food: Yes    Bowel Movement: no    Urination: no   Other factors:  no  Therapies tried and outcome: fluids, tums, tylenol with minimal relief     Patient Active Problem List   Diagnosis     Other malignant neoplasm of skin of trunk, except scrotum     Generalized osteoarthrosis, unspecified site     Disorder of bone and cartilage     Symptomatic menopausal or female climacteric states     Plantar fasciitis     Hyperlipidemia LDL goal <130     Obesity     Advanced directives, counseling/discussion     Family history of abdominal aortic aneurysm     Former moderate cigarette smoker (10-19 per day)     Chronic kidney disease, stage 3 (H)     Lumbar radiculopathy     Current Outpatient Medications   Medication     acetaminophen (ARTHRITIS PAIN RELIEF) 650 MG CR tablet     Apoaequorin (PREVAGEN PO)     ASPIRIN 81 MG OR TABS     ibuprofen 200 MG capsule     lovastatin (MEVACOR) 40 MG tablet     Lutein 20 MG CAPS     Multiple Vitamins-Minerals (ICAPS) CAPS     PREMPRO 0.45-1.5 MG tablet     No current facility-administered medications for this visit.        Allergies   Allergen Reactions     Adhesive Tape      Iodine Anaphylaxis     contrast dye     Penicillins Hives       Review of  Systems   Constitutional: Positive for fatigue. Negative for chills and fever.   Respiratory: Negative.  Negative for cough, chest tightness, shortness of breath and wheezing.    Cardiovascular: Negative.  Negative for chest pain, palpitations and peripheral edema.   Gastrointestinal: Positive for abdominal distention, abdominal pain, nausea and vomiting. Negative for constipation, diarrhea, heartburn and hematochezia.   Genitourinary: Positive for vaginal bleeding. Negative for dysuria, flank pain, frequency, hematuria, pelvic pain, urgency and vaginal discharge.   All other systems reviewed and are negative.           Objective    /79 (BP Location: Left arm, Patient Position: Sitting, Cuff Size: Adult Large)   Pulse 85   Temp 98.5  F (36.9  C) (Tympanic)   Resp 14   Wt 92.1 kg (203 lb)   SpO2 97%   BMI 32.78 kg/m    Body mass index is 32.78 kg/m .  Physical Exam  Vitals and nursing note reviewed.   Constitutional:       General: She is not in acute distress.     Appearance: Normal appearance. She is well-developed. She is obese. She is not ill-appearing.   Cardiovascular:      Rate and Rhythm: Normal rate and regular rhythm.      Pulses: Normal pulses.      Heart sounds: Normal heart sounds, S1 normal and S2 normal. No murmur heard.    No friction rub. No gallop.   Pulmonary:      Effort: Pulmonary effort is normal. No accessory muscle usage or respiratory distress.      Breath sounds: Normal breath sounds and air entry. No decreased breath sounds, wheezing, rhonchi or rales.   Abdominal:      General: Abdomen is protuberant. Bowel sounds are normal.      Palpations: Abdomen is soft. There is no hepatomegaly, splenomegaly or mass.      Tenderness: There is abdominal tenderness in the left lower quadrant. There is guarding and rebound. There is no right CVA tenderness or left CVA tenderness. Negative signs include Casanova's sign, Rovsing's sign, McBurney's sign, psoas sign and obturator sign.       Hernia: No hernia is present.   Genitourinary:     Comments: Declined pelvic exam.  Skin:     General: Skin is warm and dry.   Neurological:      Mental Status: She is alert and oriented to person, place, and time.   Psychiatric:         Mood and Affect: Mood normal.         Behavior: Behavior normal.         Thought Content: Thought content normal.         Judgment: Judgment normal.            Assessment/Plan:  Abdominal pain, left lower quadrant: Along with n/v, dehydration, and vaginal bleeding.  H&P is concerning for diverticulitis vs kidney infection/UTI vs GYN issue.  Recommend further evaluation and management in the ER.  Will most likely need further workup with labs and/or imaging.  Patient has declined transportation via ambulance and will have family drive her/him.  Understands risks and benefits of ambulance transfer and s/he has declined.  Call 911 if worsening symptoms.  S/he plans to go to [facility] ER.  S/he left in stable condition with AVS in hand.  F/u with PCP after ER visit.     Nausea and vomiting, intractability of vomiting not specified, unspecified vomiting type    Dehydration    Vaginal bleeding        Yuli See CHEMA Gordon

## 2022-03-28 NOTE — PROGRESS NOTES
Abdominal/Flank Pain  Onset/Duration: several days  Description:   Character: ***  Location: ***  Radiation: None  Intensity: moderate  Progression of Symptoms:  worsening  Accompanying Signs & Symptoms:  Fever/Chills: no  Gas/Bloating: Yes  Nausea: Yes  Vomitting: Yes  Diarrhea: Yes-resolved  Constipation: no  Dysuria or Hematuria: No dysuria, urinary frequency, urgency or hematuria.  No vaginal d/c, bleeding, rashes and irritation.  No new partners.   History:   Trauma: no  Previous similar pain: no  Previous tests done: none  Precipitating factors:   Does the pain change with:     Food: Yes    Bowel Movement: no    Urination: no   Other factors:  no  Therapies tried and outcome: fluids, tums, tylenol with minimal relief

## 2022-03-29 NOTE — DISCHARGE INSTRUCTIONS
Drink plenty of fluids, ondansetron for nausea    Advance diet as tolerated    Return for vomiting, fever, worsening abdominal pain or any other concern

## 2022-03-30 LAB — BACTERIA UR CULT: NORMAL

## 2022-04-01 ENCOUNTER — TELEPHONE (OUTPATIENT)
Dept: FAMILY MEDICINE | Facility: CLINIC | Age: 72
End: 2022-04-01
Payer: COMMERCIAL

## 2022-04-01 NOTE — TELEPHONE ENCOUNTER
No further recommendations. She just had a colonoscopy 3/15/22 and other than removal of a polyp it was okay so I don't have concerns about something more than irritation/inflammation and all of this with a fairly recent colonoscopy where they dilated her bowels.     [place] diet, fluids and rest. Follow up if no improvement or worsening    Charlene

## 2022-04-01 NOTE — TELEPHONE ENCOUNTER
Call placed to patient  Relayed Shawn's message    Patient verbalized understanding  No further questions/concerns    Gage Dwyer RN

## 2022-04-01 NOTE — TELEPHONE ENCOUNTER
"Patient's call transferred to author    Patient reports she was evaluated in the ED on Monday and diagnosed with Norovirus or Rotovirus   Had fluids and a CT scan with no abnormal findings    Patient reports Tuesday and Wednesday she was feeling good   Eating a bland diet    Reports on Thursday she had her first stool that was \"explosive diarrhea\" - no further stools since  Patient reports being very gassy and having some intermittent abdominal cramping   Thursday into today patient reports every hour she has approx a tablespoon of bright red blood after passing gas - denies blood in the night   Denies blood in the toilet - no further stools after diarrhea episodes early Thursday morning    Denies abdominal pain  Denies nausea / vomiting   Has chronic dizziness that is unchanged  Denies fevers - though does feel warm and then chilled   Denies rectal pain    Patient has a history of hemorrhoids     Reviewed red flag symptoms that would warrant ED evaluation - patient verbalized understanding  Will forward to Shawn to determine further recommendations     Gage Dwyer RN    "

## 2022-06-02 ENCOUNTER — TELEPHONE (OUTPATIENT)
Dept: FAMILY MEDICINE | Facility: CLINIC | Age: 72
End: 2022-06-02
Payer: COMMERCIAL

## 2022-06-02 NOTE — TELEPHONE ENCOUNTER
Patient's call transferred to author    Patient states yesterday she had an insect bite by her hairline   States she thought it was mosquito and squished the insect - insect unidentifiable at this time    States she noticed redness surrounding the insect bite - about the size of a small button that developed 2 hours after the bite    Unsure of increased warmth to redness  Denies drainage   Denies pain  Denies fever, chills, body aches    Did put rubbing alcohol on the bite   Requesting provider to assess insect bite    Appointment scheduled for 6/13/2022 at 1100 with Shawn     Patient verbalized understanding  No further questions/concerns    Gage Dwyer RN

## 2022-06-03 ENCOUNTER — OFFICE VISIT (OUTPATIENT)
Dept: FAMILY MEDICINE | Facility: CLINIC | Age: 72
End: 2022-06-03
Payer: COMMERCIAL

## 2022-06-03 VITALS
OXYGEN SATURATION: 99 % | HEIGHT: 66 IN | TEMPERATURE: 97.9 F | BODY MASS INDEX: 33.27 KG/M2 | DIASTOLIC BLOOD PRESSURE: 72 MMHG | SYSTOLIC BLOOD PRESSURE: 134 MMHG | HEART RATE: 59 BPM | WEIGHT: 207 LBS

## 2022-06-03 DIAGNOSIS — Z91.89 RISK OF EXPOSURE TO LYME DISEASE: Primary | ICD-10-CM
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
## 2022-06-03 DIAGNOSIS — M25.562 ACUTE PAIN OF LEFT KNEE: ICD-10-CM

## 2022-06-03 DIAGNOSIS — N18.31 STAGE 3A CHRONIC KIDNEY DISEASE (H): ICD-10-CM

## 2022-06-03 PROCEDURE — 99214 OFFICE O/P EST MOD 30 MIN: CPT | Performed by: PHYSICIAN ASSISTANT

## 2022-06-03 RX ORDER — DOXYCYCLINE 100 MG/1
200 CAPSULE ORAL ONCE
Qty: 2 CAPSULE | Refills: 0 | Status: SHIPPED | OUTPATIENT
Start: 2022-06-03 | End: 2022-06-03

## 2022-06-03 ASSESSMENT — PAIN SCALES - GENERAL: PAINLEVEL: NO PAIN (0)

## 2022-06-03 NOTE — PROGRESS NOTES
Assessment & Plan     (Z91.89) Risk of exposure to Lyme disease  (primary encounter diagnosis)  Comment: low risk overall given history but given within the 72 hour time frame and patient not entirely sure as well as being anxious about lyme, opted for prophy dosing  Plan: doxycycline hyclate (VIBRAMYCIN) 100 MG capsule           (W57.XXXA) Bug bite, initial encounter  Comment: more consistent with residual vascular geraldo from possible bite.   Plan: no intervention today - monitor area    (N18.31) Stage 3a chronic kidney disease (H)  Comment: last 2 checks have been WNL  Plan: continue to monitor. WNL today      (M25.562) Acute pain of left knee  Comment:   Plan: improving so no imaging or further testing today. Expect gradual improvement         Return in about 3 months (around 9/3/2022) for If not improving or worsening.    CHEMA Jacobo [facility] [address] MARION Nguyen is a 72 year old who presents for the following health issues     HPI     Answers for HPI/ROS submitted by the patient on 6/3/2022  What is the reason for your visit today? : Geraldo on face knee and elbow  How many servings of fruits and vegetables do you eat daily?: 0-1  On average, how many sweetened beverages do you drink each day (Examples: soda, juice, sweet tea, etc.  Do NOT count diet or artificially sweetened beverages)?: 1  How many minutes a day do you exercise enough to make your heart beat faster?: 9 or less  How many days a week do you exercise enough to make your heart beat faster?: 3 or less  How many days per week do you miss taking your medication?: 0    -She isn't sure if she got bit by a tick or not. Spot on her forehead.     -She took a fall in February 2022. Her left elbow and left knee are still bothering her from it. She has tried ice, heat and pain relievers. The pain has gotten better since February but it is still there.    Review of Systems   Remainder of ROS obtained and found to be negative  "other than that which was documented above        Objective    /72   Pulse 59   Temp 97.9  F (36.6  C) (Tympanic)   Ht 1.676 m (5' 6\")   Wt 93.9 kg (207 lb)   SpO2 99%   BMI 33.41 kg/m    Body mass index is 33.41 kg/m .  Physical Exam   GENERAL: healthy, alert and no distress  SKIN: small pea sized flat vascular appearing lesion on right temple/forehead region  KNEE, left: full ROM, normal strength. Mild tenderness with applied pressure around lateral knee              "

## 2022-06-21 ENCOUNTER — ANCILLARY PROCEDURE (OUTPATIENT)
Dept: MAMMOGRAPHY | Facility: CLINIC | Age: 72
End: 2022-06-21
Payer: COMMERCIAL

## 2022-06-21 DIAGNOSIS — Z12.31 ENCOUNTER FOR SCREENING MAMMOGRAM FOR MALIGNANT NEOPLASM OF BREAST: ICD-10-CM

## 2022-06-21 PROCEDURE — 77067 SCR MAMMO BI INCL CAD: CPT | Mod: GC | Performed by: STUDENT IN AN ORGANIZED HEALTH CARE EDUCATION/TRAINING PROGRAM

## 2022-07-05 ENCOUNTER — APPOINTMENT (OUTPATIENT)
Dept: GENERAL RADIOLOGY | Facility: CLINIC | Age: 72
End: 2022-07-05
Attending: FAMILY MEDICINE
Payer: COMMERCIAL

## 2022-07-05 ENCOUNTER — HOSPITAL ENCOUNTER (EMERGENCY)
Facility: CLINIC | Age: 72
Discharge: HOME | End: 2022-07-05
Attending: FAMILY MEDICINE | Admitting: FAMILY MEDICINE
Payer: COMMERCIAL

## 2022-07-05 ENCOUNTER — NURSE TRIAGE (OUTPATIENT)
Dept: NURSING | Facility: CLINIC | Age: 72
End: 2022-07-05

## 2022-07-05 VITALS
SYSTOLIC BLOOD PRESSURE: 128 MMHG | WEIGHT: 205 LBS | TEMPERATURE: 98.1 F | HEIGHT: 67 IN | RESPIRATION RATE: 18 BRPM | BODY MASS INDEX: 32.18 KG/M2 | HEART RATE: 88 BPM | DIASTOLIC BLOOD PRESSURE: 78 MMHG | OXYGEN SATURATION: 97 %

## 2022-07-05 DIAGNOSIS — U07.1 INFECTION DUE TO 2019 NOVEL CORONAVIRUS: ICD-10-CM

## 2022-07-05 LAB
FLUAV RNA SPEC QL NAA+PROBE: NEGATIVE
FLUBV RNA RESP QL NAA+PROBE: NEGATIVE
SARS-COV-2 RNA RESP QL NAA+PROBE: POSITIVE

## 2022-07-05 PROCEDURE — 87636 SARSCOV2 & INF A&B AMP PRB: CPT | Performed by: FAMILY MEDICINE

## 2022-07-05 PROCEDURE — 71045 X-RAY EXAM CHEST 1 VIEW: CPT

## 2022-07-05 PROCEDURE — 99284 EMERGENCY DEPT VISIT MOD MDM: CPT | Mod: 25 | Performed by: FAMILY MEDICINE

## 2022-07-05 PROCEDURE — 99284 EMERGENCY DEPT VISIT MOD MDM: CPT | Performed by: FAMILY MEDICINE

## 2022-07-05 PROCEDURE — C9803 HOPD COVID-19 SPEC COLLECT: HCPCS | Performed by: FAMILY MEDICINE

## 2022-07-06 ENCOUNTER — TELEPHONE (OUTPATIENT)
Dept: FAMILY MEDICINE | Facility: CLINIC | Age: 72
End: 2022-07-06

## 2022-07-06 NOTE — ED PROVIDER NOTES
History     Chief Complaint   Patient presents with     Cough     HPI  Tri Ingram is a 72 year old female, past medical history is significant for stage III kidney disease, cigarette smoker, lumbar radiculopathy, obesity, hyperlipidemia, plantar fasciitis, OA, IBS, vestibular neuritis, presents to the emergency department concerns of 1 week of cough, sore throat.  COVID-negative 3 days ago at home.  History is obtained from the patient who identifies approximately 1 week of cough runny nose congestion facial pain and sore throat lack of energy, sleeping more than usual.  She attended a family room reunion this past week.  No known COVID contacts that she is aware of.  She is not vaccinated.  She tested herself for COVID 3 days ago and was negative at home.  She contacted her clinic today to be seen and they sent her to the emergency department.  She notes no shortness of breath palpitations nausea or vomiting.  Her appetite is decreased but she still taking fluids and solids.  In the past when she is had a cold she is taken Tessalon Perles but she is out and she wonders if she could get a refill of them.  She is concerned that she might be getting pneumonia or bronchitis.      Allergies:  Allergies   Allergen Reactions     Adhesive Tape      Iodine Anaphylaxis     contrast dye     Monosodium Glutamate Nausea and Vomiting and Diarrhea     Penicillins Hives       Problem List:    Patient Active Problem List    Diagnosis Date Noted     Lumbar radiculopathy 06/28/2021     Priority: Medium     Chronic kidney disease, stage 3 (H) 03/17/2021     Priority: Medium     Former moderate cigarette smoker (10-19 per day) 03/18/2015     Priority: Medium     Stopped 2007       Advanced directives, counseling/discussion 07/31/2012     Priority: Medium     Patient does not have an Advance/Health Care Directive (HCD), will get on own..    Alba Cunningham  July 31, 2012         Family history of abdominal aortic aneurysm  07/31/2012     Priority: Medium     Father in late 60s, brother in late 50s, both smokers;  Patient is former smoker (36 years), discussed AAA screening at age 67 with Welcome to Medicare exam       Obesity 07/27/2011     Priority: Medium     Body mass index is 32.23 kg/(m^2).         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 Provider to review and confirm       Generalized osteoarthrosis, unspecified site 09/09/2004     Priority: Medium     back and shoulder       Disorder of bone and cartilage 09/09/2004     Priority: Medium     Very mild osteopenia, was originally put on Fosamax in addition to hormones in 2002, improved diet and discontinued smoking, taken of Fosamax in 2008 (femoral T-score -1.1, lumbar scorre normal) with plans to repeat DEXA at age 63-65.          Problem list name updated by automated process. Provider to review          Past Medical History:    Past Medical History:   Diagnosis Date     Disorder of bone and cartilage, unspecified      Generalized osteoarthrosis, unspecified site      Hematuria      IBS (irritable bowel syndrome) 11/14/14     Other malignant neoplasm of skin of trunk, except scrotum 1998     Squamous cell carcinoma      Vestibular neuritis        Past Surgical History:    Past Surgical History:   Procedure Laterality Date     COLONOSCOPY  2001, 2007     COLONOSCOPY N/A 11/3/2017    Procedure: COLONOSCOPY;  Colonoscopy  ;  Surgeon: Lg Guerrero MD;  Location: [facility]     COLONOSCOPY N/A 3/15/2022    Procedure: COLONOSCOPY, FLEXIBLE, WITH LESION REMOVAL USING SNARE;  Surgeon: Carl Huggins MD;  Location: [facility]      COLONOSCOPY WITH CO2 INSUFFLATION N/A 3/15/2022    Procedure: COLONOSCOPY, WITH CO2 INSUFFLATION;  Surgeon: Carl Huggins MD;  Location:  OR     ZZC NONSPECIFIC PROCEDURE      Bowen's disease removed X 2       Family History:    Family History   Problem Relation Age of Onset     Eye Disorder Mother         glaucoma, wet macular degeneration     Lipids Mother      Gynecology Mother         hyst     Melanoma Mother      Skin Cancer Mother      Cancer Father         colon /prostate     Circulatory Father         amputee     Diabetes Father         type II     C.A.D. Father         MIs     Eye Disorder Father         dry macular degeneration     Cardiovascular Father         small AAA     Melanoma Father      Arthritis Sister      Gynecology Sister         partial hyst  fibrous sheaths on ovary egg sized polyp uterine removed     Lipids Sister      Neurologic Disorder Sister         migraines     Cardiovascular Brother         large 7 cm AAA     Coronary Artery Disease Brother         MI     Asthma Brother      Gastrointestinal Disease Paternal Grandmother          from hepatitis       Social History:  Marital Status:   [2]  Social History     Tobacco Use     Smoking status: Former Smoker     Packs/day: 0.50     Years: 37.00     Pack years: 18.50     Types: Cigarettes     Quit date: 10/20/2007     Years since quittin.7     Smokeless tobacco: Never Used   Substance Use Topics     Alcohol use: Yes     Comment: minimal     Drug use: No        Medications:    acetaminophen (TYLENOL) 650 MG CR tablet  Apoaequorin (PREVAGEN PO)  ASPIRIN 81 MG OR TABS  ibuprofen 200 MG capsule  lovastatin (MEVACOR) 40 MG tablet  multivitamin w/minerals (THERA-VIT-M) tablet  ondansetron (ZOFRAN-ODT) 4 MG ODT tab  PREMPRO 0.45-1.5 MG tablet  Specialty Vitamins Products (ICAPS LUTEIN & ZEAXANTHIN) TBEC          Review of Systems   All other systems reviewed and are negative.      Physical Exam   BP: 132/83  Pulse: 84  Temp:  "98.1  F (36.7  C)  Resp: 18  Height: 168.9 cm (5' 6.5\")  Weight: 93 kg (205 lb)  SpO2: 98 %      Physical Exam  Vitals and nursing note reviewed.   Constitutional:       General: She is not in acute distress.     Appearance: Normal appearance. She is normal weight. She is not ill-appearing.   HENT:      Head: Normocephalic and atraumatic.      Right Ear: Tympanic membrane normal.      Left Ear: Tympanic membrane normal.      Nose: Nose normal.      Mouth/Throat:      Mouth: Mucous membranes are dry.      Pharynx: Oropharynx is clear.   Eyes:      Extraocular Movements: Extraocular movements intact.      Conjunctiva/sclera: Conjunctivae normal.      Pupils: Pupils are equal, round, and reactive to light.   Cardiovascular:      Rate and Rhythm: Normal rate and regular rhythm.      Pulses: Normal pulses.      Heart sounds: Normal heart sounds.   Pulmonary:      Effort: Pulmonary effort is normal.      Breath sounds: Normal breath sounds.   Abdominal:      General: Bowel sounds are normal.      Palpations: Abdomen is soft.   Musculoskeletal:         General: Normal range of motion.      Cervical back: Normal range of motion and neck supple.   Skin:     General: Skin is warm and dry.      Capillary Refill: Capillary refill takes less than 2 seconds.   Neurological:      General: No focal deficit present.      Mental Status: She is alert and oriented to person, place, and time.   Psychiatric:         Mood and Affect: Mood normal.         Behavior: Behavior normal.         ED Course                 Procedures              Critical Care time:  none               Results for orders placed or performed during the hospital encounter of 07/05/22 (from the past 24 hour(s))   Symptomatic; Yes; 6/28/2022 Influenza A/B & SARS-CoV2 (COVID-19) Virus PCR Multiplex Nasopharyngeal    Specimen: Nasopharyngeal; Swab   Result Value Ref Range    Influenza A PCR Negative Negative    Influenza B PCR Negative Negative    SARS CoV2 PCR Positive " (A) Negative    Narrative    Testing was performed using the karolyn SARS-CoV-2 & Influenza A/B Assay on the karolyn Jennifer System. This test should be ordered for the detection of SARS-CoV-2 and influenza viruses in individuals who meet clinical and/or epidemiological criteria. Test performance is unknown in asymptomatic patients. This test is for in vitro diagnostic use under the FDA EUA for laboratories certified under CLIA to perform moderate and/or high complexity testing. This test has not been FDA cleared or approved. A negative result does not rule out the presence of PCR inhibitors in the specimen or target RNA in concentration below the limit of detection for the assay. If only one viral target is positive but coinfection with multiple targets is suspected, the sample should be re-tested with another FDA cleared, approved or authorized test, if coinfection would change clinical management. [facility] are certified under the Clinical Laboratory Improvement Amendments of 1988 (CLIA-88) as  qualified to perform moderate and/or high complexity laboratory testing.   XR Chest Port 1 View    Narrative    EXAM: XR CHEST PORT 1 VIEW  LOCATION: [facility]  DATE/TIME: 7/5/2022 10:40 PM    INDICATION: cough, covid pos  COMPARISON: 04/17/2017.      Impression    IMPRESSION: No focal airspace consolidation. No pleural effusion or pneumothorax.    Cardiomediastinal silhouette is normal. Atheromatous calcifications involve the thoracic aorta.     11:24 PM  Chest x-ray and COVID swab reviewed with the patient by calling into the room to save PPE.  We discussed her COVID positivity for at least the last 7 days of symptoms.  She was advised home quarantine and duration of.  COVID 19 care loop referral was placed the patient will be dispositioned to home for quarantine.  She is vaccinated and boosted appropriately and her vital signs are all within normal limits at the time of her ER  visit.  I suspect that given the time course of symptoms and her current appearance vital signs negative chest x-ray that this patient will do quite well with the infection is unlikely to require hospitalization at any point.  I shared this information with her her disposition is to home.  Medications - No data to display    Assessments & Plan (with Medical Decision Making)   Assessments and plan with medical decision making at the time stamp above.    Disclaimer: This note consists of symbols derived from keyboarding, dictation and/or voice recognition software. As a result, there may be errors in the script that have gone undetected. Please consider this when interpreting information found in this chart.      I have reviewed the nursing notes.    I have reviewed the findings, diagnosis, plan and need for follow up with the patient.       New Prescriptions    No medications on file       Final diagnoses:   Infection due to 2019 novel coronavirus       7/5/2022   [facility] EMERGENCY DEPT     Fadi Delgadillo MD  07/05/22 1110

## 2022-07-06 NOTE — TELEPHONE ENCOUNTER
Pt called and was advised from ED to follow up with PCP virtually . Has covid.   Please call patient to advise if she can get a virtual with Charlene or someone else.

## 2022-07-06 NOTE — ED TRIAGE NOTES
Cough, headache, and sore throat for approximately 1 week.  Patient denies fever, shortness of breath.  Patient tested negative for COVID at home 3 days ago.     Triage Assessment     Row Name 07/05/22 2037       Triage Assessment (Adult)    Airway WDL WDL       Respiratory WDL    Respiratory WDL X;cough    Cough Frequency frequent    Cough Type bronchospastic;dry       Skin Circulation/Temperature WDL    Skin Circulation/Temperature WDL WDL       Cardiac WDL    Cardiac WDL WDL       Peripheral/Neurovascular WDL    Peripheral Neurovascular WDL WDL       Cognitive/Neuro/Behavioral WDL    Cognitive/Neuro/Behavioral WDL WDL

## 2022-07-06 NOTE — TELEPHONE ENCOUNTER
Nurse Triage SBAR    Is this a 2nd Level Triage? NO    Situation: Cough    Background: Patient calling. Negative covid test. Used to be a smoker. Started noticing the symptoms about 1 week ago.     Assessment: Cough - barking cough. Occasionally coughs up clear sputum. Very hard cough. Dry sore throat. When she coughs she has a hard time catching her breath. Coughing wakes her up. Feels like her breathing is heavier than normal. Nasal congestion. Sometimes hearing wheezing. Chest pressure. Sinus pain - forehead and teeth hurt. Shortness of breath - especially with coughing.    Protocol Recommended Disposition: Emergency Department    Recommendation: According to the protocol, Patient should go to the ED now. Advised Patient to go to the ED now. Care advice given. Patient verbalizes understanding and agrees with plan of care. Reviewed concerning symptoms and when to call back.     COVID 19 Nurse Triage Plan/Patient Instructions    Please be aware that novel coronavirus (COVID-19) may be circulating in the community. If you develop symptoms such as fever, cough, or SOB or if you have concerns about the presence of another infection including coronavirus (COVID-19), please contact your health care provider or visit [email or web address].     Disposition/Instructions    ED Visit recommended. Follow protocol based instructions.     Bring Your Own Device:  Please also bring your smart device(s) (smart phones, tablets, laptops) and their charging cables for your personal use and to communicate with your care team during your visit.    Thank you for taking steps to prevent the spread of this virus.  o Limit your contact with others.  o Wear a simple mask to cover your cough.  o Wash your hands well and often.    Resources    [facility]: About COVID-19: [email or web address]    CDC: What to Do If You're Sick: www.cdc.gov/coronavirus/2019-ncov/about/steps-when-sick.html    CDC: Ending Home Isolation:  www.cdc.gov/coronavirus/2019-ncov/hcp/disposition-in-home-patients.html     CDC: Caring for Someone: www.cdc.gov/coronavirus/2019-ncov/if-you-are-sick/care-for-someone.html     [facility]: Interim Guidance for Hospital Discharge to Home: www.health.[facility].[place]/diseases/coronavirus/hcp/hospdischarge.pdf    [facility] clinical trials (COVID-19 research studies): clinicalaffairs.[facility]/umn-clinical-trials     Below are the COVID-19 hotlines at the [place] Department of Health ([facility]). Interpreters are available.   o For health questions: Call 989-135-7149 or 1-290.463.2007 (7 a.m. to 7 p.m.)  o For questions about schools and childcare: Call 771-527-9792 or 1-364.952.6779 (7 a.m. to 7 p.m.)     Kim Sung RN Nursing Advisor 7/5/2022 7:32 PM     Reason for Disposition    Difficulty breathing    Additional Information    Negative: Severe difficulty breathing (e.g., struggling for each breath, speaks in single words)    Negative: Bluish (or gray) lips or face now    Negative: [1] Difficulty breathing AND [2] exposure to flames, smoke, or fumes    Negative: [1] Stridor AND [2] difficulty breathing    Negative: Sounds like a life-threatening emergency to the triager    Negative: [1] Previous asthma attacks AND [2] this feels like asthma attack    Negative: Dry (non-productive) cough (i.e., no sputum or minimal clear sputum)    Protocols used: COUGH - ACUTE SOQLMAPNNO-Q-SD

## 2022-07-06 NOTE — DISCHARGE INSTRUCTIONS
Home quarantine next 5 days.  Fluids, rest.  I have placed a COVID-19 care loop referral for you.  If you have further concerns or difficulty coping with this infection at home please return to the emergency department for repeat evaluation.

## 2022-07-06 NOTE — ED NOTES
Pt reports harsh cough for the past week, pt reports she took an at home covid test that was negative. Pt reports it feels like she has a sinus infection, head and teeth hurt

## 2022-07-07 ENCOUNTER — VIRTUAL VISIT (OUTPATIENT)
Dept: FAMILY MEDICINE | Facility: CLINIC | Age: 72
End: 2022-07-07
Payer: COMMERCIAL

## 2022-07-07 DIAGNOSIS — U07.1 INFECTION DUE TO 2019 NOVEL CORONAVIRUS: Primary | ICD-10-CM

## 2022-07-07 PROCEDURE — 99213 OFFICE O/P EST LOW 20 MIN: CPT | Mod: CS | Performed by: FAMILY MEDICINE

## 2022-07-07 RX ORDER — BENZONATATE 200 MG/1
200 CAPSULE ORAL 3 TIMES DAILY PRN
Qty: 45 CAPSULE | Refills: 1 | Status: SHIPPED | OUTPATIENT
Start: 2022-07-07 | End: 2022-08-09

## 2022-07-07 ASSESSMENT — PAIN SCALES - GENERAL: PAINLEVEL: NO PAIN (0)

## 2022-07-07 NOTE — PROGRESS NOTES
Wendy is a 72 year old who is being evaluated via a billable telephone visit.      What phone number would you like to be contacted at? [phone]  How would you like to obtain your AVS? MyChart    Assessment & Plan     Infection due to 2019 novel coronavirus  Overall improving but cough still bothersome.  Has used tessalon perles in past with excellent results  - benzonatate (TESSALON) 200 MG capsule  Dispense: 45 capsule; Refill: 1  Continue monitoring fever and oxygen saturations.  Should be seen if new/worsening sx develop.            eJana Pierce MD  [facility] MARIONMONIQUE Nguyen is a 72 year old presenting for the following health issues:  ER F/U  Develop cough, sore throat about a week ago.  Negative home test on 7/2 but sx persisted.  She went to the ER and was found to be Covid positive noted on 7/5/2022.  CXR done there and without focal pneumonia.  Sats ok.  She has been checking sats at home and are 95-98%.  She does cough and at times is incontinent due to cough.  Has used tessalon in the past for cough and very helpful.  She has an inhaler but not using it.  Her sore throat is feeling better.      HPI     ED/UC Followup:    Facility:  [facility]  Date of visit: 07/05/2022  Reason for visit: Covid 19  Current Status: She is feeling okay but she is still having a really bad cough to the point where it hurts her chest and she is incontinent.    Review of Systems   Constitutional, HEENT, cardiovascular, pulmonary, gi and gu systems are negative, except as otherwise noted.      Objective           Vitals:  No vitals were obtained today due to virtual visit.    Physical Exam   alert, no distress and hoarse voice  PSYCH: Alert and oriented times 3; coherent speech, normal   rate and volume, able to articulate logical thoughts, able   to abstract reason, no tangential thoughts, no hallucinations   or delusions  Her affect is normal and pleasant  RESP: No  cough, no audible wheezing, able to talk in full sentences  Remainder of exam unable to be completed due to telephone visits      Phone call duration: 9 minutes    .  ..

## 2022-08-08 DIAGNOSIS — U07.1 INFECTION DUE TO 2019 NOVEL CORONAVIRUS: ICD-10-CM

## 2022-08-09 RX ORDER — BENZONATATE 200 MG/1
200 CAPSULE ORAL 3 TIMES DAILY PRN
Qty: 45 CAPSULE | Refills: 1 | Status: SHIPPED | OUTPATIENT
Start: 2022-08-09 | End: 2023-03-27

## 2022-10-05 DIAGNOSIS — N95.1 SYMPTOMATIC MENOPAUSAL OR FEMALE CLIMACTERIC STATES: ICD-10-CM

## 2022-10-05 RX ORDER — ESTROGEN,CON/M-PROGEST ACET 0.45-1.5MG
TABLET ORAL
Qty: 28 TABLET | Refills: 2 | Status: SHIPPED | OUTPATIENT
Start: 2022-10-05 | End: 2023-03-27

## 2022-10-07 ENCOUNTER — TELEPHONE (OUTPATIENT)
Dept: PALLIATIVE MEDICINE | Facility: CLINIC | Age: 72
End: 2022-10-07

## 2022-10-07 NOTE — TELEPHONE ENCOUNTER
Call back to pt.  Pt stated that she would just like to meet with a provider to discuss if other therapies would be better than the injection again since her pain isn't that bad.    Writer asked that Pt reach out her her PCP and ask for an Pain clinic one time evaluation.      Pt verbalzed understanding and would likely want to be seen in [facility].      Cirilo Giradr, RN  Patient Care Supervisor   [facility]

## 2022-10-07 NOTE — TELEPHONE ENCOUNTER
[facility] Call [facility]    Phone Message    May a detailed message be left on voicemail: yes     Reason for Call: Patient is requesting to be called back to discuss scheduling options- Patient last seen in Nov last year and would like to schedule an appointment as soon as possible.    Please advise.     Action Taken: Message routed to:  [facility] Pain    Travel Screening: Not Applicable

## 2022-10-10 ENCOUNTER — HEALTH MAINTENANCE LETTER (OUTPATIENT)
Age: 72
End: 2022-10-10

## 2022-10-11 ENCOUNTER — OFFICE VISIT (OUTPATIENT)
Dept: FAMILY MEDICINE | Facility: CLINIC | Age: 72
End: 2022-10-11
Payer: COMMERCIAL

## 2022-10-11 VITALS
TEMPERATURE: 97.7 F | RESPIRATION RATE: 18 BRPM | WEIGHT: 207.2 LBS | HEIGHT: 67 IN | BODY MASS INDEX: 32.52 KG/M2 | OXYGEN SATURATION: 98 % | SYSTOLIC BLOOD PRESSURE: 126 MMHG | DIASTOLIC BLOOD PRESSURE: 80 MMHG | HEART RATE: 65 BPM

## 2022-10-11 DIAGNOSIS — M54.42 CHRONIC BILATERAL LOW BACK PAIN WITH LEFT-SIDED SCIATICA: Primary | ICD-10-CM

## 2022-10-11 DIAGNOSIS — G89.29 CHRONIC BILATERAL LOW BACK PAIN WITH LEFT-SIDED SCIATICA: Primary | ICD-10-CM

## 2022-10-11 PROCEDURE — 99213 OFFICE O/P EST LOW 20 MIN: CPT | Performed by: NURSE PRACTITIONER

## 2022-10-11 RX ORDER — CX-024414 0.2 MG/ML
INJECTION, SUSPENSION INTRAMUSCULAR
COMMUNITY
Start: 2022-06-06 | End: 2023-03-27

## 2022-10-11 ASSESSMENT — PAIN SCALES - GENERAL: PAINLEVEL: MILD PAIN (3)

## 2022-10-11 NOTE — TELEPHONE ENCOUNTER
Referral has been received for patient to be scheduled with Dr. Sarmiento, please advise on scheduling

## 2022-10-11 NOTE — PROGRESS NOTES
"  Assessment & Plan     Chronic bilateral low back pain with left-sided sciatica  Patient is asking for referrals to specialities. Long history of back pain, has tried physical therapy without relief.  - Chiropractic Referral; Future  - Spine  Referral; Future  - Pain Management  Referral; Future       BMI:   Estimated body mass index is 32.94 kg/m  as calculated from the following:    Height as of this encounter: 1.689 m (5' 6.5\").    Weight as of this encounter: 94 kg (207 lb 3.2 oz).       Return in about 1 month (around 11/11/2022) for Follow up.    HEBERT Ayon CNP  [facility] MARION Nguyen is a 72 year old, presenting for the following health issues:  Back Pain      History of Present Illness       Reason for visit:  Back    She eats 2-3 servings of fruits and vegetables daily.She consumes 0 sweetened beverage(s) daily.She exercises with enough effort to increase her heart rate 9 or less minutes per day.  She exercises with enough effort to increase her heart rate 3 or less days per week.   She is taking medications regularly.       Chronic/Recurring Back Pain Follow Up      Where is your back pain located? (Select all that apply) gluteus left and hip left    How would you describe your back pain?  burning and sharp and cramping    Where does your back pain spread? the left  thigh    Since your last clinic visit for back pain, how has your pain changed? gradually worsening since visit in January    Does your back pain interfere with your job? YES, Not applicable, interferes with day to day activities    Since your last visit, have you tried any new treatment? No    Long history of low back pain with radiation down to her left hip. Has been working with physical therapy without relief and had heard of other doctors that may be able to help her. Would like some referral to chiro, spine care, and Dr Sarmiento in Pain Medicine    Review of Systems " "  Musculoskeletal: Positive for back pain and gait problem.            Objective    /80   Pulse 65   Temp 97.7  F (36.5  C) (Tympanic)   Resp 18   Ht 1.689 m (5' 6.5\")   Wt 94 kg (207 lb 3.2 oz)   SpO2 98%   BMI 32.94 kg/m    Body mass index is 32.94 kg/m .  Physical Exam  Constitutional:       Appearance: Normal appearance.   HENT:      Head: Normocephalic.   Musculoskeletal:         General: Normal range of motion.   Skin:     General: Skin is warm and dry.   Neurological:      Mental Status: She is alert.   Psychiatric:         Mood and Affect: Mood normal.         Behavior: Behavior normal.         Thought Content: Thought content normal.         Judgment: Judgment normal.                    "

## 2022-10-13 ASSESSMENT — ENCOUNTER SYMPTOMS: BACK PAIN: 1

## 2022-10-24 NOTE — TELEPHONE ENCOUNTER
SPINE PATIENTS - NEW PROTOCOL PREVISIT    RECORDS RECEIVED FROM: Internal   REASON FOR VISIT: Chronic bilateral low back pain with left-sided sciatica    Date of Appt: 10/28/2022   NOTES (FOR ALL VISITS) STATUS DETAILS   OFFICE NOTE from referring provider Internal 10/11/2022 Dr Vázquez [facility]    OFFICE NOTE from other specialist Internal 11/08/2021 Dr Tao [facility]   12/15/2020 PT [facility]    DISCHARGE SUMMARY from hospital N/A    DISCHARGE REPORT from ER N/A    EMG REPORT N/A    MEDICATION LIST N/A    IMAGING  (FOR ALL VISITS)     MRI (HEAD, NECK, SPINE) Internal 03/26/2021 lumbar spine   XRAY (SPINE) *NEUROSURGERY* N/A    CT (HEAD, NECK, SPINE) N/A

## 2022-10-28 ENCOUNTER — OFFICE VISIT (OUTPATIENT)
Dept: NEUROSURGERY | Facility: CLINIC | Age: 72
End: 2022-10-28
Attending: NURSE PRACTITIONER
Payer: COMMERCIAL

## 2022-10-28 ENCOUNTER — PRE VISIT (OUTPATIENT)
Dept: NEUROSURGERY | Facility: CLINIC | Age: 72
End: 2022-10-28

## 2022-10-28 VITALS
BODY MASS INDEX: 33.27 KG/M2 | WEIGHT: 207 LBS | HEART RATE: 91 BPM | DIASTOLIC BLOOD PRESSURE: 84 MMHG | HEIGHT: 66 IN | SYSTOLIC BLOOD PRESSURE: 140 MMHG

## 2022-10-28 DIAGNOSIS — G89.29 CHRONIC BILATERAL LOW BACK PAIN WITH LEFT-SIDED SCIATICA: ICD-10-CM

## 2022-10-28 DIAGNOSIS — M54.42 CHRONIC BILATERAL LOW BACK PAIN WITH LEFT-SIDED SCIATICA: ICD-10-CM

## 2022-10-28 PROCEDURE — 99203 OFFICE O/P NEW LOW 30 MIN: CPT | Performed by: PHYSICIAN ASSISTANT

## 2022-10-28 ASSESSMENT — PAIN SCALES - GENERAL: PAINLEVEL: EXTREME PAIN (8)

## 2022-10-28 NOTE — PROGRESS NOTES
Neurosurgery Consult    HPI    Ms. Ingram is a 72-year-old female who presents to clinic for evaluation of back pain and left leg pain, the symptoms are worse with standing walking and improved with sitting down.  She had an injection of L4-5 almost a year ago, and improve her symptoms for about 4 and half months, she is considering possible another injection, or other conservative therapies such as acupuncture chiropractic.  She denies bowel or bladder symptoms or saddle anesthesia.    Medical history  Obesity  Vestibular neuritis    Social history  Noncontributory      B/P: 140/84, T: Data Unavailable, P: 91, R: Data Unavailable       Exam    Alert and oriented no acute distress  Bilateral lower extremities with 5/5 strength  Reflexes 2+ patella/ankle  Negative straight leg raise bilaterally  Negative ankle clonus negative Babinski bilaterally  Lumbar spine nontender to palpation  Able to stand on heels and toes  Gait is normal    Imaging    Lumbar MRI from 2021 demonstrated spinal stenosis at L4-5 due to anterolisthesis, ligamentous hypertrophy and facet arthropathy.    Assessment    Back pain left leg pain  Spinal stenosis with neurogenic claudication  Anterolisthesis of L4-5    Plan:      I recommend the patient certainly can be given some conservative therapy such as chiropractic and acupuncture physical therapy at her discretion.  If her symptoms do not improving and she wants to try an injection she can simply call our clinic will help arrange an injection.  After her injection we can certainly follow-up with us if is not improving.

## 2022-10-28 NOTE — LETTER
10/28/2022         RE: Tri Ingram  [address]  [facility] [address]        Dear Colleague,    Thank you for referring your patient, Tri Ingram, to the [facility]. Please see a copy of my visit note below.    Neurosurgery Consult    HPI    Ms. Ingram is a 72-year-old female who presents to clinic for evaluation of back pain and left leg pain, the symptoms are worse with standing walking and improved with sitting down.  She had an injection of L4-5 almost a year ago, and improve her symptoms for about 4 and half months, she is considering possible another injection, or other conservative therapies such as acupuncture chiropractic.  She denies bowel or bladder symptoms or saddle anesthesia.    Medical history  Obesity  Vestibular neuritis    Social history  Noncontributory      B/P: 140/84, T: Data Unavailable, P: 91, R: Data Unavailable       Exam    Alert and oriented no acute distress  Bilateral lower extremities with 5/5 strength  Reflexes 2+ patella/ankle  Negative straight leg raise bilaterally  Negative ankle clonus negative Babinski bilaterally  Lumbar spine nontender to palpation  Able to stand on heels and toes  Gait is normal    Imaging    Lumbar MRI from 2021 demonstrated spinal stenosis at L4-5 due to anterolisthesis, ligamentous hypertrophy and facet arthropathy.    Assessment    Back pain left leg pain  Spinal stenosis with neurogenic claudication  Anterolisthesis of L4-5    Plan:      I recommend the patient certainly can be given some conservative therapy such as chiropractic and acupuncture physical therapy at her discretion.  If her symptoms do not improving and she wants to try an injection she can simply call our clinic will help arrange an injection.  After her injection we can certainly follow-up with us if is not improving.          Again, thank you for allowing me to participate in the care of your patient.         Sincerely,        Orly Goldman PA-C

## 2022-10-28 NOTE — NURSING NOTE
"Tri Ingram's goals for this visit include:   Chief Complaint   Patient presents with     New Patient     Chronic bilateral low back pain with left-sided sciatica /Lulú Vázquez/ [employer]/  ortho con       She requests these members of her care team be copied on today's visit information: yes    PCP: Charlene Cain    Referring Provider:  Lulú Vázquez, APRN CNP  [address] [place] [address]    BP (!) 140/84 (BP Location: Right arm, Patient Position: Sitting, Cuff Size: Adult Large)   Pulse 91   Ht 1.676 m (5' 6\")   Wt 93.9 kg (207 lb)   BMI 33.41 kg/m      Do you need any medication refills at today's visit? No  FRANCY Mccloud., VICKI ([facility])      "

## 2023-02-18 ENCOUNTER — HEALTH MAINTENANCE LETTER (OUTPATIENT)
Age: 73
End: 2023-02-18

## 2023-03-20 ASSESSMENT — ENCOUNTER SYMPTOMS
CONSTIPATION: 0
HEMATURIA: 0
FEVER: 0
NERVOUS/ANXIOUS: 1
COUGH: 1
SHORTNESS OF BREATH: 1
DYSURIA: 0
PARESTHESIAS: 0
ARTHRALGIAS: 0
BREAST MASS: 0
MYALGIAS: 1
JOINT SWELLING: 0
HEARTBURN: 0
WEAKNESS: 0
DIZZINESS: 1
CHILLS: 0
FREQUENCY: 0
HEMATOCHEZIA: 0
ABDOMINAL PAIN: 0
PALPITATIONS: 1
EYE PAIN: 0
HEADACHES: 0
SORE THROAT: 0
NAUSEA: 0
DIARRHEA: 0

## 2023-03-20 ASSESSMENT — ACTIVITIES OF DAILY LIVING (ADL): CURRENT_FUNCTION: NO ASSISTANCE NEEDED

## 2023-03-27 ENCOUNTER — OFFICE VISIT (OUTPATIENT)
Dept: FAMILY MEDICINE | Facility: CLINIC | Age: 73
End: 2023-03-27
Payer: COMMERCIAL

## 2023-03-27 VITALS
WEIGHT: 214 LBS | HEIGHT: 66 IN | DIASTOLIC BLOOD PRESSURE: 74 MMHG | OXYGEN SATURATION: 99 % | SYSTOLIC BLOOD PRESSURE: 112 MMHG | HEART RATE: 68 BPM | BODY MASS INDEX: 34.39 KG/M2 | RESPIRATION RATE: 16 BRPM | TEMPERATURE: 97.7 F

## 2023-03-27 DIAGNOSIS — E78.5 HYPERLIPIDEMIA LDL GOAL <130: ICD-10-CM

## 2023-03-27 DIAGNOSIS — Z87.891 PERSONAL HISTORY OF TOBACCO USE: ICD-10-CM

## 2023-03-27 DIAGNOSIS — N18.30 STAGE 3 CHRONIC KIDNEY DISEASE, UNSPECIFIED WHETHER STAGE 3A OR 3B CKD (H): ICD-10-CM

## 2023-03-27 DIAGNOSIS — R07.89 ATYPICAL CHEST PAIN: ICD-10-CM

## 2023-03-27 DIAGNOSIS — R05.3 CHRONIC COUGH: ICD-10-CM

## 2023-03-27 DIAGNOSIS — N95.1 SYMPTOMATIC MENOPAUSAL OR FEMALE CLIMACTERIC STATES: ICD-10-CM

## 2023-03-27 DIAGNOSIS — Z00.00 ENCOUNTER FOR MEDICARE ANNUAL WELLNESS EXAM: Primary | ICD-10-CM

## 2023-03-27 LAB
ANION GAP SERPL CALCULATED.3IONS-SCNC: 10 MMOL/L (ref 7–15)
BUN SERPL-MCNC: 17.7 MG/DL (ref 8–23)
CALCIUM SERPL-MCNC: 9.8 MG/DL (ref 8.8–10.2)
CHLORIDE SERPL-SCNC: 105 MMOL/L (ref 98–107)
CHOLEST SERPL-MCNC: 159 MG/DL
CREAT SERPL-MCNC: 0.93 MG/DL (ref 0.51–0.95)
CREAT UR-MCNC: 105.9 MG/DL
DEPRECATED HCO3 PLAS-SCNC: 26 MMOL/L (ref 22–29)
GFR SERPL CREATININE-BSD FRML MDRD: 65 ML/MIN/1.73M2
GLUCOSE SERPL-MCNC: 104 MG/DL (ref 70–99)
HDLC SERPL-MCNC: 50 MG/DL
HGB BLD-MCNC: 14.5 G/DL (ref 11.7–15.7)
LDLC SERPL CALC-MCNC: 67 MG/DL
MICROALBUMIN UR-MCNC: 29.8 MG/L
MICROALBUMIN/CREAT UR: 28.14 MG/G CR (ref 0–25)
NONHDLC SERPL-MCNC: 109 MG/DL
POTASSIUM SERPL-SCNC: 4.8 MMOL/L (ref 3.4–5.3)
SODIUM SERPL-SCNC: 141 MMOL/L (ref 136–145)
TRIGL SERPL-MCNC: 208 MG/DL
TSH SERPL DL<=0.005 MIU/L-ACNC: 0.96 UIU/ML (ref 0.3–4.2)

## 2023-03-27 PROCEDURE — 85018 HEMOGLOBIN: CPT | Performed by: PHYSICIAN ASSISTANT

## 2023-03-27 PROCEDURE — G0438 PPPS, INITIAL VISIT: HCPCS | Performed by: PHYSICIAN ASSISTANT

## 2023-03-27 PROCEDURE — 80061 LIPID PANEL: CPT | Performed by: PHYSICIAN ASSISTANT

## 2023-03-27 PROCEDURE — 84443 ASSAY THYROID STIM HORMONE: CPT | Performed by: PHYSICIAN ASSISTANT

## 2023-03-27 PROCEDURE — 82043 UR ALBUMIN QUANTITATIVE: CPT | Performed by: PHYSICIAN ASSISTANT

## 2023-03-27 PROCEDURE — G0296 VISIT TO DETERM LDCT ELIG: HCPCS | Performed by: PHYSICIAN ASSISTANT

## 2023-03-27 PROCEDURE — 80048 BASIC METABOLIC PNL TOTAL CA: CPT | Performed by: PHYSICIAN ASSISTANT

## 2023-03-27 PROCEDURE — 82570 ASSAY OF URINE CREATININE: CPT | Performed by: PHYSICIAN ASSISTANT

## 2023-03-27 PROCEDURE — 36415 COLL VENOUS BLD VENIPUNCTURE: CPT | Performed by: PHYSICIAN ASSISTANT

## 2023-03-27 PROCEDURE — 99214 OFFICE O/P EST MOD 30 MIN: CPT | Mod: 25 | Performed by: PHYSICIAN ASSISTANT

## 2023-03-27 RX ORDER — ESTROGEN,CON/M-PROGEST ACET 0.45-1.5MG
TABLET ORAL
Qty: 28 TABLET | Refills: 5 | Status: SHIPPED | OUTPATIENT
Start: 2023-03-27 | End: 2024-05-09

## 2023-03-27 RX ORDER — BENZONATATE 200 MG/1
200 CAPSULE ORAL 3 TIMES DAILY PRN
Qty: 45 CAPSULE | Refills: 1 | Status: SHIPPED | OUTPATIENT
Start: 2023-03-27 | End: 2024-01-12

## 2023-03-27 RX ORDER — LOVASTATIN 40 MG
TABLET ORAL
Qty: 90 TABLET | Refills: 3 | Status: SHIPPED | OUTPATIENT
Start: 2023-03-27 | End: 2024-03-19

## 2023-03-27 ASSESSMENT — ENCOUNTER SYMPTOMS
EYE PAIN: 0
HEARTBURN: 0
FEVER: 0
WEAKNESS: 0
NERVOUS/ANXIOUS: 1
SORE THROAT: 0
FREQUENCY: 0
HEMATOCHEZIA: 0
BREAST MASS: 0
DIARRHEA: 0
CHILLS: 0
ARTHRALGIAS: 0
ABDOMINAL PAIN: 0
HEADACHES: 0
HEMATURIA: 0
PALPITATIONS: 1
MYALGIAS: 1
DIZZINESS: 1
CONSTIPATION: 0
DYSURIA: 0
PARESTHESIAS: 0
JOINT SWELLING: 0
COUGH: 1
NAUSEA: 0
SHORTNESS OF BREATH: 1

## 2023-03-27 ASSESSMENT — ACTIVITIES OF DAILY LIVING (ADL): CURRENT_FUNCTION: NO ASSISTANCE NEEDED

## 2023-03-27 NOTE — PATIENT INSTRUCTIONS
To schedule the CT of the lungs - call [phone]    To schedule the ECHO stress test - call [phone] ( can ask to schedule in [place])        Patient Education   Personalized Prevention Plan  You are due for the preventive services outlined below.  Your care team is available to assist you in scheduling these services.  If you have already completed any of these items, please share that information with your care team to update in your medical record.  Health Maintenance Due   Topic Date Due    Cholesterol Lab  12/20/2022    Kidney Microalbumin Urine Test  12/20/2022    Annual Wellness Visit  01/17/2023    Basic Metabolic Panel  03/28/2023    Hemoglobin  03/28/2023       Exercise for a Healthier Heart  You may wonder how you can improve the health of your heart. If you re thinking about exercise, you re on the right track. You don t need to become an athlete. But you do need a certain amount of brisk exercise to help strengthen your heart. If you have been diagnosed with a heart condition, your healthcare provider may advise exercise to help your condition. To help make exercise a habit, choose safe, fun activities.      Exercise with a friend. When activity is fun, you're more likely to stick with it.     Before you start  Check with your healthcare provider before starting an exercise program. This is especially important if you haven't been active for a while. It's also important if you have a long-term (chronic) health problem such as heart disease, diabetes, or obesity. Also check with your provider if you're at high risk for having these problems.   Why exercise?  Exercising regularly offers many healthy rewards. It can help you do all of these:   Improve your blood cholesterol level to help prevent further heart trouble.  Lower your blood pressure to help prevent a stroke or heart attack.  Control diabetes or reduce your risk of getting this disease.  Improve your heart and lung function.  Reach  and stay at a healthy weight.  Make your muscles stronger so you can stay active.  Prevent falls and fractures by slowing the loss of bone mass (osteoporosis).  Manage stress better.  Improve your sense of self and your body image.  Exercise tips    Ease into your routine. Set small goals. Then build on them. Talk with your healthcare provider first before starting an exercise routine if you're not sure what your activity level should be.  Exercise on most days. Aim for a total of at least 150 minutes (2 hours and 30 minutes) or more of moderate-intensity aerobic activity each week. You could also do 75 minutes (1 hour and 15 minutes) or more of vigorous-intensity aerobic activity each week. Or try for a combination of both. Moderate activity means that you breathe heavier and your heart rate increases, but you can still talk. Think about doing at least 30 minutes of moderate exercise, 5 times a week. It's OK to work up to the 30-minute period over time. Examples of moderate-intensity activity are brisk walking, gardening, and water aerobics.  Step up your daily activity level.  Along with your exercise program, try being more active the whole day. Walk instead of drive. Or park further away so that you take more steps each day. Do more household tasks or yard work. You may not be able to meet the advised amount of physical activity. But doing some moderate- or vigorous-intensity aerobic activity can help reduce your risk for heart disease. Your healthcare provider can help you figure out what is best for you.  Choose 1 or more activities you enjoy.  Walking is one of the easiest things you can do. You can also try swimming, riding a bike, dancing, or taking an exercise class.    Call 911  Call 911 right away if any of these occur:   Chest pain that doesn't go away quickly with rest  New burning, tightness, pressure, or heaviness in your chest, neck, shoulders, back, or arms  Abnormal or severe shortness of  breath  A very fast or irregular heartbeat (palpitations)  Fainting  When to call your healthcare provider  Call your healthcare provider if you have any of these:   Dizziness or lightheadedness  Mild shortness of breath or chest pain  Increased or new joint or muscle pain    [employer] last reviewed this educational content on 7/1/2022 2000-2022 [employer]. All rights reserved. This information is not intended as a substitute for professional medical care. Always follow your healthcare professional's instructions.          Understanding USDA MyPlate  The USDA has guidelines to help you make healthy food choices. These are called MyPlate. MyPlate shows the food groups that make up healthy meals using the image of a place setting. Before you eat, think about the healthiest choices for what to put on your plate or in your cup or bowl. To learn more about building a healthy plate, visit www.choosemyplate.gov.     The food groups  Fruits. Any fruit or 100% fruit juice counts as part of the Fruit Group. Fruits may be fresh, canned, frozen, or dried, and may be whole, cut-up, or pureed. Make 1/2 of your plate fruits and vegetables.  Vegetables. Any vegetable or 100% vegetable juice counts as a member of the Vegetable Group. Vegetables may be fresh, frozen, canned, or dried. They can be served raw or cooked and may be whole, cut-up, or mashed. Make 1/2 of your plate fruits and vegetables.  Grains. All foods made from grains are part of the Grains Group. These include wheat, rice, oats, cornmeal, and barley. Grains are often used to make foods such as bread, pasta, oatmeal, cereal, tortillas, and grits. Grains should be no more than 1/4 of your plate. At least half of your grains should be whole grains.  Protein. This group includes meat, poultry, seafood, beans and peas, eggs, processed soy products (such as tofu), nuts (including nut butters), and seeds. Make protein choices no more than 1/4 of your plate.  Meat and poultry choices should be lean or low fat.  Dairy. The Dairy Group includes all fluid milk products and foods made from milk that contain calcium, such as yogurt and cheese. (Foods that have little calcium, such as cream, butter, and cream cheese, are not part of this group.) Most dairy choices should be low-fat or fat-free.  Oils. Oils aren't a food group, but they do contain essential nutrients. However it's important to watch your intake of oils. These are fats that are liquid at room temperature. They include canola, corn, olive, soybean, vegetable, and sunflower oil. Foods that are mainly oil include mayonnaise, certain salad dressings, and soft margarines. You likely already get your daily oil allowance from the foods you eat.  Things to limit  Eating healthy also means limiting these things in your diet:  Salt (sodium). Many processed foods have a lot of sodium. To keep sodium intake down, eat fresh vegetables, meats, poultry, and seafood when possible. Purchase low-sodium, reduced-sodium, or no-salt-added food products at the store. And don't add salt to your meals at home. Instead, season them with herbs and spices such as dill, oregano, cumin, and paprika. Or try adding flavor with lemon or lime zest and juice.  Saturated fat. Saturated fats are most often found in animal products such as beef, pork, and chicken. They are often solid at room temperature, such as butter. To reduce your saturated fat intake, choose leaner cuts of meat and poultry. And try healthier cooking methods such as grilling, broiling, roasting, or baking. For a simple lower-fat swap, use plain nonfat yogurt instead of mayonnaise when making potato salad or macaroni salad.  Added sugars. These are sugars added to foods. They are in foods such as ice cream, candy, soda, fruit drinks, sports drinks, energy drinks, cookies, pastries, jams, and syrups. Cut down on added sugars by sharing sweet treats with a family member or  friend. You can also choose fruit for dessert, and drink water or other unsweetened beverages.  [employer] last reviewed this educational content on 6/1/2020 2000-2022 [employer]. All rights reserved. This information is not intended as a substitute for professional medical care. Always follow your healthcare professional's instructions.          Signs of Hearing Loss  Hearing loss is a problem shared by many people. In fact, it's one of the most common health problems, particularly as people age. Most people aged 65 and older have some hearing loss. By age 80, almost everyone does. Hearing loss often occurs slowly over the years. So, you may not realize your hearing has gotten worse.   When sudden hearing loss occurs, it's important to contact your healthcare provider right away. Your provider will do a medical exam and a hearing exam as soon as possible. This is to help find the cause and type of your sudden hearing loss. Based on your diagnosis, your healthcare provider will discuss possible treatments.      Hearing much better with one ear can be a sign of hearing loss.     Have your hearing checked  Call your healthcare provider if you:   Have to strain to hear normal conversation  Have to watch other people s faces very carefully to follow what they re saying  Need to ask people to repeat what they ve said  Often misunderstand what people are saying  Turn the volume of the television or radio up so high that others complain  Feel that people are mumbling when they re talking to you  Find that the effort to hear leaves you feeling tired and irritated  Notice, when using the phone, that you hear better with one ear than the other  [employer] last reviewed this educational content on 6/1/2022 2000-2022 [employer]. All rights reserved. This information is not intended as a substitute for professional medical care. Always follow your healthcare professional's  instructions.          Urinary Incontinence, Female (Adult)   Urinary incontinence means loss of bladder control. This problem affects many women, especially as they get older. If you have incontinence, you may be embarrassed to ask for help. But know that this problem can be treated.   Types of Incontinence  There are different types of incontinence. Two of the main types are described here. You can have more than one type.   Stress incontinence. With this type, urine leaks when pressure (stress) is put on the bladder. This may happen when you cough, sneeze, or laugh. Stress incontinence most often occurs because the pelvic floor muscles that support the bladder and urethra are weak. This can happen after pregnancy and vaginal childbirth or a hysterectomy. It can also be due to excess body weight or hormone changes.  Urge incontinence (also called overactive bladder). With this type, a sudden urge to urinate is felt often. This may happen even though there may not be much urine in the bladder. The need to urinate often during the night is common. Urge incontinence most often occurs because of bladder spasms. This may be due to bladder irritation or infection. Damage to bladder nerves or pelvic muscles, constipation, and certain medicines can also lead to urge incontinence.  Treatment depends on the cause. Further evaluation is needed to find the type you have. This will likely include an exam and certain tests. Based on the results, you and your healthcare provider can then plan treatment. Until a diagnosis is made, the home care tips below can help ease symptoms.   Home care  Do pelvic floor muscle exercises, if they are prescribed. The pelvic floor muscles help support the bladder and urethra. Many women find that their symptoms improve when doing special exercises that strengthen these muscles. To do the exercises, contract the muscles you would use to stop your stream of urine. But do this when you re not  urinating. Hold for 10 seconds, then relax. Repeat 10 to 20 times in a row, at least 3 times a day. Your healthcare provider may give you other instructions for how to do the exercises and how often.  Keep a bladder diary. This helps track how often and how much you urinate over a set period of time. Bring this diary with you to your next visit with the provider. The information can help your provider learn more about your bladder problem.  Lose weight, if advised to by your provider. Extra weight puts pressure on the bladder. Your provider can help you create a weight-loss plan that s right for you. This may include exercising more and making certain diet changes.  Don't have foods and drinks that may irritate the bladder. These can include alcohol and caffeinated drinks.  Quit smoking. Smoking and other tobacco use can lead to a long-term (chronic) cough that strains the pelvic floor muscles. Smoking may also damage the bladder and urethra. Talk with your provider about treatments or methods you can use to quit smoking.  If drinking large amounts of fluid makes you have symptoms, you may be advised to limit your fluid intake. You may also be advised to drink most of your fluids during the day and to limit fluids at night.  If you re worried about urine leakage or accidents, you may wear absorbent pads to catch urine. Change the pads often. This helps reduce discomfort. It may also reduce the risk of skin or bladder infections.    Follow-up care  Follow up with your healthcare provider, or as directed. It may take some to find the right treatment for your problem. But healthy lifestyle changes can be made right away. These include such things as exercising on a regular basis, eating a healthy diet, losing weight (if needed), and quitting smoking. Your treatment plan may include special therapies or medicines. Certain procedures or surgery may also be options. Talk about any questions you have with your provider.    When to seek medical advice  Call the healthcare provider right away if any of these occur:  Fever of 100.4 F (38 C) or higher, or as directed by your provider  Bladder pain or fullness  Belly swelling  Nausea or vomiting  Back pain  Weakness, dizziness, or fainting  [employer] last reviewed this educational content on 1/1/2020 2000-2022 [employer]. All rights reserved. This information is not intended as a substitute for professional medical care. Always follow your healthcare professional's instructions.           Lung Cancer Screening   Frequently Asked Questions  If you are at high-risk for lung cancer, getting screened with low-dose computed tomography (LDCT) every year can help save your life. This handout offers answers to some of the most common questions about lung cancer screening. If you have other questions, please call [phone] ([phone]).     What is it?  Lung cancer screening uses special X-ray technology to create an image of your lung tissue. The exam is quick and easy and takes less than 10 seconds. We don t give you any medicine or use any needles. You can eat before and after the exam. You don t need to change your clothes as long as the clothing on your chest doesn t contain metal. But, you do need to be able to hold your breath for at least 6 seconds during the exam.    What is the goal of lung cancer screening?  The goal of lung cancer screening is to save lives. Many times, lung cancer is not found until a person starts having physical symptoms. Lung cancer screening can help detect lung cancer in the earliest stages when it may be easier to treat.    Who should be screened for lung cancer?  We suggest lung cancer screening for anyone who is at high-risk for lung cancer. You are in the high-risk group if you:     are between the ages of 55 and 79, and   have smoked at least 1 pack of cigarettes a day for 20 or more years, and   still smoke or have quit within  the past 15 years.    However, if you have a new cough or shortness of breath, you should talk to your doctor before being screened.    Why does it matter if I have symptoms?  Certain symptoms can be a sign that you have a condition in your lungs that should be checked and treated by your doctor. These symptoms include fever, chest pain, a new or changing cough, shortness of breath that you have never felt before, coughing up blood or unexplained weight loss. Having any of these symptoms can greatly affect the results of lung cancer screening.       Should all smokers get an LDCT lung cancer screening exam?  It depends. Lung cancer screening is for a very specific group of men and women who have a history of heavy smoking over a long period of time (see  Who should be screened for lung cancer  above).  I am in the high-risk group, but have been diagnosed with cancer in the past. Is LDCT lung cancer screening right for me?  In some cases, you should not have LDCT lung screening, such as when your doctor is already following your cancer with CT scan studies. Your doctor will help you decide if LDCT lung screening is right for you.  Do I need to have a screening exam every year?  Yes. If you are in the high-risk group described earlier, you should get an LDCT lung cancer screening exam every year until you are 79, or are no longer willing or able to undergo screening and possible procedures to diagnose and treat lung cancer.  How effective is LDCT at preventing death from lung cancer?  Studies have shown that LDCT lung cancer screening can lower the risk of death from lung cancer by 20 percent in people who are at high-risk.  What are the risks?  There are some risks and limitations of LDCT lung cancer screening. We want to make sure you understand the risks and benefits, so please let us know if you have any questions. Your doctor may want to talk with you more about these risks.   Radiation exposure: As with any exam  that uses radiation, there is a very small increased risk of cancer. The amount of radiation in LDCT is small--about the same amount a person would get from a mammogram. Your doctor orders the exam when he or she feels the potential benefits outweigh the risks.   False negatives: No test is perfect, including LDCT. It is possible that you may have a medical condition, including lung cancer, that is not found during your exam. This is called a false negative result.   False positives and more testing: LDCT very often finds something in the lung that could be cancer, but in fact is not. This is called a false positive result. False positive tests often cause anxiety. To make sure these findings are not cancer, you may need to have more tests. These tests will be done only if you give us permission. Sometimes patients need a treatment that can have side effects, such as a biopsy. For more information on false positives, see  What can I expect from the results?    Findings not related to lung cancer: Your LDCT exam also takes pictures of areas of your body next to your lungs. In a very small number of cases, the CT scan will show an abnormal finding in one of these areas, such as your kidneys, adrenal glands, liver or thyroid. This finding may not be serious, but you may need more tests. Your doctor can help you decide what other tests you may need, if any.  What can I expect from the results?  About 1 out of 4 LDCT exams will find something that may need more tests. Most of the time, these findings are lung nodules. Lung nodules are very small collections of tissue in the lung. These nodules are very common, and the vast majority--more than 97 percent--are not cancer (benign). Most are normal lymph nodes or small areas of scarring from past infections.  But, if a small lung nodule is found to be cancer, the cancer can be cured more than 90 percent of the time. To know if the nodule is cancer, we may need to get more  images before your next yearly screening exam. If the nodule has suspicious features (for example, it is large, has an odd shape or grows over time), we will refer you to a specialist for further testing.  Will my doctor also get the results?  Yes. Your doctor will get a copy of your results.  Is it okay to keep smoking now that there s a cancer screening exam?  No. Tobacco is one of the strongest cancer-causing agents. It causes not only lung cancer, but other cancers and cardiovascular (heart) diseases as well. The damage caused by smoking builds over time. This means that the longer you smoke, the higher your risk of disease. While it is never too late to quit, the sooner you quit, the better.  Where can I find help to quit smoking?  The best way to prevent lung cancer is to stop smoking. If you have already quit smoking, congratulations and keep it up! For help on quitting smoking, please call [employer] at 0-049-QUITNOW (1-391.293.6712) or the [employer] at 1-240.660.2280 to find local resources near you.  One-on-one health coaching:  If you d prefer to work individually with a health care provider on tobacco cessation, we offer:     Medication Therapy Management:  Our specially trained pharmacists work closely with you and your doctor to help you quit smoking.  Call [phone] or [phone] (toll free).

## 2023-03-27 NOTE — PROGRESS NOTES
"SUBJECTIVE:   Wendy is a 73 year old who presents for Preventive Visit.  Patient has been advised of split billing requirements and indicates understanding: Yes  Are you in the first 12 months of your Medicare coverage?  No    Healthy Habits:     In general, how would you rate your overall health?  Good    Frequency of exercise:  None    Do you usually eat at least 4 servings of fruit and vegetables a day, include whole grains    & fiber and avoid regularly eating high fat or \"junk\" foods?  No    Taking medications regularly:  Yes    Medication side effects:  Not applicable    Ability to successfully perform activities of daily living:  No assistance needed    Home Safety:  No safety concerns identified    Hearing Impairment:  Difficulty following a conversation in a noisy restaurant or crowded room    In the past 6 months, have you been bothered by leaking of urine? Yes    In general, how would you rate your overall mental or emotional health?  Good      PHQ-2 Total Score: 0    *  Patient is fasting this morning, will do labs    Times arms will feel numb/tingly  Getting winded going up her 18 stairs (from the parking garage) which she sometimes will do often throughout the day  Sometimes feeling a 'squeezing' in her chest  Sometimes will feel a 'pounding' in her chest while just watching TV     has had a lot of medical issues this last year  She is constantly worried about him  Knows that there is probably a component of anxiety    Wonders about indigestion as sometimes she will feel like she has 'air trapped\" in her chest that she needs to burp out  No stomach pains  No change in stools    Cough persists - quit smoking ~15+ years ago but feels she has had a chronic cough since then  Gets bronchitis easily/frequently  Had bronchitis and influenza in the last several months   Does use tessalon and if she takes it consistently for a few days the cough does improve  Smoked for over 1/2 her life - at her " heaviest smoking she was doing over 2 packs/day        Have you ever done Advance Care Planning? (For example, a Health Directive, POLST, or a discussion with a medical provider or your loved ones about your wishes): No, advance care planning information given to patient to review.  Patient plans to discuss their wishes with loved ones or provider.         Fall risk  Fallen 2 or more times in the past year?: No  Any fall with injury in the past year?: No    Cognitive Screening   1) Repeat 3 items (Leader, Season, Table)    2) Clock draw: NORMAL  3) 3 item recall: Recalls 3 objects  Results: 3 items recalled: COGNITIVE IMPAIRMENT LESS LIKELY    Mini-CogTM Copyright S Yulissa. Licensed by the author for use in [facility]; reprinted with permission ([email or web address]). All rights reserved.      Do you have sleep apnea, excessive snoring or daytime drowsiness?: no    Reviewed and updated as needed this visit by clinical staff   Tobacco  Allergies  Meds  Problems  Med Hx  Surg Hx  Fam Hx  Soc   Hx        Reviewed and updated as needed this visit by Provider                 Social History     Tobacco Use     Smoking status: Former     Packs/day: 0.50     Years: 37.00     Pack years: 18.50     Types: Cigarettes     Quit date: 10/20/2007     Years since quitting: 15.4     Smokeless tobacco: Never   Substance Use Topics     Alcohol use: Yes     Comment: minimal         Alcohol Use 3/20/2023   Prescreen: >3 drinks/day or >7 drinks/week? No   No flowsheet data found.  Do you have a current opioid prescription? No  Do you use any other controlled substances or medications that are not prescribed by a provider? None      Current providers sharing in care for this patient include:   Patient Care Team:  Charlene Cain PA-C as PCP - General (Physician Assistant)  Charlene Cain PA-C as Assigned PCP  Lacy Ch MD as MD (Dermatology)  Orly Goldman PA-C as Assigned Neuroscience  Provider    The following health maintenance items are reviewed in Epic and correct as of today:  Health Maintenance   Topic Date Due     LIPID  12/20/2022     MICROALBUMIN  12/20/2022     MEDICARE ANNUAL WELLNESS VISIT  01/17/2023     BMP  03/28/2023     HEMOGLOBIN  03/28/2023     ANNUAL REVIEW OF HM ORDERS  06/03/2023     MAMMO SCREENING  06/21/2023     FALL RISK ASSESSMENT  03/27/2024     DTAP/TDAP/TD IMMUNIZATION (2 - Td or Tdap) 04/18/2026     COLORECTAL CANCER SCREENING  03/15/2027     ADVANCE CARE PLANNING  03/27/2028     DEXA  05/23/2032     HEPATITIS C SCREENING  Completed     PHQ-2 (once per calendar year)  Completed     INFLUENZA VACCINE  Completed     Pneumococcal Vaccine: 65+ Years  Completed     URINALYSIS  Completed     ZOSTER IMMUNIZATION  Completed     COVID-19 Vaccine  Completed     IPV IMMUNIZATION  Aged Out     MENINGITIS IMMUNIZATION  Aged Out     BP Readings from Last 3 Encounters:   03/27/23 112/74   10/28/22 (!) 140/84   10/11/22 126/80    Wt Readings from Last 3 Encounters:   03/27/23 97.1 kg (214 lb)   10/28/22 93.9 kg (207 lb)   10/11/22 94 kg (207 lb 3.2 oz)                 Review of Systems   Constitutional: Negative for chills and fever.   HENT: Positive for congestion. Negative for ear pain, hearing loss and sore throat.    Eyes: Negative for pain and visual disturbance.   Respiratory: Positive for cough and shortness of breath.    Cardiovascular: Positive for palpitations. Negative for chest pain and peripheral edema.   Gastrointestinal: Negative for abdominal pain, constipation, diarrhea, heartburn, hematochezia and nausea.   Breasts:  Negative for tenderness, breast mass and discharge.   Genitourinary: Negative for dysuria, frequency, genital sores, hematuria, pelvic pain, urgency, vaginal bleeding and vaginal discharge.   Musculoskeletal: Positive for myalgias. Negative for arthralgias and joint swelling.   Skin: Negative for rash.   Neurological: Positive for dizziness. Negative  "for weakness, headaches and paresthesias.   Psychiatric/Behavioral: Negative for mood changes. The patient is nervous/anxious.          OBJECTIVE:   /74   Pulse 68   Temp 97.7  F (36.5  C) (Tympanic)   Resp 16   Ht 1.676 m (5' 5.98\")   Wt 97.1 kg (214 lb)   SpO2 99%   BMI 34.56 kg/m   Estimated body mass index is 34.56 kg/m  as calculated from the following:    Height as of this encounter: 1.676 m (5' 5.98\").    Weight as of this encounter: 97.1 kg (214 lb).  Physical Exam  GENERAL APPEARANCE: healthy, alert and no distress  EYES: Eyes grossly normal to inspection, PERRL and conjunctivae and sclerae normal  HENT: ear canals and TM's normal, nose and mouth without ulcers or lesions, oropharynx clear and oral mucous membranes moist  NECK: no adenopathy, no asymmetry, masses, or scars and thyroid normal to palpation  RESP: lungs clear to auscultation - no rales, rhonchi or wheezes  CV: regular rate and rhythm, normal S1 S2, no S3 or S4, no murmur, click or rub, no peripheral edema and peripheral pulses strong  ABDOMEN: soft, nontender, no hepatosplenomegaly, no masses and bowel sounds normal  MS: no musculoskeletal defects are noted and gait is age appropriate without ataxia  SKIN: no suspicious lesions or rashes  NEURO: Normal strength and tone, sensory exam grossly normal, mentation intact and speech normal  PSYCH: mentation appears normal and affect normal/bright    Diagnostic Test Results:  pending    ASSESSMENT / PLAN:   (Z00.00) Encounter for Medicare annual wellness exam  (primary encounter diagnosis)  Comment:   Plan: CANCELED: PRIMARY CARE FOLLOW-UP SCHEDULING            (E78.5) Hyperlipidemia LDL goal <130  Comment:   Plan: Lipid panel reflex to direct LDL Fasting,         lovastatin (MEVACOR) 40 MG tablet            (N18.30) Stage 3 chronic kidney disease, unspecified whether stage 3a or 3b CKD (H)  Comment:   Plan: Albumin Random Urine Quantitative with Creat         Ratio, BASIC METABOLIC " "PANEL, Hemoglobin, TSH         with free T4 reflex            (N95.1) SYMPTOMATIC FEMALE CLIMACTERIC STATE  Comment: very slowly trying to taper down. Symptoms have returned when going off completely  Plan: estrogen conj-medroxyPROGESTERone (PREMPRO)         0.45-1.5 MG tablet            (Z87.891) Personal history of tobacco use  Comment: long history of smoking and was smoking heavy for many years. Had discussed screening at previous visits but with other stressors at the time she had declined. Given current symptoms and active cough, feel it is a necessary step. We did discuss pros/cons as outlined in the shared decision making   Plan: Prof fee: Shared Decision Making for Lung         Cancer Screening, CT Chest Lung Cancer Scrn Low        Dose wo            (R07.89) Atypical chest pain  Comment: given symptoms, feel it is necessary to exclude cardiac cause of symptoms before turning to anxiety or GI causes. With back issues improved, patient does feel she can exercise on a treadmill   Plan: Echocardiogram Exercise Stress            (R05.3) Chronic cough  Comment: etiology cardiac vs pulmonary vs GI vs other. Work up of pulmonary and cardiac source as outlined above. Need to be done first before looking at other causes such as GI  Plan: cardiology and pulmonary work up then follow up to discuss if still present         Patient has been advised of split billing requirements and indicates understanding: Yes      COUNSELING:  Reviewed preventive health counseling, as reflected in patient instructions      BMI:   Estimated body mass index is 34.56 kg/m  as calculated from the following:    Height as of this encounter: 1.676 m (5' 5.98\").    Weight as of this encounter: 97.1 kg (214 lb).         She reports that she quit smoking about 15 years ago. Her smoking use included cigarettes. She has a 18.50 pack-year smoking history. She has never used smokeless tobacco.      Appropriate preventive services were discussed with " this patient, including applicable screening as appropriate for cardiovascular disease, diabetes, osteopenia/osteoporosis, and glaucoma.  As appropriate for age/gender, discussed screening for colorectal cancer, prostate cancer, breast cancer, and cervical cancer. Checklist reviewing preventive services available has been given to the patient.    Reviewed patients plan of care and provided an AVS. The Basic Care Plan (routine screening as documented in Health Maintenance) for Tri meets the Care Plan requirement. This Care Plan has been established and reviewed with the Patient.      Charlene Cain PA-C  [facility]    Identified Health Risks:    I have reviewed Opioid Use Disorder and Substance Use Disorder risk factors and made any needed referrals.       She is at risk for lack of exercise and has been provided with information to increase physical activity for the benefit of her well-being.  The patient was counseled and encouraged to consider modifying their diet and eating habits. She was provided with information on recommended healthy diet options.  The patient was provided with written information regarding signs of hearing loss.  Information on urinary incontinence and treatment options given to patient.  Lung Cancer Screening Shared Decision Making Visit     Tri Ingram, a 73 year old female, is eligible for lung cancer screening    History   Smoking Status     Former     Packs/day: 0.50     Years: 37.00     Types: Cigarettes     Quit date: 10/20/2007   Smokeless Tobacco     Never       I have discussed with patient the risks and benefits of screening for lung cancer with low-dose CT.     The risks include:    radiation exposure: one low dose chest CT has as much ionizing radiation as about 15 chest x-rays, or 6 months of background radiation living in [place]      false positives: most findings/nodules are NOT cancer, but some might still require additional  diagnostic evaluation, including biopsy    over-diagnosis: some slow growing cancers that might never have been clinically significant will be detected and treated unnecessarily     The benefit of early detection of lung cancer is contingent upon adherence to annual screening or more frequent follow up if indicated.     Furthermore, to benefit from screening, Tri must be willing and able to undergo diagnostic procedures, if indicated. Although no specific guide is available for determining severity of comorbidities, it is reasonable to withhold screening in patients who have greater mortality risk from other diseases.     We did discuss that the best way to prevent lung cancer is to not smoke.    Some patients may value a numeric estimation of lung cancer risk when evaluating if lung cancer screening is right for them, here is one calculator:    ShouldIScreen

## 2023-03-30 ENCOUNTER — ANCILLARY PROCEDURE (OUTPATIENT)
Dept: CT IMAGING | Facility: CLINIC | Age: 73
End: 2023-03-30
Attending: PHYSICIAN ASSISTANT
Payer: COMMERCIAL

## 2023-03-30 DIAGNOSIS — Z87.891 PERSONAL HISTORY OF TOBACCO USE: ICD-10-CM

## 2023-03-30 PROCEDURE — 71271 CT THORAX LUNG CANCER SCR C-: CPT | Mod: GC | Performed by: RADIOLOGY

## 2023-03-31 ENCOUNTER — TELEPHONE (OUTPATIENT)
Dept: FAMILY MEDICINE | Facility: CLINIC | Age: 73
End: 2023-03-31
Payer: COMMERCIAL

## 2023-03-31 DIAGNOSIS — E04.1 THYROID NODULE: Primary | ICD-10-CM

## 2023-03-31 NOTE — TELEPHONE ENCOUNTER
Received call from Megan from [employer] regarding Patient's CT from yesterday    Significant Incidental Finding(s):  Category S: Yes.  a.  2.9 cm heterogeneous left thyroid nodule extending into the  superior mediastinum, consider dedicated thyroid ultrasound for  further evaluation.   b. Left adrenal nodule    Rich Merritt RN

## 2023-04-04 NOTE — TELEPHONE ENCOUNTER
Call placed to Patient  Relayed [facility] message  Number given for Patient to call and schedule US  Verbalized understanding  Rich Merritt RN

## 2023-04-04 NOTE — TELEPHONE ENCOUNTER
Please call patient and let her know that the CT of the chest looked okay (I will comment on it with results in the near future) but there was a thyroid nodule that was noted incidentally. It is suggested we do a dedicated image (ultrasound) to better assess the thyroid nodule so I did order this and patient can call [phone] to schedule this    Charlene Cain PA-C

## 2023-04-11 ENCOUNTER — ANCILLARY PROCEDURE (OUTPATIENT)
Dept: ULTRASOUND IMAGING | Facility: CLINIC | Age: 73
End: 2023-04-11
Attending: PHYSICIAN ASSISTANT
Payer: COMMERCIAL

## 2023-04-11 DIAGNOSIS — E04.1 THYROID NODULE: ICD-10-CM

## 2023-04-11 PROCEDURE — 76536 US EXAM OF HEAD AND NECK: CPT | Mod: GC | Performed by: RADIOLOGY

## 2023-04-16 DIAGNOSIS — E04.1 THYROID NODULE: Primary | ICD-10-CM

## 2023-04-26 ENCOUNTER — OFFICE VISIT (OUTPATIENT)
Dept: DERMATOLOGY | Facility: CLINIC | Age: 73
End: 2023-04-26
Payer: COMMERCIAL

## 2023-04-26 DIAGNOSIS — L81.4 LENTIGINES: ICD-10-CM

## 2023-04-26 DIAGNOSIS — L82.0 SEBORRHEIC KERATOSES, INFLAMED: ICD-10-CM

## 2023-04-26 DIAGNOSIS — D22.9 MULTIPLE BENIGN NEVI: ICD-10-CM

## 2023-04-26 DIAGNOSIS — D18.01 CHERRY ANGIOMA: ICD-10-CM

## 2023-04-26 DIAGNOSIS — Z85.828 HISTORY OF NONMELANOMA SKIN CANCER: ICD-10-CM

## 2023-04-26 DIAGNOSIS — L82.1 SEBORRHEIC KERATOSES: Primary | ICD-10-CM

## 2023-04-26 PROCEDURE — 99213 OFFICE O/P EST LOW 20 MIN: CPT | Mod: 25 | Performed by: PHYSICIAN ASSISTANT

## 2023-04-26 PROCEDURE — 17110 DESTRUCTION B9 LES UP TO 14: CPT | Performed by: PHYSICIAN ASSISTANT

## 2023-04-26 ASSESSMENT — PAIN SCALES - GENERAL: PAINLEVEL: NO PAIN (0)

## 2023-04-26 NOTE — NURSING NOTE
Tri Ingram's goals for this visit include:   Chief Complaint   Patient presents with     Skin Check     FBSE. Area of concern: scalp, groin, legs.  History of sherrill skin cancer.        She requests these members of her care team be copied on today's visit information:       PCP: Charlene Cain    Referring Provider:  Referred Self, MD  No address on file    There were no vitals taken for this visit.    Do you need any medication refills at today's visit? No    Megan Mcintosh CMA on 4/26/2023 at 11:34 AM

## 2023-04-26 NOTE — PROGRESS NOTES
"[facility] Dermatology Note  Encounter Date: Apr 26, 2023  Office Visit      Dermatology Problem List:  1. 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   - Signs and Symptoms of non-melanoma skin cancer and ABCDEs of melanoma reviewed with patient. Patient encouraged to perform monthly self skin exams and educated on how to perform them. UV precautions reviewed with patient. Patient was asked about new or changing moles/lesions on body.   - Sunscreen: Apply 20 minutes prior to going outdoors and reapply every two hours, when wet or sweating. We recommend using an SPF 30 or higher, and to use one that is water resistant.    - Continue annual skin exams     # Benign findings: multiple benign nevi, lentigines, cherry angiomas, SKs  - edu on benign etiology  - Signs and Symptoms of non-melanoma skin cancer and ABCDEs of melanoma reviewed with patient. Patient encouraged to perform monthly self skin exams and educated on how to perform them. UV precautions reviewed with patient. Patient was asked about new or changing moles/lesions on body.   - Sunscreen: Apply 20 minutes prior to going outdoors and reapply every two hours, when wet or sweating. We recommend using an SPF 30 or higher, and to use one that is water resistant.     - RTC for changes    # Fhx melanoma   - several family members  - recommended annual skin exams  - consider genetic testing, patient " defers for now     # Seborrheic keratosis, symptomatic. x2 on scalp.  - Cryotherapy performed today (see procedure note(s) below)     Procedures Performed:   - Cryotherapy procedure note, location(s): scalp. After verbal consent and discussion of risks and benefits including, but not limited to, dyspigmentation/scar, blister, and pain, 2 lesion(s) was(were) treated with 1-2 mm freeze border for 1-2 cycles with liquid nitrogen. Post cryotherapy instructions were provided.     Follow-up: 1 year(s) in-person, or earlier for new or changing lesions    Staff:     All risks, benefits and alternatives were discussed with patient.  Patient is in agreement and understands the assessment and plan.  All questions were answered.    Delores Hutchins PA-C, MPAS  [facility]: Phone: [phone], Fax: [phone]  [facility]: Phone: [phone],  Fax: [phone]  [facility]: Phone: [phone], Fax: [phone]  ____________________________________________    CC: Skin Check (FBSE. Area of concern: scalp, groin, legs.  History of sherrill skin cancer. )      HPI:  Ms. Tri Ingram is a 73 year old female who presents today as a new patient for FBSE. Last seen by Dr. Good in 2021 for a skin exam. Hx NMSC. Notes scaly spots on scalp that are occasionally tender.    Patient is otherwise feeling well, without additional concerns.    Labs:  none    Physical Exam:  Vitals: There were no vitals taken for this visit.  SKIN: Full skin, which includes the head/face, both arms, chest, back, abdomen,both legs, genitalia and/or groin buttocks, digits and/or nails, was examined.   - Gee's skin type II, <100 nevi  - There are dome shaped bright red papules on the trunk.   - Multiple regular brown pigmented macules and papules are identified on the trunk and extremities.   - Scattered brown macules on sun exposed  areas.  - There are waxy stuck on tan to brown papules on the trunk.   - There is no erythema, telangectasias, nodularity, or pigmentation on the mid chest and R groin.   - There are tan to brown waxy stuck on papules with surrounding erythema on the scalp x2. .   - No other lesions of concern on areas examined.     Medications:  Current Outpatient Medications   Medication     Apoaequorin (PREVAGEN PO)     ASPIRIN 81 MG OR TABS     estrogen conj-medroxyPROGESTERone (PREMPRO) 0.45-1.5 MG tablet     lovastatin (MEVACOR) 40 MG tablet     multivitamin w/minerals (THERA-VIT-M) tablet     Specialty Vitamins Products (ICAPS LUTEIN & ZEAXANTHIN) TBEC     benzonatate (TESSALON) 200 MG capsule     ondansetron (ZOFRAN-ODT) 4 MG ODT tab     No current facility-administered medications for this visit.      Past Medical/Surgical History:   Patient Active Problem List   Diagnosis     Other malignant neoplasm of skin of trunk, except scrotum     Generalized osteoarthrosis, unspecified site     Disorder of bone and cartilage     Symptomatic menopausal or female climacteric states     Plantar fasciitis     Hyperlipidemia LDL goal <130     Obesity     Advanced directives, counseling/discussion     Family history of abdominal aortic aneurysm     Former moderate cigarette smoker (10-19 per day)     Chronic kidney disease, stage 3 (H)     Lumbar radiculopathy     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     Squamous cell carcinoma      Vestibular neuritis

## 2023-04-26 NOTE — LETTER
"    4/26/2023         RE: Tri Ingram  [address]  [facility] [address]        Dear Colleague,    Thank you for referring your patient, Tri Ingram, to the [facility]. Please see a copy of my visit note below.    [facility] Dermatology Note  Encounter Date: Apr 26, 2023  Office Visit      Dermatology Problem List:  1. 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   - Signs and Symptoms of non-melanoma skin cancer and ABCDEs of melanoma reviewed with patient. Patient encouraged to perform monthly self skin exams and educated on how to perform them. UV precautions reviewed with patient. Patient was asked about new or changing moles/lesions on body.   - Sunscreen: Apply 20 minutes prior to going outdoors and reapply every two hours, when wet or sweating. We recommend using an SPF 30 or higher, and to use one that is water resistant.    - Continue annual skin exams     # Benign findings: multiple benign nevi, lentigines, cherry angiomas, SKs  - edu on benign etiology  - Signs and Symptoms of non-melanoma skin cancer and ABCDEs of melanoma reviewed with patient. Patient encouraged to perform monthly self skin exams and educated on how to perform them. UV precautions reviewed with patient. Patient was asked about new or changing moles/lesions on body.   - Sunscreen: Apply 20 minutes prior to going outdoors " and reapply every two hours, when wet or sweating. We recommend using an SPF 30 or higher, and to use one that is water resistant.     - RTC for changes    # Fhx melanoma   - several family members  - recommended annual skin exams  - consider genetic testing, patient defers for now     # Seborrheic keratosis, symptomatic. x2 on scalp.  - Cryotherapy performed today (see procedure note(s) below)     Procedures Performed:   - Cryotherapy procedure note, location(s): scalp. After verbal consent and discussion of risks and benefits including, but not limited to, dyspigmentation/scar, blister, and pain, 2 lesion(s) was(were) treated with 1-2 mm freeze border for 1-2 cycles with liquid nitrogen. Post cryotherapy instructions were provided.     Follow-up: 1 year(s) in-person, or earlier for new or changing lesions    Staff:     All risks, benefits and alternatives were discussed with patient.  Patient is in agreement and understands the assessment and plan.  All questions were answered.    Delores Hutchins PA-C, MPAS  [facility]: Phone: [phone], Fax: [phone]  [facility]: Phone: [phone],  Fax: [phone]  [facility]: Phone: [phone], Fax: [phone]  ____________________________________________    CC: Skin Check (FBSE. Area of concern: scalp, groin, legs.  History of sherrill skin cancer. )      HPI:  Ms. Tri Ingram is a 73 year old female who presents today as a new patient for FBSE. Last seen by Dr. Good in 2021 for a skin exam. Hx NMSC. Notes scaly spots on scalp that are occasionally tender.    Patient is otherwise feeling well, without additional concerns.    Labs:  none    Physical Exam:  Vitals: There were no vitals taken for this visit.  SKIN: Full skin, which includes the head/face, both arms, chest, back, abdomen,both legs, genitalia and/or groin buttocks, digits and/or  nails, was examined.   - Gee's skin type II, <100 nevi  - There are dome shaped bright red papules on the trunk.   - Multiple regular brown pigmented macules and papules are identified on the trunk and extremities.   - Scattered brown macules on sun exposed areas.  - There are waxy stuck on tan to brown papules on the trunk.   - There is no erythema, telangectasias, nodularity, or pigmentation on the mid chest and R groin.   - There are tan to brown waxy stuck on papules with surrounding erythema on the scalp x2. .   - No other lesions of concern on areas examined.     Medications:  Current Outpatient Medications   Medication     Apoaequorin (PREVAGEN PO)     ASPIRIN 81 MG OR TABS     estrogen conj-medroxyPROGESTERone (PREMPRO) 0.45-1.5 MG tablet     lovastatin (MEVACOR) 40 MG tablet     multivitamin w/minerals (THERA-VIT-M) tablet     Specialty Vitamins Products (ICAPS LUTEIN & ZEAXANTHIN) TBEC     benzonatate (TESSALON) 200 MG capsule     ondansetron (ZOFRAN-ODT) 4 MG ODT tab     No current facility-administered medications for this visit.      Past Medical/Surgical History:   Patient Active Problem List   Diagnosis     Other malignant neoplasm of skin of trunk, except scrotum     Generalized osteoarthrosis, unspecified site     Disorder of bone and cartilage     Symptomatic menopausal or female climacteric states     Plantar fasciitis     Hyperlipidemia LDL goal <130     Obesity     Advanced directives, counseling/discussion     Family history of abdominal aortic aneurysm     Former moderate cigarette smoker (10-19 per day)     Chronic kidney disease, stage 3 (H)     Lumbar radiculopathy     Past Medical History:   Diagnosis Date     Disorder of bone and cartilage, unspecified      Generalized osteoarthrosis, unspecified site     back and shoulder     Hematuria     hx of blood in urine and kidney stones     IBS (irritable bowel syndrome) 11/14/14    episode of IBS     Other malignant neoplasm of skin of  trunk, except scrotum 1998    Bowen's disease, 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     Squamous cell carcinoma      Vestibular neuritis                         Again, thank you for allowing me to participate in the care of your patient.        Sincerely,        Delores Hutchins PA-C

## 2023-04-27 ENCOUNTER — ANCILLARY PROCEDURE (OUTPATIENT)
Dept: CARDIOLOGY | Facility: CLINIC | Age: 73
End: 2023-04-27
Attending: PHYSICIAN ASSISTANT
Payer: COMMERCIAL

## 2023-04-27 DIAGNOSIS — R07.89 ATYPICAL CHEST PAIN: ICD-10-CM

## 2023-04-27 PROCEDURE — 93325 DOPPLER ECHO COLOR FLOW MAPG: CPT | Performed by: INTERNAL MEDICINE

## 2023-04-27 PROCEDURE — 93017 CV STRESS TEST TRACING ONLY: CPT | Performed by: INTERNAL MEDICINE

## 2023-04-27 PROCEDURE — 93016 CV STRESS TEST SUPVJ ONLY: CPT | Performed by: INTERNAL MEDICINE

## 2023-04-27 PROCEDURE — 93321 DOPPLER ECHO F-UP/LMTD STD: CPT | Performed by: INTERNAL MEDICINE

## 2023-04-27 PROCEDURE — 93018 CV STRESS TEST I&R ONLY: CPT | Performed by: INTERNAL MEDICINE

## 2023-04-27 PROCEDURE — 93350 STRESS TTE ONLY: CPT | Performed by: INTERNAL MEDICINE

## 2023-05-01 ENCOUNTER — ANCILLARY PROCEDURE (OUTPATIENT)
Dept: ULTRASOUND IMAGING | Facility: CLINIC | Age: 73
End: 2023-05-01
Attending: PHYSICIAN ASSISTANT
Payer: COMMERCIAL

## 2023-05-01 DIAGNOSIS — E04.1 THYROID NODULE: ICD-10-CM

## 2023-05-01 PROCEDURE — 88173 CYTOPATH EVAL FNA REPORT: CPT | Mod: GC | Performed by: PATHOLOGY

## 2023-05-01 PROCEDURE — 10006 FNA BX W/US GDN EA ADDL: CPT | Performed by: RADIOLOGY

## 2023-05-01 PROCEDURE — 10005 FNA BX W/US GDN 1ST LES: CPT | Performed by: RADIOLOGY

## 2023-05-02 LAB
PATH REPORT.COMMENTS IMP SPEC: NORMAL
PATH REPORT.FINAL DX SPEC: NORMAL
PATH REPORT.GROSS SPEC: NORMAL
PATH REPORT.MICROSCOPIC SPEC OTHER STN: NORMAL
PATH REPORT.RELEVANT HX SPEC: NORMAL

## 2023-05-23 ENCOUNTER — PATIENT OUTREACH (OUTPATIENT)
Dept: CARE COORDINATION | Facility: CLINIC | Age: 73
End: 2023-05-23
Payer: COMMERCIAL

## 2023-06-20 ENCOUNTER — PATIENT OUTREACH (OUTPATIENT)
Dept: CARE COORDINATION | Facility: CLINIC | Age: 73
End: 2023-06-20
Payer: COMMERCIAL

## 2023-07-19 ENCOUNTER — ANCILLARY PROCEDURE (OUTPATIENT)
Dept: MAMMOGRAPHY | Facility: CLINIC | Age: 73
End: 2023-07-19
Attending: PHYSICIAN ASSISTANT
Payer: COMMERCIAL

## 2023-07-19 DIAGNOSIS — Z12.31 VISIT FOR SCREENING MAMMOGRAM: ICD-10-CM

## 2023-07-19 PROCEDURE — 77067 SCR MAMMO BI INCL CAD: CPT | Mod: GC | Performed by: STUDENT IN AN ORGANIZED HEALTH CARE EDUCATION/TRAINING PROGRAM

## 2023-08-04 ENCOUNTER — ANCILLARY PROCEDURE (OUTPATIENT)
Dept: MAMMOGRAPHY | Facility: CLINIC | Age: 73
End: 2023-08-04
Attending: PHYSICIAN ASSISTANT
Payer: COMMERCIAL

## 2023-08-04 DIAGNOSIS — R92.8 ABNORMAL MAMMOGRAM: ICD-10-CM

## 2023-08-04 PROCEDURE — 77065 DX MAMMO INCL CAD UNI: CPT | Mod: RT

## 2023-08-04 PROCEDURE — G0279 TOMOSYNTHESIS, MAMMO: HCPCS

## 2023-09-13 ENCOUNTER — OFFICE VISIT (OUTPATIENT)
Dept: PODIATRY | Facility: CLINIC | Age: 73
End: 2023-09-13
Payer: COMMERCIAL

## 2023-09-13 DIAGNOSIS — G62.9 PERIPHERAL NEURITIS: Primary | ICD-10-CM

## 2023-09-13 DIAGNOSIS — L84 TYLOMA: ICD-10-CM

## 2023-09-13 DIAGNOSIS — M20.42 HAMMERTOES OF BOTH FEET: ICD-10-CM

## 2023-09-13 DIAGNOSIS — M20.41 HAMMERTOES OF BOTH FEET: ICD-10-CM

## 2023-09-13 PROCEDURE — 99204 OFFICE O/P NEW MOD 45 MIN: CPT | Performed by: PODIATRIST

## 2023-09-13 ASSESSMENT — PAIN SCALES - GENERAL: PAINLEVEL: MILD PAIN (3)

## 2023-09-13 NOTE — PROGRESS NOTES
Past Medical History:   Diagnosis Date    Disorder of bone and cartilage, unspecified     Generalized osteoarthrosis, unspecified site     back and shoulder    Hematuria     hx of blood in urine and kidney stones    IBS (irritable bowel syndrome) 11/14/14    episode of IBS    Other malignant neoplasm of skin of trunk, except scrotum 1998    Bowen's disease, 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    Squamous cell carcinoma     Vestibular neuritis      Patient Active Problem List   Diagnosis    Other malignant neoplasm of skin of trunk, except scrotum    Generalized osteoarthrosis, unspecified site    Disorder of bone and cartilage    Symptomatic menopausal or female climacteric states    Plantar fasciitis    Hyperlipidemia LDL goal <130    Obesity    Advanced directives, counseling/discussion    Family history of abdominal aortic aneurysm    Former moderate cigarette smoker (10-19 per day)    Chronic kidney disease, stage 3 (H)    Lumbar radiculopathy     Past Surgical History:   Procedure Laterality Date    COLONOSCOPY  2001, 2007    COLONOSCOPY N/A 11/3/2017    Procedure: COLONOSCOPY;  Colonoscopy  ;  Surgeon: Lg Guerrero MD;  Location: [facility]    COLONOSCOPY N/A 3/15/2022    Procedure: COLONOSCOPY, FLEXIBLE, WITH LESION REMOVAL USING SNARE;  Surgeon: Carl Huggins MD;  Location: [facility]    COLONOSCOPY WITH CO2 INSUFFLATION N/A 3/15/2022    Procedure: COLONOSCOPY, WITH CO2 INSUFFLATION;  Surgeon: Carl Huggins MD;  Location: [facility]    ZZC NONSPECIFIC PROCEDURE      Bowen's disease removed X 2     Social History     Socioeconomic History    Marital status:      Spouse name: Not on file    Number of children: Not on file    Years of education: Not on file    Highest education level: Not on file   Occupational History    Occupation: RETIRED   Tobacco Use    Smoking status: Former     Packs/day: 0.50     Years: 37.00     Pack years: 18.50     Types: Cigarettes     Quit date: 10/20/2007     Years since  "quitting: 15.9    Smokeless tobacco: Never   Vaping Use    Vaping Use: Never used   Substance and Sexual Activity    Alcohol use: Yes     Comment: minimal    Drug use: No    Sexual activity: Yes     Partners: Male     Comment: menopause   Other Topics Concern     Service No    Blood Transfusions No    Caffeine Concern No    Occupational Exposure No    Hobby Hazards No    Sleep Concern Yes     Comment: arthritis    Stress Concern Yes    Weight Concern No    Special Diet Yes     Comment: Frazier    Back Care Yes     Comment: arthritis    Exercise Yes    Bike Helmet No    Seat Belt Yes    Self-Exams Yes    Parent/sibling w/ CABG, MI or angioplasty before 65F 55M? Yes     Comment: brother MI at age 50   Social History Narrative    Caffeine intake/servings daily - 0    Calcium intake/servings daily - 4 occ. calcium supplement    Exercise 7 times weekly - describe  walking    Sunscreen used - No    Seatbelts used - Yes    Guns stored in the home - No    Self Breast Exam - Yes    Pap test up to date -  Yes    Eye exam up to date -  Yes    Dental exam up to date -  No    DEXA scan up to date -  Yes, last done 5/4/04    Flex Sig/Colonoscopy up to date -  Yes, at age 52 \"normal\"    Mammography up to date - 8/18/06    Immunizations reviewed and up to date - unknown last tetanus shot    Abuse: Current or Past (Physical, Sexual or Emotional) - No    Do you feel safe in your environment - Yes    Do you cope well with stress - Yes    Do you suffer from insomnia - Yes    Last updated by: Mary Beth Katz 8/28/06 July 23, 2019            ENVIRONMENTAL HISTORY: [place] a[place] year [place] The home is heated with a gas furnace. They do have central air conditioning. The patient's bedroom is furnished with Indoor plants, fabric window coverings, carpet in bedroom. No pets . There is no history of cockroach or mice infestation. There are no smokers in the house.  The house does not have a " basement.      Social Determinants of Health     Financial Resource Strain: Not on file   Food Insecurity: Not on file   Transportation Needs: Not on file   Physical Activity: Not on file   Stress: Not on file   Social Connections: Not on file   Intimate Partner Violence: Not on file   Housing Stability: Not on file     Family History   Problem Relation Age of Onset    Eye Disorder Mother         glaucoma, wet macular degeneration    Lipids Mother     Gynecology Mother         hyst    Melanoma Mother     Skin Cancer Mother     Cancer Father         colon /prostate    Circulatory Father         amputee    Diabetes Father         type II    C.A.D. Father         MIs    Eye Disorder Father         dry macular degeneration    Cardiovascular Father         small AAA    Melanoma Father     Arthritis Sister     Gynecology Sister         partial hyst  fibrous sheaths on ovary egg sized polyp uterine removed    Lipids Sister     Neurologic Disorder Sister         migraines    Cardiovascular Brother         large 7 cm AAA    Coronary Artery Disease Brother         MI    Asthma Brother     Gastrointestinal Disease Paternal Grandmother          from hepatitis     Last Comprehensive Metabolic Panel:  Lab Results   Component Value Date     2023    POTASSIUM 4.8 2023    CHLORIDE 105 2023    CO2 26 2023    ANIONGAP 10 2023     (H) 2023    BUN 17.7 2023    CR 0.93 2023    GFRESTIMATED 65 2023    JEFFY 9.8 2023             Subjective findings- 73-year-old presents for foot pains bilaterally.  She relates she seen a doctor years ago but for the last 5 months she is getting pain across the MPJs and then the top of the foot and at night the big toe cramps up, relates she has calluses, she is not sure if those are causing the symptoms or not, relates she has a callus trimming device that she has not been using recently and feels the calluses of built up more,  relates that the feet feel tight, gets tingling, gets burning they feel hot sometimes and then they feel cold at times and it feels like she is walking on rocks, relates to no injuries, relates she has had back disease with 2 surgeries, relates she has a family history of peripheral neuropathy, relates she is not Diabetic.    Objective findings- DP and PT are 2 out of 4 bilaterally,  CFT is less than 3 seconds bilaterally.  Has mild dorsally contracted digits 1 through 5 bilaterally, has hyperkeratotic tissue buildup plantar medial hallux bilaterally, has hyperkeratotic tissue buildup diffusely plantar 2 through 4 MPJs bilaterally, has functional hallux limitus bilaterally, no gross tendon voids bilaterally, Sharp dull is intact with 5.07 [place] Kali monofilament bilaterally, proprioception is intact bilaterally, deep tendon reflexes are intact bilaterally, there is no palpable click or shooting pain in the interspaces with pinch and squeeze test bilaterally, no pain on palpation bilaterally.  No erythema, no drainage, no odor, no calor bilaterally.        Assessment and plan- Peripheral Neuritis bilaterally.  Tylomas bilaterally.  She has Hammertoes and functional Hallux Limitus present.  Diagnosis and treatment options discussed with the patient.  Advised her on callus care.  Prescription for Urea lotion given and use discussed with the patient.  Advised her on stretching.  Metatarsal pads dispensed to use discussed with her.  Spenco over-the-counter insoles advised and use discussed with her.  Referral to Neurology for further evaluation and management done and use discussed with her.  Return to clinic and see me as needed.            Moderate level of medical decision making.

## 2023-09-13 NOTE — LETTER
9/13/2023         RE: Tri Ingram  [address]  [place] [facility] [address]        Dear Colleague,    Thank you for referring your patient, Tri Ingram, to the [facility]. Please see a copy of my visit note below.    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     Squamous cell carcinoma      Vestibular neuritis      Patient Active Problem List   Diagnosis     Other malignant neoplasm of skin of trunk, except scrotum     Generalized osteoarthrosis, unspecified site     Disorder of bone and cartilage     Symptomatic menopausal or female climacteric states     Plantar fasciitis     Hyperlipidemia LDL goal <130     Obesity     Advanced directives, counseling/discussion     Family history of abdominal aortic aneurysm     Former moderate cigarette smoker (10-19 per day)     Chronic kidney disease, stage 3 (H)     Lumbar radiculopathy     Past Surgical History:   Procedure Laterality Date     COLONOSCOPY  2001, 2007     COLONOSCOPY N/A 11/3/2017    Procedure: COLONOSCOPY;  Colonoscopy  ;  Surgeon: Lg Guerrero MD;  Location: [facility]     COLONOSCOPY N/A 3/15/2022    Procedure: COLONOSCOPY, FLEXIBLE, WITH LESION REMOVAL USING SNARE;  Surgeon: Carl Huggins MD;  Location: [facility]     COLONOSCOPY WITH CO2 INSUFFLATION N/A 3/15/2022    Procedure: COLONOSCOPY, WITH CO2 INSUFFLATION;  Surgeon: Carl Huggins MD;  Location: [facility]     ZZC NONSPECIFIC PROCEDURE      Bowen's disease removed X 2     Social History     Socioeconomic History     Marital status:      Spouse name: Not on file     Number of children: Not  "on file     Years of education: Not on file     Highest education level: Not on file   Occupational History     Occupation: RETIRED   Tobacco Use     Smoking status: Former     Packs/day: 0.50     Years: 37.00     Pack years: 18.50     Types: Cigarettes     Quit date: 10/20/2007     Years since quitting: 15.9     Smokeless tobacco: Never   Vaping Use     Vaping Use: Never used   Substance and Sexual Activity     Alcohol use: Yes     Comment: minimal     Drug use: No     Sexual activity: Yes     Partners: Male     Comment: menopause   Other Topics Concern      Service No     Blood Transfusions No     Caffeine Concern No     Occupational Exposure No     Hobby Hazards No     Sleep Concern Yes     Comment: arthritis     Stress Concern Yes     Weight Concern No     Special Diet Yes     Comment: [place]     Back Care Yes     Comment: arthritis     Exercise Yes     Bike Helmet No     Seat Belt Yes     Self-Exams Yes     Parent/sibling w/ CABG, MI or angioplasty before 65F 55M? Yes     Comment: brother MI at age 50   Social History Narrative    Caffeine intake/servings daily - 0    Calcium intake/servings daily - 4 occ. calcium supplement    Exercise 7 times weekly - describe  walking    Sunscreen used - No    Seatbelts used - Yes    Guns stored in the home - No    Self Breast Exam - Yes    Pap test up to date -  Yes    Eye exam up to date -  Yes    Dental exam up to date -  No    DEXA scan up to date -  Yes, last done 5/4/04    Flex Sig/Colonoscopy up to date -  Yes, at age 52 \"normal\"    Mammography up to date - 8/18/06    Immunizations reviewed and up to date - unknown last tetanus shot    Abuse: Current or Past (Physical, Sexual or Emotional) - No    Do you feel safe in your environment - Yes    Do you cope well with stress - Yes    Do you suffer from insomnia - Yes    Last updated by: Mary Beth Katz 8/28/06 July 23, 2019            ENVIRONMENTAL HISTORY: The family [place] in a[place] year [place]. The home is heated with a gas furnace. They do have central air conditioning. The patient's bedroom is furnished with Indoor plants, fabric window coverings, carpet in bedroom. No pets . There is no history of cockroach or mice infestation. There are no smokers in the house.  The house does not have a basement.      Social Determinants of Health     Financial Resource Strain: Not on file   Food Insecurity: Not on file   Transportation Needs: Not on file   Physical Activity: Not on file   Stress: Not on file   Social Connections: Not on file   Intimate Partner Violence: Not on file   Housing Stability: Not on file     Family History   Problem Relation Age of Onset     Eye Disorder Mother         glaucoma, wet macular degeneration     Lipids Mother      Gynecology Mother         hyst     Melanoma Mother      Skin Cancer Mother      Cancer Father         colon /prostate     Circulatory Father         amputee     Diabetes Father         type II     C.A.D. Father         MIs     Eye Disorder Father         dry macular degeneration     Cardiovascular Father         small AAA     Melanoma Father      Arthritis Sister      Gynecology Sister         partial hyst  fibrous sheaths on ovary egg sized polyp uterine removed     Lipids Sister      Neurologic Disorder Sister         migraines     Cardiovascular Brother         large 7 cm AAA     Coronary Artery Disease Brother         MI     Asthma Brother      Gastrointestinal Disease Paternal Grandmother          from hepatitis     Last Comprehensive Metabolic Panel:  Lab Results   Component Value Date     2023    POTASSIUM 4.8 2023    CHLORIDE 105 2023    CO2 26 2023    ANIONGAP 10 2023     (H) 2023    BUN 17.7 2023    CR 0.93 2023    GFRESTIMATED 65 2023    JEFFY 9.8 2023             Subjective findings- 73-year-old presents for foot pains bilaterally.  She relates she seen a doctor years ago  but for the last 5 months she is getting pain across the MPJs and then the top of the foot and at night the big toe cramps up, relates she has calluses, she is not sure if those are causing the symptoms or not, relates she has a callus trimming device that she has not been using recently and feels the calluses of built up more, relates that the feet feel tight, gets tingling, gets burning they feel hot sometimes and then they feel cold at times and it feels like she is walking on rocks, relates to no injuries, relates she has had back disease with 2 surgeries, relates she has a family history of peripheral neuropathy, relates she is not Diabetic.    Objective findings- DP and PT are 2 out of 4 bilaterally,  CFT is less than 3 seconds bilaterally.  Has mild dorsally contracted digits 1 through 5 bilaterally, has hyperkeratotic tissue buildup plantar medial hallux bilaterally, has hyperkeratotic tissue buildup diffusely plantar 2 through 4 MPJs bilaterally, has functional hallux limitus bilaterally, no gross tendon voids bilaterally, Sharp dull is intact with 5.07 [place] Kali monofilament bilaterally, proprioception is intact bilaterally, deep tendon reflexes are intact bilaterally, there is no palpable click or shooting pain in the interspaces with pinch and squeeze test bilaterally, no pain on palpation bilaterally.  No erythema, no drainage, no odor, no calor bilaterally.        Assessment and plan- Peripheral Neuritis bilaterally.  Tylomas bilaterally.  She has Hammertoes and functional Hallux Limitus present.  Diagnosis and treatment options discussed with the patient.  Advised her on callus care.  Prescription for Urea lotion given and use discussed with the patient.  Advised her on stretching.  Metatarsal pads dispensed to use discussed with her.  Spenco over-the-counter insoles advised and use discussed with her.  Referral to Neurology for further evaluation and management done and use discussed with her.   Return to clinic and see me as needed.            Moderate level of medical decision making.      Again, thank you for allowing me to participate in the care of your patient.        Sincerely,        Renzo Luna DPM

## 2023-11-16 ENCOUNTER — OFFICE VISIT (OUTPATIENT)
Dept: DERMATOLOGY | Facility: CLINIC | Age: 73
End: 2023-11-16
Payer: COMMERCIAL

## 2023-11-16 DIAGNOSIS — B07.9 FILIFORM WART: ICD-10-CM

## 2023-11-16 DIAGNOSIS — L82.1 SK (SEBORRHEIC KERATOSIS): ICD-10-CM

## 2023-11-16 DIAGNOSIS — Z80.8 FAMILY HISTORY OF MELANOMA: Primary | ICD-10-CM

## 2023-11-16 DIAGNOSIS — L57.0 AK (ACTINIC KERATOSIS): ICD-10-CM

## 2023-11-16 PROCEDURE — 17110 DESTRUCTION B9 LES UP TO 14: CPT | Performed by: DERMATOLOGY

## 2023-11-16 PROCEDURE — 99213 OFFICE O/P EST LOW 20 MIN: CPT | Mod: 25 | Performed by: DERMATOLOGY

## 2023-11-16 PROCEDURE — 17000 DESTRUCT PREMALG LESION: CPT | Mod: XS | Performed by: DERMATOLOGY

## 2023-11-16 ASSESSMENT — PAIN SCALES - GENERAL: PAINLEVEL: NO PAIN (0)

## 2023-11-16 NOTE — LETTER
"    11/16/2023         RE: Tri Ingram  [address]  [facility] [address]        Dear Colleague,    Thank you for referring your patient, Tri Ingram, to the [facility]. Please see a copy of my visit note below.    [facility] Dermatology Note  Encounter Date: Nov 16, 2023  Office Visit     Dermatology Problem List:  1. 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 AK, right brow  3. SK       FHx: melanoma - father, mother, brother, sister. Brother passed away from MM or possible Zachariah 2023. Father with colon cancer and pancreatic cancer.     ____________________________________________    Assessment & Plan:    # Actinic keratosis, right brow   - Cryotherapy performed today (see procedure note(s) below).    # Seborrheic keratosis, left lower leg, scalp, and crown.  - Cryotherapy performed today (see procedure note(s) below). She wants crown lesion treated for symptoms, pain  -given family hx she was offered biopsy and declined    # Filiform wart, left eye  - Will hold of on cryo for now, has eye appointment in 2 weeks      #Family hx of melanoma in mom had 2 with first degree relatives with melanoma (its possible the mom qualifies).   -PT will ask mom  if she has had a genetic referral    Procedures Performed:   - Cryotherapy procedure note, location(s): see above. After verbal consent and discussion of risks and benefits including, but not limited to, dyspigmentation/scar, blister, and pain, 3x SK, 1x 1AK lesion(s) was(were) treated with 1-2 mm freeze border for 1-2 cycles with liquid " nitrogen. Post cryotherapy instructions were provided.      Follow-up: 6 month(s) in-person, or earlier for new or changing lesions    Staff and Scribe:     I, Vianca Greco, am serving as a scribe to document services personally performed by Dr. Lacy Ch, based on data collection and the provider's statements to me.     Provider Disclosure:   The documentation recorded by the scribe accurately reflects the services I personally performed and the decisions made by me.    Lacy Ch MD    Department of Dermatology  [facility]: Phone: [phone], Fax:[phone]  [facility]: Phone: [phone], Fax: [phone]   ____________________________________________    CC: Skin Check (Spot check scalp and finger. Brother passed from melanoma on scalp recently or possibly Zachariah)    HPI:  Ms. Tri Ingram is a(n) 73 year old female who presents today as a return patient for spot check.    She is concerned for a spot on her scalp and finger that she would like to be checked.    Has had spot on scalp for several years.    Patient is otherwise feeling well, without additional skin concerns.    Labs Reviewed:  N/A    Physical Exam:  Vitals: There were no vitals taken for this visit.  SKIN: Focused examination of scalp and finger was performed.  - left eye, filiform wart  - There are waxy stuck on tan to brown papules on the scalp and crown.  - There is an erythematous macule with overyling adherent scale on the right brow.  - No other lesions of concern on areas examined.     Medications:  Current Outpatient Medications   Medication     Apoaequorin (PREVAGEN PO)     estrogen conj-medroxyPROGESTERone (PREMPRO) 0.45-1.5 MG tablet     lovastatin (MEVACOR) 40 MG tablet     multivitamin w/minerals (THERA-VIT-M) tablet     Specialty Vitamins Products (ICAPS LUTEIN & ZEAXANTHIN) TBEC      Urea 20 20 % LOTN     ASPIRIN 81 MG OR TABS     benzonatate (TESSALON) 200 MG capsule     ondansetron (ZOFRAN-ODT) 4 MG ODT tab     No current facility-administered medications for this visit.     Facility-Administered Medications Ordered in Other Visits   Medication     perflutren diluted in saline (DEFINITY) injection 5 mL      Past Medical History:   Patient Active Problem List   Diagnosis     Other malignant neoplasm of skin of trunk, except scrotum     Generalized osteoarthrosis, unspecified site     Disorder of bone and cartilage     Symptomatic menopausal or female climacteric states     Plantar fasciitis     Hyperlipidemia LDL goal <130     Obesity     Advanced directives, counseling/discussion     Family history of abdominal aortic aneurysm     Former moderate cigarette smoker (10-19 per day)     Chronic kidney disease, stage 3 (H)     Lumbar radiculopathy     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     Squamous cell carcinoma      Vestibular neuritis         CC No referring provider defined for this encounter. on close of this encounter.      Again, thank you for allowing me to participate in the care of your patient.        Sincerely,        Lacy Ch MD

## 2023-11-16 NOTE — PATIENT INSTRUCTIONS
Cryotherapy    What is it?  Use of a very cold liquid, such as liquid nitrogen, to freeze and destroy abnormal skin cells that need to be removed    What should I expect?  Tenderness and redness  A small blister that might grow and fill with dark purple blood. There may be crusting.  More than one treatment may be needed if the lesions do not go away.    How do I care for the treated area?  Gently wash the area with your hands when bathing.  Use a thin layer of Vaseline to help with healing. You may use a Band-Aid.   The area should heal within 7-10 days and may leave behind a pink or lighter color.   Do not use an antibiotic or Neosporin ointment.   You may take acetaminophen (Tylenol) for pain.     Call your doctor if you have:  Severe pain  Signs of infection (warmth, redness, cloudy yellow drainage, and or a bad smell)  Questions or concerns    Who should I call with questions?      [facility]: [phone]      [facility]: [phone]      For urgent needs outside of business hours call the [facility] at [phone] and ask for the dermatology resident on call   Checking for Skin Cancer  You can help find cancer early by checking your skin each month. There are 3 main kinds of skin cancer: melanoma, basal cell carcinoma, and squamous cell carcinoma. Doing monthly skin checks is the best way to find new marks, sores, or skin changes. Follow these instructions for checking your skin.   The ABCDEs of checking moles for melanoma   Check your moles or growths for signs of melanoma using ABCDE:   Asymmetry: The sides of the mole or growth don t match.  Border: The edges are ragged, notched, or blurred.  Color: The color within the mole or growth varies. It could be black, brown, tan, white, or shades of red, gray, or blue.  Diameter: The mole or growth is larger than   inch or 6 mm (size of a pencil eraser).  Evolving: The size, shape, texture,  or color of the mole or growth is changing.     ABCDE's of moles on light skin.        ABCDE's of moles on dark skin may be harder to identify.     Checking for other types of skin cancer  Basal cell carcinoma or squamous cell carcinoma cause symptoms like:     A spot or mole that looks different from all other marks on your skin  Changes in how an area feels, such as itching, tenderness, or pain  Changes in the skin's surface, such as oozing, bleeding, or scaliness  A sore that doesn't heal  New swelling, redness, or spread of color beyond the border of a mole    Who s at risk?  Anyone of any skin color can get skin cancer. But you're at greater risk if you have:   Fair skin that freckles easily and burns instead of tanning  Light-colored or red hair  Light-colored eyes  Many moles or abnormal moles on your skin  A long history of unprotected exposure to sunlight or tanning beds  A history of many blistering sunburns as a child or teen  A family history of skin cancer  Been exposed to radiation or chemicals  A weakened immune system  Been exposed to arsenic  If you've had skin cancer in the past, you're at high risk of having it again.   How to check your skin  Do your monthly skin checkups in front of a full-length mirror. Use a room with good lighting so it's easier to see. Use a hand mirror to look at hard-to-see places like your buttocks and back. You can also have a trusted friend or family member help you with these checks. Check every part of your body, including your:   Head (ears, face, neck, and scalp)  Torso (front, back, sides, and under breasts)  Arms (tops, undersides, and armpits)  Hands (palms, backs, and fingers, including under the nails)  Lower back, buttocks, and genitals  Legs (front, back, and sides)  Feet (tops, soles, toes, including under the nails, and between toes)  Watch for new spots on your skin or a spot that's changing in color, shape, size.   If you have a lot of moles, take digital  photos of them each month. Make sure to take photos both up close and from a distance. These can help you see if any moles change over time.   Know your skin  Most skin changes aren't cancer. But if you see any changes in your skin, call your healthcare provider right away. Only they can tell you if a change is a problem. If you have skin cancer, seeing your provider can be the first step to getting the treatment that could save your life.   [employer] last reviewed this educational content on 10/1/2021    [id number] [employer]. All rights reserved. This information is not intended as a substitute for professional medical care. Always follow your healthcare professional's instructions.

## 2023-11-16 NOTE — NURSING NOTE
Tri Ingram's chief complaint for this visit includes:  Chief Complaint   Patient presents with    Skin Check     Spot check scalp and finger. Brother passed from melanoma on scalp recently or possibly Zachariah     PCP: Charlene Cain    Referring Provider:  No referring provider defined for this encounter.    There were no vitals taken for this visit.  No Pain (0)        Allergies   Allergen Reactions    Adhesive Tape     Iodine Anaphylaxis     contrast dye    Monosodium Glutamate Nausea and Vomiting and Diarrhea    Penicillins Hives         Do you need any medication refills at today's visit?    No

## 2023-12-07 NOTE — TELEPHONE ENCOUNTER
RECORDS RECEIVED FROM: Internal   REASON FOR VISIT: Peripheral neuritis [G62.9]    Date of Appt: 03/06/2024   NOTES (FOR ALL VISITS) STATUS DETAILS   OFFICE NOTE from referring provider Internal 09/13/2023 Dr Luna [facility]    OFFICE NOTE from other specialist N/A    DISCHARGE SUMMARY from hospital N/A    DISCHARGE REPORT from the ER N/A    OPERATIVE REPORT N/A    ALFONSO Virus Labs (MS ONLY) N/A    EMG N/A    EEG N/A    MEDICATION LIST N/A    IMAGING  (FOR ALL VISITS)     LUMBAR PUNCTURE N/A    DARRIUS SCAN (MOVEMENT) N/A    ULTRASOUND (CAROTID BILAT) *VASCULAR* N/A    MRI (HEAD, NECK, SPINE) N/A    CT (HEAD, NECK, SPINE) N/A

## 2024-01-11 DIAGNOSIS — R05.9 COUGH, UNSPECIFIED TYPE: Primary | ICD-10-CM

## 2024-01-12 RX ORDER — BENZONATATE 200 MG/1
200 CAPSULE ORAL 3 TIMES DAILY PRN
Qty: 45 CAPSULE | Refills: 1 | Status: SHIPPED | OUTPATIENT
Start: 2024-01-12

## 2024-02-20 SDOH — HEALTH STABILITY: PHYSICAL HEALTH: ON AVERAGE, HOW MANY DAYS PER WEEK DO YOU ENGAGE IN MODERATE TO STRENUOUS EXERCISE (LIKE A BRISK WALK)?: 2 DAYS

## 2024-02-20 SDOH — HEALTH STABILITY: PHYSICAL HEALTH: ON AVERAGE, HOW MANY MINUTES DO YOU ENGAGE IN EXERCISE AT THIS LEVEL?: 20 MIN

## 2024-02-20 ASSESSMENT — SOCIAL DETERMINANTS OF HEALTH (SDOH): HOW OFTEN DO YOU GET TOGETHER WITH FRIENDS OR RELATIVES?: ONCE A WEEK

## 2024-02-26 ENCOUNTER — TELEPHONE (OUTPATIENT)
Dept: FAMILY MEDICINE | Facility: OTHER | Age: 74
End: 2024-02-26

## 2024-02-26 ENCOUNTER — OFFICE VISIT (OUTPATIENT)
Dept: FAMILY MEDICINE | Facility: OTHER | Age: 74
End: 2024-02-26
Payer: COMMERCIAL

## 2024-02-26 ENCOUNTER — PATIENT OUTREACH (OUTPATIENT)
Dept: CARE COORDINATION | Facility: CLINIC | Age: 74
End: 2024-02-26

## 2024-02-26 VITALS
HEIGHT: 66 IN | BODY MASS INDEX: 33.75 KG/M2 | HEART RATE: 57 BPM | SYSTOLIC BLOOD PRESSURE: 122 MMHG | RESPIRATION RATE: 20 BRPM | OXYGEN SATURATION: 99 % | WEIGHT: 210 LBS | TEMPERATURE: 99.1 F | DIASTOLIC BLOOD PRESSURE: 68 MMHG

## 2024-02-26 DIAGNOSIS — Z87.891 PERSONAL HISTORY OF TOBACCO USE, PRESENTING HAZARDS TO HEALTH: ICD-10-CM

## 2024-02-26 DIAGNOSIS — N18.30 STAGE 3 CHRONIC KIDNEY DISEASE, UNSPECIFIED WHETHER STAGE 3A OR 3B CKD (H): ICD-10-CM

## 2024-02-26 DIAGNOSIS — E78.5 HYPERLIPIDEMIA LDL GOAL <130: Primary | ICD-10-CM

## 2024-02-26 DIAGNOSIS — Z12.31 ENCOUNTER FOR SCREENING MAMMOGRAM FOR MALIGNANT NEOPLASM OF BREAST: ICD-10-CM

## 2024-02-26 DIAGNOSIS — R91.8 PULMONARY NODULES: ICD-10-CM

## 2024-02-26 DIAGNOSIS — K21.00 GASTROESOPHAGEAL REFLUX DISEASE WITH ESOPHAGITIS WITHOUT HEMORRHAGE: ICD-10-CM

## 2024-02-26 LAB
FEF 25/75: NORMAL
FEV-1: NORMAL
FEV1/FVC: NORMAL
FVC: NORMAL

## 2024-02-26 PROCEDURE — 94010 BREATHING CAPACITY TEST: CPT | Performed by: FAMILY MEDICINE

## 2024-02-26 PROCEDURE — 99214 OFFICE O/P EST MOD 30 MIN: CPT | Mod: 25 | Performed by: FAMILY MEDICINE

## 2024-02-26 RX ORDER — OMEPRAZOLE 40 MG/1
40 CAPSULE, DELAYED RELEASE ORAL DAILY
Qty: 90 CAPSULE | Refills: 1 | Status: SHIPPED | OUTPATIENT
Start: 2024-02-26

## 2024-02-26 SDOH — HEALTH STABILITY: PHYSICAL HEALTH: ON AVERAGE, HOW MANY MINUTES DO YOU ENGAGE IN EXERCISE AT THIS LEVEL?: 20 MIN

## 2024-02-26 SDOH — HEALTH STABILITY: PHYSICAL HEALTH: ON AVERAGE, HOW MANY DAYS PER WEEK DO YOU ENGAGE IN MODERATE TO STRENUOUS EXERCISE (LIKE A BRISK WALK)?: 2 DAYS

## 2024-02-26 ASSESSMENT — SOCIAL DETERMINANTS OF HEALTH (SDOH): HOW OFTEN DO YOU GET TOGETHER WITH FRIENDS OR RELATIVES?: ONCE A WEEK

## 2024-02-26 ASSESSMENT — PAIN SCALES - GENERAL: PAINLEVEL: NO PAIN (0)

## 2024-02-26 NOTE — PROGRESS NOTES
Assessment & Plan         ICD-10-CM    1. Hyperlipidemia LDL goal <130  E78.5 Lipid panel reflex to direct LDL Fasting      2. Stage 3 chronic kidney disease, unspecified whether stage 3a or 3b CKD (H)  N18.30 BASIC METABOLIC PANEL     Lipid panel reflex to direct LDL Fasting     Albumin Random Urine Quantitative with Creat Ratio      3. Gastroesophageal reflux disease with esophagitis without hemorrhage  K21.00 omeprazole (PRILOSEC) 40 MG DR capsule      4. Pulmonary nodules  R91.8 CT CHEST W/O CONTRAST      5. Personal history of tobacco use, presenting hazards to health  Z87.891 CT CHEST W/O CONTRAST      6. Encounter for screening mammogram for malignant neoplasm of breast  Z12.31 MA Screen Bilateral w/Dayne        Patient is due for her CT chest shortly to follow-up on her pulmonary nodules and she would like to continue to do this.  This was ordered for her to complete.  She also would like to do a 3D mammogram when she does her next mammogram which is not due until July that we did order this for her today as a 3D's we did not forget to do this type of mammogram for her.  She also has had some high cholesterol and kidney functions are just outside the normal ranges.  She is planning to do fasting labs prior to her annual well visit when she returns.  She has had a persistent cough for which her CT scan did not indicate a reason for this and she gets that normally with illnesses.  She does have a smoking history and so we were curious that she has developed emphysema or COPD.  Spirometry was done today and at least one of the curves within the normal ranges.  She is unlikely to have significant COPD though we did talk about the need for cardiovascular exercise to try and help open up the lungs and minimize her overall risk for bronchitis that she gets frequently with illnesses.      I spent a total of 39 minutes on the day of the visit.   Time spent by me doing chart review, history and exam, documentation  "and further activities per the note    Mirna Child MD       Review of the result(s) of each unique test - spirometry  I spent a total of 39 minutes on the day of the visit.   Time spent by me doing chart review, history and exam, documentation and further activities per the note      BMI  Estimated body mass index is 34.16 kg/m  as calculated from the following:    Height as of this encounter: 1.67 m (5' 5.75\").    Weight as of this encounter: 95.3 kg (210 lb).   Weight management plan: Discussed healthy diet and exercise guidelines    Counseling  Appropriate preventive services were discussed with this patient, including applicable screening as appropriate for fall prevention, nutrition, physical activity, Tobacco-use cessation, weight loss and cognition.  Checklist reviewing preventive services available has been given to the patient.  Reviewed patient's diet, addressing concerns and/or questions.   She is at risk for lack of exercise and has been provided with information to increase physical activity for the benefit of her well-being.   Patient reported safety concerns were addressed today.The patient was provided with written information regarding signs of hearing loss.   Information on urinary incontinence and treatment options given to patient.           Heaven Nguyen is a 74 year old, presenting for the following health issues:  Establish Care        2/26/2024    10:16 AM   Additional Questions   Roomed by Pippa   Accompanied by Self         2/26/2024    10:16 AM   Patient Reported Additional Medications   Patient reports taking the following new medications NA     History of Present Illness       Reason for visit:  New patient   She is taking medications regularly.         Hyperlipidemia Follow-Up    Are you regularly taking any medication or supplement to lower your cholesterol?   Yes- Lovastatin  Are you having muscle aches or other side effects that you think could be caused by your cholesterol " "lowering medication?  Yes- unknown if related to medication: patient has some cramping in legs/feet        Review of Systems  Constitutional, HEENT, cardiovascular, pulmonary, GI, , musculoskeletal, neuro, skin, endocrine and psych systems are negative, except as otherwise noted.      Objective    /68   Pulse 57   Temp 99.1  F (37.3  C) (Temporal)   Resp 20   Ht 1.67 m (5' 5.75\")   Wt 95.3 kg (210 lb)   SpO2 99%   BMI 34.16 kg/m    Body mass index is 34.16 kg/m .  Physical Exam   GENERAL: alert and no distress  EYES: Eyes grossly normal to inspection, PERRL and conjunctivae and sclerae normal  HENT: ear canals and TM's normal, nose and mouth without ulcers or lesions  NECK: no adenopathy, no asymmetry, masses, or scars  RESP: lungs clear to auscultation - no rales, rhonchi or wheezes  CV: regular rate and rhythm, normal S1 S2, no S3 or S4, no murmur, click or rub, no peripheral edema  ABDOMEN: soft, nontender, no hepatosplenomegaly, no masses and bowel sounds normal  MS: no gross musculoskeletal defects noted, no edema  SKIN: no suspicious lesions or rashes  NEURO: Normal strength and tone, mentation intact and speech normal  PSYCH: mentation appears normal, affect normal/bright            Signed Electronically by: Mirna Child MD, MD    "

## 2024-02-26 NOTE — TELEPHONE ENCOUNTER
Last MAW for this patient was less than a year ago. Too early to complete this today. Can adjust appt type or will need to reschedule.  Mirna Child MD

## 2024-02-26 NOTE — TELEPHONE ENCOUNTER
Pt returned call. Would like to keep appt today to meet provider and talk about a few things. Rescheduled AWV

## 2024-03-05 NOTE — PROGRESS NOTES
"[facility] Neurology Consultation    Tri Ingram MRN# [id number]   Age: 74 year old YOB: 1950     Requesting physician: Charlene Rebolledo     Reason for Consultation: abnormal sensations in the feet      History of Presenting Symptoms:   Tri Ingram is a 74 year old female who presents today for evaluation of abnormal sensations in the feet.    Abnormal sensations in the feet have been going on for about 8 months. She feels a tightness in the skin of the feet and toes. She feels \"domingo horses\" in the toes, calves, and hamstrings. These tend to happen at night. She takes a few tums at night, which can help with cramps. Her feet feel bruised. They can feel tender to touch. She sometimes feels burnings, hot/cold temperatures, numbness/tingling.     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     She has problems with low back pain. She has had sciatica, but not currently.     Her sister and mother have neuropathy. Her mother has had significant gait issues with her neuropathy.       Past Medical History:     Patient Active Problem List   Diagnosis    Other malignant neoplasm of skin of trunk, except scrotum    Generalized osteoarthrosis, unspecified site    Disorder of bone and cartilage    Symptomatic menopausal or female climacteric states    Plantar fasciitis    Hyperlipidemia LDL goal <130    Obesity    Advanced directives, counseling/discussion    Family history of abdominal aortic aneurysm    Former moderate cigarette smoker (10-19 per day)    Chronic kidney disease, stage 3 (H)    Lumbar radiculopathy     Past Medical History:   Diagnosis Date    Disorder of bone and cartilage, unspecified     Generalized osteoarthrosis, unspecified site     back and shoulder    Hematuria     hx of blood in urine and kidney stones    IBS (irritable bowel syndrome) 11/14/2014    episode of IBS "    Other malignant neoplasm of skin of trunk, except scrotum     Bowen's disease, 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    Squamous cell carcinoma     Vestibular neuritis         Past Surgical History:     Past Surgical History:   Procedure Laterality Date    BIOPSY      COLONOSCOPY  ,     COLONOSCOPY N/A 2017    Procedure: COLONOSCOPY;  Colonoscopy  ;  Surgeon: Lg Guerrero MD;  Location: [facility]    COLONOSCOPY N/A 03/15/2022    Procedure: COLONOSCOPY, FLEXIBLE, WITH LESION REMOVAL USING SNARE;  Surgeon: Carl Huggins MD;  Location: [facility]    COLONOSCOPY WITH CO2 INSUFFLATION N/A 03/15/2022    Procedure: COLONOSCOPY, WITH CO2 INSUFFLATION;  Surgeon: Carl Huggins MD;  Location: [facility]    ZZC NONSPECIFIC PROCEDURE      Bowen's disease removed X 2        Social History:     Social History     Tobacco Use    Smoking status: Former     Packs/day: 0.50     Years: 37.00     Additional pack years: 0.00     Total pack years: 18.50     Types: Cigarettes     Quit date: 10/20/2007     Years since quittin.3    Smokeless tobacco: Never   Vaping Use    Vaping Use: Never used   Substance Use Topics    Alcohol use: Yes     Comment: minimal    Drug use: No        Family History:     Family History   Problem Relation Age of Onset    Eye Disorder Mother         glaucoma, wet macular degeneration    Lipids Mother     Gynecology Mother         hyst    Melanoma Mother     Skin Cancer Mother     Hyperlipidemia Mother     Other Cancer Mother         melanoma    Cancer Father         colon /prostate    Circulatory Father         amputee    Diabetes Father         type II    C.A.D. Father         MIs    Eye Disorder Father         dry macular degeneration    Cardiovascular Father         small AAA    Melanoma Father     Colon Cancer Father     Prostate Cancer Father     Other Cancer Father         melanoma    Arthritis Sister     Gynecology Sister         partial hyst  fibrous sheaths on ovary egg sized  polyp uterine removed    Lipids Sister     Neurologic Disorder Sister         migraines    Diabetes Sister     Hyperlipidemia Sister     Other Cancer Sister         melanoma    Obesity Sister     Cardiovascular Brother         large 7 cm AAA    Coronary Artery Disease Brother         MI    Asthma Brother     Other Cancer Brother         melanoma, small cell carcinoma    Anxiety Disorder Brother     Gastrointestinal Disease Paternal Grandmother          from hepatitis        Medications:     Current Outpatient Medications   Medication Sig    Apoaequorin (PREVAGEN PO) Take 1 tablet by mouth daily     ASPIRIN 81 MG OR TABS ONE DAILY (Patient taking differently: Take 81 mg by mouth daily)    benzonatate (TESSALON) 200 MG capsule TAKE 1 CAPSULE (200 MG) BY MOUTH 3 TIMES DAILY AS NEEDED FOR COUGH (Patient not taking: Reported on 2024)    estrogen conj-medroxyPROGESTERone (PREMPRO) 0.45-1.5 MG tablet TAKE ONE TABLET BY MOUTH THREE TIMES WEEKLY OR AS NEEDED TO CONTROL HOT FLASHES/MENOPAUSAL SYMPTOMS Strength: 0.45-1.5 mg    lovastatin (MEVACOR) 40 MG tablet TAKE ONE TABLET BY MOUTH EVERY NIGHT AT BEDTIME    multivitamin w/minerals (THERA-VIT-M) tablet Take 1 tablet by mouth daily    omeprazole (PRILOSEC) 40 MG DR capsule Take 1 capsule (40 mg) by mouth daily    ondansetron (ZOFRAN-ODT) 4 MG ODT tab Take 1 tablet (4 mg) by mouth every 8 hours as needed for nausea (Patient not taking: Reported on 2023)    Specialty Vitamins Products (ICAPS LUTEIN & ZEAXANTHIN) TBEC Take 1 tablet by mouth every evening     No current facility-administered medications for this visit.        Allergies:     Allergies   Allergen Reactions    Adhesive Tape     Iodine Anaphylaxis     contrast dye    Monosodium Glutamate Nausea and Vomiting and Diarrhea    Penicillins Hives        Review of Systems:   As above     Physical Exam:   Vitals: BP (!) 151/82   Pulse 87   Wt 93.9 kg (207 lb)   BMI 33.67 kg/m     General: Seated comfortably  in no acute distress.  Lungs: breathing comfortably  Neurologic:     Mental Status: Fully alert, attentive. Language normal, speech clear and fluent, no paraphasic errors.     Cranial Nerves: Visual fields intact. PERRL. EOMI with normal smooth pursuit. Facial sensation intact/symmetric. Facial movements symmetric. Hearing not formally tested but intact to conversation. Palate elevation symmetric, uvula midline. No dysarthria. Shoulder shrug strong bilaterally. Tongue protrusion midline.     Motor: No tremors or other abnormal movements observed. Muscle tone normal throughout. Normal/symmetric rapid finger tapping. Strength 5/5 throughout upper and lower extremities.      Right Left   Shoulder abduction        5 5   Elbow extension 5 5   Elbow flexion 5 5   Wrist extension         5 5   Finger extension 5 5   ADM 5 5   FDI 5 5   APB 5 5   Hip flexion 5 5   Knee flexion 5 5   Knee extension 5 5   Dorsiflexion 5 5   Plantar flexion 5 5        Deep Tendon Reflexes: 2+/symmetric throughout upper and lower extremities with exception of 1+ ankle jerks. No clonus. Toes downgoing bilaterally.     Sensory: Decreased sensation to bilateral toes and tops of feet to light touch. Pinprick is decreased below lower shin bilaterally. Vibration is ~2 seconds in bilateral great toes, ~4 seconds in the right ankle, ~6 seconds left ankle, normal in the hands. Proprioception is intact throughout. Negative Romberg.      Coordination: Finger-nose-finger and heel-shin intact without dysmetria.      Gait: Cautious, but steady casual gait. Able to walk on toes, heels and tandem with moderate difficulties.         Data: Pertinent prior to visit   Imaging:  None    Procedures:  None    Laboratory:  CBC/CMP normal (3/2022)  A1c 5.6 (12/2021)         Assessment and Plan:   Assessment:  Tri Ingram is a 74 year old female who presents today for evaluation of abnormal sensations in the feet. Symptoms have developed over the last 8  months. 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 Neuropathy labs were ordered today. If there is significant worsening, EMG could be considered, but is likely low yield at this time. She is going to try magnesium for cramping. She will let us know if more issues come up in the future.      Plan:  - Magnesium supplement for cramping  - A1c, B12, CBC, CMP, ELP, immunofixation    Follow up in Neurology clinic as needed should new concerns arise.    Eamon James MD   of Neurology  [facility]

## 2024-03-06 ENCOUNTER — PRE VISIT (OUTPATIENT)
Dept: NEUROLOGY | Facility: CLINIC | Age: 74
End: 2024-03-06

## 2024-03-06 ENCOUNTER — OFFICE VISIT (OUTPATIENT)
Dept: NEUROLOGY | Facility: CLINIC | Age: 74
End: 2024-03-06
Attending: PODIATRIST
Payer: COMMERCIAL

## 2024-03-06 VITALS
WEIGHT: 207 LBS | BODY MASS INDEX: 33.67 KG/M2 | SYSTOLIC BLOOD PRESSURE: 151 MMHG | DIASTOLIC BLOOD PRESSURE: 82 MMHG | HEART RATE: 87 BPM

## 2024-03-06 DIAGNOSIS — R79.9 ABNORMAL FINDING OF BLOOD CHEMISTRY, UNSPECIFIED: ICD-10-CM

## 2024-03-06 DIAGNOSIS — G62.9 NEUROPATHY: Primary | ICD-10-CM

## 2024-03-06 LAB
ALBUMIN SERPL BCG-MCNC: 4.3 G/DL (ref 3.5–5.2)
ALP SERPL-CCNC: 90 U/L (ref 40–150)
ALT SERPL W P-5'-P-CCNC: 24 U/L (ref 0–50)
ANION GAP SERPL CALCULATED.3IONS-SCNC: 12 MMOL/L (ref 7–15)
AST SERPL W P-5'-P-CCNC: 25 U/L (ref 0–45)
BILIRUB SERPL-MCNC: 0.3 MG/DL
BUN SERPL-MCNC: 18.9 MG/DL (ref 8–23)
CALCIUM SERPL-MCNC: 9.7 MG/DL (ref 8.8–10.2)
CHLORIDE SERPL-SCNC: 104 MMOL/L (ref 98–107)
CREAT SERPL-MCNC: 0.94 MG/DL (ref 0.51–0.95)
DEPRECATED HCO3 PLAS-SCNC: 25 MMOL/L (ref 22–29)
EGFRCR SERPLBLD CKD-EPI 2021: 64 ML/MIN/1.73M2
ERYTHROCYTE [DISTWIDTH] IN BLOOD BY AUTOMATED COUNT: 11.9 % (ref 10–15)
GLUCOSE SERPL-MCNC: 117 MG/DL (ref 70–99)
HBA1C MFR BLD: 5.8 % (ref 0–5.6)
HCT VFR BLD AUTO: 42.5 % (ref 35–47)
HGB BLD-MCNC: 13.8 G/DL (ref 11.7–15.7)
MCH RBC QN AUTO: 28.1 PG (ref 26.5–33)
MCHC RBC AUTO-ENTMCNC: 32.5 G/DL (ref 31.5–36.5)
MCV RBC AUTO: 87 FL (ref 78–100)
PLATELET # BLD AUTO: 209 10E3/UL (ref 150–450)
POTASSIUM SERPL-SCNC: 4.8 MMOL/L (ref 3.4–5.3)
PROT SERPL-MCNC: 7.5 G/DL (ref 6.4–8.3)
RBC # BLD AUTO: 4.91 10E6/UL (ref 3.8–5.2)
SODIUM SERPL-SCNC: 141 MMOL/L (ref 135–145)
WBC # BLD AUTO: 6.4 10E3/UL (ref 4–11)

## 2024-03-06 PROCEDURE — 83036 HEMOGLOBIN GLYCOSYLATED A1C: CPT | Performed by: INTERNAL MEDICINE

## 2024-03-06 PROCEDURE — 86334 IMMUNOFIX E-PHORESIS SERUM: CPT | Performed by: INTERNAL MEDICINE

## 2024-03-06 PROCEDURE — 36415 COLL VENOUS BLD VENIPUNCTURE: CPT | Performed by: INTERNAL MEDICINE

## 2024-03-06 PROCEDURE — 99204 OFFICE O/P NEW MOD 45 MIN: CPT | Performed by: INTERNAL MEDICINE

## 2024-03-06 PROCEDURE — 85027 COMPLETE CBC AUTOMATED: CPT | Performed by: INTERNAL MEDICINE

## 2024-03-06 PROCEDURE — 80053 COMPREHEN METABOLIC PANEL: CPT | Performed by: INTERNAL MEDICINE

## 2024-03-06 PROCEDURE — 84165 PROTEIN E-PHORESIS SERUM: CPT | Performed by: INTERNAL MEDICINE

## 2024-03-06 PROCEDURE — 82607 VITAMIN B-12: CPT | Performed by: INTERNAL MEDICINE

## 2024-03-06 PROCEDURE — 84155 ASSAY OF PROTEIN SERUM: CPT | Mod: 59 | Performed by: INTERNAL MEDICINE

## 2024-03-06 ASSESSMENT — PAIN SCALES - GENERAL: PAINLEVEL: NO PAIN (0)

## 2024-03-06 NOTE — LETTER
"    3/6/2024         RE: Tri Ingram  [address]  [place] [address]        Dear Colleague,    Thank you for referring your patient, Tri Ingram, to the [facility] [place]. Please see a copy of my visit note below.    [facility] Neurology Consultation    Tri Ingram MRN# [id number]   Age: 74 year old YOB: 1950     Requesting physician: Charlene Rebolledo     Reason for Consultation: abnormal sensations in the feet      History of Presenting Symptoms:   Tri Ingram is a 74 year old female who presents today for evaluation of abnormal sensations in the feet.    Abnormal sensations in the feet have been going on for about 8 months. She feels a tightness in the skin of the feet and toes. She feels \"domingo horses\" in the toes, calves, and hamstrings. These tend to happen at night. She takes a few tums at night, which can help with cramps. Her feet feel bruised. They can feel tender to touch. She sometimes feels burnings, hot/cold temperatures, numbness/tingling.     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     She has problems with low back pain. She has had sciatica, but not currently.     Her sister and mother have neuropathy. Her mother has had significant gait issues with her neuropathy.       Past Medical History:     Patient Active Problem List   Diagnosis     Other malignant neoplasm of skin of trunk, except scrotum     Generalized osteoarthrosis, unspecified site     Disorder of bone and cartilage     Symptomatic menopausal or female climacteric states     Plantar fasciitis     Hyperlipidemia LDL goal <130     Obesity     Advanced directives, counseling/discussion     Family history of abdominal aortic aneurysm     Former moderate cigarette smoker (10-19 per day)     Chronic kidney disease, stage 3 (H)     Lumbar " radiculopathy     Past Medical History:   Diagnosis Date     Disorder of bone and cartilage, unspecified      Generalized osteoarthrosis, unspecified site     back and shoulder     Hematuria     hx of blood in urine and kidney stones     IBS (irritable bowel syndrome) 2014    episode of IBS     Other malignant neoplasm of skin of trunk, except scrotum     Bowen's disease, 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     Squamous cell carcinoma      Vestibular neuritis         Past Surgical History:     Past Surgical History:   Procedure Laterality Date     BIOPSY       COLONOSCOPY  ,      COLONOSCOPY N/A 2017    Procedure: COLONOSCOPY;  Colonoscopy  ;  Surgeon: Lg Guerrero MD;  Location: [facility]     COLONOSCOPY N/A 03/15/2022    Procedure: COLONOSCOPY, FLEXIBLE, WITH LESION REMOVAL USING SNARE;  Surgeon: Carl Huggins MD;  Location: [facility]     COLONOSCOPY WITH CO2 INSUFFLATION N/A 03/15/2022    Procedure: COLONOSCOPY, WITH CO2 INSUFFLATION;  Surgeon: Carl Huggins MD;  Location: [facility]     ZZC NONSPECIFIC PROCEDURE      Bowen's disease removed X 2        Social History:     Social History     Tobacco Use     Smoking status: Former     Packs/day: 0.50     Years: 37.00     Additional pack years: 0.00     Total pack years: 18.50     Types: Cigarettes     Quit date: 10/20/2007     Years since quittin.3     Smokeless tobacco: Never   Vaping Use     Vaping Use: Never used   Substance Use Topics     Alcohol use: Yes     Comment: minimal     Drug use: No        Family History:     Family History   Problem Relation Age of Onset     Eye Disorder Mother         glaucoma, wet macular degeneration     Lipids Mother      Gynecology Mother         hyst     Melanoma Mother      Skin Cancer Mother      Hyperlipidemia Mother      Other Cancer Mother         melanoma     Cancer Father         colon /prostate     Circulatory Father         amputee     Diabetes Father         type II     C.A.D. Father          MIs     Eye Disorder Father         dry macular degeneration     Cardiovascular Father         small AAA     Melanoma Father      Colon Cancer Father      Prostate Cancer Father      Other Cancer Father         melanoma     Arthritis Sister      Gynecology Sister         partial hyst  fibrous sheaths on ovary egg sized polyp uterine removed     Lipids Sister      Neurologic Disorder Sister         migraines     Diabetes Sister      Hyperlipidemia Sister      Other Cancer Sister         melanoma     Obesity Sister      Cardiovascular Brother         large 7 cm AAA     Coronary Artery Disease Brother         MI     Asthma Brother      Other Cancer Brother         melanoma, small cell carcinoma     Anxiety Disorder Brother      Gastrointestinal Disease Paternal Grandmother          from hepatitis        Medications:     Current Outpatient Medications   Medication Sig     Apoaequorin (PREVAGEN PO) Take 1 tablet by mouth daily      ASPIRIN 81 MG OR TABS ONE DAILY (Patient taking differently: Take 81 mg by mouth daily)     benzonatate (TESSALON) 200 MG capsule TAKE 1 CAPSULE (200 MG) BY MOUTH 3 TIMES DAILY AS NEEDED FOR COUGH (Patient not taking: Reported on 2024)     estrogen conj-medroxyPROGESTERone (PREMPRO) 0.45-1.5 MG tablet TAKE ONE TABLET BY MOUTH THREE TIMES WEEKLY OR AS NEEDED TO CONTROL HOT FLASHES/MENOPAUSAL SYMPTOMS Strength: 0.45-1.5 mg     lovastatin (MEVACOR) 40 MG tablet TAKE ONE TABLET BY MOUTH EVERY NIGHT AT BEDTIME     multivitamin w/minerals (THERA-VIT-M) tablet Take 1 tablet by mouth daily     omeprazole (PRILOSEC) 40 MG DR capsule Take 1 capsule (40 mg) by mouth daily     ondansetron (ZOFRAN-ODT) 4 MG ODT tab Take 1 tablet (4 mg) by mouth every 8 hours as needed for nausea (Patient not taking: Reported on 2023)     Specialty Vitamins Products (ICAPS LUTEIN & ZEAXANTHIN) TBEC Take 1 tablet by mouth every evening     No current facility-administered medications for this visit.         Allergies:     Allergies   Allergen Reactions     Adhesive Tape      Iodine Anaphylaxis     contrast dye     Monosodium Glutamate Nausea and Vomiting and Diarrhea     Penicillins Hives        Review of Systems:   As above     Physical Exam:   Vitals: BP (!) 151/82   Pulse 87   Wt 93.9 kg (207 lb)   BMI 33.67 kg/m     General: Seated comfortably in no acute distress.  Lungs: breathing comfortably  Neurologic:     Mental Status: Fully alert, attentive. Language normal, speech clear and fluent, no paraphasic errors.     Cranial Nerves: Visual fields intact. PERRL. EOMI with normal smooth pursuit. Facial sensation intact/symmetric. Facial movements symmetric. Hearing not formally tested but intact to conversation. Palate elevation symmetric, uvula midline. No dysarthria. Shoulder shrug strong bilaterally. Tongue protrusion midline.     Motor: No tremors or other abnormal movements observed. Muscle tone normal throughout. Normal/symmetric rapid finger tapping. Strength 5/5 throughout upper and lower extremities.      Right Left   Shoulder abduction        5 5   Elbow extension 5 5   Elbow flexion 5 5   Wrist extension         5 5   Finger extension 5 5   ADM 5 5   FDI 5 5   APB 5 5   Hip flexion 5 5   Knee flexion 5 5   Knee extension 5 5   Dorsiflexion 5 5   Plantar flexion 5 5        Deep Tendon Reflexes: 2+/symmetric throughout upper and lower extremities with exception of 1+ ankle jerks. No clonus. Toes downgoing bilaterally.     Sensory: Decreased sensation to bilateral toes and tops of feet to light touch. Pinprick is decreased below lower shin bilaterally. Vibration is ~2 seconds in bilateral great toes, ~4 seconds in the right ankle, ~6 seconds left ankle, normal in the hands. Proprioception is intact throughout. Negative Romberg.      Coordination: Finger-nose-finger and heel-shin intact without dysmetria.      Gait: Cautious, but steady casual gait. Able to walk on toes, heels and tandem with moderate  difficulties.         Data: Pertinent prior to visit   Imaging:  None    Procedures:  None    Laboratory:  CBC/CMP normal (3/2022)  A1c 5.6 (12/2021)         Assessment and Plan:   Assessment:  Tri Ingram is a 74 year old female who presents today for evaluation of abnormal sensations in the feet. Symptoms have developed over the last 8 months. 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 Neuropathy labs were ordered today. If there is significant worsening, EMG could be considered, but is likely low yield at this time. She is going to try magnesium for cramping. She will let us know if more issues come up in the future.      Plan:  - Magnesium supplement for cramping  - A1c, B12, CBC, CMP, ELP, immunofixation    Follow up in Neurology clinic as needed should new concerns arise.    Eamon James MD   of Neurology  [facility]

## 2024-03-06 NOTE — NURSING NOTE
Chief Complaint   Patient presents with    Consult     Foot concerns referral   BP (!) 151/82   Pulse 87   Wt 93.9 kg (207 lb)   BMI 33.67 kg/m      Shilpi Trinidad CMA at 9:12 AM on 3/6/2024.

## 2024-03-06 NOTE — PATIENT INSTRUCTIONS
Alpha-lipoic acid 600 mg a day    Magnesium 400 mg a day can help with cramping    B-complex vitamin can cramping    We can consider that EMG test too if things are getting worse

## 2024-03-07 LAB
ALBUMIN SERPL ELPH-MCNC: 3.9 G/DL (ref 3.7–5.1)
ALPHA1 GLOB SERPL ELPH-MCNC: 0.3 G/DL (ref 0.2–0.4)
ALPHA2 GLOB SERPL ELPH-MCNC: 0.9 G/DL (ref 0.5–0.9)
B-GLOBULIN SERPL ELPH-MCNC: 0.9 G/DL (ref 0.6–1)
GAMMA GLOB SERPL ELPH-MCNC: 1 G/DL (ref 0.7–1.6)
LOCATION OF TASK: NORMAL
LOCATION OF TASK: NORMAL
M PROTEIN SERPL ELPH-MCNC: 0 G/DL
PROT PATTERN SERPL ELPH-IMP: NORMAL
PROT PATTERN SERPL IFE-IMP: NORMAL
TOTAL PROTEIN SERUM FOR ELP: 7 G/DL (ref 6.4–8.3)
VIT B12 SERPL-MCNC: 632 PG/ML (ref 232–1245)

## 2024-03-11 ENCOUNTER — PATIENT OUTREACH (OUTPATIENT)
Dept: CARE COORDINATION | Facility: CLINIC | Age: 74
End: 2024-03-11
Payer: COMMERCIAL

## 2024-03-14 ENCOUNTER — LAB (OUTPATIENT)
Dept: LAB | Facility: CLINIC | Age: 74
End: 2024-03-14
Payer: COMMERCIAL

## 2024-03-14 DIAGNOSIS — N18.30 STAGE 3 CHRONIC KIDNEY DISEASE, UNSPECIFIED WHETHER STAGE 3A OR 3B CKD (H): ICD-10-CM

## 2024-03-14 DIAGNOSIS — E78.5 HYPERLIPIDEMIA LDL GOAL <130: ICD-10-CM

## 2024-03-14 LAB
ANION GAP SERPL CALCULATED.3IONS-SCNC: 10 MMOL/L (ref 7–15)
BUN SERPL-MCNC: 20.1 MG/DL (ref 8–23)
CALCIUM SERPL-MCNC: 9.6 MG/DL (ref 8.8–10.2)
CHLORIDE SERPL-SCNC: 103 MMOL/L (ref 98–107)
CHOLEST SERPL-MCNC: 157 MG/DL
CREAT SERPL-MCNC: 0.97 MG/DL (ref 0.51–0.95)
CREAT UR-MCNC: 63.2 MG/DL
DEPRECATED HCO3 PLAS-SCNC: 28 MMOL/L (ref 22–29)
EGFRCR SERPLBLD CKD-EPI 2021: 62 ML/MIN/1.73M2
FASTING STATUS PATIENT QL REPORTED: YES
GLUCOSE SERPL-MCNC: 120 MG/DL (ref 70–99)
HDLC SERPL-MCNC: 46 MG/DL
LDLC SERPL CALC-MCNC: 66 MG/DL
MICROALBUMIN UR-MCNC: <12 MG/L
MICROALBUMIN/CREAT UR: NORMAL MG/G{CREAT}
NONHDLC SERPL-MCNC: 111 MG/DL
POTASSIUM SERPL-SCNC: 5 MMOL/L (ref 3.4–5.3)
SODIUM SERPL-SCNC: 141 MMOL/L (ref 135–145)
TRIGL SERPL-MCNC: 223 MG/DL

## 2024-03-14 PROCEDURE — 82043 UR ALBUMIN QUANTITATIVE: CPT

## 2024-03-14 PROCEDURE — 82570 ASSAY OF URINE CREATININE: CPT

## 2024-03-14 PROCEDURE — 36415 COLL VENOUS BLD VENIPUNCTURE: CPT

## 2024-03-14 PROCEDURE — 80048 BASIC METABOLIC PNL TOTAL CA: CPT

## 2024-03-14 PROCEDURE — 80061 LIPID PANEL: CPT

## 2024-03-18 DIAGNOSIS — E78.5 HYPERLIPIDEMIA LDL GOAL <130: ICD-10-CM

## 2024-03-19 RX ORDER — LOVASTATIN 40 MG
TABLET ORAL
Qty: 90 TABLET | Refills: 3 | Status: SHIPPED | OUTPATIENT
Start: 2024-03-19 | End: 2024-03-28

## 2024-03-22 SDOH — HEALTH STABILITY: PHYSICAL HEALTH: ON AVERAGE, HOW MANY DAYS PER WEEK DO YOU ENGAGE IN MODERATE TO STRENUOUS EXERCISE (LIKE A BRISK WALK)?: 1 DAY

## 2024-03-22 SDOH — HEALTH STABILITY: PHYSICAL HEALTH: ON AVERAGE, HOW MANY MINUTES DO YOU ENGAGE IN EXERCISE AT THIS LEVEL?: 10 MIN

## 2024-03-22 ASSESSMENT — SOCIAL DETERMINANTS OF HEALTH (SDOH): HOW OFTEN DO YOU GET TOGETHER WITH FRIENDS OR RELATIVES?: ONCE A WEEK

## 2024-03-28 ENCOUNTER — OFFICE VISIT (OUTPATIENT)
Dept: FAMILY MEDICINE | Facility: OTHER | Age: 74
End: 2024-03-28
Payer: COMMERCIAL

## 2024-03-28 VITALS
OXYGEN SATURATION: 98 % | HEIGHT: 66 IN | SYSTOLIC BLOOD PRESSURE: 110 MMHG | TEMPERATURE: 97.6 F | DIASTOLIC BLOOD PRESSURE: 64 MMHG | RESPIRATION RATE: 20 BRPM | WEIGHT: 213 LBS | HEART RATE: 69 BPM | BODY MASS INDEX: 34.23 KG/M2

## 2024-03-28 DIAGNOSIS — E66.09 CLASS 1 OBESITY DUE TO EXCESS CALORIES WITHOUT SERIOUS COMORBIDITY WITH BODY MASS INDEX (BMI) OF 34.0 TO 34.9 IN ADULT: ICD-10-CM

## 2024-03-28 DIAGNOSIS — Z00.00 ENCOUNTER FOR MEDICARE ANNUAL WELLNESS EXAM: Primary | ICD-10-CM

## 2024-03-28 DIAGNOSIS — K21.00 GASTROESOPHAGEAL REFLUX DISEASE WITH ESOPHAGITIS WITHOUT HEMORRHAGE: ICD-10-CM

## 2024-03-28 DIAGNOSIS — E66.811 CLASS 1 OBESITY DUE TO EXCESS CALORIES WITHOUT SERIOUS COMORBIDITY WITH BODY MASS INDEX (BMI) OF 34.0 TO 34.9 IN ADULT: ICD-10-CM

## 2024-03-28 DIAGNOSIS — E78.5 HYPERLIPIDEMIA LDL GOAL <130: ICD-10-CM

## 2024-03-28 PROBLEM — N18.30 CHRONIC KIDNEY DISEASE, STAGE 3 (H): Status: RESOLVED | Noted: 2021-03-17 | Resolved: 2024-03-28

## 2024-03-28 PROCEDURE — G0439 PPPS, SUBSEQ VISIT: HCPCS | Performed by: FAMILY MEDICINE

## 2024-03-28 PROCEDURE — 99213 OFFICE O/P EST LOW 20 MIN: CPT | Mod: 25 | Performed by: FAMILY MEDICINE

## 2024-03-28 RX ORDER — CALCIUM CARBONATE/VITAMIN D3 500-10/5ML
400 LIQUID (ML) ORAL DAILY
COMMUNITY

## 2024-03-28 RX ORDER — LOVASTATIN 40 MG
TABLET ORAL
Qty: 90 TABLET | Refills: 3 | Status: SHIPPED | OUTPATIENT
Start: 2024-03-28

## 2024-03-28 ASSESSMENT — PAIN SCALES - GENERAL: PAINLEVEL: NO PAIN (1)

## 2024-03-28 NOTE — PATIENT INSTRUCTIONS
Preventive Care Advice   This is general advice given by our system to help you stay healthy. However, your care team may have specific advice just for you. Please talk to your care team about your preventive care needs.  Nutrition  Eat 5 or more servings of fruits and vegetables each day.  Try wheat bread, brown rice and whole grain pasta (instead of white bread, rice, and pasta).  Get enough calcium and vitamin D. Check the label on foods and aim for 100% of the RDA (recommended daily allowance).  Lifestyle  Exercise at least 150 minutes each week   (30 minutes a day, 5 days a week).  Do muscle strengthening activities 2 days a week. These help control your weight and prevent disease.  No smoking.  Wear sunscreen to prevent skin cancer.  Have a dental exam and cleaning every 6 months.  Yearly exams  See your health care team every year to talk about:  Any changes in your health.  Any medicines your care team has prescribed.  Preventive care, family planning, and ways to prevent chronic diseases.  Shots (vaccines)   HPV shots (up to age 26), if you've never had them before.  Hepatitis B shots (up to age 59), if you've never had them before.  COVID-19 shot: Get this shot when it's due.  Flu shot: Get a flu shot every year.  Tetanus shot: Get a tetanus shot every 10 years.  Pneumococcal, hepatitis A, and RSV shots: Ask your care team if you need these based on your risk.  Shingles shot (for age 50 and up).  General health tests  Diabetes screening:  Starting at age 35, Get screened for diabetes at least every 3 years.  If you are younger than age 35, ask your care team if you should be screened for diabetes.  Cholesterol test: At age 39, start having a cholesterol test every 5 years, or more often if advised.  Bone density scan (DEXA): At age 50, ask your care team if you should have this scan for osteoporosis (brittle bones).  Hepatitis C: Get tested at least once in your life.  STIs (sexually transmitted  infections)  Before age 24: Ask your care team if you should be screened for STIs.  After age 24: Get screened for STIs if you're at risk. You are at risk for STIs (including HIV) if:  You are sexually active with more than one person.  You don't use condoms every time.  You or a partner was diagnosed with a sexually transmitted infection.  If you are at risk for HIV, ask about PrEP medicine to prevent HIV.  Get tested for HIV at least once in your life, whether you are at risk for HIV or not.  Cancer screening tests  Cervical cancer screening: If you have a cervix, begin getting regular cervical cancer screening tests at age 21. Most people who have regular screenings with normal results can stop after age 65. Talk about this with your provider.  Breast cancer scan (mammogram): If you've ever had breasts, begin having regular mammograms starting at age 40. This is a scan to check for breast cancer.  Colon cancer screening: It is important to start screening for colon cancer at age 45.  Have a colonoscopy test every 10 years (or more often if you're at risk) Or, ask your provider about stool tests like a FIT test every year or Cologuard test every 3 years.  To learn more about your testing options, visit: https://[email or web address]/231364.pdf.  For help making a decision, visit: https://bit.ly/gv28762.  Prostate cancer screening test: If you have a prostate and are age 55 to 69, ask your provider if you would benefit from a yearly prostate cancer screening test.  Lung cancer screening: If you are a current or former smoker age 50 to 80, ask your care team if ongoing lung cancer screenings are right for you.  For informational purposes only. Not to replace the advice of your health care provider. Copyright   2023 [employer]. All rights reserved. Clinically reviewed by the [facility] Transitions Program. [employer] 159103 - REV 01/24.    Learning About Activities of Daily Living  What are activities  of daily living?     Activities of daily living (ADLs) are the basic self-care tasks you do every day. These include eating, bathing, dressing, and moving around.  As you age, and if you have health problems, you may find that it's harder to do some of these tasks. If so, your doctor can suggest ideas that may help.  To measure what kind of help you may need, your doctor will ask how well you are able to do ADLs. Let your doctor know if there are any tasks that you are having trouble doing. This is an important first step to getting help. And when you have the help you need, you can stay as independent as possible.  How will a doctor assess your ADLs?  Asking about ADLs is part of a routine health checkup your doctor will likely do as you age. Your health check might be done in a doctor's office, in your home, or at a hospital. The goal is to find out if you are having any problems that could make it hard to care for yourself or that make it unsafe for you to be on your own.  To measure your ADLs, your doctor will ask how hard it is for you to do routine tasks. Your doctor may also want to know if you have changed the way you do a task because of a health problem. Your doctor may watch how you:  Walk back and forth.  Keep your balance while you stand or walk.  Move from sitting to standing or from a bed to a chair.  Button or unbutton a shirt or sweater.  Remove and put on your shoes.  It's common to feel a little worried or anxious if you find you can't do all the things you used to be able to do. Talking with your doctor about ADLs is a way to make sure you're as safe as possible and able to care for yourself as well as you can. You may want to bring a caregiver, friend, or family member to your checkup. They can help you talk to your doctor.  Follow-up care is a key part of your treatment and safety. Be sure to make and go to all appointments, and call your doctor if you are having problems. It's also a good idea  to know your test results and keep a list of the medicines you take.  Current as of: October 24, 2023               Content Version: 14.0    2694-9015 [employer].   Care instructions adapted under license by your healthcare professional. If you have questions about a medical condition or this instruction, always ask your healthcare professional. [employer] disclaims any warranty or liability for your use of this information.      Preventing Falls: Care Instructions  Injuries and health problems such as trouble walking or poor eyesight can increase your risk of falling. So can some medicines. But there are things you can do to help prevent falls. You can exercise to get stronger. You can also arrange your home to make it safer.    Talk to your doctor about the medicines you take. Ask if any of them increase the risk of falls and whether they can be changed or stopped.   Try to exercise regularly. It can help improve your strength and balance. This can help lower your risk of falling.     Practice fall safety and prevention.    Wear low-heeled shoes that fit well and give your feet good support. Talk to your doctor if you have foot problems that make this hard.  Carry a cellphone or wear a medical alert device that you can use to call for help.  Use stepladders instead of chairs to reach high objects. Don't climb if you're at risk for falls. Ask for help, if needed.  Wear the correct eyeglasses, if you need them.    Make your home safer.    Remove rugs, cords, clutter, and furniture from walkways.  Keep your house well lit. Use night-lights in hallways and bathrooms.  Install and use sturdy handrails on stairways.  Wear nonskid footwear, even inside. Don't walk barefoot or in socks without shoes.    Be safe outside.    Use handrails, curb cuts, and ramps whenever possible.  Keep your hands free by using a shoulder bag or backpack.  Try to walk in well-lit areas. Watch out for uneven ground,  "changes in pavement, and debris.  Be careful in the winter. Walk on the grass or gravel when sidewalks are slippery. Use de-icer on steps and walkways. Add non-slip devices to shoes.    Put grab bars and nonskid mats in your shower or tub and near the toilet. Try to use a shower chair or bath bench when bathing.   Get into a tub or shower by putting in your weaker leg first. Get out with your strong side first. Have a phone or medical alert device in the bathroom with you.   Where can you learn more?  Go to https://www.Unsubscribe.com.net/patiented  Enter G117 in the search box to learn more about \"Preventing Falls: Care Instructions.\"  Current as of: July 17, 2023               Content Version: 14.0    1506-7638 [employer].   Care instructions adapted under license by your healthcare professional. If you have questions about a medical condition or this instruction, always ask your healthcare professional. [employer] disclaims any warranty or liability for your use of this information.      Hearing Loss: Care Instructions  Overview     Hearing loss is a sudden or slow decrease in how well you hear. It can range from slight to profound. Permanent hearing loss can occur with aging. It also can happen when you are exposed long-term to loud noise. Examples include listening to loud music, riding motorcycles, or being around other loud machines.  Hearing loss can affect your work and home life. It can make you feel lonely or depressed. You may feel that you have lost your independence. But hearing aids and other devices can help you hear better and feel connected to others.  Follow-up care is a key part of your treatment and safety. Be sure to make and go to all appointments, and call your doctor if you are having problems. It's also a good idea to know your test results and keep a list of the medicines you take.  How can you care for yourself at home?  Avoid loud noises whenever possible. This " helps keep your hearing from getting worse.  Always wear hearing protection around loud noises.  Wear a hearing aid as directed.  A professional can help you pick a hearing aid that will work best for you.  You can also get hearing aids over the counter for mild to moderate hearing loss.  Have hearing tests as your doctor suggests. They can show whether your hearing has changed. Your hearing aid may need to be adjusted.  Use other devices as needed. These may include:  Telephone amplifiers and hearing aids that can connect to a television, stereo, radio, or microphone.  Devices that use lights or vibrations. These alert you to the doorbell, a ringing telephone, or a baby monitor.  Television closed-captioning. This shows the words at the bottom of the screen. Most new TVs can do this.  TTY (text telephone). This lets you type messages back and forth on the telephone instead of talking or listening. These devices are also called TDD. When messages are typed on the keyboard, they are sent over the phone line to a receiving TTY. The message is shown on a monitor.  Use text messaging, social media, and email if it is hard for you to communicate by telephone.  Try to learn a listening technique called speechreading. It is not lipreading. You pay attention to people's gestures, expressions, posture, and tone of voice. These clues can help you understand what a person is saying. Face the person you are talking to, and have them face you. Make sure the lighting is good. You need to see the other person's face clearly.  Think about counseling if you need help to adjust to your hearing loss.  When should you call for help?  Watch closely for changes in your health, and be sure to contact your doctor if:    You think your hearing is getting worse.     You have new symptoms, such as dizziness or nausea.   Where can you learn more?  Go to https://www.healthwise.net/patiented  Enter R798 in the search box to learn more about  "\"Hearing Loss: Care Instructions.\"  Current as of: September 27, 2023               Content Version: 14.0    6507-2083 [employer]   Care instructions adapted under license by your healthcare professional. If you have questions about a medical condition or this instruction, always ask your healthcare professional. [employer] disclaims any warranty or liability for your use of this information.      Learning About Stress  What is stress?     Stress is your body's response to a hard situation. Your body can have a physical, emotional, or mental response. Stress is a fact of life for most people, and it affects everyone differently. What causes stress for you may not be stressful for someone else.  A lot of things can cause stress. You may feel stress when you go on a job interview, take a test, or run a race. This kind of short-term stress is normal and even useful. It can help you if you need to work hard or react quickly. For example, stress can help you finish an important job on time.  Long-term stress is caused by ongoing stressful situations or events. Examples of long-term stress include long-term health problems, ongoing problems at work, or conflicts in your family. Long-term stress can harm your health.  How does stress affect your health?  When you are stressed, your body responds as though you are in danger. It makes hormones that speed up your heart, make you breathe faster, and give you a burst of energy. This is called the fight-or-flight stress response. If the stress is over quickly, your body goes back to normal and no harm is done.  But if stress happens too often or lasts too long, it can have bad effects. Long-term stress can make you more likely to get sick, and it can make symptoms of some diseases worse. If you tense up when you are stressed, you may develop neck, shoulder, or low back pain. Stress is linked to high blood pressure and heart disease.  Stress also " harms your emotional health. It can make you khan, tense, or depressed. Your relationships may suffer, and you may not do well at work or school.  What can you do to manage stress?  You can try these things to help manage stress:   Do something active. Exercise or activity can help reduce stress. Walking is a great way to get started. Even everyday activities such as housecleaning or yard work can help.  Try yoga or tony chi. These techniques combine exercise and meditation. You may need some training at first to learn them.  Do something you enjoy. For example, listen to music or go to a movie. Practice your hobby or do volunteer work.  Meditate. This can help you relax, because you are not worrying about what happened before or what may happen in the future.  Do guided imagery. Imagine yourself in any setting that helps you feel calm. You can use online videos, books, or a teacher to guide you.  Do breathing exercises. For example:  From a standing position, bend forward from the waist with your knees slightly bent. Let your arms dangle close to the floor.  Breathe in slowly and deeply as you return to a standing position. Roll up slowly and lift your head last.  Hold your breath for just a few seconds in the standing position.  Breathe out slowly and bend forward from the waist.  Let your feelings out. Talk, laugh, cry, and express anger when you need to. Talking with supportive friends or family, a counselor, or a epifanio leader about your feelings is a healthy way to relieve stress. Avoid discussing your feelings with people who make you feel worse.  Write. It may help to write about things that are bothering you. This helps you find out how much stress you feel and what is causing it. When you know this, you can find better ways to cope.  What can you do to prevent stress?  You might try some of these things to help prevent stress:  Manage your time. This helps you find time to do the things you want and need to  "do.  Get enough sleep. Your body recovers from the stresses of the day while you are sleeping.  Get support. Your family, friends, and community can make a difference in how you experience stress.  Limit your news feed. Avoid or limit time on social media or news that may make you feel stressed.  Do something active. Exercise or activity can help reduce stress. Walking is a great way to get started.  Where can you learn more?  Go to https://www.Vadio.net/patiented  Enter N032 in the search box to learn more about \"Learning About Stress.\"  Current as of: October 24, 2023               Content Version: 14.0    2115-2079 [employer].   Care instructions adapted under license by your healthcare professional. If you have questions about a medical condition or this instruction, always ask your healthcare professional. [employer] disclaims any warranty or liability for your use of this information.      Learning About Sleeping Well  What does sleeping well mean?     Sleeping well means getting enough sleep to feel good and stay healthy. How much sleep is enough varies among people.  The number of hours you sleep and how you feel when you wake up are both important. If you do not feel refreshed, you probably need more sleep. Another sign of not getting enough sleep is feeling tired during the day.  Experts recommend that adults get at least 7 or more hours of sleep per day. Children and older adults need more sleep.  Why is getting enough sleep important?  Getting enough quality sleep is a basic part of good health. When your sleep suffers, your physical health, mood, and your thoughts can suffer too. You may find yourself feeling more grumpy or stressed. Not getting enough sleep also can lead to serious problems, including injury, accidents, anxiety, and depression.  What might cause poor sleeping?  Many things can cause sleep problems, including:  Changes to your sleep schedule.  Stress. " "Stress can be caused by fear about a single event, such as giving a speech. Or you may have ongoing stress, such as worry about work or school.  Depression, anxiety, and other mental or emotional conditions.  Changes in your sleep habits or surroundings. This includes changes that happen where you sleep, such as noise, light, or sleeping in a different bed. It also includes changes in your sleep pattern, such as having jet lag or working a late shift.  Health problems, such as pain, breathing problems, and restless legs syndrome.  Lack of regular exercise.  Using alcohol, nicotine, or caffeine before bed.  How can you help yourself?  Here are some tips that may help you sleep more soundly and wake up feeling more refreshed.  Your sleeping area   Use your bedroom only for sleeping and sex. A bit of light reading may help you fall asleep. But if it doesn't, do your reading elsewhere in the house. Try not to use your TV, computer, smartphone, or tablet while you are in bed.  Be sure your bed is big enough to stretch out comfortably, especially if you have a sleep partner.  Keep your bedroom quiet, dark, and cool. Use curtains, blinds, or a sleep mask to block out light. To block out noise, use earplugs, soothing music, or a \"white noise\" machine.  Your evening and bedtime routine   Create a relaxing bedtime routine. You might want to take a warm shower or bath, or listen to soothing music.  Go to bed at the same time every night. And get up at the same time every morning, even if you feel tired.  What to avoid   Limit caffeine (coffee, tea, caffeinated sodas) during the day, and don't have any for at least 6 hours before bedtime.  Avoid drinking alcohol before bedtime. Alcohol can cause you to wake up more often during the night.  Try not to smoke or use tobacco, especially in the evening. Nicotine can keep you awake.  Limit naps during the day, especially close to bedtime.  Avoid lying in bed awake for too long. If " "you can't fall asleep or if you wake up in the middle of the night and can't get back to sleep within about 20 minutes, get out of bed and go to another room until you feel sleepy.  Avoid taking medicine right before bed that may keep you awake or make you feel hyper or energized. Your doctor can tell you if your medicine may do this and if you can take it earlier in the day.  If you can't sleep   Imagine yourself in a peaceful, pleasant scene. Focus on the details and feelings of being in a place that is relaxing.  Get up and do a quiet or boring activity until you feel sleepy.  Avoid drinking any liquids before going to bed to help prevent waking up often to use the bathroom.  Where can you learn more?  Go to https://www.Between Digital.net/patiented  Enter J942 in the search box to learn more about \"Learning About Sleeping Well.\"  Current as of: July 10, 2023               Content Version: 14.0    8693-7425 [employer].   Care instructions adapted under license by your healthcare professional. If you have questions about a medical condition or this instruction, always ask your healthcare professional. [employer] disclaims any warranty or liability for your use of this information.      Bladder Training: Care Instructions  Your Care Instructions     Bladder training is used to treat urge incontinence and stress incontinence. Urge incontinence means that the need to urinate comes on so fast that you can't get to a toilet in time. Stress incontinence means that you leak urine because of pressure on your bladder. For example, it may happen when you laugh, cough, or lift something heavy.  Bladder training can increase how long you can wait before you have to urinate. It can also help your bladder hold more urine. And it can give you better control over the urge to urinate.  It is important to remember that bladder training takes a few weeks to a few months to make a difference. You may not see " results right away, but don't give up.  Follow-up care is a key part of your treatment and safety. Be sure to make and go to all appointments, and call your doctor if you are having problems. It's also a good idea to know your test results and keep a list of the medicines you take.  How can you care for yourself at home?  Work with your doctor to come up with a bladder training program that is right for you. You may use one or more of the following methods.  Delayed urination  In the beginning, try to keep from urinating for 5 minutes after you first feel the need to go.  While you wait, take deep, slow breaths to relax. Kegel exercises can also help you delay the need to go to the bathroom.  After some practice, when you can easily wait 5 minutes to urinate, try to wait 10 minutes before you urinate.  Slowly increase the waiting period until you are able to control when you have to urinate.  Scheduled urination  Empty your bladder when you first wake up in the morning.  Schedule times throughout the day when you will urinate.  Start by going to the bathroom every hour, even if you don't need to go.  Slowly increase the time between trips to the bathroom.  When you have found a schedule that works well for you, keep doing it.  If you wake up during the night and have to urinate, do it. Apply your schedule to waking hours only.  Kegel exercises  These tighten and strengthen pelvic muscles, which can help you control the flow of urine. (If doing these exercises causes pain, stop doing them and talk with your doctor.) To do Kegel exercises:  Squeeze your muscles as if you were trying not to pass gas. Or squeeze your muscles as if you were stopping the flow of urine. Your belly, legs, and buttocks shouldn't move.  Hold the squeeze for 3 seconds, then relax for 5 to 10 seconds.  Start with 3 seconds, then add 1 second each week until you are able to squeeze for 10 seconds.  Repeat the exercise 10 times a session. Do 3 to  "8 sessions a day.  When should you call for help?  Watch closely for changes in your health, and be sure to contact your doctor if:    Your incontinence is getting worse.     You do not get better as expected.   Where can you learn more?  Go to https://www.LeukoDx.net/patiented  Enter V684 in the search box to learn more about \"Bladder Training: Care Instructions.\"  Current as of: November 15, 2023               Content Version: 14.0    3567-3821 [employer].   Care instructions adapted under license by your healthcare professional. If you have questions about a medical condition or this instruction, always ask your healthcare professional. [employer] disclaims any warranty or liability for your use of this information.      " [place] Pregnancy And Lactation Text: This medication is not safe during pregnancy. It is unknown if this medication is excreted in breast milk.

## 2024-03-28 NOTE — PROGRESS NOTES
Aware she today forgot about it today scheduled I just forgot about her and I was thinking all 1 of these days and getting a meter we will see if it is today because she is here and I am here and that would make sense except for Preventive Care Visit  [facility]  Mirna Child MD, MD, Family Medicine  Mar 28, 2024      Assessment & Plan         ICD-10-CM    1. Encounter for Medicare annual wellness exam  Z00.00       2. Hyperlipidemia LDL goal <130  E78.5 lovastatin (MEVACOR) 40 MG tablet      3. Gastroesophageal reflux disease with esophagitis without hemorrhage  K21.00       4. Class 1 obesity due to excess calories without serious comorbidity with body mass index (BMI) of 34.0 to 34.9 in adult  E66.09     Z68.34           Patient had GERD and was doing well with her Prilosec medication but was having concerns over adding the alpha lipoic acid recommended through her neurologist for her neuropathy as this has a side effect that could potentially worsen reflux.  We talked about her food choices as she has been continuing to eat tomatoes and citrus fruits and if she would like to be able to take a medication that could be helpful for more neuropathy that potentially could cause reflux she likely would balance this out by reducing her acid containing foods is likely it is lifestyle related for her medication needs at this time.  We also did discuss her overall health has improved since she initially was given aspirin for primary prevention of cardiovascular disease and it does not appear that she needs this any longer though her most recent evaluation for neuropathy had a borderline blood sugar which is new for her so we did talk about maintaining exercise to help maintain weight to reduce this from progression    No LOS data to display   Time spent by me doing chart review, history and exam, documentation and further activities per the note    Mirna Child MD     Patient has been advised of split  billing requirements and indicates understanding: Yes          Counseling  Appropriate preventive services were discussed with this patient, including applicable screening as appropriate for fall prevention, nutrition, physical activity, Tobacco-use cessation, weight loss and cognition.  Checklist reviewing preventive services available has been given to the patient.  Reviewed patient's diet, addressing concerns and/or questions.   She is at risk for lack of exercise and has been provided with information to increase physical activity for the benefit of her well-being.   She is at risk for psychosocial distress and has been provided with information to reduce risk.   Discussed possible causes of fatigue. Patient reported safety concerns were addressed today.The patient was provided with written information regarding signs of hearing loss.   Information on urinary incontinence and treatment options given to patient.           Heaven Nguyen is a 74 year old, presenting for the following:  Physical        3/28/2024     9:17 AM   Additional Questions   Roomed by Pippa   Accompanied by Self         3/28/2024     9:17 AM   Patient Reported Additional Medications   Patient reports taking the following new medications Magnesium 400mg 1x qd         Health Care Directive  Patient does not have a Health Care Directive or Living Will: Discussed advance care planning with patient; information given to patient to review.    HPI      Hyperlipidemia Follow-Up    Are you regularly taking any medication or supplement to lower your cholesterol?   Yes- Lovastatin  Are you having muscle aches or other side effects that you think could be caused by your cholesterol lowering medication?  No        3/22/2024   General Health   How would you rate your overall physical health? Good   Feel stress (tense, anxious, or unable to sleep) Only a little   (!) STRESS CONCERN      3/22/2024   Nutrition   Diet: Regular (no restrictions)          3/22/2024   Exercise   Days per week of moderate/strenous exercise 1 day   Average minutes spent exercising at this level 10 min   (!) EXERCISE CONCERN      3/22/2024   Social Factors   Frequency of gathering with friends or relatives Once a week   Worry food won't last until get money to buy more No   Food not last or not have enough money for food? No   Do you have housing?  Yes   Are you worried about losing your housing? No   Lack of transportation? No   Unable to get utilities (heat,electricity)? No         3/28/2024   Fall Risk   Gait Speed Test (Document in seconds) 3.8   Gait Speed Test Interpretation Less than or equal to 5.00 seconds - PASS          3/22/2024   Activities of Daily Living- Home Safety   Needs help with the following daily activites None of the above   Safety concerns in the home No grab bars in the bathroom         3/22/2024   Dental   Dentist two times every year? Yes         3/22/2024   Hearing Screening   Hearing concerns? (!) IT'S HARD TO FOLLOW A CONVERSATION IN A NOISY RESTAURANT OR CROWDED ROOM.    (!) TROUBLE UNDERSTANDING SOFT OR WHISPERED SPEECH.         3/22/2024   Driving Risk Screening   Patient/family members have concerns about driving No         3/22/2024   General Alertness/Fatigue Screening   Have you been more tired than usual lately? (!) YES         3/22/2024   Urinary Incontinence Screening   Bothered by leaking urine in past 6 months Yes         3/22/2024   TB Screening   Were you born outside of the [place]? No         Today's PHQ-2 Score:       3/28/2024     9:04 AM   PHQ-2 ( 1999 [employer])   Q1: Little interest or pleasure in doing things 0   Q2: Feeling down, depressed or hopeless 0   PHQ-2 Score 0   Q1: Little interest or pleasure in doing things Not at all   Q2: Feeling down, depressed or hopeless Not at all   PHQ-2 Score 0           3/22/2024   Substance Use   Alcohol more than 3/day or more than 7/wk No   Do you have a current opioid prescription? No   How severe/bad  is pain from 1 to 10? 2/10   Do you use any other substances recreationally? No     Social History     Tobacco Use    Smoking status: Former     Packs/day: 0.50     Years: 37.00     Additional pack years: 0.00     Total pack years: 18.50     Types: Cigarettes     Quit date: 10/20/2007     Years since quittin.4    Smokeless tobacco: Never   Vaping Use    Vaping Use: Never used   Substance Use Topics    Alcohol use: Yes     Comment: minimal    Drug use: No           2023   LAST FHS-7 RESULTS   1st degree relative breast or ovarian cancer No   Any relative bilateral breast cancer No   Any male have breast cancer No   Any ONE woman have BOTH breast AND ovarian cancer No   Any woman with breast cancer before 50yrs No   2 or more relatives with breast AND/OR ovarian cancer No   2 or more relatives with breast AND/OR bowel cancer No        Mammogram Screening - Mammogram every 1-2 years updated in Health Maintenance based on mutual decision making    ASCVD Risk   The 10-year ASCVD risk score (Luiz MURILLO, et al., 2019) is: 8.6%    Values used to calculate the score:      Age: 74 years      Sex: Female      Is Non- : No      Diabetic: No      Tobacco smoker: No      Systolic Blood Pressure: 110 mmHg      Is BP treated: No      HDL Cholesterol: 46 mg/dL      Total Cholesterol: 157 mg/dL            Reviewed and updated as needed this visit by Provider   Tobacco  Allergies  Meds  Problems  Med Hx  Surg Hx  Fam Hx              Current providers sharing in care for this patient include:  Patient Care Team:  Mirna Child MD as PCP - General (Family Medicine)  Charlene Cain PA-C as Assigned PCP  Lacy Ch MD as MD (Dermatology)  Renzo Luna DPM as Assigned Musculoskeletal Provider  Lacy Ch MD as Assigned Surgical Provider  Eamon James MD as Assigned Neuroscience Provider    The following health maintenance items are reviewed in Epic and correct  "as of today:  Health Maintenance   Topic Date Due    MAMMO SCREENING  08/04/2024    ANNUAL REVIEW OF HM ORDERS  02/26/2025    LIPID  03/14/2025    MEDICARE ANNUAL WELLNESS VISIT  03/28/2025    FALL RISK ASSESSMENT  03/28/2025    DTAP/TDAP/TD IMMUNIZATION (2 - Td or Tdap) 04/18/2026    GLUCOSE  03/14/2027    COLORECTAL CANCER SCREENING  03/15/2027    ADVANCE CARE PLANNING  03/27/2028    DEXA  05/23/2032    HEPATITIS C SCREENING  Completed    PHQ-2 (once per calendar year)  Completed    INFLUENZA VACCINE  Completed    Pneumococcal Vaccine: 65+ Years  Completed    ZOSTER IMMUNIZATION  Completed    RSV VACCINE (Pregnancy & 60+)  Completed    COVID-19 Vaccine  Completed    IPV IMMUNIZATION  Aged Out    HPV IMMUNIZATION  Aged Out    MENINGITIS IMMUNIZATION  Aged Out    RSV MONOCLONAL ANTIBODY  Aged Out    LUNG CANCER SCREENING  Discontinued            Objective    Exam  /64   Pulse 69   Temp 97.6  F (36.4  C) (Temporal)   Resp 20   Ht 1.67 m (5' 5.75\")   Wt 96.6 kg (213 lb)   SpO2 98%   BMI 34.64 kg/m     Estimated body mass index is 34.64 kg/m  as calculated from the following:    Height as of this encounter: 1.67 m (5' 5.75\").    Weight as of this encounter: 96.6 kg (213 lb).    Physical Exam  GENERAL: alert and no distress  EYES: Eyes grossly normal to inspection, PERRL and conjunctivae and sclerae normal  HENT: ear canals and TM's normal, nose and mouth without ulcers or lesions  NECK: no adenopathy, no asymmetry, masses, or scars  RESP: lungs clear to auscultation - no rales, rhonchi or wheezes  CV: regular rate and rhythm, normal S1 S2, no S3 or S4, no murmur, click or rub, no peripheral edema  ABDOMEN: soft, nontender, no hepatosplenomegaly, no masses and bowel sounds normal  MS: no gross musculoskeletal defects noted, no edema  SKIN: no suspicious lesions or rashes  NEURO: Normal strength and tone, mentation intact and speech normal  PSYCH: mentation appears normal, affect normal/bright        " 3/28/2024   Mini Cog   Clock Draw Score 2 Normal   3 Item Recall 3 objects recalled   Mini Cog Total Score 5              Signed Electronically by: Mirna Child MD, MD

## 2024-04-01 ENCOUNTER — ANCILLARY PROCEDURE (OUTPATIENT)
Dept: CT IMAGING | Facility: CLINIC | Age: 74
End: 2024-04-01
Attending: FAMILY MEDICINE
Payer: COMMERCIAL

## 2024-04-01 DIAGNOSIS — R91.8 PULMONARY NODULES: ICD-10-CM

## 2024-04-01 DIAGNOSIS — R91.8 PULMONARY NODULES: Primary | ICD-10-CM

## 2024-04-01 DIAGNOSIS — Z87.891 PERSONAL HISTORY OF TOBACCO USE, PRESENTING HAZARDS TO HEALTH: ICD-10-CM

## 2024-04-01 PROCEDURE — 71250 CT THORAX DX C-: CPT | Mod: GC | Performed by: RADIOLOGY

## 2024-04-08 ENCOUNTER — TRANSFERRED RECORDS (OUTPATIENT)
Dept: HEALTH INFORMATION MANAGEMENT | Facility: CLINIC | Age: 74
End: 2024-04-08
Payer: COMMERCIAL

## 2024-05-09 DIAGNOSIS — N95.1 SYMPTOMATIC MENOPAUSAL OR FEMALE CLIMACTERIC STATES: ICD-10-CM

## 2024-05-09 RX ORDER — ESTROGEN,CON/M-PROGEST ACET 0.45-1.5MG
TABLET ORAL
Qty: 28 TABLET | Refills: 5 | Status: SHIPPED | OUTPATIENT
Start: 2024-05-09

## 2024-05-09 NOTE — TELEPHONE ENCOUNTER
Pending Prescriptions:                       Disp   Refills    estrogen conj-medroxyPROGESTERone (PREMPR*28 tab*5            Sig: TAKE ONE TABLET BY MOUTH THREE TIMES WEEKLY OR AS           NEEDED TO CONTROL HOT FLASHES/MENOPAUSAL SYMPTOMS    Routing refill request to provider for review/approval because:  Medication indicated for associated diagnosis       Eleazar Carson RN

## 2024-07-05 ENCOUNTER — PATIENT OUTREACH (OUTPATIENT)
Dept: CARE COORDINATION | Facility: CLINIC | Age: 74
End: 2024-07-05
Payer: COMMERCIAL

## 2024-07-08 ENCOUNTER — ANCILLARY PROCEDURE (OUTPATIENT)
Dept: CT IMAGING | Facility: CLINIC | Age: 74
End: 2024-07-08
Attending: THORACIC SURGERY (CARDIOTHORACIC VASCULAR SURGERY)
Payer: COMMERCIAL

## 2024-07-08 DIAGNOSIS — R91.1 SOLITARY PULMONARY NODULE: ICD-10-CM

## 2024-07-08 PROCEDURE — 71250 CT THORAX DX C-: CPT

## 2024-07-29 ENCOUNTER — ANCILLARY PROCEDURE (OUTPATIENT)
Dept: MAMMOGRAPHY | Facility: CLINIC | Age: 74
End: 2024-07-29
Attending: FAMILY MEDICINE
Payer: COMMERCIAL

## 2024-07-29 ENCOUNTER — TELEPHONE (OUTPATIENT)
Dept: FAMILY MEDICINE | Facility: OTHER | Age: 74
End: 2024-07-29

## 2024-07-29 DIAGNOSIS — Z12.31 ENCOUNTER FOR SCREENING MAMMOGRAM FOR MALIGNANT NEOPLASM OF BREAST: ICD-10-CM

## 2024-07-29 DIAGNOSIS — R92.8 ABNORMAL MAMMOGRAM: Primary | ICD-10-CM

## 2024-07-29 PROCEDURE — 77067 SCR MAMMO BI INCL CAD: CPT | Mod: GC

## 2024-07-29 PROCEDURE — 77063 BREAST TOMOSYNTHESIS BI: CPT | Mod: GC

## 2024-07-29 NOTE — TELEPHONE ENCOUNTER
Attempted to call patient, no answer. Will send Howcasthart message but please call patient as well.     Her mammogram showed that on the left breast there is a POSSIBLE asymmetry in the breast, they need to get better views to see if there is even anything there to be concerned about. Orders placed, they will call to schedule,     Chidi Kerr PA-C

## 2024-07-29 NOTE — TELEPHONE ENCOUNTER
FYI - Status Update    Who is Calling: patient    Update: Pt called and wanted to let the clinic know that she scheduled more Imaging appts     Does caller want a call/response back: No

## 2024-07-31 ENCOUNTER — ANCILLARY PROCEDURE (OUTPATIENT)
Dept: ULTRASOUND IMAGING | Facility: CLINIC | Age: 74
End: 2024-07-31
Attending: FAMILY MEDICINE
Payer: COMMERCIAL

## 2024-07-31 ENCOUNTER — ANCILLARY PROCEDURE (OUTPATIENT)
Dept: MAMMOGRAPHY | Facility: CLINIC | Age: 74
End: 2024-07-31
Attending: FAMILY MEDICINE
Payer: COMMERCIAL

## 2024-07-31 DIAGNOSIS — R92.8 ABNORMAL MAMMOGRAM: ICD-10-CM

## 2024-07-31 PROCEDURE — G0279 TOMOSYNTHESIS, MAMMO: HCPCS | Performed by: RADIOLOGY

## 2024-07-31 PROCEDURE — 76642 ULTRASOUND BREAST LIMITED: CPT | Mod: LT | Performed by: RADIOLOGY

## 2024-07-31 PROCEDURE — 77065 DX MAMMO INCL CAD UNI: CPT | Mod: LT | Performed by: RADIOLOGY

## 2024-07-31 NOTE — LETTER
Tri Ingram  [address]   [facility] [address]              July 31, 2024  Date of Exam: 7/31/24    Dear Tri:    Thank you for your recent visit.  Breast Imaging Result: We are pleased to inform you that the results of your recent breast imaging show no evidence of malignancy (cancer).    Your breast tissue is not dense:  Breast tissue can be either dense or not dense. Dense tissue makes it harder to find breast cancer on a mammogram and also raises the risk of developing breast cancer.  Your breast tissue is not dense. Talk to your healthcare provider about breast density, risks for breast cancer, and your individual situation.    If you are experiencing any breast problems such as a lump or localized pain we request that you discuss this with your health care team if you haven t already done so, as additional testing may be necessary.    Breast Cancer Screening Recommendation: Routine yearly mammography beginning at age 40 or as discussed with your provider.    A report of your breast imaging results was sent to: Mirna Child    Your breast imaging will become part of your medical file here at [facility] for at least 10 years. You are responsible for informing any new health care team or breast imaging facility of the date and location of this examination.    We appreciate the opportunity to participate in your health care.    Sincerely,  Dr. Jennifer WHEELER [facility]

## 2024-07-31 NOTE — RESULT ENCOUNTER NOTE
Tri,    Your primary care provider is currently out of the office and I am responding while they are out    Your recent mammogram and breast imaging is benign, continue with yearly mammogram testing      If you have any questions feel free to call the clinic at [phone].      Thank you,    Stu Antoine MD

## 2024-10-07 ENCOUNTER — TELEPHONE (OUTPATIENT)
Dept: FAMILY MEDICINE | Facility: OTHER | Age: 74
End: 2024-10-07
Payer: COMMERCIAL

## 2024-10-07 NOTE — TELEPHONE ENCOUNTER
RN did speak with patient.  Reviewed message from provider below.  Patient verbalized understanding and is agreeable. Denies any other questions or concerns at this time.

## 2024-10-07 NOTE — TELEPHONE ENCOUNTER
S-(situation): Patient called asking if she should be getting an RSV vaccine.     B-(background): Patient states she gets regular imaging to monitor a growth in her lungs. She states she knows she is due for COVID and influenza vaccines now.     A-(assessment): Per patient chart, patient had RSV Vaccine (Abrysvo) 11/17/2023.     R-(recommendations): Writer notified patient she had an RSV vaccine last fall, she states she did not remember that. She is wondering if this is something she needs to get again this year? Writer unable to find information that clarifies if this is an annual or one time vaccine. Please clarify.       Anay Verma, JAMEEN, RN

## 2024-10-16 ENCOUNTER — OFFICE VISIT (OUTPATIENT)
Dept: DERMATOLOGY | Facility: CLINIC | Age: 74
End: 2024-10-16
Payer: COMMERCIAL

## 2024-10-16 DIAGNOSIS — L82.1 SEBORRHEIC KERATOSES: Primary | ICD-10-CM

## 2024-10-16 DIAGNOSIS — D22.9 MULTIPLE BENIGN NEVI: ICD-10-CM

## 2024-10-16 DIAGNOSIS — D18.01 CHERRY ANGIOMA: ICD-10-CM

## 2024-10-16 DIAGNOSIS — Z85.828 HISTORY OF NONMELANOMA SKIN CANCER: ICD-10-CM

## 2024-10-16 DIAGNOSIS — L81.4 LENTIGINES: ICD-10-CM

## 2024-10-16 DIAGNOSIS — Z80.8 FAMILY HISTORY OF MELANOMA: ICD-10-CM

## 2024-10-16 PROCEDURE — 99213 OFFICE O/P EST LOW 20 MIN: CPT | Performed by: PHYSICIAN ASSISTANT

## 2024-10-16 NOTE — PATIENT INSTRUCTIONS
Patient Education       Proper skin care from [facility]:    -Eliminate harsh soaps as they strip the natural oils from the skin, often resulting in dry itchy skin ( i.e. Dial, Zest, [place])  -Use mild soaps such as Cetaphil or Dove Sensitive Skin in the shower. You do not need to use soap on arms, legs, and trunk every time you shower unless visibly soiled.   -Avoid hot or cold showers.  -After showering, lightly dry off and apply moisturizing within 2-3 minutes. This will help trap moisture in the skin.   -Aggressive use of a moisturizer at least 1-2 times a day to the entire body (including -Vanicream, Cetaphil, Aquaphor or Cerave) and moisturize hands after every washing.  -We recommend using moisturizers that come in a tub that needs to be scooped out, not a pump. This has more of an oil base. It will hold moisture in your skin much better than a water base moisturizer. The above recommended are non-pore clogging.      Wear a sunscreen with at least SPF 30 on your face, ears, neck and V of the chest daily. Wear sunscreen on other areas of the body if those areas are exposed to the sun throughout the day. Sunscreens can contain physical and/or chemical blockers. Physical blockers are less likely to clog pores, these include zinc oxide and titanium dioxide. Reapply every two hour and after swimming.     Sunscreen examples: https://www.ewg.org/sunscreen/    UV radiation  UVA radiation remains constant throughout the day and throughout the year. It is a longer wavelength than UVB and therefore penetrates deeper into the skin leading to immediate and delayed tanning, photoaging, and skin cancer. 70-80% of UVA and UVB radiation occurs between the hours of 10am-2pm.  UVB radiation  UVB radiation causes the most harmful effects and is more significant during the summer months. However, snow and ice can reflect UVB radiation leading to skin damage during the winter months as well. UVB radiation is  responsible for tanning, burning, inflammation, delayed erythema (pinkness), pigmentation (brown spots), and skin cancer.     I recommend self monthly full body exams and yearly full body exams with a dermatology provider. If you develop a new or changing lesion please follow up for examination. Most skin cancers are pink and scaly or pink and pearly. However, we do see blue/brown/black skin cancers.  Consider the ABCDEs of melanoma when giving yourself your monthly full body exam ( don't forget the groin, buttocks, feet, toes, etc). A-asymmetry, B-borders, C-color, D-diameter, E-elevation or evolving. If you see any of these changes please follow up in clinic. If you cannot see your back I recommend purchasing a hand held mirror to use with a larger wall mirror.       Checking for Skin Cancer  You can find cancer early by checking your skin each month. There are 3 kinds of skin cancer. They are melanoma, basal cell carcinoma, and squamous cell carcinoma. Doing monthly skin checks is the best way to find new marks or skin changes. Follow the instructions below for checking your skin.   The ABCDEs of checking moles for melanoma   Check your moles or growths for signs of melanoma using ABCDE:   Asymmetry: the sides of the mole or growth don t match  Border: the edges are ragged, notched, or blurred  Color: the color within the mole or growth varies  Diameter: the mole or growth is larger than 6 mm (size of a pencil eraser)  Evolving: the size, shape, or color of the mole or growth is changing (evolving is not shown in the images below)    Checking for other types of skin cancer  Basal cell carcinoma or squamous cell carcinoma have symptoms such as:     A spot or mole that looks different from all other marks on your skin  Changes in how an area feels, such as itching, tenderness, or pain  Changes in the skin's surface, such as oozing, bleeding, or scaliness  A sore that does not heal  New swelling or redness beyond  the border of a mole    Who s at risk?  Anyone can get skin cancer. But you are at greater risk if you have:   Fair skin, light-colored hair, or light-colored eyes  Many moles or abnormal moles on your skin  A history of sunburns from sunlight or tanning beds  A family history of skin cancer  A history of exposure to radiation or chemicals  A weakened immune system  If you have had skin cancer in the past, you are at risk for recurring skin cancer.   How to check your skin  Do your monthly skin checkups in front of a full-length mirror. Check all parts of your body, including your:   Head (ears, face, neck, and scalp)  Torso (front, back, and sides)  Arms (tops, undersides, upper, and lower armpits)  Hands (palms, backs, and fingers, including under the nails)  Buttocks and genitals  Legs (front, back, and sides)  Feet (tops, soles, toes, including under the nails, and between toes)  If you have a lot of moles, take digital photos of them each month. Make sure to take photos both up close and from a distance. These can help you see if any moles change over time.   Most skin changes are not cancer. But if you see any changes in your skin, call your doctor right away. Only he or she can diagnose a problem. If you have skin cancer, seeing your doctor can be the first step toward getting the treatment that could save your life.   [employer] last reviewed this educational content on 4/1/2019 2000-2020 [employer]. [address] [place] [address], [place] [address]. All rights reserved. This information is not intended as a substitute for professional medical care. Always follow your healthcare professional's instructions.

## 2024-10-16 NOTE — NURSING NOTE
Tri Ingram's goals for this visit include:   Chief Complaint   Patient presents with    Skin Check     FBSE. Scalp and face check, Hx bone disease     She requests these members of her care team be copied on today's visit information: yes    PCP: Mirna Child    Referring Provider:  Referred Self, MD  No address on file    There were no vitals taken for this visit.    Do you need any medication refills at today's visit? no    Liban Rose, [facility]

## 2024-10-16 NOTE — PROGRESS NOTES
"[facility] Dermatology Note  Encounter Date: Oct 16, 2024  Office Visit      Dermatology Problem List:  FBSE: 10/16/24    1. Hx NMSC  - SCC - mid chest - s/p [facility] 7/27/20  - SCCIS - R medial groin - s/p 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 AK, right brow    FHx: melanoma - father, mother, brother, sister. Brother passed away from  , 2023. Father with colon cancer and pancreatic cancer.  ____________________________________________    Assessment & Plan:    # Hx of NMSC  - Sunscreen: Apply 20 minutes prior to going outdoors and reapply every two hours, when wet or sweating. We recommend using an SPF 30 or higher, and to use one that is water resistant.     - Advised to monitor for changing, non-healing, bleeding, painful, changing, or otherwise symptomatic lesions  - Continue annual skin exams    # FHx melanoma (brother) - passed from this in 2023  - continue annual skin exams    # Benign findings: multiple benign nevi, lentigines, cherry angiomas, SKs  - edu on benign etiology  - Signs and Symptoms of non-melanoma skin cancer and ABCDEs of melanoma reviewed with patient. Patient encouraged to perform monthly self skin exams and educated on how to perform them. UV precautions reviewed with patient. Patient was asked about new or changing moles/lesions on body.   - Sunscreen: Apply 20 minutes prior to going outdoors and reapply every two hours, when wet or sweating. We recommend using an SPF 30 or higher, and to use one that is water resistant.     - RTC for changes     Procedures Performed:   None     Follow-up: 1 year(s) in-person, or earlier for new or changing lesions    Staff and scribe:    Scribe Disclosure: "   I, LEOBARDO ORTIZ, am serving as a scribe; to document services personally performed by Delores Hutchins PA-C -based on data collection and the provider's statements to me.     Provider Disclosure:  I agree with above History, Review of Systems, Physical exam and Plan.  I have reviewed the content of the documentation and have edited it as needed. I have personally performed the services documented here and the documentation accurately represents those services and the decisions I have made.      Electronically signed by:    All risks, benefits and alternatives were discussed with patient.  Patient is in agreement and understands the assessment and plan.  All questions were answered.    Delores Hutchins PA-C, MPAS  [facility] [place]: Phone: [phone], Fax: [phone]  [facility]: Phone: [phone],  Fax: [phone]  [facility]: Phone: [phone], Fax: [phone]  ____________________________________________    CC: Skin Check (FBSE. Scalp and face check, Hx bone disease)      Reviewed patients past medical history and pertinent chart review prior to patient's visit today.     HPI:  Ms. Tri Ingram is a 74 year old female who presents today as a return patient for FBSE. Today patient reported two spots of concern on her scalp. She also reported another spot of her face.    Has a hx of NMSC    Patient is otherwise feeling well, without additional concerns.    Labs:  N/A    Physical Exam:  Vitals: There were no vitals taken for this visit.  SKIN: Total skin excluding the undergarment areas was performed. The exam included the head/face, neck, both arms, chest, back, abdomen, both legs, digits and/or nails.    - - Gee's skin type II, has <100 nevi  - There are dome shaped bright red papules on the trunk.   - Multiple regular brown pigmented macules and papules are identified on the  trunk and extremities.   - Scattered brown macules on sun exposed areas.  - There are waxy stuck on tan to brown papules on the trunk.    -There are well healed surgical scars without erythema, nodularity or telangiectasias on the prior NMSC sites, NERD   - No other lesions of concern on areas examined.     Medications:  Current Outpatient Medications   Medication Sig Dispense Refill    alpha-lipoic acid 100 MG capsule Take 600 mg by mouth daily      Apoaequorin (PREVAGEN PO) Take 1 tablet by mouth daily       estrogen conj-medroxyPROGESTERone (PREMPRO) 0.45-1.5 MG tablet TAKE ONE TABLET BY MOUTH THREE TIMES WEEKLY OR AS NEEDED TO CONTROL HOT FLASHES/MENOPAUSAL SYMPTOMS 28 tablet 5    lovastatin (MEVACOR) 40 MG tablet TAKE ONE TABLET BY MOUTH EVERY NIGHT AT BEDTIME 90 tablet 3    magnesium oxide 400 MG CAPS Take 400 mg by mouth daily      multivitamin w/minerals (THERA-VIT-M) tablet Take 1 tablet by mouth daily      omeprazole (PRILOSEC) 40 MG DR capsule Take 1 capsule (40 mg) by mouth daily 90 capsule 1    ondansetron (ZOFRAN-ODT) 4 MG ODT tab Take 1 tablet (4 mg) by mouth every 8 hours as needed for nausea 10 tablet 0    Specialty Vitamins Products (ICAPS LUTEIN & ZEAXANTHIN) TBEC Take 1 tablet by mouth every evening      benzonatate (TESSALON) 200 MG capsule TAKE 1 CAPSULE (200 MG) BY MOUTH 3 TIMES DAILY AS NEEDED FOR COUGH (Patient not taking: Reported on 2/26/2024) 45 capsule 1     No current facility-administered medications for this visit.      Past Medical/Surgical History:   Patient Active Problem List   Diagnosis    Other malignant neoplasm of skin of trunk, except scrotum    Generalized osteoarthrosis, unspecified site    Disorder of bone and cartilage    Symptomatic menopausal or female climacteric states    Plantar fasciitis    Hyperlipidemia LDL goal <130    Obesity    Family history of abdominal aortic aneurysm    Former moderate cigarette smoker (10-19 per day)    Lumbar radiculopathy     Past  Medical History:   Diagnosis Date    Disorder of bone and cartilage, unspecified     Generalized osteoarthrosis, unspecified site     back and shoulder    Hematuria     hx of blood in urine and kidney stones    IBS (irritable bowel syndrome) 11/14/2014    episode of IBS    Other malignant neoplasm of skin of trunk, except scrotum 1998    Bowen's disease, 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    Squamous cell carcinoma     Vestibular neuritis

## 2024-10-16 NOTE — LETTER
"10/16/2024      Tri Ingram  [address]  [place] [address]      Dear Colleague,    Thank you for referring your patient, Tri Ingram, to the [facility]. Please see a copy of my visit note below.    [facility] Dermatology Note  Encounter Date: Oct 16, 2024  Office Visit      Dermatology Problem List:  FBSE: 10/16/24    1. 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 AK, right brow    FHx: melanoma - father, mother, brother, sister. Brother passed away from  , 2023. Father with colon cancer and pancreatic cancer.  ____________________________________________    Assessment & Plan:    # Hx of NMSC  - Sunscreen: Apply 20 minutes prior to going outdoors and reapply every two hours, when wet or sweating. We recommend using an SPF 30 or higher, and to use one that is water resistant.     - Advised to monitor for changing, non-healing, bleeding, painful, changing, or otherwise symptomatic lesions  - Continue annual skin exams    # FHx melanoma (brother) - passed from this in 2023  - continue annual skin exams    # Benign findings: multiple benign nevi, lentigines, cherry angiomas, SKs  - edu on benign etiology  - Signs and Symptoms of non-melanoma skin cancer and ABCDEs of melanoma reviewed with patient. Patient encouraged to perform monthly self skin exams and educated on how to perform them. UV precautions reviewed with patient. Patient was asked about new or changing moles/lesions on body.   - Sunscreen: Apply 20 minutes prior to going outdoors and reapply every two hours, when wet or " sweating. We recommend using an SPF 30 or higher, and to use one that is water resistant.     - RTC for changes     Procedures Performed:   None     Follow-up: 1 year(s) in-person, or earlier for new or changing lesions    Staff and scribe:    Scribe Disclosure:   I, LEOBARDO DIANA, am serving as a scribe; to document services personally performed by Delores Hutchins PA-C -based on data collection and the provider's statements to me.     Provider Disclosure:  I agree with above History, Review of Systems, Physical exam and Plan.  I have reviewed the content of the documentation and have edited it as needed. I have personally performed the services documented here and the documentation accurately represents those services and the decisions I have made.      Electronically signed by:    All risks, benefits and alternatives were discussed with patient.  Patient is in agreement and understands the assessment and plan.  All questions were answered.    Delores Hutchins PA-C, MPAS  [facility]: Phone: [phone], Fax: [phone]  [facility]: Phone: [phone],  Fax: [phone]  [facility]: Phone: [phone], Fax: [phone]  ____________________________________________    CC: Skin Check (FBSE. Scalp and face check, Hx bone disease)      Reviewed patients past medical history and pertinent chart review prior to patient's visit today.     HPI:  Ms. Tri Ingram is a 74 year old female who presents today as a return patient for FBSE. Today patient reported two spots of concern on her scalp. She also reported another spot of her face.    Has a hx of NMSC    Patient is otherwise feeling well, without additional concerns.    Labs:  N/A    Physical Exam:  Vitals: There were no vitals taken for this visit.  SKIN: Total skin excluding the undergarment areas was performed. The exam included the  head/face, neck, both arms, chest, back, abdomen, both legs, digits and/or nails.    - - Gee's skin type II, has <100 nevi  - There are dome shaped bright red papules on the trunk.   - Multiple regular brown pigmented macules and papules are identified on the trunk and extremities.   - Scattered brown macules on sun exposed areas.  - There are waxy stuck on tan to brown papules on the trunk.    -There are well healed surgical scars without erythema, nodularity or telangiectasias on the prior NMSC sites, NERD   - No other lesions of concern on areas examined.     Medications:  Current Outpatient Medications   Medication Sig Dispense Refill    alpha-lipoic acid 100 MG capsule Take 600 mg by mouth daily      Apoaequorin (PREVAGEN PO) Take 1 tablet by mouth daily       estrogen conj-medroxyPROGESTERone (PREMPRO) 0.45-1.5 MG tablet TAKE ONE TABLET BY MOUTH THREE TIMES WEEKLY OR AS NEEDED TO CONTROL HOT FLASHES/MENOPAUSAL SYMPTOMS 28 tablet 5    lovastatin (MEVACOR) 40 MG tablet TAKE ONE TABLET BY MOUTH EVERY NIGHT AT BEDTIME 90 tablet 3    magnesium oxide 400 MG CAPS Take 400 mg by mouth daily      multivitamin w/minerals (THERA-VIT-M) tablet Take 1 tablet by mouth daily      omeprazole (PRILOSEC) 40 MG DR capsule Take 1 capsule (40 mg) by mouth daily 90 capsule 1    ondansetron (ZOFRAN-ODT) 4 MG ODT tab Take 1 tablet (4 mg) by mouth every 8 hours as needed for nausea 10 tablet 0    Specialty Vitamins Products (ICAPS LUTEIN & ZEAXANTHIN) TBEC Take 1 tablet by mouth every evening      benzonatate (TESSALON) 200 MG capsule TAKE 1 CAPSULE (200 MG) BY MOUTH 3 TIMES DAILY AS NEEDED FOR COUGH (Patient not taking: Reported on 2/26/2024) 45 capsule 1     No current facility-administered medications for this visit.      Past Medical/Surgical History:   Patient Active Problem List   Diagnosis    Other malignant neoplasm of skin of trunk, except scrotum    Generalized osteoarthrosis, unspecified site    Disorder of bone and  cartilage    Symptomatic menopausal or female climacteric states    Plantar fasciitis    Hyperlipidemia LDL goal <130    Obesity    Family history of abdominal aortic aneurysm    Former moderate cigarette smoker (10-19 per day)    Lumbar radiculopathy     Past Medical History:   Diagnosis Date    Disorder of bone and cartilage, unspecified     Generalized osteoarthrosis, unspecified site     back and shoulder    Hematuria     hx of blood in urine and kidney stones    IBS (irritable bowel syndrome) 11/14/2014    episode of IBS    Other malignant neoplasm of skin of trunk, except scrotum 1998    Bowen's disease, 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    Squamous cell carcinoma     Vestibular neuritis          Again, thank you for allowing me to participate in the care of your patient.        Sincerely,      Delores Hutchins PA-C

## 2024-10-17 DIAGNOSIS — K21.00 GASTROESOPHAGEAL REFLUX DISEASE WITH ESOPHAGITIS WITHOUT HEMORRHAGE: ICD-10-CM

## 2024-10-17 RX ORDER — OMEPRAZOLE 40 MG/1
40 CAPSULE, DELAYED RELEASE ORAL DAILY
Qty: 90 CAPSULE | Refills: 0 | Status: SHIPPED | OUTPATIENT
Start: 2024-10-17

## 2024-11-11 ENCOUNTER — TRANSFERRED RECORDS (OUTPATIENT)
Dept: HEALTH INFORMATION MANAGEMENT | Facility: CLINIC | Age: 74
End: 2024-11-11

## 2024-11-13 ENCOUNTER — ANCILLARY PROCEDURE (OUTPATIENT)
Dept: CT IMAGING | Facility: CLINIC | Age: 74
End: 2024-11-13
Attending: THORACIC SURGERY (CARDIOTHORACIC VASCULAR SURGERY)
Payer: COMMERCIAL

## 2024-11-13 DIAGNOSIS — R91.1 PULMONARY NODULE: ICD-10-CM

## 2024-11-13 PROCEDURE — 71250 CT THORAX DX C-: CPT

## 2024-12-10 NOTE — PROGRESS NOTES
[facility] Neurology Follow Up Visit    Tri Ingram MRN# [id number]   Age: 74 year old YOB: 1950     Brief history of symptoms: The patient was initially seen in neurologic consultation on 3/6/2024 for evaluation of paresthesias. Please see the comprehensive neurologic consultation note from that date in the Epic records for details.          Assessment and Plan:   Assessment:  Tri Ingram is a 74 year old female who presents today for evaluation of suspected neuropathy. Symptoms started in 2023. Lab work is notable for prediabetes. History and examination are more suggestive of polyneuropathy, but patient also has lumbar spinal stenosis. EMG could be considered if symptoms worsen. She has had benefit with magnesium and alpha lipoic acid. She will try OTC lidocaine cream for toe pains.      Plan:  - Magnesium supplement for cramping, alpha lipoic acid, lidocaine cream     Follow up in Neurology clinic in 1 year or sooner if new issues arise     Eamon James MD   of Neurology  [facility]    ---------------------------------------------------------------------------------------------------------------------------           Interval History:   Cramps have moved a little into the calves. Magnesium and alpha-lipoic acid have helped. She does a vibrating foot massage, which helps. She is gets pains in the tip of the big toes, which comes and goes. It lasts for about a minute or two.      She gets a hot sensation in the hands, but no clear numbness/tingling/burning/pins/needles. She gets an aching pain in the first 3 fingers.      She has chronic back pain, but it doesn't radiate down the legs. It is not every day. It is aggravated by certain activities.       Past Medical History:     Patient Active Problem List   Diagnosis    Other malignant neoplasm of skin of trunk, except scrotum    Generalized osteoarthrosis, unspecified site    Disorder of bone and  cartilage    Symptomatic menopausal or female climacteric states    Plantar fasciitis    Hyperlipidemia LDL goal <130    Obesity    Family history of abdominal aortic aneurysm    Former moderate cigarette smoker (10-19 per day)    Lumbar radiculopathy     Past Medical History:   Diagnosis Date    Disorder of bone and cartilage, unspecified     Generalized osteoarthrosis, unspecified site     back and shoulder    Hematuria     hx of blood in urine and kidney stones    IBS (irritable bowel syndrome) 2014    episode of IBS    Other malignant neoplasm of skin of trunk, except scrotum     Bowen's disease, 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    Squamous cell carcinoma     Vestibular neuritis         Past Surgical History:     Past Surgical History:   Procedure Laterality Date    BIOPSY      COLONOSCOPY  ,     COLONOSCOPY N/A 2017    Procedure: COLONOSCOPY;  Colonoscopy  ;  Surgeon: Lg Guerrero MD;  Location: [facility]    COLONOSCOPY N/A 03/15/2022    Procedure: COLONOSCOPY, FLEXIBLE, WITH LESION REMOVAL USING SNARE;  Surgeon: Carl Huggins MD;  Location: [facility]    COLONOSCOPY WITH CO2 INSUFFLATION N/A 03/15/2022    Procedure: COLONOSCOPY, WITH CO2 INSUFFLATION;  Surgeon: Carl Huggins MD;  Location: [facility] OR    ZZC NONSPECIFIC PROCEDURE      Bowen's disease removed X 2        Social History:     Social History     Tobacco Use    Smoking status: Former     Current packs/day: 0.00     Average packs/day: 0.5 packs/day for 37.0 years (18.5 ttl pk-yrs)     Types: Cigarettes     Start date: 10/20/1970     Quit date: 10/20/2007     Years since quittin.1    Smokeless tobacco: Never   Vaping Use    Vaping status: Never Used   Substance Use Topics    Alcohol use: Yes     Comment: minimal    Drug use: No        Family History:     Family History   Problem Relation Age of Onset    Eye Disorder Mother         glaucoma, wet macular degeneration    Lipids Mother     Gynecology Mother         hyst     Melanoma Mother     Skin Cancer Mother     Hyperlipidemia Mother     Other Cancer Mother         melanoma    Neuropathy Mother     Cancer Father         colon /prostate    Circulatory Father         amputee    Diabetes Father         type II    C.A.D. Father         MIs    Eye Disorder Father         dry macular degeneration    Cardiovascular Father         small AAA    Melanoma Father     Colon Cancer Father     Prostate Cancer Father     Other Cancer Father         melanoma    Gastrointestinal Disease Paternal Grandmother          from hepatitis    Neuropathy Sister     Arthritis Sister     Gynecology Sister         partial hyst  fibrous sheaths on ovary egg sized polyp uterine removed    Lipids Sister     Neurologic Disorder Sister         migraines    Diabetes Sister     Hyperlipidemia Sister     Other Cancer Sister         melanoma    Obesity Sister     Cardiovascular Brother         large 7 cm AAA    Coronary Artery Disease Brother         MI    Asthma Brother     Other Cancer Brother         melanoma, small cell carcinoma    Anxiety Disorder Brother         Medications:     Current Outpatient Medications   Medication Sig Dispense Refill    alpha-lipoic acid 100 MG capsule Take 600 mg by mouth daily      Apoaequorin (PREVAGEN PO) Take 1 tablet by mouth daily       benzonatate (TESSALON) 200 MG capsule TAKE 1 CAPSULE (200 MG) BY MOUTH 3 TIMES DAILY AS NEEDED FOR COUGH (Patient not taking: Reported on 2024) 45 capsule 1    estrogen conj-medroxyPROGESTERone (PREMPRO) 0.45-1.5 MG tablet TAKE ONE TABLET BY MOUTH THREE TIMES WEEKLY OR AS NEEDED TO CONTROL HOT FLASHES/MENOPAUSAL SYMPTOMS 28 tablet 5    lovastatin (MEVACOR) 40 MG tablet TAKE ONE TABLET BY MOUTH EVERY NIGHT AT BEDTIME 90 tablet 3    magnesium oxide 400 MG CAPS Take 400 mg by mouth daily      multivitamin w/minerals (THERA-VIT-M) tablet Take 1 tablet by mouth daily      omeprazole (PRILOSEC) 40 MG DR capsule TAKE 1 CAPSULE (40 MG) BY MOUTH  "DAILY 90 capsule 0    ondansetron (ZOFRAN-ODT) 4 MG ODT tab Take 1 tablet (4 mg) by mouth every 8 hours as needed for nausea 10 tablet 0    Specialty Vitamins Products (ICAPS LUTEIN & ZEAXANTHIN) TBEC Take 1 tablet by mouth every evening       No current facility-administered medications for this visit.        Allergies:     Allergies   Allergen Reactions    Adhesive Tape     Iodine Anaphylaxis     contrast dye    Monosodium Glutamate Nausea and Vomiting and Diarrhea    Penicillins Hives        Review of Systems:   As above     Physical Exam:   Vitals: /85 (BP Location: Right arm, Patient Position: Sitting, Cuff Size: Adult Regular)   Pulse 86   Ht 1.67 m (5' 5.75\")   Wt 96.6 kg (213 lb)   BMI 34.64 kg/m     General: Seated comfortably in no acute distress.  HEENT: Optic discs sharp on funduscopic exam.   Lungs: breathing comfortably  Neurologic:     Mental Status: Fully alert, attentive. Language normal, speech clear and fluent, no paraphasic errors.      Cranial Nerves: Visual fields intact. PERRL. EOMI with normal smooth pursuit. Facial sensation intact/symmetric. Facial movements symmetric. Hearing not formally tested but intact to conversation. Palate elevation symmetric, uvula midline. No dysarthria. Shoulder shrug strong bilaterally. Tongue protrusion midline.     Motor: No tremors or other abnormal movements observed. Muscle tone normal throughout. No pronator drift. Normal/symmetric rapid finger tapping. Strength 5/5 throughout upper and lower extremities. Limited testing of right hip flexion due to back pain.      Right Left   Shoulder abduction        5 5   Elbow extension 5 5   Elbow flexion 5 5   Wrist extension         5 5   Finger extension 5 5   ADM 5 5   FDI 5 5   APB 5 5   Hip flexion 5 5   Knee flexion 5 5   Knee extension 5 5   Dorsiflexion 5 5   Plantar flexion 5 5        Deep Tendon Reflexes: 2+/symmetric throughout upper and lower extremities. No clonus. Toes downgoing bilaterally.   "   Sensory: Decreased sensation to light touch in pads of feet and toes. Decreased temperature sensation in the bottoms of feet. Vibration is ~2-3 seconds in bilateral great toes. Negative Romberg.      Coordination: Finger-nose-finger and heel-shin intact without dysmetria.      Gait: Mildly wide based antalgic gait. Moderate issues with heel and toe gait. Not able to tandem.      Data reviewed on previous visits    Laboratory:  CBC/CMP normal (3/2022)  A1c 5.6 (12/2021)    Pertinent Investigations since last visit:   A1c 5.8, CMP with elevated glucose, normal ELP/immunofixation/B12 (3/2024)    The longitudinal plan of care for the diagnosis(es)/condition(s) as documented were addressed during this visit. Due to the added complexity in care, I will continue to support Wendy in the subsequent management and with ongoing continuity of care.    The total time of this encounter today amounted to 32 minutes. This time included time spent with the patient, prep work, ordering tests, and performing post visit documentation.

## 2024-12-11 ENCOUNTER — OFFICE VISIT (OUTPATIENT)
Dept: NEUROLOGY | Facility: CLINIC | Age: 74
End: 2024-12-11
Payer: COMMERCIAL

## 2024-12-11 VITALS
HEIGHT: 66 IN | HEART RATE: 86 BPM | DIASTOLIC BLOOD PRESSURE: 85 MMHG | WEIGHT: 213 LBS | BODY MASS INDEX: 34.23 KG/M2 | SYSTOLIC BLOOD PRESSURE: 124 MMHG

## 2024-12-11 DIAGNOSIS — G62.9 NEUROPATHY: Primary | ICD-10-CM

## 2024-12-11 NOTE — LETTER
12/11/2024      Tri Ingram  [address]  [facility] [address]      Dear Colleague,    Thank you for referring your patient, Tri Ingram, to the [facility] [place]. Please see a copy of my visit note below.    [facility] Neurology Follow Up Visit    Tri Ingram MRN# [id number]   Age: 74 year old YOB: 1950     Brief history of symptoms: The patient was initially seen in neurologic consultation on 3/6/2024 for evaluation of paresthesias. Please see the comprehensive neurologic consultation note from that date in the Epic records for details.          Assessment and Plan:   Assessment:  Tri Ingram is a 74 year old female who presents today for evaluation of suspected neuropathy. Symptoms started in 2023. Lab work is notable for prediabetes. History and examination are more suggestive of polyneuropathy, but patient also has lumbar spinal stenosis. EMG could be considered if symptoms worsen. She has had benefit with magnesium and alpha lipoic acid. She will try OTC lidocaine cream for toe pains.      Plan:  - Magnesium supplement for cramping, alpha lipoic acid, lidocaine cream     Follow up in Neurology clinic in 1 year or sooner if new issues arise     Eamon James MD   of Neurology  [facility]    ---------------------------------------------------------------------------------------------------------------------------           Interval History:   Cramps have moved a little into the calves. Magnesium and alpha-lipoic acid have helped. She does a vibrating foot massage, which helps. She is gets pains in the tip of the big toes, which comes and goes. It lasts for about a minute or two.      She gets a hot sensation in the hands, but no clear numbness/tingling/burning/pins/needles. She gets an aching pain in the first 3 fingers.      She has chronic back pain, but it doesn't radiate down the legs. It  is not every day. It is aggravated by certain activities.       Past Medical History:     Patient Active Problem List   Diagnosis     Other malignant neoplasm of skin of trunk, except scrotum     Generalized osteoarthrosis, unspecified site     Disorder of bone and cartilage     Symptomatic menopausal or female climacteric states     Plantar fasciitis     Hyperlipidemia LDL goal <130     Obesity     Family history of abdominal aortic aneurysm     Former moderate cigarette smoker (10-19 per day)     Lumbar radiculopathy     Past Medical History:   Diagnosis Date     Disorder of bone and cartilage, unspecified      Generalized osteoarthrosis, unspecified site     back and shoulder     Hematuria     hx of blood in urine and kidney stones     IBS (irritable bowel syndrome) 2014    episode of IBS     Other malignant neoplasm of skin of trunk, except scrotum     Bowen's disease, 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     Squamous cell carcinoma      Vestibular neuritis         Past Surgical History:     Past Surgical History:   Procedure Laterality Date     BIOPSY       COLONOSCOPY  ,      COLONOSCOPY N/A 2017    Procedure: COLONOSCOPY;  Colonoscopy  ;  Surgeon: Lg Guerrero MD;  Location: [facility]     COLONOSCOPY N/A 03/15/2022    Procedure: COLONOSCOPY, FLEXIBLE, WITH LESION REMOVAL USING SNARE;  Surgeon: Carl Huggins MD;  Location: [facility]     COLONOSCOPY WITH CO2 INSUFFLATION N/A 03/15/2022    Procedure: COLONOSCOPY, WITH CO2 INSUFFLATION;  Surgeon: Carl Huggins MD;  Location: [facility]     ZZC NONSPECIFIC PROCEDURE      Bowen's disease removed X 2        Social History:     Social History     Tobacco Use     Smoking status: Former     Current packs/day: 0.00     Average packs/day: 0.5 packs/day for 37.0 years (18.5 ttl pk-yrs)     Types: Cigarettes     Start date: 10/20/1970     Quit date: 10/20/2007     Years since quittin.1     Smokeless tobacco: Never   Vaping Use     Vaping  status: Never Used   Substance Use Topics     Alcohol use: Yes     Comment: minimal     Drug use: No        Family History:     Family History   Problem Relation Age of Onset     Eye Disorder Mother         glaucoma, wet macular degeneration     Lipids Mother      Gynecology Mother         hyst     Melanoma Mother      Skin Cancer Mother      Hyperlipidemia Mother      Other Cancer Mother         melanoma     Neuropathy Mother      Cancer Father         colon /prostate     Circulatory Father         amputee     Diabetes Father         type II     C.A.D. Father         MIs     Eye Disorder Father         dry macular degeneration     Cardiovascular Father         small AAA     Melanoma Father      Colon Cancer Father      Prostate Cancer Father      Other Cancer Father         melanoma     Gastrointestinal Disease Paternal Grandmother          from hepatitis     Neuropathy Sister      Arthritis Sister      Gynecology Sister         partial hyst  fibrous sheaths on ovary egg sized polyp uterine removed     Lipids Sister      Neurologic Disorder Sister         migraines     Diabetes Sister      Hyperlipidemia Sister      Other Cancer Sister         melanoma     Obesity Sister      Cardiovascular Brother         large 7 cm AAA     Coronary Artery Disease Brother         MI     Asthma Brother      Other Cancer Brother         melanoma, small cell carcinoma     Anxiety Disorder Brother         Medications:     Current Outpatient Medications   Medication Sig Dispense Refill     alpha-lipoic acid 100 MG capsule Take 600 mg by mouth daily       Apoaequorin (PREVAGEN PO) Take 1 tablet by mouth daily        benzonatate (TESSALON) 200 MG capsule TAKE 1 CAPSULE (200 MG) BY MOUTH 3 TIMES DAILY AS NEEDED FOR COUGH (Patient not taking: Reported on 2024) 45 capsule 1     estrogen conj-medroxyPROGESTERone (PREMPRO) 0.45-1.5 MG tablet TAKE ONE TABLET BY MOUTH THREE TIMES WEEKLY OR AS NEEDED TO CONTROL HOT FLASHES/MENOPAUSAL  "SYMPTOMS 28 tablet 5     lovastatin (MEVACOR) 40 MG tablet TAKE ONE TABLET BY MOUTH EVERY NIGHT AT BEDTIME 90 tablet 3     magnesium oxide 400 MG CAPS Take 400 mg by mouth daily       multivitamin w/minerals (THERA-VIT-M) tablet Take 1 tablet by mouth daily       omeprazole (PRILOSEC) 40 MG DR capsule TAKE 1 CAPSULE (40 MG) BY MOUTH DAILY 90 capsule 0     ondansetron (ZOFRAN-ODT) 4 MG ODT tab Take 1 tablet (4 mg) by mouth every 8 hours as needed for nausea 10 tablet 0     Specialty Vitamins Products (ICAPS LUTEIN & ZEAXANTHIN) TBEC Take 1 tablet by mouth every evening       No current facility-administered medications for this visit.        Allergies:     Allergies   Allergen Reactions     Adhesive Tape      Iodine Anaphylaxis     contrast dye     Monosodium Glutamate Nausea and Vomiting and Diarrhea     Penicillins Hives        Review of Systems:   As above     Physical Exam:   Vitals: /85 (BP Location: Right arm, Patient Position: Sitting, Cuff Size: Adult Regular)   Pulse 86   Ht 1.67 m (5' 5.75\")   Wt 96.6 kg (213 lb)   BMI 34.64 kg/m     General: Seated comfortably in no acute distress.  HEENT: Optic discs sharp on funduscopic exam.   Lungs: breathing comfortably  Neurologic:     Mental Status: Fully alert, attentive. Language normal, speech clear and fluent, no paraphasic errors.      Cranial Nerves: Visual fields intact. PERRL. EOMI with normal smooth pursuit. Facial sensation intact/symmetric. Facial movements symmetric. Hearing not formally tested but intact to conversation. Palate elevation symmetric, uvula midline. No dysarthria. Shoulder shrug strong bilaterally. Tongue protrusion midline.     Motor: No tremors or other abnormal movements observed. Muscle tone normal throughout. No pronator drift. Normal/symmetric rapid finger tapping. Strength 5/5 throughout upper and lower extremities. Limited testing of right hip flexion due to back pain.      Right Left   Shoulder abduction        5 5 "   Elbow extension 5 5   Elbow flexion 5 5   Wrist extension         5 5   Finger extension 5 5   ADM 5 5   FDI 5 5   APB 5 5   Hip flexion 5 5   Knee flexion 5 5   Knee extension 5 5   Dorsiflexion 5 5   Plantar flexion 5 5        Deep Tendon Reflexes: 2+/symmetric throughout upper and lower extremities. No clonus. Toes downgoing bilaterally.     Sensory: Decreased sensation to light touch in pads of feet and toes. Decreased temperature sensation in the bottoms of feet. Vibration is ~2-3 seconds in bilateral great toes. Negative Romberg.      Coordination: Finger-nose-finger and heel-shin intact without dysmetria.      Gait: Mildly wide based antalgic gait. Moderate issues with heel and toe gait. Not able to tandem.      Data reviewed on previous visits    Laboratory:  CBC/CMP normal (3/2022)  A1c 5.6 (12/2021)    Pertinent Investigations since last visit:   A1c 5.8, CMP with elevated glucose, normal ELP/immunofixation/B12 (3/2024)    The longitudinal plan of care for the diagnosis(es)/condition(s) as documented were addressed during this visit. Due to the added complexity in care, I will continue to support Wendy in the subsequent management and with ongoing continuity of care.    The total time of this encounter today amounted to 32 minutes. This time included time spent with the patient, prep work, ordering tests, and performing post visit documentation.      Again, thank you for allowing me to participate in the care of your patient.        Sincerely,        Eamon James MD

## 2024-12-11 NOTE — PROGRESS NOTES
Cramps have moved a little into the calves. Magnesium and alpha-lipoic acid have helped. She does a vibrating foot massage, which helps. She is gets pains in the tip of the big toes, which comes and goes. It lasts for about a minute or two.     She gets a hot sensation in the hands, but no clear numbness/tingling/burning/pins/needles. She gets an aching pain in the first 3 fingers.     She has chronic back pain, but it doesn't radiate down the legs. It is not every day. It is aggravated by certain activities.

## 2024-12-11 NOTE — NURSING NOTE
"Tri Ingram's goals for this visit include:   Chief Complaint   Patient presents with    RECHECK     Neuropathy         She requests these members of her care team be copied on today's visit information: yes    PCP: Mirna Child    Referring Provider:  Eamon James MD  [address] [place] [id number]    /85 (BP Location: Right arm, Patient Position: Sitting, Cuff Size: Adult Regular)   Pulse 86   Ht 1.67 m (5' 5.75\")   Wt 96.6 kg (213 lb)   BMI 34.64 kg/m      Do you need any medication refills at today's visit? No  FRANCY Mccloud., VICKI ([facility])      "

## 2025-03-03 ENCOUNTER — OFFICE VISIT (OUTPATIENT)
Dept: FAMILY MEDICINE | Facility: OTHER | Age: 75
End: 2025-03-03
Payer: COMMERCIAL

## 2025-03-03 VITALS
RESPIRATION RATE: 17 BRPM | BODY MASS INDEX: 34.55 KG/M2 | HEIGHT: 66 IN | SYSTOLIC BLOOD PRESSURE: 130 MMHG | TEMPERATURE: 97.8 F | WEIGHT: 215 LBS | HEART RATE: 66 BPM | DIASTOLIC BLOOD PRESSURE: 77 MMHG | OXYGEN SATURATION: 98 %

## 2025-03-03 DIAGNOSIS — E66.811 CLASS 1 OBESITY DUE TO EXCESS CALORIES WITHOUT SERIOUS COMORBIDITY WITH BODY MASS INDEX (BMI) OF 34.0 TO 34.9 IN ADULT: ICD-10-CM

## 2025-03-03 DIAGNOSIS — H25.13 AGE-RELATED NUCLEAR CATARACT OF BOTH EYES: ICD-10-CM

## 2025-03-03 DIAGNOSIS — R73.09 ABNORMAL BLOOD SUGAR: ICD-10-CM

## 2025-03-03 DIAGNOSIS — R06.83 SNORING: ICD-10-CM

## 2025-03-03 DIAGNOSIS — E78.5 HYPERLIPIDEMIA LDL GOAL <130: ICD-10-CM

## 2025-03-03 DIAGNOSIS — E66.09 CLASS 1 OBESITY DUE TO EXCESS CALORIES WITHOUT SERIOUS COMORBIDITY WITH BODY MASS INDEX (BMI) OF 34.0 TO 34.9 IN ADULT: ICD-10-CM

## 2025-03-03 DIAGNOSIS — Z01.818 PREOP GENERAL PHYSICAL EXAM: Primary | ICD-10-CM

## 2025-03-03 DIAGNOSIS — R91.1 SOLITARY PULMONARY NODULE: ICD-10-CM

## 2025-03-03 PROCEDURE — 99214 OFFICE O/P EST MOD 30 MIN: CPT | Performed by: FAMILY MEDICINE

## 2025-03-03 PROCEDURE — 3075F SYST BP GE 130 - 139MM HG: CPT | Performed by: FAMILY MEDICINE

## 2025-03-03 PROCEDURE — 3078F DIAST BP <80 MM HG: CPT | Performed by: FAMILY MEDICINE

## 2025-03-03 PROCEDURE — 1126F AMNT PAIN NOTED NONE PRSNT: CPT | Performed by: FAMILY MEDICINE

## 2025-03-03 RX ORDER — BROMFENAC 1.03 MG/ML
SOLUTION/ DROPS OPHTHALMIC
COMMUNITY
Start: 2025-02-26

## 2025-03-03 RX ORDER — PREDNISOLONE ACETATE 10 MG/ML
SUSPENSION/ DROPS OPHTHALMIC
COMMUNITY
Start: 2025-02-26

## 2025-03-03 ASSESSMENT — PAIN SCALES - GENERAL: PAINLEVEL_OUTOF10: NO PAIN (0)

## 2025-03-03 NOTE — PROGRESS NOTES
Preoperative Evaluation  [facility]  [address]  [facility] [address]  Phone: [phone]  Fax: [phone]  Primary Provider: Mirna Child MD, MD  Pre-op Performing Provider: Mirna Child MD, MD  Mar 3, 2025             3/1/2025   Surgical Information   What procedure is being done? cataract surgery   Facility or Hospital where procedure/surgery will be performed: [place], [place]   Who is doing the procedure / surgery? loco carlos md   Date of surgery / procedure: 3/13/2025 right eye   3/27/2025 left eye   Time of surgery / procedure: tbd on both procedures   Where do you plan to recover after surgery? at home with family     Fax number for surgical facility: [phone] [facility]     Assessment & Plan     The proposed surgical procedure is considered LOW risk.        ICD-10-CM    1. Preop general physical exam  Z01.818       2. Age-related nuclear cataract of both eyes  H25.13       3. Hyperlipidemia LDL goal <130  E78.5 Lipid panel reflex to direct LDL Non-fasting      4. Class 1 obesity due to excess calories without serious comorbidity with body mass index (BMI) of 34.0 to 34.9 in adult  E66.811 Basic metabolic panel  (Ca, Cl, CO2, Creat, Gluc, K, Na, BUN)    E66.09     Z68.34       5. Solitary pulmonary nodule  R91.1       6. Abnormal blood sugar  R73.09 Basic metabolic panel  (Ca, Cl, CO2, Creat, Gluc, K, Na, BUN)      7. Snoring  R06.83 Adult Sleep Eval & Management  Referral          Has been well and her cough is appearing to be from her pulmonary nodule. No acute illness suspected. Been mild and typically not neeidng meds.   Planning to return fasting to be able to update cholesterol and blood sugars.  Following pulm nodules as well with the cough - stable.  Plans sleep study for her high risk sleep apnea symptoms.    No LOS data to display   Time spent by me today doing chart review, history and exam,  documentation and further activities per the note    Mirna Child MD       Possible Sleep Apnea: screening positive, will order the sleep study       3/3/2025     9:43 AM   STOP-Bang Total Score   Total Score 5   Risk Stratification 5 - 8: High Risk for HOMA           - No identified additional risk factors other than previously addressed    Preoperative Medication Instructions  Antiplatelet or Anticoagulation Medication Instructions   - We reviewed the medication list and the patient is not on an antiplatelet or anticoagulation medications.    Additional Medication Instructions   - ibuprofen (Advil, Motrin): DO NOT TAKE 1 day before surgery.     Recommendation  Approval given to proceed with proposed procedure, without further diagnostic evaluation.    Heaven Nguyen is a 75 year old, presenting for the following:  Pre-Op Exam          3/3/2025     9:12 AM   Additional Questions   Roomed by deana   Accompanied by self     HPI: cataracts          3/1/2025   Pre-Op Questionnaire   Have you ever had a heart attack or stroke? No   Have you ever had surgery on your heart or blood vessels, such as a stent placement, a coronary artery bypass, or surgery on an artery in your head, neck, heart, or legs? No   Do you have chest pain with activity? No   Do you have a history of heart failure? No   Do you currently have a cold, bronchitis or symptoms of other infection? No   Do you have a cough, shortness of breath, or wheezing? (!) YES chronic and stable, thought to be related to pulm nodule   Do you or anyone in your family have previous history of blood clots? No   Do you or does anyone in your family have a serious bleeding problem such as prolonged bleeding following surgeries or cuts? No   Have you ever had problems with anemia or been told to take iron pills? No   Have you had any abnormal blood loss such as black, tarry or bloody stools, or abnormal vaginal bleeding? No   Have you ever had a blood transfusion? No   Are  you willing to have a blood transfusion if it is medically needed before, during, or after your surgery? Yes   Have you or any of your relatives ever had problems with anesthesia? (!) YES gets really nauseated after typically    Do you have sleep apnea, excessive snoring or daytime drowsiness? (!) YES - snores a lot, has heard she may stop breathing, feels rested in the am as she awakes   Do you have a CPAP machine? (!) NO not yet diagnosed, ordered testign   Do you have any artifical heart valves or other implanted medical devices like a pacemaker, defibrillator, or continuous glucose monitor? No   Do you have artificial joints? No   Are you allergic to latex? (!) YES     Health Care Directive  Patient does not have a Health Care Directive: Discussed advance care planning with patient; information given to patient to review.    Preoperative Review of    reviewed - no record of controlled substances prescribed.          Patient Active Problem List    Diagnosis Date Noted     Solitary pulmonary nodule 03/03/2025     Priority: Medium     Lumbar radiculopathy 06/28/2021     Priority: Medium     Former moderate cigarette smoker (10-19 per day) 03/18/2015     Priority: Medium     Stopped 2007       Family history of abdominal aortic aneurysm 07/31/2012     Priority: Medium     Father in late 60s, brother in late 50s, both smokers;  Patient is former smoker (36 years), discussed AAA screening at age 67 with Welcome to Medicare exam       Obesity 07/27/2011     Priority: Medium     Body mass index is 32.23 kg/(m^2).         Hyperlipidemia LDL goal <130 07/26/2011     Priority: Medium     Plantar fasciitis 11/21/2008     Priority: Medium     Symptomatic menopausal or female climacteric states 08/30/2005     Priority: Medium     Other malignant neoplasm of skin of trunk, except scrotum 09/09/2004     Priority: Medium     Bowen's disease, 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  Problem list name updated by automated  process. Provider to review and confirm       Generalized osteoarthrosis, unspecified site 09/09/2004     Priority: Medium     back and shoulder       Disorder of bone and cartilage 09/09/2004     Priority: Medium     Very mild osteopenia, was originally put on Fosamax in addition to hormones in 2002, improved diet and discontinued smoking, taken of Fosamax in 2008 (femoral T-score -1.1, lumbar scorre normal) with plans to repeat DEXA at age 63-65.          Problem list name updated by automated process. Provider to review        Past Medical History:   Diagnosis Date     Disorder of bone and cartilage, unspecified      Generalized osteoarthrosis, unspecified site     back and shoulder     Hematuria     hx of blood in urine and kidney stones     IBS (irritable bowel syndrome) 11/14/2014    episode of IBS     Other malignant neoplasm of skin of trunk, except scrotum 1998    Bowen's disease, 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     Squamous cell carcinoma      Vestibular neuritis      Past Surgical History:   Procedure Laterality Date     BIOPSY  2020,2023     COLONOSCOPY  2001, 2007     COLONOSCOPY N/A 11/03/2017    Procedure: COLONOSCOPY;  Colonoscopy  ;  Surgeon: Lg Guerrero MD;  Location: [facility]     COLONOSCOPY N/A 03/15/2022    Procedure: COLONOSCOPY, FLEXIBLE, WITH LESION REMOVAL USING SNARE;  Surgeon: Carl Huggins MD;  [facility]     COLONOSCOPY WITH CO2 INSUFFLATION N/A 03/15/2022    Procedure: COLONOSCOPY, WITH CO2 INSUFFLATION;  Surgeon: Carl Huggins MD;  Location: [facility]     ZZC NONSPECIFIC PROCEDURE      Bowen's disease removed X 2     Current Outpatient Medications   Medication Sig Dispense Refill     alpha-lipoic acid 100 MG capsule Take 600 mg by mouth daily       Apoaequorin (PREVAGEN PO) Take 1 tablet by mouth daily        benzonatate (TESSALON) 200 MG capsule TAKE 1 CAPSULE (200 MG) BY MOUTH 3 TIMES DAILY AS NEEDED FOR COUGH 45 capsule 1     bromfenac (BROMDAY) 0.09 % ophthalmic  "solution        estrogen conj-medroxyPROGESTERone (PREMPRO) 0.45-1.5 MG tablet TAKE ONE TABLET BY MOUTH THREE TIMES WEEKLY OR AS NEEDED TO CONTROL HOT FLASHES/MENOPAUSAL SYMPTOMS 28 tablet 5     lovastatin (MEVACOR) 40 MG tablet TAKE ONE TABLET BY MOUTH EVERY NIGHT AT BEDTIME 90 tablet 3     magnesium oxide 400 MG CAPS Take 400 mg by mouth daily       multivitamin w/minerals (THERA-VIT-M) tablet Take 1 tablet by mouth daily       omeprazole (PRILOSEC) 40 MG DR capsule TAKE ONE CAPSULE BY MOUTH ONCE DAILY 90 capsule 0     ondansetron (ZOFRAN-ODT) 4 MG ODT tab Take 1 tablet (4 mg) by mouth every 8 hours as needed for nausea 10 tablet 0     prednisoLONE acetate (PRED FORTE) 1 % ophthalmic suspension        Specialty Vitamins Products (ICAPS LUTEIN & ZEAXANTHIN) TBEC Take 1 tablet by mouth every evening         Allergies   Allergen Reactions     Adhesive Tape      Iodine Anaphylaxis     contrast dye     Monosodium Glutamate Nausea and Vomiting and Diarrhea     Penicillins Hives        Social History     Tobacco Use     Smoking status: Former     Current packs/day: 0.00     Average packs/day: 0.5 packs/day for 37.0 years (18.5 ttl pk-yrs)     Types: Cigarettes     Start date: 10/20/1970     Quit date: 10/20/2007     Years since quittin.3     Smokeless tobacco: Never   Substance Use Topics     Alcohol use: Yes     Comment: minimal       History   Drug Use No       Cough related to stable pulmonary nodule  No illness.        Review of Systems  Constitutional, HEENT, cardiovascular, pulmonary, GI, , musculoskeletal, neuro, skin, endocrine and psych systems are negative, except as otherwise noted.    Objective    /77   Pulse 66   Temp 97.8  F (36.6  C) (Temporal)   Resp 17   Ht 1.67 m (\")   Wt 97.5 kg (215 lb)   SpO2 98%   BMI 34.97 kg/m     Estimated body mass index is 34.97 kg/m  as calculated from the following:    Height as of this encounter: 1.67 m (5' \").    Weight as of this encounter: " 97.5 kg (215 lb).  Physical Exam  GENERAL: alert and no distress  EYES: Eyes grossly normal to inspection, PERRL and conjunctivae and sclerae normal  HENT: ear canals and TM's normal, nose and mouth without ulcers or lesions  NECK: no adenopathy, no asymmetry, masses, or scars  RESP: lungs clear to auscultation - no rales, rhonchi or wheezes  CV: regular rate and rhythm, normal S1 S2, no S3 or S4, no murmur, click or rub, no peripheral edema  ABDOMEN: soft, nontender, no hepatosplenomegaly, no masses and bowel sounds normal  MS: no gross musculoskeletal defects noted, no edema  SKIN: no suspicious lesions or rashes  NEURO: Normal strength and tone, mentation intact and speech normal  PSYCH: mentation appears normal, affect normal/bright    Recent Labs   Lab Test 03/14/24  0812 03/06/24  1020   HGB  --  13.8   PLT  --  209    141   POTASSIUM 5.0 4.8   CR 0.97* 0.94   A1C  --  5.8*        Diagnostics  No labs were ordered during this visit.   No EKG required for low risk surgery (cataract, skin procedure, breast biopsy, etc).    Revised Cardiac Risk Index (RCRI)  The patient has the following serious cardiovascular risks for perioperative complications:   - No serious cardiac risks = 0 points     RCRI Interpretation: 0 points: Class I (very low risk - 0.4% complication rate)         Signed Electronically by: Mirna Child MD, MD  A copy of this evaluation report is provided to the requesting physician.

## 2025-03-03 NOTE — PATIENT INSTRUCTIONS
How to Take Your Medication Before Surgery  Preoperative Medication Instructions   Antiplatelet or Anticoagulation Medication Instructions   - We reviewed the medication list and the patient is not on an antiplatelet or anticoagulation medications.    Additional Medication Instructions   - ibuprofen (Advil, Motrin): DO NOT TAKE 1 day before surgery.        Patient Education   Preparing for Your Surgery  For Adults  Getting started  In most cases, a nurse will call to review your health history and instructions. They will give you an arrival time based on your scheduled surgery time. Please be ready to share:  Your doctor's clinic name and phone number  Your medical, surgical, and anesthesia history  A list of allergies and sensitivities  A list of medicines, including herbal treatments and over-the-counter drugs  Whether the patient has a legal guardian (ask how to send us the papers in advance)  Note: You may not receive a call if you were seen at our PAC (Preoperative Assessment Center).  Please tell us if you're pregnant--or if there's any chance you might be pregnant. Some surgeries may injure a fetus (unborn baby), so they require a pregnancy test. Surgeries that are safe for a fetus don't always need a test, and you can choose whether to have one.   Preparing for surgery  Within 10 to 30 days of surgery: Have a pre-op exam (sometimes called an H&P, or History and Physical). This can be done at a clinic or pre-operative center.  If you're having a , you may not need this exam. Talk to your care team.  At your pre-op exam, talk to your care team about all medicines you take. (This includes CBD oil and any drugs, such as THC, marijuana, and other forms of cannabis.) If you need to stop any medicine before surgery, ask when to start taking it again.  This is for your safety. Many medicines and drugs can make you bleed too much during surgery. Some change how well surgery (anesthesia) drugs work.  Call  your insurance company to let them know you're having surgery. (If you don't have insurance, call [phone].)  Call your clinic if there's any change in your health. This includes a scrape or scratch near the surgery site, or any signs of a cold (sore throat, runny nose, cough, rash, fever).  Eating and drinking guidelines  For your safety: Unless your surgeon tells you otherwise, follow the guidelines below.  Eat and drink as normal until 8 hours before you arrive for surgery. After that, no food or milk. You can spit out gum when you arrive.  Drink clear liquids until 2 hours before you arrive. These are liquids you can see through, like water, Gatorade, and Propel Water. They also include plain black coffee and tea (no cream or milk).  No alcohol for 24 hours before you arrive. The night before surgery, stop any drinks that contain THC.  If your care team tells you to take medicine on the morning of surgery, it's okay to take it with a sip of water. No other medicines or drugs are allowed (including CBD oil)--follow your care team's instructions.  If you have questions the day of surgery, call your hospital or surgery center.   Preventing infection  Shower or bathe the night before and the morning of surgery. Follow the instructions your clinic gave you. (If no instructions, use regular soap.)  Don't shave or clip hair near your surgery site. We'll remove the hair if needed.  Don't smoke or vape the morning of surgery. No chewing tobacco for 6 hours before you arrive. A nicotine patch is okay. You may spit out nicotine gum when you arrive.  For some surgeries, the surgeon will tell you to fully quit smoking and nicotine.  We will make every effort to keep you safe from infection. We will:  Clean our hands often with soap and water (or an alcohol-based hand rub).  Clean the skin at your surgery site with a special soap that kills germs.  Give you a special gown to keep you warm. (Cold raises the risk of  infection.)  Wear hair covers, masks, gowns, and gloves during surgery.  Give antibiotic medicine, if prescribed. Not all surgeries need this medicine.  What to bring on the day of surgery  Photo ID and insurance card  Copy of your health care directive, if you have one  Glasses and hearing aids (bring cases)  You can't wear contacts during surgery  Inhaler and eye drops, if you use them (tell us about these when you arrive)  CPAP machine or breathing device, if you use them  A few personal items, if spending the night  If you have . . .  A pacemaker, ICD (cardiac defibrillator), or other implant: Bring the ID card.  An implanted stimulator: Bring the remote control.  A legal guardian: Bring a copy of the certified (court-stamped) guardianship papers.  Please remove any jewelry, including body piercings. Leave jewelry and other valuables at home.  If you're going home the day of surgery  You must have a responsible adult drive you home. They should stay with you overnight as well.  If you don't have someone to stay with you, and you aren't safe to go home alone, we may keep you overnight. Insurance often won't pay for this.  After surgery  If it's hard to control your pain or you need more pain medicine, please call your surgeon's office.  Questions?   If you have any questions for your care team, list them here:   ____________________________________________________________________________________________________________________________________________________________________________________________________________________________________________________________  For informational purposes only. Not to replace the advice of your health care provider. Copyright   2003, 2019 [facility]. All rights reserved. Clinically reviewed by José Miguel Cisneros MD. Danger [id number] - REV 08/24.

## 2025-03-18 ENCOUNTER — LAB (OUTPATIENT)
Dept: LAB | Facility: CLINIC | Age: 75
End: 2025-03-18
Payer: COMMERCIAL

## 2025-03-18 DIAGNOSIS — R73.09 ABNORMAL BLOOD SUGAR: ICD-10-CM

## 2025-03-18 DIAGNOSIS — E66.09 CLASS 1 OBESITY DUE TO EXCESS CALORIES WITHOUT SERIOUS COMORBIDITY WITH BODY MASS INDEX (BMI) OF 34.0 TO 34.9 IN ADULT: ICD-10-CM

## 2025-03-18 DIAGNOSIS — E78.5 HYPERLIPIDEMIA LDL GOAL <130: ICD-10-CM

## 2025-03-18 DIAGNOSIS — E66.811 CLASS 1 OBESITY DUE TO EXCESS CALORIES WITHOUT SERIOUS COMORBIDITY WITH BODY MASS INDEX (BMI) OF 34.0 TO 34.9 IN ADULT: ICD-10-CM

## 2025-03-18 LAB
ANION GAP SERPL CALCULATED.3IONS-SCNC: 9 MMOL/L (ref 7–15)
BUN SERPL-MCNC: 16.8 MG/DL (ref 8–23)
CALCIUM SERPL-MCNC: 9.6 MG/DL (ref 8.8–10.4)
CHLORIDE SERPL-SCNC: 105 MMOL/L (ref 98–107)
CHOLEST SERPL-MCNC: 145 MG/DL
CREAT SERPL-MCNC: 0.98 MG/DL (ref 0.51–0.95)
EGFRCR SERPLBLD CKD-EPI 2021: 61 ML/MIN/1.73M2
FASTING STATUS PATIENT QL REPORTED: YES
FASTING STATUS PATIENT QL REPORTED: YES
GLUCOSE SERPL-MCNC: 136 MG/DL (ref 70–99)
HCO3 SERPL-SCNC: 27 MMOL/L (ref 22–29)
HDLC SERPL-MCNC: 45 MG/DL
LDLC SERPL CALC-MCNC: 67 MG/DL
NONHDLC SERPL-MCNC: 100 MG/DL
POTASSIUM SERPL-SCNC: 4.9 MMOL/L (ref 3.4–5.3)
SODIUM SERPL-SCNC: 141 MMOL/L (ref 135–145)
TRIGL SERPL-MCNC: 165 MG/DL

## 2025-03-18 PROCEDURE — 80048 BASIC METABOLIC PNL TOTAL CA: CPT

## 2025-03-18 PROCEDURE — 80061 LIPID PANEL: CPT

## 2025-03-18 PROCEDURE — 36415 COLL VENOUS BLD VENIPUNCTURE: CPT

## 2025-03-26 SDOH — HEALTH STABILITY: PHYSICAL HEALTH: ON AVERAGE, HOW MANY DAYS PER WEEK DO YOU ENGAGE IN MODERATE TO STRENUOUS EXERCISE (LIKE A BRISK WALK)?: 1 DAY

## 2025-03-26 SDOH — HEALTH STABILITY: PHYSICAL HEALTH: ON AVERAGE, HOW MANY MINUTES DO YOU ENGAGE IN EXERCISE AT THIS LEVEL?: 20 MIN

## 2025-03-26 ASSESSMENT — SOCIAL DETERMINANTS OF HEALTH (SDOH): HOW OFTEN DO YOU GET TOGETHER WITH FRIENDS OR RELATIVES?: ONCE A WEEK

## 2025-03-31 ENCOUNTER — OFFICE VISIT (OUTPATIENT)
Dept: FAMILY MEDICINE | Facility: OTHER | Age: 75
End: 2025-03-31
Payer: COMMERCIAL

## 2025-03-31 VITALS
BODY MASS INDEX: 34.23 KG/M2 | OXYGEN SATURATION: 98 % | HEIGHT: 66 IN | WEIGHT: 213 LBS | TEMPERATURE: 97.8 F | DIASTOLIC BLOOD PRESSURE: 81 MMHG | RESPIRATION RATE: 15 BRPM | SYSTOLIC BLOOD PRESSURE: 125 MMHG | HEART RATE: 74 BPM

## 2025-03-31 DIAGNOSIS — N95.1 SYMPTOMATIC MENOPAUSAL OR FEMALE CLIMACTERIC STATES: ICD-10-CM

## 2025-03-31 DIAGNOSIS — R73.09 BLOOD GLUCOSE ABNORMAL: ICD-10-CM

## 2025-03-31 DIAGNOSIS — E78.5 HYPERLIPIDEMIA LDL GOAL <130: ICD-10-CM

## 2025-03-31 DIAGNOSIS — K21.00 GASTROESOPHAGEAL REFLUX DISEASE WITH ESOPHAGITIS WITHOUT HEMORRHAGE: ICD-10-CM

## 2025-03-31 DIAGNOSIS — Z00.00 ENCOUNTER FOR MEDICARE ANNUAL WELLNESS EXAM: Primary | ICD-10-CM

## 2025-03-31 LAB
EST. AVERAGE GLUCOSE BLD GHB EST-MCNC: 123 MG/DL
HBA1C MFR BLD: 5.9 % (ref 0–5.6)

## 2025-03-31 PROCEDURE — 99214 OFFICE O/P EST MOD 30 MIN: CPT | Mod: 25 | Performed by: FAMILY MEDICINE

## 2025-03-31 PROCEDURE — 83036 HEMOGLOBIN GLYCOSYLATED A1C: CPT | Performed by: FAMILY MEDICINE

## 2025-03-31 PROCEDURE — G0439 PPPS, SUBSEQ VISIT: HCPCS | Performed by: FAMILY MEDICINE

## 2025-03-31 PROCEDURE — 3074F SYST BP LT 130 MM HG: CPT | Performed by: FAMILY MEDICINE

## 2025-03-31 PROCEDURE — 1126F AMNT PAIN NOTED NONE PRSNT: CPT | Performed by: FAMILY MEDICINE

## 2025-03-31 PROCEDURE — G2211 COMPLEX E/M VISIT ADD ON: HCPCS | Performed by: FAMILY MEDICINE

## 2025-03-31 PROCEDURE — 36415 COLL VENOUS BLD VENIPUNCTURE: CPT | Performed by: FAMILY MEDICINE

## 2025-03-31 PROCEDURE — 3079F DIAST BP 80-89 MM HG: CPT | Performed by: FAMILY MEDICINE

## 2025-03-31 RX ORDER — OMEPRAZOLE 40 MG/1
40 CAPSULE, DELAYED RELEASE ORAL DAILY
Qty: 90 CAPSULE | Refills: 3 | Status: SHIPPED | OUTPATIENT
Start: 2025-03-31

## 2025-03-31 RX ORDER — LOVASTATIN 20 MG/1
TABLET ORAL
Qty: 90 TABLET | Refills: 3 | Status: SHIPPED | OUTPATIENT
Start: 2025-03-31

## 2025-03-31 RX ORDER — LOVASTATIN 40 MG/1
TABLET ORAL
Qty: 90 TABLET | Refills: 3 | OUTPATIENT
Start: 2025-03-31

## 2025-03-31 ASSESSMENT — PAIN SCALES - GENERAL: PAINLEVEL_OUTOF10: NO PAIN (0)

## 2025-03-31 NOTE — PATIENT INSTRUCTIONS
Patient Education   Preventive Care Advice   This is general advice given by our system to help you stay healthy. However, your care team may have specific advice just for you. Please talk to your care team about your preventive care needs.  Nutrition  Eat 5 or more servings of fruits and vegetables each day.  Try wheat bread, brown rice and whole grain pasta (instead of white bread, rice, and pasta).  Get enough calcium and vitamin D. Check the label on foods and aim for 100% of the RDA (recommended daily allowance).  Lifestyle  Exercise at least 150 minutes each week  (30 minutes a day, 5 days a week).  Do muscle strengthening activities 2 days a week. These help control your weight and prevent disease.  No smoking.  Wear sunscreen to prevent skin cancer.  Have a dental exam and cleaning every 6 months.  Yearly exams  See your health care team every year to talk about:  Any changes in your health.  Any medicines your care team has prescribed.  Preventive care, family planning, and ways to prevent chronic diseases.  Shots (vaccines)   HPV shots (up to age 26), if you've never had them before.  Hepatitis B shots (up to age 59), if you've never had them before.  COVID-19 shot: Get this shot when it's due.  Flu shot: Get a flu shot every year.  Tetanus shot: Get a tetanus shot every 10 years.  Pneumococcal, hepatitis A, and RSV shots: Ask your care team if you need these based on your risk.  Shingles shot (for age 50 and up)  General health tests  Diabetes screening:  Starting at age 35, Get screened for diabetes at least every 3 years.  If you are younger than age 35, ask your care team if you should be screened for diabetes.  Cholesterol test: At age 39, start having a cholesterol test every 5 years, or more often if advised.  Bone density scan (DEXA): At age 50, ask your care team if you should have this scan for osteoporosis (brittle bones).  Hepatitis C: Get tested at least once in your life.  STIs (sexually  transmitted infections)  Before age 24: Ask your care team if you should be screened for STIs.  After age 24: Get screened for STIs if you're at risk. You are at risk for STIs (including HIV) if:  You are sexually active with more than one person.  You don't use condoms every time.  You or a partner was diagnosed with a sexually transmitted infection.  If you are at risk for HIV, ask about PrEP medicine to prevent HIV.  Get tested for HIV at least once in your life, whether you are at risk for HIV or not.  Cancer screening tests  Cervical cancer screening: If you have a cervix, begin getting regular cervical cancer screening tests starting at age 21.  Breast cancer scan (mammogram): If you've ever had breasts, begin having regular mammograms starting at age 40. This is a scan to check for breast cancer.  Colon cancer screening: It is important to start screening for colon cancer at age 45.  Have a colonoscopy test every 10 years (or more often if you're at risk) Or, ask your provider about stool tests like a FIT test every year or Cologuard test every 3 years.  To learn more about your testing options, visit:   .  For help making a decision, visit:   https://bit.ly/jn59995.  Prostate cancer screening test: If you have a prostate, ask your care team if a prostate cancer screening test (PSA) at age 55 is right for you.  Lung cancer screening: If you are a current or former smoker ages 50 to 80, ask your care team if ongoing lung cancer screenings are right for you.  For informational purposes only. Not to replace the advice of your health care provider. Copyright   2023 [facility]. All rights reserved. Clinically reviewed by the [facility]. [employer] [id number] - REV 01/24.  Preventing Falls: Care Instructions  Injuries and health problems such as trouble walking or poor eyesight can increase your risk of falling. So can some medicines. But there are things you can do to help  "prevent falls. You can exercise to get stronger. You can also arrange your home to make it safer.    Talk to your doctor about the medicines you take. Ask if any of them increase the risk of falls and whether they can be changed or stopped.   Try to exercise regularly. It can help improve your strength and balance. This can help lower your risk of falling.         Practice fall safety and prevention.   Wear low-heeled shoes that fit well and give your feet good support. Talk to your doctor if you have foot problems that make this hard.  Carry a cellphone or wear a medical alert device that you can use to call for help.  Use stepladders instead of chairs to reach high objects. Don't climb if you're at risk for falls. Ask for help, if needed.  Wear the correct eyeglasses, if you need them.        Make your home safer.   Remove rugs, cords, clutter, and furniture from walkways.  Keep your house well lit. Use night-lights in hallways and bathrooms.  Install and use sturdy handrails on stairways.  Wear nonskid footwear, even inside. Don't walk barefoot or in socks without shoes.        Be safe outside.   Use handrails, curb cuts, and ramps whenever possible.  Keep your hands free by using a shoulder bag or backpack.  Try to walk in well-lit areas. Watch out for uneven ground, changes in pavement, and debris.  Be careful in the winter. Walk on the grass or gravel when sidewalks are slippery. Use de-icer on steps and walkways. Add non-slip devices to shoes.    Put grab bars and nonskid mats in your shower or tub and near the toilet. Try to use a shower chair or bath bench when bathing.   Get into a tub or shower by putting in your weaker leg first. Get out with your strong side first. Have a phone or medical alert device in the bathroom with you.   Where can you learn more?  Go to https://www.[employer].net/patiented  Enter G117 in the search box to learn more about \"Preventing Falls: Care Instructions.\"  Current as of: " July 31, 2024  Content Version: 14.4    7273-2641 [employer].   Care instructions adapted under license by your healthcare professional. If you have questions about a medical condition or this instruction, always ask your healthcare professional. [employer] disclaims any warranty or liability for your use of this information.    Hearing Loss: Care Instructions  Overview     Hearing loss is a sudden or slow decrease in how well you hear. It can range from slight to profound. Permanent hearing loss can occur with aging. It also can happen when you are exposed long-term to loud noise. Examples include listening to loud music, riding motorcycles, or being around other loud machines.  Hearing loss can affect your work and home life. It can make you feel lonely or depressed. You may feel that you have lost your independence. But hearing aids and other devices can help you hear better and feel connected to others.  Follow-up care is a key part of your treatment and safety. Be sure to make and go to all appointments, and call your doctor if you are having problems. It's also a good idea to know your test results and keep a list of the medicines you take.  How can you care for yourself at home?  Avoid loud noises whenever possible. This helps keep your hearing from getting worse.  Always wear hearing protection around loud noises.  Wear a hearing aid as directed.  A professional can help you pick a hearing aid that will work best for you.  You can also get hearing aids over the counter for mild to moderate hearing loss.  Have hearing tests as your doctor suggests. They can show whether your hearing has changed. Your hearing aid may need to be adjusted.  Use other devices as needed. These may include:  Telephone amplifiers and hearing aids that can connect to a television, stereo, radio, or microphone.  Devices that use lights or vibrations. These alert you to the doorbell, a ringing telephone, or a  "baby monitor.  Television closed-captioning. This shows the words at the bottom of the screen. Most new TVs can do this.  TTY (text telephone). This lets you type messages back and forth on the telephone instead of talking or listening. These devices are also called TDD. When messages are typed on the keyboard, they are sent over the phone line to a receiving TTY. The message is shown on a monitor.  Use text messaging, social media, and email if it is hard for you to communicate by telephone.  Try to learn a listening technique called speechreading. It is not lipreading. You pay attention to people's gestures, expressions, posture, and tone of voice. These clues can help you understand what a person is saying. Face the person you are talking to, and have them face you. Make sure the lighting is good. You need to see the other person's face clearly.  Think about counseling if you need help to adjust to your hearing loss.  When should you call for help?  Watch closely for changes in your health, and be sure to contact your doctor if:    You think your hearing is getting worse.     You have new symptoms, such as dizziness or nausea.   Where can you learn more?  Go to https://www.[employer].net/patiented  Enter R798 in the search box to learn more about \"Hearing Loss: Care Instructions.\"  Current as of: October 27, 2024  Content Version: 14.4    0324-9351 [employer].   Care instructions adapted under license by your healthcare professional. If you have questions about a medical condition or this instruction, always ask your healthcare professional. [employer] disclaims any warranty or liability for your use of this information.    Learning About Stress  What is stress?     Stress is your body's response to a hard situation. Your body can have a physical, emotional, or mental response. Stress is a fact of life for most people, and it affects everyone differently. What causes stress for you may not " be stressful for someone else.  A lot of things can cause stress. You may feel stress when you go on a job interview, take a test, or run a race. This kind of short-term stress is normal and even useful. It can help you if you need to work hard or react quickly. For example, stress can help you finish an important job on time.  Long-term stress is caused by ongoing stressful situations or events. Examples of long-term stress include long-term health problems, ongoing problems at work, or conflicts in your family. Long-term stress can harm your health.  How does stress affect your health?  When you are stressed, your body responds as though you are in danger. It makes hormones that speed up your heart, make you breathe faster, and give you a burst of energy. This is called the fight-or-flight stress response. If the stress is over quickly, your body goes back to normal and no harm is done.  But if stress happens too often or lasts too long, it can have bad effects. Long-term stress can make you more likely to get sick, and it can make symptoms of some diseases worse. If you tense up when you are stressed, you may develop neck, shoulder, or low back pain. Stress is linked to high blood pressure and heart disease.  Stress also harms your emotional health. It can make you khan, tense, or depressed. Your relationships may suffer, and you may not do well at work or school.  What can you do to manage stress?  You can try these things to help manage stress:   Do something active. Exercise or activity can help reduce stress. Walking is a great way to get started. Even everyday activities such as housecleaning or yard work can help.  Try yoga or tony chi. These techniques combine exercise and meditation. You may need some training at first to learn them.  Do something you enjoy. For example, listen to music or go to a movie. Practice your hobby or do volunteer work.  Meditate. This can help you relax, because you are not  "worrying about what happened before or what may happen in the future.  Do guided imagery. Imagine yourself in any setting that helps you feel calm. You can use online videos, books, or a teacher to guide you.  Do breathing exercises. For example:  From a standing position, bend forward from the waist with your knees slightly bent. Let your arms dangle close to the floor.  Breathe in slowly and deeply as you return to a standing position. Roll up slowly and lift your head last.  Hold your breath for just a few seconds in the standing position.  Breathe out slowly and bend forward from the waist.  Let your feelings out. Talk, laugh, cry, and express anger when you need to. Talking with supportive friends or family, a counselor, or a ep[employer]io leader about your feelings is a healthy way to relieve stress. Avoid discussing your feelings with people who make you feel worse.  Write. It may help to write about things that are bothering you. This helps you find out how much stress you feel and what is causing it. When you know this, you can find better ways to cope.  What can you do to prevent stress?  You might try some of these things to help prevent stress:  Manage your time. This helps you find time to do the things you want and need to do.  Get enough sleep. Your body recovers from the stresses of the day while you are sleeping.  Get support. Your family, friends, and community can make a difference in how you experience stress.  Limit your news feed. Avoid or limit time on social media or news that may make you feel stressed.  Do something active. Exercise or activity can help reduce stress. Walking is a great way to get started.  Where can you learn more?  Go to https://www.[employer].net/patiented  Enter N032 in the search box to learn more about \"Learning About Stress.\"  Current as of: October 24, 2024  Content Version: 14.4    3501-6891 [facility].   Care instructions adapted under license by your " healthcare professional. If you have questions about a medical condition or this instruction, always ask your healthcare professional. [employer] disclaims any warranty or liability for your use of this information.    Learning About Sleeping Well  What does sleeping well mean?     Sleeping well means getting enough sleep to feel good and stay healthy. How much sleep is enough varies among people.  The number of hours you sleep and how you feel when you wake up are both important. If you do not feel refreshed, you probably need more sleep. Another sign of not getting enough sleep is feeling tired during the day.  Experts recommend that adults get at least 7 or more hours of sleep per day. Children and older adults need more sleep.  Why is getting enough sleep important?  Getting enough quality sleep is a basic part of good health. When your sleep suffers, your physical health, mood, and your thoughts can suffer too. You may find yourself feeling more grumpy or stressed. Not getting enough sleep also can lead to serious problems, including injury, accidents, anxiety, and depression.  What might cause poor sleeping?  Many things can cause sleep problems, including:  Changes to your sleep schedule.  Stress. Stress can be caused by fear about a single event, such as giving a speech. Or you may have ongoing stress, such as worry about work or school.  Depression, anxiety, and other mental or emotional conditions.  Changes in your sleep habits or surroundings. This includes changes that happen where you sleep, such as noise, light, or sleeping in a different bed. It also includes changes in your sleep pattern, such as having jet lag or working a late shift.  Health problems, such as pain, breathing problems, and restless legs syndrome.  Lack of regular exercise.  Using alcohol, nicotine, or caffeine before bed.  How can you help yourself?  Here are some tips that may help you sleep more soundly and wake up  "feeling more refreshed.  Your sleeping area   Use your bedroom only for sleeping and sex. A bit of light reading may help you fall asleep. But if it doesn't, do your reading elsewhere in the house. Try not to use your TV, computer, smartphone, or tablet while you are in bed.  Be sure your bed is big enough to stretch out comfortably, especially if you have a sleep partner.  Keep your bedroom quiet, dark, and cool. Use curtains, blinds, or a sleep mask to block out light. To block out noise, use earplugs, soothing music, or a \"white noise\" machine.  Your evening and bedtime routine   Create a relaxing bedtime routine. You might want to take a warm shower or bath, or listen to soothing music.  Go to bed at the same time every night. And get up at the same time every morning, even if you feel tired.  What to avoid   Limit caffeine (coffee, tea, caffeinated sodas) during the day, and don't have any for at least 6 hours before bedtime.  Avoid drinking alcohol before bedtime. Alcohol can cause you to wake up more often during the night.  Try not to smoke or use tobacco, especially in the evening. Nicotine can keep you awake.  Limit naps during the day, especially close to bedtime.  Avoid lying in bed awake for too long. If you can't fall asleep or if you wake up in the middle of the night and can't get back to sleep within about 20 minutes, get out of bed and go to another room until you feel sleepy.  Avoid taking medicine right before bed that may keep you awake or make you feel hyper or energized. Your doctor can tell you if your medicine may do this and if you can take it earlier in the day.  If you can't sleep   Imagine yourself in a peaceful, pleasant scene. Focus on the details and feelings of being in a place that is relaxing.  Get up and do a quiet or boring activity until you feel sleepy.  Avoid drinking any liquids before going to bed to help prevent waking up often to use the bathroom.  Where can you learn " "more?  Go to https://www.[employer].net/patiented  Enter J942 in the search box to learn more about \"Learning About Sleeping Well.\"  Current as of: July 31, 2024  Content Version: 14.4 2024-2025 [employer].   Care instructions adapted under license by your healthcare professional. If you have questions about a medical condition or this instruction, always ask your healthcare professional. [employer] disclaims any warranty or liability for your use of this information.    Bladder Training: Care Instructions  Your Care Instructions     Bladder training is used to treat urge incontinence and stress incontinence. Urge incontinence means that the need to urinate comes on so fast that you can't get to a toilet in time. Stress incontinence means that you leak urine because of pressure on your bladder. For example, it may happen when you laugh, cough, or lift something heavy.  Bladder training can increase how long you can wait before you have to urinate. It can also help your bladder hold more urine. And it can give you better control over the urge to urinate.  It is important to remember that bladder training takes a few weeks to a few months to make a difference. You may not see results right away, but don't give up.  Follow-up care is a key part of your treatment and safety. Be sure to make and go to all appointments, and call your doctor if you are having problems. It's also a good idea to know your test results and keep a list of the medicines you take.  How can you care for yourself at home?  Work with your doctor to come up with a bladder training program that is right for you. You may use one or more of the following methods.  Delayed urination  In the beginning, try to keep from urinating for 5 minutes after you first feel the need to go.  While you wait, take deep, slow breaths to relax. Kegel exercises can also help you delay the need to go to the bathroom.  After some practice, when " "you can easily wait 5 minutes to urinate, try to wait 10 minutes before you urinate.  Slowly increase the waiting period until you are able to control when you have to urinate.  Scheduled urination  Empty your bladder when you first wake up in the morning.  Schedule times throughout the day when you will urinate.  Start by going to the bathroom every hour, even if you don't need to go.  Slowly increase the time between trips to the bathroom.  When you have found a schedule that works well for you, keep doing it.  If you wake up during the night and have to urinate, do it. Apply your schedule to waking hours only.  Kegel exercises  These tighten and strengthen pelvic muscles, which can help you control the flow of urine. (If doing these exercises causes pain, stop doing them and talk with your doctor.) To do Kegel exercises:  Squeeze your muscles as if you were trying not to pass gas. Or squeeze your muscles as if you were stopping the flow of urine. Your belly, legs, and buttocks shouldn't move.  Hold the squeeze for 3 seconds, then relax for 5 to 10 seconds.  Start with 3 seconds, then add 1 second each week until you are able to squeeze for 10 seconds.  Repeat the exercise 10 times a session. Do 3 to 8 sessions a day.  When should you call for help?  Watch closely for changes in your health, and be sure to contact your doctor if:    Your incontinence is getting worse.     You do not get better as expected.   Where can you learn more?  Go to https://www.[employer].net/patiented  Enter V684 in the search box to learn more about \"Bladder Training: Care Instructions.\"  Current as of: April 30, 2024  Content Version: 14.4    7500-7295 [employer].   Care instructions adapted under license by your healthcare professional. If you have questions about a medical condition or this instruction, always ask your healthcare professional. [employer] disclaims any warranty or liability for your use of this " information.  The 10-year ASCVD risk score (Luiz MURILLO, et al., 2019) is: 12%    Values used to calculate the score:      Age: 75 years      Sex: Female      Is Non- : No      Diabetic: No      Tobacco smoker: No      Systolic Blood Pressure: 125 mmHg      Is BP treated: No      HDL Cholesterol: 45 mg/dL      Total Cholesterol: 145 mg/dL

## 2025-03-31 NOTE — PROGRESS NOTES
Preventive Care Visit  [facility]  Mirna Child MD, MD, Family Medicine  Mar 31, 2025      Assessment & Plan         ICD-10-CM    1. Encounter for Medicare annual wellness exam  Z00.00       2. Gastroesophageal reflux disease with esophagitis without hemorrhage  K21.00 omeprazole (PRILOSEC) 40 MG DR capsule      3. Hyperlipidemia LDL goal <130  E78.5 lovastatin (MEVACOR) 20 MG tablet      4. SYMPTOMATIC FEMALE CLIMACTERIC STATE  N95.1 estrogen conj-medroxyPROGESTERone (PREMPRO) 0.3-1.5 MG tablet      5. Blood glucose abnormal  R73.09 Hemoglobin A1c     Hemoglobin A1c          Consent was obtained from the patient to use an AI documentation tool in the creation of this note.    Assessment & Plan  Gastroesophageal reflux disease with esophagitis without hemorrhage:  - Reflux symptoms managed with prilosec, which has been effective.  - Continue current dose of prilosec and monitor for any changes in symptoms.    Hyperlipidemia LDL goal <130:  - LDL levels are within the goal range, but muscle cramping suggests possible statin side effects.  - Reduce statin dose from 40 mg to 20 mg. Consider CoQ10 supplementation if cramping persists. Monitor symptoms and adjust treatment as needed. Re-evaluate cholesterol levels and symptoms in follow-up visits.    Symptomatic menopausal or female climacteric states:  - Hot flashes are not significantly impacting life. Previous attempts to discontinue prempro led to concentration issues.  - Reduce prempro dose. Gradually decrease frequency of dosing. Monitor for changes in symptoms and adjust as necessary. Discussed potential increased risk of heart attack, stroke, and breast cancer with hormone therapy. Consider non-hormonal alternatives if symptoms persist.    Blood glucose abnormal:  - Blood sugar level of 136 mg/dL, indicating potential diabetes.  - Order A1c test to confirm diagnosis. Consider lifestyle changes and diabetic education program if needed.  "Monitor blood sugar levels and adjust treatment plan based on A1c results.      I spent a total of 30 minutes on the day of the visit.   Time spent by me today doing chart review, history and exam, documentation and further activities per the note    Mirna Child MD     Patient has been advised of split billing requirements and indicates understanding: Yes        BMI  Estimated body mass index is 34.64 kg/m  as calculated from the following:    Height as of this encounter: 1.67 m (5' 5.75\").    Weight as of this encounter: 96.6 kg (213 lb).   Weight management plan: Discussed healthy diet and exercise guidelines    Counseling  Appropriate preventive services were addressed with this patient via screening, questionnaire, or discussion as appropriate for fall prevention, nutrition, physical activity, Tobacco-use cessation, social engagement, weight loss and cognition.  Checklist reviewing preventive services available has been given to the patient.  Reviewed patient's diet, addressing concerns and/or questions.   She is at risk for lack of exercise and has been provided with information to increase physical activity for the benefit of her well-being.   The patient was instructed to see the dentist every 6 months.   She is at risk for psychosocial distress and has been provided with information to reduce risk.   Discussed possible causes of fatigue. The patient was provided with written information regarding signs of hearing loss.   Information on urinary incontinence and treatment options given to patient.           Heaven Nguyen is a 75 year old, presenting for the following:  Physical        3/31/2025    10:01 AM   Additional Questions   Roomed by deana   Accompanied by self         3/31/2025    10:01 AM   Patient Reported Additional Medications   Patient reports taking the following new medications BromFenac Ophthalmic solution 0.09%    Prednisolone Acetate Ophthalmic Suspenison 1%           HPI  Ezetimibe " instead of a statin - Recently had cataract surgery on both eyes, with the last surgery on Thursday, March 27, 2025.  - Experiencing emotional distress, feeling sad, depressed, and anxious.  - Concerns about mortality, both her own and her 's.  - Eyes are still weeping post-surgery, using multiple eye drops including antibiotic and anti-inflammatory.  - Reports snoring has stopped since starting eye drops.  - Experiencing muscle cramping, particularly in the calves and big toe, with painful knots.  - Has a history of neuropathy, shared with her mother and sister.  - Reports a bad mammogram, categorized as benign category 2, with ongoing monitoring.  - Previously attempted to wean off hormone therapy, experienced issues with concentration and focus.        Advance Care Planning  Patient does not have a Health Care Directive: Patient states has Advance Directive and will bring in a copy to clinic.      3/26/2025   General Health   How would you rate your overall physical health? Good   Feel stress (tense, anxious, or unable to sleep) Rather much   (!) STRESS CONCERN      3/26/2025   Nutrition   Diet: Regular (no restrictions)         3/26/2025   Exercise   Days per week of moderate/strenous exercise 1 day   Average minutes spent exercising at this level 20 min   (!) EXERCISE CONCERN      3/26/2025   Social Factors   Frequency of gathering with friends or relatives Once a week   Worry food won't last until get money to buy more No   Food not last or not have enough money for food? No   Do you have housing? (Housing is defined as stable permanent housing and does not include staying ouside in a car, in a tent, in an abandoned building, in an overnight shelter, or couch-surfing.) Yes   Are you worried about losing your housing? No   Lack of transportation? No   Unable to get utilities (heat,electricity)? No         3/31/2025   Fall Risk   Gait Speed Test (Document in seconds) 4.62   Gait Speed Test Interpretation  Less than or equal to 5.00 seconds - PASS          3/26/2025   Activities of Daily Living- Home Safety   Needs help with the following daily activites None of the above   Safety concerns in the home None of the above         3/26/2025   Dental   Dentist two times every year? (!) NO         3/26/2025   Hearing Screening   Hearing concerns? (!) IT'S HARD TO FOLLOW A CONVERSATION IN A NOISY RESTAURANT OR CROWDED ROOM.    (!) TROUBLE UNDERSTANDING SOFT OR WHISPERED SPEECH.       Multiple values from one day are sorted in reverse-chronological order         3/26/2025   Driving Risk Screening   Patient/family members have concerns about driving No         3/26/2025   General Alertness/Fatigue Screening   Have you been more tired than usual lately? (!) YES         3/26/2025   Urinary Incontinence Screening   Bothered by leaking urine in past 6 months Yes           3/22/2024   TB Screening   Were you born outside of the [place]? No           Today's PHQ-2 Score:       3/31/2025     9:55 AM   PHQ-2 (  [employer])   Q1: Little interest or pleasure in doing things 1   Q2: Feeling down, depressed or hopeless 1   PHQ-2 Score 2    Q1: Little interest or pleasure in doing things Several days   Q2: Feeling down, depressed or hopeless Several days   PHQ-2 Score 2       Patient-reported           3/26/2025   Substance Use   Alcohol more than 3/day or more than 7/wk No   Do you have a current opioid prescription? No   How severe/bad is pain from 1 to 10? 2/10   Do you use any other substances recreationally? No     Social History     Tobacco Use    Smoking status: Former     Current packs/day: 0.00     Average packs/day: 0.5 packs/day for 37.0 years (18.5 ttl pk-yrs)     Types: Cigarettes     Start date: 10/20/1970     Quit date: 10/20/2007     Years since quittin.4    Smokeless tobacco: Never   Vaping Use    Vaping status: Never Used   Substance Use Topics    Alcohol use: Yes     Comment: minimal    Drug use: No           2024    LAST FHS-7 RESULTS   1st degree relative breast or ovarian cancer No   Any relative bilateral breast cancer No   Any male have breast cancer No   Any ONE woman have BOTH breast AND ovarian cancer No   Any woman with breast cancer before 50yrs No   2 or more relatives with breast AND/OR ovarian cancer No   2 or more relatives with breast AND/OR bowel cancer --    No       Multiple values from one day are sorted in reverse-chronological order        Mammogram Screening - Mammogram every 1-2 years updated in Health Maintenance based on mutual decision making    ASCVD Risk   The 10-year ASCVD risk score (Luiz MURILLO, et al., 2019) is: 12%    Values used to calculate the score:      Age: 75 years      Sex: Female      Is Non- : No      Diabetic: No      Tobacco smoker: No      Systolic Blood Pressure: 125 mmHg      Is BP treated: No      HDL Cholesterol: 45 mg/dL      Total Cholesterol: 145 mg/dL            Reviewed and updated as needed this visit by Provider   Tobacco  Allergies  Meds  Problems  Med Hx  Surg Hx  Fam Hx              Current providers sharing in care for this patient include:  Patient Care Team:  Mirna Child MD as PCP - General (Family Medicine)  Lacy Ch MD as MD (Dermatology)  Eamon James MD as Assigned Neuroscience Provider  Mirna Child MD as Assigned PCP  Delores Hutchins PA-C as Physician Assistant (Dermatology)  Delores Hutchins PA-C as Assigned Dermatology Provider    The following health maintenance items are reviewed in Epic and correct as of today:  Health Maintenance   Topic Date Due    COVID-19 Vaccine (8 - 2024-25 season) 04/22/2025    MAMMO SCREENING  07/31/2025    ANNUAL REVIEW OF HM ORDERS  03/03/2026    LIPID  03/18/2026    MEDICARE ANNUAL WELLNESS VISIT  03/31/2026    FALL RISK ASSESSMENT  03/31/2026    DTAP/TDAP/TD IMMUNIZATION (2 - Td or Tdap) 04/18/2026    COLORECTAL CANCER SCREENING  03/15/2027    DIABETES SCREENING   "03/31/2028    ADVANCE CARE PLANNING  03/03/2030    DEXA  05/23/2032    HEPATITIS C SCREENING  Completed    PHQ-2 (once per calendar year)  Completed    INFLUENZA VACCINE  Completed    Pneumococcal Vaccine: 50+ Years  Completed    ZOSTER IMMUNIZATION  Completed    RSV VACCINE  Completed    HPV IMMUNIZATION  Aged Out    MENINGITIS IMMUNIZATION  Aged Out    LUNG CANCER SCREENING  Discontinued         Review of Systems  Constitutional, HEENT, cardiovascular, pulmonary, GI, , musculoskeletal, neuro, skin, endocrine and psych systems are negative, except as otherwise noted.     Objective    Exam  /81   Pulse 74   Temp 97.8  F (36.6  C) (Temporal)   Resp 15   Ht 1.67 m (5' 5.75\")   Wt 96.6 kg (213 lb)   SpO2 98%   BMI 34.64 kg/m     Estimated body mass index is 34.64 kg/m  as calculated from the following:    Height as of this encounter: 1.67 m (5' 5.75\").    Weight as of this encounter: 96.6 kg (213 lb).    Physical Exam  GENERAL: alert and no distress  RESP: lungs clear to auscultation - no rales, rhonchi or wheezes  CV: regular rate and rhythm, normal S1 S2, no S3 or S4, no murmur, click or rub, no peripheral edema  ABDOMEN: soft, nontender, no hepatosplenomegaly, no masses and bowel sounds normal  MS: no gross musculoskeletal defects noted, no edema  SKIN: no suspicious lesions or rashes  NEURO: Normal strength and tone, mentation intact and speech normal  PSYCH: mentation appears normal and appears a little on edge for tears        3/31/2025   Mini Cog   Clock Draw Score 2 Normal   3 Item Recall 3 objects recalled   Mini Cog Total Score 5              Signed Electronically by: Mirna Child MD, MD    "

## 2025-04-14 ENCOUNTER — DOCUMENTATION ONLY (OUTPATIENT)
Dept: OTHER | Facility: CLINIC | Age: 75
End: 2025-04-14
Payer: COMMERCIAL

## 2025-05-21 ENCOUNTER — TRANSFERRED RECORDS (OUTPATIENT)
Dept: HEALTH INFORMATION MANAGEMENT | Facility: CLINIC | Age: 75
End: 2025-05-21
Payer: COMMERCIAL

## 2025-05-21 ENCOUNTER — ANCILLARY PROCEDURE (OUTPATIENT)
Dept: CT IMAGING | Facility: CLINIC | Age: 75
End: 2025-05-21
Attending: THORACIC SURGERY (CARDIOTHORACIC VASCULAR SURGERY)
Payer: COMMERCIAL

## 2025-05-21 DIAGNOSIS — R91.1 SOLITARY PULMONARY NODULE: ICD-10-CM

## 2025-05-21 PROCEDURE — 71250 CT THORAX DX C-: CPT

## 2025-06-27 ASSESSMENT — SLEEP AND FATIGUE QUESTIONNAIRES
HOW LIKELY ARE YOU TO NOD OFF OR FALL ASLEEP WHILE SITTING QUIETLY AFTER LUNCH WITHOUT ALCOHOL: WOULD NEVER DOZE
HOW LIKELY ARE YOU TO NOD OFF OR FALL ASLEEP WHILE LYING DOWN TO REST IN THE AFTERNOON WHEN CIRCUMSTANCES PERMIT: HIGH CHANCE OF DOZING
HOW LIKELY ARE YOU TO NOD OFF OR FALL ASLEEP WHEN YOU ARE A PASSENGER IN A CAR FOR AN HOUR WITHOUT A BREAK: MODERATE CHANCE OF DOZING
HOW LIKELY ARE YOU TO NOD OFF OR FALL ASLEEP WHILE SITTING INACTIVE IN A PUBLIC PLACE: SLIGHT CHANCE OF DOZING
HOW LIKELY ARE YOU TO NOD OFF OR FALL ASLEEP IN A CAR, WHILE STOPPED FOR A FEW MINUTES IN TRAFFIC: WOULD NEVER DOZE
HOW LIKELY ARE YOU TO NOD OFF OR FALL ASLEEP WHILE SITTING AND TALKING TO SOMEONE: WOULD NEVER DOZE
HOW LIKELY ARE YOU TO NOD OFF OR FALL ASLEEP WHILE WATCHING TV: MODERATE CHANCE OF DOZING
HOW LIKELY ARE YOU TO NOD OFF OR FALL ASLEEP WHILE SITTING AND READING: SLIGHT CHANCE OF DOZING

## 2025-07-01 ENCOUNTER — OFFICE VISIT (OUTPATIENT)
Dept: SLEEP MEDICINE | Facility: CLINIC | Age: 75
End: 2025-07-01
Attending: FAMILY MEDICINE
Payer: COMMERCIAL

## 2025-07-01 ENCOUNTER — PATIENT OUTREACH (OUTPATIENT)
Dept: CARE COORDINATION | Facility: CLINIC | Age: 75
End: 2025-07-01

## 2025-07-01 VITALS
OXYGEN SATURATION: 98 % | SYSTOLIC BLOOD PRESSURE: 121 MMHG | HEIGHT: 66 IN | DIASTOLIC BLOOD PRESSURE: 78 MMHG | HEART RATE: 72 BPM | BODY MASS INDEX: 34.91 KG/M2 | RESPIRATION RATE: 20 BRPM | WEIGHT: 217.2 LBS

## 2025-07-01 DIAGNOSIS — R06.83 SNORING: Primary | ICD-10-CM

## 2025-07-01 DIAGNOSIS — R35.1 NOCTURIA: ICD-10-CM

## 2025-07-01 DIAGNOSIS — R40.0 DAYTIME SLEEPINESS: ICD-10-CM

## 2025-07-01 DIAGNOSIS — R06.81 WITNESSED APNEIC SPELLS: ICD-10-CM

## 2025-07-01 DIAGNOSIS — E66.811 OBESITY (BMI 30.0-34.9): ICD-10-CM

## 2025-07-01 PROCEDURE — 1126F AMNT PAIN NOTED NONE PRSNT: CPT

## 2025-07-01 PROCEDURE — 99204 OFFICE O/P NEW MOD 45 MIN: CPT

## 2025-07-01 PROCEDURE — 3078F DIAST BP <80 MM HG: CPT

## 2025-07-01 PROCEDURE — 3074F SYST BP LT 130 MM HG: CPT

## 2025-07-01 NOTE — PATIENT INSTRUCTIONS
"        MY TREATMENT INFORMATION FOR SLEEP APNEA-  [facility]    DOCTOR : HEBERT Marinelli CNP    Am I having a sleep study at a sleep center?  --->Due to normal delays, you will be contacted within 2-4 weeks to schedule    Am I having a home sleep study?  --->Watch the video for the device you are using:    -/drop off device- https://www.[employer].com/watch?v=yGGFBdELGhk    Frequently asked questions:  1. What is Obstructive Sleep Apnea (HOMA)? HOMA is the most common type of sleep apnea. Apnea means, \"without breath.\"  Apnea is most often caused by narrowing or collapse of the upper airway as muscles relax during sleep.   Almost everyone has occasional apneas. Most people with sleep apnea have had brief interruptions at night frequently for many years.  The severity of sleep apnea is related to how frequent and severe the events are.   2. What are the consequences of HOMA? Symptoms include: feeling sleepy during the day, snoring loudly, gasping or stopping of breathing, trouble sleeping, and occasionally morning headaches or heartburn at night.  Sleepiness can be serious and even increase the risk of falling asleep while driving. Other health consequences may include development of high blood pressure and other cardiovascular disease in persons who are susceptible. Untreated HOMA can contribute to heart disease, stroke and diabetes.   3. What are the treatment options? In most situations, sleep apnea is a lifelong disease that must be managed with daily therapy. Medications are not effective for sleep apnea and surgery is generally not considered until other therapies have been tried. Your treatment is your choice . Continuous Positive Airway (CPAP) works right away and is the therapy that is effective in nearly everyone. An oral device to hold your jaw forward is usually the next most reliable option. Other options include postioning devices (to keep you off your back), weight loss, and surgery " including a tongue pacing device. There is more detail about some of these options below.  4. Are my sleep studies covered by insurance? Although we will request verification of coverage, we advise you also check in advance of the study to ensure there is coverage.    Important tips for those choosing CPAP and similar devices  REMEMBER-IF YOU RECEIVE A CALL FROM [phone]--> IT IS TO SETUP A DEVICE  For new devices, sign up for device LEONCIO to monitor your device for your followup visits  We encourage you to utilize the [employer] leoncio or website (https://Epiphyte/) to monitor your therapy progress and share the data with your healthcare team when you discuss your sleep apnea.                                                    Know your equipment:  CPAP is continuous positive airway pressure that prevents obstructive sleep apnea by keeping the throat from collapsing while you are sleeping. In most cases, the device is  smart  and can slowly self-adjusts if your throat collapses and keeps a record every day of how well you are treated-this information is available to you and your care team.  BPAP is bilevel positive airway pressure that keeps your throat open and also assists each breath with a pressure boost to maintain adequate breathing.  Special kinds of BPAP are used in patients who have inadequate breathing from lung or heart disease. In most cases, the device is  smart  and can slowly self-adjusts to assist breathing. Like CPAP, the device keeps a record of how well you are treated.  Your mask is your connection to the device. You get to choose what feels most comfortable and the staff will help to make sure if fits. Here: are some examples of the different masks that are available: Magnetic mask aids may assist with use but there are safety issues that should be addressed when considering with magnets* (see end of discussion).       Key points to remember on your journey with sleep apnea:  Sleep  study.  PAP devices often need to be adjusted during a sleep study to show that they are effective and adjusted right.  Good tips to remember: Try wearing just the mask during a quiet time during the day so your body adapts to wearing it. A humidifier is recommended for comfort in most cases to prevent drying of your nose and throat. Allergy medication from your provider may help you if you are having nasal congestion.  Getting settled-in. It takes more than one night for most of us to get used to wearing a mask. Try wearing just the mask during a quiet time during the day so your body adapts to wearing it. A humidifier is recommended for comfort in most cases. Our team will work with you carefully on the first day and will be in contact within 4 days and again at 2 and 4 weeks for advice and remote device adjustments. Your therapy is evaluated by the device each day.   Use it every night. The more you are able to sleep naturally for 7-8 hours, the more likely you will have good sleep and to prevent health risks or symptoms from sleep apnea. Even if you use it 4 hours it helps. Occasionally all of us are unable to use a medical therapy, in sleep apnea, it is not dangerous to miss one night.   Communicate. Call our skilled team on the number provided on the first day if your visit for problems that make it difficult to wear the device. Over 2 out of 3 patients can learn to wear the device long-term with help from our team. Remember to call our team or your sleep providers if you are unable to wear the device as we may have other solutions for those who cannot adapt to mask CPAP therapy. It is recommended that you sleep your sleep provider within the first 3 months and yearly after that if you are not having problems.   Use it for your health. We encourage use of CPAP masks during daytime quiet periods to allow your face and brain to adapt to the sensation of CPAP so that it will be a more natural sensation to awaken  to at night or during naps. This can be very useful during the first few weeks or months of adapting to CPAP though it does not help medically to wear CPAP during wakefulness and  should not be used as a strategy just to meet guidelines.  Take care of your equipment. Make sure you clean your mask and tubing using directions every day and that your filter and mask are replaced as recommended or if they are not working.     *Masks with magnets:  Updated Contraindications  Masks with magnetic components are contraindicated for use by patients where they, or anyone in close physical contact while using the mask, have the following:   Active medical implants that interact with magnets (i.e., pacemakers, implantable cardioverter defibrillators (ICD), neurostimulators, cerebrospinal fluid (CSF) shunts, insulin/infusion pumps)   Metallic implants/objects containing ferromagnetic material (i.e., aneurysm clips/flow disruption devices, embolic coils, stents, valves, electrodes, implants to restore hearing or balance with implanted magnets, ocular implants, metallic splinters in the eye)  Updated Warning  Keep the mask magnets at a safe distance of at least 6 inches (150 mm) away from implants or medical devices that may be adversely affected by magnetic interference. This warning applies to you or anyone in close physical contact with your mask. The magnets are in the frame and lower headgear clips, with a magnetic field strength of up to 400mT. When worn, they connect to secure the mask but may inadvertently detach while asleep.  Implants/medical devices, including those listed within contraindications, may be adversely affected if they change function under external magnetic fields or contain ferromagnetic materials that attract/repel to magnetic fields (some metallic implants, e.g., contact lenses with metal, dental implants, metallic cranial plates, screws, saeed hole covers, and bone substitute devices). Consult your  physician and  of your implant / other medical device for information on the potential adverse effects of magnetic fields.    BESIDES CPAP, WHAT OTHER THERAPIES ARE THERE?    Positioning Device  Positioning devices are generally used when sleep apnea is mild and only occurs on your back.This example shows a pillow that straps around the waist. It may be appropriate for those whose sleep study shows milder sleep apnea that occurs primarily when lying flat on one's back. Preliminary studies have shown benefit but effectiveness at home may need to be verified by a home sleep test. These devices are generally not covered by medical insurance.  Examples of devices that maintain sleeping on the back to prevent snoring and mild sleep apnea.    Belt type body positioner  http://[employer].CultureIQ/    Electronic reminder  http://nightshifttherapy.com/            Oral Appliance  What is oral appliance therapy?  An oral appliance device fits on your teeth at night like a retainer used after having braces. The device is made by a specialized dentist and requires several visits over 1-2 months before a manufactured device is made to fit your teeth and is adjusted to prevent your sleep apnea. Once an oral device is working properly, snoring should be improved. A home sleep test may be recommended at that time if to determine whether the sleep apnea is adequately treated.       Some things to remember:  -Oral devices are often, but not always, covered by your medical insurance. Be sure to check with your insurance provider.   -If you are referred for oral therapy, you will be given a list of specialized dentists to consider or you may choose to visit the Web site of the American Academy of Dental Sleep Medicine  -Oral devices are less likely to work if you have severe sleep apnea or are extremely overweight.     More detailed information  An oral appliance is a small acrylic device that fits over the upper and lower teeth   (similar to a retainer or a mouth guard). This device slightly moves jaw forward, which moves the base of the tongue forward, opens the airway, improves breathing for effective treat snoring and obstructive sleep apnea in perhaps 7 out of 10 people .  The best working devices are custom-made by a dental device  after a mold is made of the teeth 1, 2, 3.  When is an oral appliance indicated?  Oral appliance therapy is recommended as a first-line treatment for patients with primary snoring, mild sleep apnea, and for patients with moderate sleep apnea who prefer appliance therapy to use of CPAP4, 5. Severity of sleep apnea is determined by sleep testing and is based on the number of respiratory events per hour of sleep.   How successful is oral appliance therapy?  The success rate of oral appliance therapy in patients with mild sleep apnea is 75-80% while in patients with moderate sleep apnea it is 50-70%. The chance of success in patients with severe sleep apnea is 40-50%. The research also shows that oral appliances have a beneficial effect on the cardiovascular health of HOMA patients at the same magnitude as CPAP therapy7.  Oral appliances should be a second-line treatment in cases of severe sleep apnea, but if not completely successful then a combination therapy utilizing CPAP plus oral appliance therapy may be effective. Oral appliances tend to be effective in a broad range of patients although studies show that the patients who have the highest success are females, younger patients, those with milder disease, and less severe obesity. 3, 6.   Finding a dentist that practices dental sleep medicine  Specific training is available through the American Academy of Dental Sleep Medicine for dentists interested in working in the field of sleep. To find a dentist who is educated in the field of sleep and the use of oral appliances, near you, visit the Web site of the American Academy of Dental Sleep  Medicine.    References  1. Chele et al. Objectively measured vs self-reported compliance during oral appliance therapy for sleep-disordered breathing. Chest 2013; 144(5): 0937-7430.  2. Regine et al. Objective measurement of compliance during oral appliance therapy for sleep-disordered breathing. Thorax 2013; 68(1): 91-96.  3. Abdi et al. Mandibular advancement devices in 620 men and women with HOMA and snoring: tolerability and predictors of treatment success. Chest 2004; 125: 2243-0565.  4. Cristóbal et al. Oral appliances for snoring and HOMA: a review. Sleep 2006; 29: 244-262.  5. Miguel et al. Oral appliance treatment for HOMA: an update. J Clin Sleep Med 2014; 10(2): 215-227.  6. Kelsey et al. Predictors of OSAH treatment outcome. J Dent Res 2007; 86: 6041-2744.      Weight Loss: Your Body mass index is 34.79 kg/m .    Being overweight does not necessarily mean you will have health consequences.  Those who have BMI over 30 or over 27 with existing medical conditions carries greater risk.   Weight loss decreases severity of sleep apnea in most people with obesity. For those with mild obesity who have developed snoring with weight gain, even 15-30 pound weight loss can improve and occasionally milder eliminate sleep apnea.  Structured and life-long dietary and health habits are necessary to lose weight and keep healthier weight levels.     The Comprehensive Weight loss program offers all aspects of weight loss strategies including two Non-Surgical Weight Loss Programs: Medical Weight Management and our 24 Week Healthy Lifestyle Program:  Medical Weight Management: You will meet with a Medical Weight Management Provider, as well as a Registered Dietician. The program may include medication therapy, dietary education, recommended exercise and physical therapy programs, monthly support group meetings, and possible psychological counseling. Follow up visits with the provider or dietician are  scheduled based on your progress and needs.  24 Week Healthy Lifestyle Program: This unique program is designed to give you the support of weekly appointments and activities thru a 24-week period. It may include all of the components of the basic program (above), with the addition of 11 individual Health  Visits, 24-week access to the [employer] website for over 700 online classes, and monthly support group meetings. This program has an out-of-pocket expense of $499 to cover the items that can not be billed to insurance (health coaches and [employer] access), and is non-refundable/non-transferable (you may be able to use a Health Savings Account; ask your HSA provider). There may be an optional meal replacement plan prescribed as well.   Medication therapy has been approved for the treatment of sleep apnea: The FDA approved tirzepatide (ZEPBOUND) for moderate to severe sleep apnea (apnea-hypopnea index greater than or equal to 15) in patients with BMI of greater than or equal to 30, or BMI greater than or equal to 27 with at least 1 weight-related condition such as hypertension or dyslipidemia.  Surgical management achieves meaningful long-term weight loss and improvement in health risks in most patients with more severe obesity.      Sleep Apnea Surgery:    Surgery for obstructive sleep apnea is considered generally only when other therapies fail to work. Surgery may be discussed with you if you are having a difficult time tolerating CPAP and or when there is an abnormal structure that requires surgical correction.  Nose and throat surgeries often enlarge the airway to prevent collapse.  Most of these surgeries create pain for 1-2 weeks and up to half of the most common surgeries are not effective throughout life.  You should carefully discuss the benefits and drawbacks to surgery with your sleep provider and surgeon to determine if it is the best solution for you.   More information  Surgery for HOMA is directed  at areas that are responsible for narrowing or complete obstruction of the airway during sleep.  There are a wide range of procedures available to enlarge and/or stabilize the airway to prevent blockage of breathing in the three major areas where it can occur: the palate, tongue, and nasal regions.  Successful surgical treatment depends on the accurate identification of the factors responsible for obstructive sleep apnea in each person.  A personalized approach is required because there is no single treatment that works well for everyone.  Because of anatomic variation, consultation with an examination by a sleep surgeon is a critical first step in determining what surgical options are best for each patient.  In some cases, examination during sedation may be recommended in order to guide the selection of procedures.  Patients will be counseled about risks and benefits as well as the typical recovery course after surgery. Surgery is typically not a cure for a person s HOMA.  However, surgery will often significantly improve one s HOMA severity (termed  success rate ).  Even in the absence of a cure, surgery will decrease the cardiovascular risk associated with OSA7; improve overall quality of life8 (sleepiness, functionality, sleep quality, etc).    Palate Procedures:  Patients with HOMA often have narrowing of their airway in the region of their tonsils and uvula.  The goals of palate procedures are to widen the airway in this region as well as to help the tissues resist collapse.  Modern palate procedure techniques focus on tissue conservation and soft tissue rearrangement, rather than tissue removal.  Often the uvula is preserved in this procedure. Residual sleep apnea is common in patient after pharyngoplasty with an average reduction in sleep apnea events of 33%2.      Tongue Procedures:  While patients are awake, the muscles that surround the throat are active and keep this region open for breathing. These muscles  relax during sleep, allowing the tongue and other structures to collapse and block breathing.  There are several different tongue procedures available.  Selection of a tongue base procedure depends on characteristics seen on physical exam.  Generally, procedures are aimed at removing bulky tissues in this area or preventing the back of the tongue from falling back during sleep.  Success rates for tongue surgery range from 50-62%3.    Hypoglossal Nerve Stimulation:  Hypoglossal nerve stimulation has recently received approval from the [place] Food and Drug Administration for the treatment of obstructive sleep apnea.  This is based on research showing that the system was safe and effective in treating sleep apnea6.  Results showed that the median AHI score decreased 68%, from 29.3 to 9.0. This therapy uses an implant system that senses breathing patterns and delivers mild stimulation to airway muscles, which keeps the airway open during sleep.  The system consists of three fully implanted components: a small generator (similar in size to a pacemaker), a breathing sensor, and a stimulation lead.  Using a small handheld remote, a patient turns the therapy on before bed and off upon awakening.    Candidates for this device must be greater than 18 years of age, have moderate to severe obstructive sleep apnea with less than 25% central events  (AHI between 15-65), BMI less than 35, have tried CPAP/oral appliance for at least 8 weeks without success, and have appropriate upper airway anatomy (determined by a sleep endoscopy performed by Dr. Dale Brunner or Dr. Navi Quiros).     Nasal Procedures:  Nasal obstruction can interfere with nasal breathing during the day and night.  Studies have shown that relief of nasal obstruction can improve the ability of some patients to tolerate positive airway pressure therapy for obstructive sleep apnea1.  Treatment options include medications such as nasal saline, topical  corticosteroid and antihistamine sprays, and oral medications such as antihistamines or decongestants. Non-surgical treatments can include external nasal dilators for selected patients. If these are not successful by themselves, surgery can improve the nasal airway either alone or in combination with these other options.    Combination Procedures:  Combination of surgical procedures and other treatments may be recommended, particularly if patients have more than one area of narrowing or persistent positional disease.  The success rate of combination surgery ranges from 66-80%2,3.    References  Elizabeth PEREZ. The Role of the Nose in Snoring and Obstructive Sleep Apnoea: An Update.  Eur Arch Otorhinolaryngol. 2011; 268: 1365-73.   Eli SM; Jaylen JA; Du JR; Pallanch JF; Nam MB; Ruperto SG; Katrin DYER. Surgical modifications of the upper airway for obstructive sleep apnea in adults: a systematic review and meta-analysis. SLEEP 2010;33(10):1911-1880. Bernadette ALVARADO. Hypopharyngeal surgery in obstructive sleep apnea: an evidence-based medicine review.  Arch Otolaryngol Head Neck Surg. 2006 Feb;132(2):206-13.  Pedro YH1, Lisa Y, Bijan MELVIN. The efficacy of anatomically based multilevel surgery for obstructive sleep apnea. Otolaryngol Head Neck Surg. 2003 Oct;129(4):327-35.  Bernadette ALVARADO, Goldberg A. Hypopharyngeal Surgery in Obstructive Sleep Apnea: An Evidence-Based Medicine Review. Arch Otolaryngol Head Neck Surg. 2006 Feb;132(2):206-13.  Gina PJ et al. Upper-Airway Stimulation for Obstructive Sleep Apnea.  N Engl J Med. 2014 Jan 9;370(2):139-49.  Stacy Y et al. Increased Incidence of Cardiovascular Disease in Middle-aged Men with Obstructive Sleep Apnea. Am J Respir Crit Care Med; 2002 166: 159-165  Warren EM et al. Studying Life Effects and Effectiveness of Palatopharyngoplasty (SLEEP) study: Subjective Outcomes of Isolated Uvulopalatopharyngoplasty. Otolaryngol Head Neck Surg. 2011; 144: 623-631.    WHAT IF I ONLY  HAVE SNORING?  Mandibular advancement devices, lateral sleep positioning, long-term weight loss and treatment of nasal allergies have been shown to improve snoring.  Exercising tongue muscles with a game (https://ClickFacts.MotorwayBuddy/us/leoncio/soundly-reduce-snoring/[email or web address]) or stimulating the tongue during the day with a device (https://doi.org/10.3390/jio50099902) have improved snoring in some individuals.  https://www.[employer].Glocal/  https://www.sleepfoundation.org/best-anti-snoring-mouthpieces-and-mouthguards    Remember to Drive Safe... Drive Alive     Sleep health profoundly affects your health, mood, and your safety.  Thirty three percent of the population (one in three of us) is not getting enough sleep and many have a sleep disorder. Not getting enough sleep or having an untreated / undertreated sleep condition may make us sleepy without even knowing it. In fact, our driving could be dramatically impaired due to our sleep health. As your provider, here are some things I would like you to know about driving:     Here are some warning signs for impairment and dangerous drowsy driving:              -Having been awake more than 16 hours               -Looking tired               -Eyelid drooping              -Head nodding (it could be too late at this point)              -Driving for more than 30 minutes     Some things you could do to make the driving safer if you are experiencing some drowsiness:              -Stop driving and rest              -Call for transportation              -Make sure your sleep disorder is adequately treated     Some things that have been shown NOT to work when experiencing drowsiness while driving:              -Turning on the radio              -Opening windows              -Eating any  distracting  /  entertaining  foods (e.g., sunflower seeds, candy, or any other)              -Talking on the phone      Your decision may not only impact your life, but also the life of others.  Please, remember to drive safe for yourself and all of us.

## 2025-07-01 NOTE — PROGRESS NOTES
Outpatient Sleep Medicine Consultation:      Name: Tri Ingram MRN# [id number]   Age: 75 year old YOB: 1950     Date of Consultation: July 1, 2025  Consultation is requested by: Mirna Child MD  [address] [place] [id number] Mirna Child  Primary care provider: Mirna Child       Reason for Sleep Consult:     Tri Ingram is sent by Mirna Child for a sleep consultation regarding snoring.    Patient s Reason for visit  Tri Ingram main reason for visit: (Patient-Rptd) referred by my primary  Patient states problem(s) started: (Patient-Rptd) college  Tri Ingram's goals for this visit: (Patient-Rptd) clarification           Assessment and Plan:     Summary Sleep Diagnoses:  (R06.83) Snoring (primary encounter diagnosis) (R06.81) Witnessed apneic spells (E66.811) Obesity (BMI 30.0-34.9) (R35.1) Nocturia (R40.0) Daytime sleepiness  Comment: Wendy is a 75-year-old female who presents to the sleep clinic with symptoms suggestive of obstructive sleep apnea including snoring, witnessed apneic spells, and daytime sleepiness. She was sent by her PCP after having a pre op evaluation for her cataract surgery. Wendy has been snoring for a long time, and her family members have observed apnea. She usually feels rested in the morning, but she can be sleepy throughout the day. She wakes 3-5 times per night to use the bathroom. Fortunately, she is able to fall back asleep quickly. She takes a purposeful nap twice per week, and she can inadvertently doze while watching the evening news. Her STOP-BANG is 5/8- snoring, tired, observed apnea, age > 50 (75), and neck circumference > 40 cm (41 cm).        Plan: HST-Home Sleep Apnea Test - Noxturnal Returnable  Wendy is at high risk for obstructive sleep apnea. Recommended a home sleep study. We reviewed treatment options for obstructive sleep apnea including PAP therapy, mandibular advancement device, positional  therapy, surgery, weight management, and hypoglossal nerve stimulator. She is working on weight loss. She has started doing yoga.     Comorbid Diagnoses:  Obesity, solitary pulmonary nodule, HLD, prediabetes, neuropathy      Summary Recommendations:  Orders Placed This Encounter   Procedures    HST-Home Sleep Apnea Test - Noxturnal Returnable     Summary Counseling:    Sleep Testing Reviewed  Obstructive Sleep Apnea Reviewed  Complications of Untreated Sleep Apnea Reviewed    Patient will follow up approximately 3 months after sleep study. Results of the study will be reviewed via MyChart and treatment will be initiated prior to the follow up visit.   HEBERT Marinelli CNP    Total time spent reviewing medical records, history and physical examination, review of previous testing and interpretation as well as documentation on this date: 57 minutes     CC: Mirna Child MD           History of Present Illness:     Wendy presents to the sleep clinic with symptoms suggestive of obstructive sleep apnea including snoring, witnessed apneic spells, and daytime sleepiness. She has been snoring for a long time, and her family members have observed apnea. She usually feels rested in the morning. She can be sleepy throughout the day. She wakes 3-5 times per night to use the bathroom.       Occasional snort arousals.     She knows she has a deviated septum.     Past Sleep Evaluations: None     SLEEP-WAKE SCHEDULE:     Work/School Days: Patient goes to school/work: (Patient-Rptd) No   Usually gets into bed at (Patient-Rptd) 11:30-12:00  Takes patient about (Patient-Rptd) 5 minutes to fall asleep  Has trouble falling asleep (Patient-Rptd) i dont nights per week  Wakes up in the middle of the night (Patient-Rptd) 3-5 times.  Wakes up due to (Patient-Rptd) Snorting self awake;Pain;Use the bathroom  She has trouble falling back asleep (Patient-Rptd) 0 times a week.   It usually takes (Patient-Rptd) 1-2 minutes to get back to  sleep  Patient is usually up at (Patient-Rptd) 8:30 - 9 am  Uses alarm:      Weekends/Non-work Days/All Other Days:  Usually gets into bed at (Patient-Rptd) 11:30 - 12   Takes patient about (Patient-Rptd) minutes to fall asleep  Patient is usually up at (Patient-Rptd) 8:30 - 9  Uses alarm: (Patient-Rptd) No    Sleep Need  Patient gets (Patient-Rptd) 9 hours sleep on average   Patient thinks she needs about (Patient-Rptd) 9 hours sleep    Tri Ingram prefers to sleep in this position(s): (Patient-Rptd) Side She does not sleep on her back. She has some disc issues in her lower back.   Patient states they do the following activities in bed:      Naps  Patient takes a purposeful nap (Patient-Rptd) twice a week and naps are usually (Patient-Rptd) hour and a half in duration  She feels better after a nap:    She dozes off unintentionally (Patient-Rptd) depends if I did errands days per week. She can inadvertently doze while watching the evening news.   Patient has had a driving accident or near-miss due to sleepiness/drowsiness: She can get sleepy while driving if she is taking a long trip. She has pulled over at a rest stop to nap for 15-20 minutes. She gets motion sickness so sometimes that will make her feel tired too.     SLEEP DISRUPTIONS:    Breathing/Snoring  Patient snores: (Patient-Rptd) Yes  Other people complain about her snoring: (Patient-Rptd) Yes  Patient has been told she stops breathing in her sleep: (Patient-Rptd) Yes Rare  She has issues with the following: (Patient-Rptd) Morning mouth dryness;Getting up to urinate more than once She denies morning headaches.     Movement:  Patient gets pain, discomfort, with an urge to move: (Patient-Rptd) Yes She gets pain in her feet due to neuropathy. She can also get cramps in the night that can wake her. She denies restless legs.   It happens when she is resting: (Patient-Rptd) No  It happens more at night: (Patient-Rptd) Yes  Patient has been told she  kicks her legs at night: (Patient-Rptd) No     Behaviors in Sleep:  Tri Ingram has experienced the following behaviors while sleeping: (Patient-Rptd) Sleep-talking;Teeth grinding She has been told she grinds her teeth.   She has experienced sudden muscle weakness during the day: (Patient-Rptd) No  Pt denies sleep walking and dream enactment behavior. Pt denies sleep paralysis, hypnagogue and cataplexy.    Is there anything else you would like your sleep provider to know:      CAFFEINE AND OTHER SUBSTANCES:    Patient consumes caffeinated beverages per day: (Patient-Rptd) 2 large diet cokes  Last caffeine use is usually: (Patient-Rptd) 2pm  List of any prescribed or over the counter stimulants that patient takes: None  List of any prescribed or over the counter sleep medication patient takes: None   List of previous sleep medications that patient has tried:    Patient drinks alcohol to help them sleep: (Patient-Rptd) No  Patient drinks alcohol near bedtime: (Patient-Rptd) Yes    Family History:  Patient has a family member been diagnosed with a sleep disorder: (Patient-Rptd) No      Social History: She lives at home with her , Sriram. 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 They met each other through work. She grew up in [place].     SCALES:    EPWORTH SLEEPINESS SCALE         6/27/2025     3:51 PM    Im[place] Sleepiness Scale ( ELISEO Morales  [id number]<br>ESS - [place]/English - Final version - 21 Nov 07 - [facility] Research [employer].)   Sitting and reading Slight chance of dozing   Watching TV Moderate chance of dozing   Sitting, inactive in a public place (e.g. a theatre or a meeting) Slight chance of dozing   As a passenger in a car for an hour without a break Moderate chance of dozing   Lying down to rest in the afternoon when circumstances permit High chance of dozing   Sitting and talking to someone Would never doze   Sitting quietly after a lunch without alcohol Would never doze   In a car, while  stopped for a few minutes in traffic Would never doze   Suring Score (MC) 9   Suring Score (Sleep) 9        Patient-reported         INSOMNIA SEVERITY INDEX (KATHRYN)          6/27/2025     3:26 PM   Insomnia Severity Index (KATHRYN)   Difficulty falling asleep 0   Difficulty staying asleep 1   Problems waking up too early 1   How SATISFIED/DISSATISFIED are you with your CURRENT sleep pattern? 2   How NOTICEABLE to others do you think your sleep problem is in terms of impairing the quality of your life? 2   How WORRIED/DISTRESSED are you about your current sleep problem? 2   To what extent do you consider your sleep problem to INTERFERE with your daily functioning (e.g. daytime fatigue, mood, ability to function at work/daily chores, concentration, memory, mood, etc.) CURRENTLY? 2   KATHRYN Total Score 10        Patient-reported       Guidelines for Scoring/Interpretation:  Total score categories:  0-7 = No clinically significant insomnia   8-14 = Subthreshold insomnia   15-21 = Clinical insomnia (moderate severity)  22-28 = Clinical insomnia (severe)  Used via courtesy of www.Lexityth.va.gov with permission from Javy Byers PhD., [facility]      STOP BANG         6/27/2025     3:52 PM   STOP BANG Questionnaire (  2008, the American Society of Anesthesiologists, Inc. [employer]   1. Snoring - Do you snore loudly (louder than talking or loud enough to be heard through closed doors)? Yes   2. Tired - Do you often feel tired, fatigued, or sleepy during daytime? Yes   3. Observed - Has anyone observed you stop breathing during your sleep? Yes   4. Blood pressure - Do you have or are you being treated for high blood pressure? No   5. BMI - BMI more than 35 kg/m2? Yes   6. Age - Age over 50 yr old? Yes   7. Neck circumference - Neck circumference greater than 40 cm? No   8. Gender - Gender male? No   STOP BANG Score (MC): 5 (High risk of HOMA)         GAD7         No data to display           "        CAGE-AID         No data to display                CAGE-AID reprinted with permission from the [place] Medical Journal, BENEDICT Deutsch. and NORTH Plunkett, \"Conjoint screening questionnaires for alcohol and drug abuse\" [place] Medical Journal 94: 135-140, 1995.      PATIENT HEALTH QUESTIONNAIRE-9 (PHQ - 9)        7/26/2011     8:15 AM   PHQ-9 ([employer])   No Interest In Doing Things 1   Feeling Depressed 1   Trouble Sleeping 1   Tired / No Energy 1   No appetite or Over-Eating 0   Feeling Bad about Self 0   Trouble Concentrating 1   Moving Slow or Restless 0   Suicidal Thoughts 0   Total Score 5       Developed by Stephani Mcnally, Leslie Bellamy, Stiven Sue and colleagues, with an educational marion from [employer]. No permission required to reproduce, translate, display or distribute.        Allergies:    Allergies   Allergen Reactions    Iodinated Contrast Media Difficulty breathing and Other (See Comments)    Adhesive Tape     Amoxicillin     Iodine Anaphylaxis     contrast dye    Monosodium Glutamate Nausea and Vomiting and Diarrhea    Penicillins Hives       Medications:    Current Outpatient Medications   Medication Sig Dispense Refill    alpha-lipoic acid 100 MG capsule Take 600 mg by mouth daily      Apoaequorin (PREVAGEN PO) Take 1 tablet by mouth daily       benzonatate (TESSALON) 200 MG capsule TAKE 1 CAPSULE (200 MG) BY MOUTH 3 TIMES DAILY AS NEEDED FOR COUGH 45 capsule 1    bromfenac (BROMDAY) 0.09 % ophthalmic solution       estrogen conj-medroxyPROGESTERone (PREMPRO) 0.625-2.5 MG tablet Take 1 tablet by mouth daily.      lovastatin (MEVACOR) 20 MG tablet TAKE ONE TABLET BY MOUTH EVERY NIGHT AT BEDTIME 90 tablet 3    magnesium oxide 400 MG CAPS Take 400 mg by mouth daily      multivitamin w/minerals (THERA-VIT-M) tablet Take 1 tablet by mouth daily      omeprazole (PRILOSEC) 40 MG DR capsule Take 1 capsule (40 mg) by mouth daily. 90 capsule 3    ondansetron (ZOFRAN-ODT) 4 MG ODT tab Take " 1 tablet (4 mg) by mouth every 8 hours as needed for nausea 10 tablet 0    prednisoLONE acetate (PRED FORTE) 1 % ophthalmic suspension       Specialty Vitamins Products (ICAPS LUTEIN & ZEAXANTHIN) TBEC Take 1 tablet by mouth every evening         Problem List:  Patient Active Problem List    Diagnosis Date Noted    Solitary pulmonary nodule 03/03/2025     Priority: Medium    Lumbar radiculopathy 06/28/2021     Priority: Medium    Former moderate cigarette smoker (10-19 per day) 03/18/2015     Priority: Medium     Stopped 2007      Family history of abdominal aortic aneurysm 07/31/2012     Priority: Medium     Father in late 60s, brother in late 50s, both smokers;  Patient is former smoker (36 years), discussed AAA screening at age 67 with Welcome to Medicare exam      Obesity 07/27/2011     Priority: Medium     Body mass index is 32.23 kg/(m^2).        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 Provider to review and confirm      Generalized osteoarthrosis, unspecified site 09/09/2004     Priority: Medium     back and shoulder      Disorder of bone and cartilage 09/09/2004     Priority: Medium     Very mild osteopenia, was originally put on Fosamax in addition to hormones in 2002, improved diet and discontinued smoking, taken of Fosamax in 2008 (femoral T-score -1.1, lumbar scorre normal) with plans to repeat DEXA at age 63-65.          Problem list name updated by automated process. 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    Squamous cell carcinoma     Vestibular neuritis      Past Surgical History:   Procedure Laterality Date    BIOPSY      COLONOSCOPY  ,     COLONOSCOPY N/A 2017    Procedure: COLONOSCOPY;  Colonoscopy  ;  Surgeon: Lg Guerrero MD;  Location: [facility]    COLONOSCOPY N/A 03/15/2022    Procedure: COLONOSCOPY, FLEXIBLE, WITH LESION REMOVAL USING SNARE;  Surgeon: Carl Huggins MD;  Location: [facility]    COLONOSCOPY WITH CO2 INSUFFLATION N/A 03/15/2022    Procedure: COLONOSCOPY, WITH CO2 INSUFFLATION;  Surgeon: Carl Huggins MD;  Location: [facility]    ZZC NONSPECIFIC PROCEDURE      Bowen's disease removed X 2       Social History:  Social History     Socioeconomic History    Marital status:      Spouse name: Not on file    Number of children: Not on file    Years of education: Not on file    Highest education level: Not on file   Occupational History    Occupation: RETIRED   Tobacco Use    Smoking status: Former     Current packs/day: 0.00     Average packs/day: 0.5 packs/day for 37.0 years (18.5 ttl pk-yrs)     Types: Cigarettes     Start date: 10/20/1970     Quit date: 10/20/2007     Years since quittin.7     Passive exposure: Never    Smokeless tobacco: Never   Vaping Use    Vaping status: Never Used   Substance and Sexual Activity    Alcohol use: Yes     Comment: minimal    Drug use: No    Sexual activity: Yes     Partners: Male     Birth control/protection: Post-menopausal     Comment: menopause   Other Topics Concern     Service No    Blood Transfusions No    Caffeine Concern No    Occupational Exposure No    Hobby Hazards No    Sleep Concern Yes     Comment: arthritis    Stress Concern Yes     "Weight Concern No    Special Diet Yes     Comment: [place]    Back Care Yes     Comment: arthritis    Exercise Yes    Bike Helmet No    Seat Belt Yes    Self-Exams Yes    Parent/sibling w/ CABG, MI or angioplasty before 65F 55M? Yes     Comment: father,brother   Social History Narrative    Caffeine intake/servings daily - 0    Calcium intake/servings daily - 4 occ. calcium supplement    Exercise 7 times weekly - describe  walking    Sunscreen used - No    Seatbelts used - Yes    Guns stored in the home - No    Self Breast Exam - Yes    Pap test up to date -  Yes    Eye exam up to date -  Yes    Dental exam up to date -  No    DEXA scan up to date -  Yes, last done 5/4/04    Flex Sig/Colonoscopy up to date -  Yes, at age 52 \"normal\"    Mammography up to date - 8/18/06    Immunizations reviewed and up to date - unknown last tetanus shot    Abuse: Current or Past (Physical, Sexual or Emotional) - No    Do you feel safe in your environment - Yes    Do you cope well with stress - Yes    Do you suffer from insomnia - Yes    Last updated by: Mary Beth Katz 8/28/06 July 23, 2019            ENVIRONMENTAL HISTORY: The [place] a[place] year [place] The home is heated with a gas furnace. They do have central air conditioning. The patient's bedroom is furnished with Indoor plants, fabric window coverings, carpet in bedroom. No pets . There is no history of cockroach or mice infestation. There are no smokers in the house.  The house does not have a basement.      Social Drivers of Health     Financial Resource Strain: Low Risk  (3/26/2025)    Financial Resource Strain     Within the past 12 months, have you or your family members you live with been unable to get utilities (heat, electricity) when it was really needed?: No   Food Insecurity: Low Risk  (3/26/2025)    Food Insecurity     Within the past 12 months, did you worry that your food would run out before you got money to buy more?: No     Within " the past 12 months, did the food you bought just not last and you didn t have money to get more?: No   Transportation Needs: Low Risk  (3/26/2025)    Transportation Needs     Within the past 12 months, has lack of transportation kept you from medical appointments, getting your medicines, non-medical meetings or appointments, work, or from getting things that you need?: No   Physical Activity: Insufficiently Active (3/26/2025)    Exercise Vital Sign     Days of Exercise per Week: 1 day     Minutes of Exercise per Session: 20 min   Stress: Stress Concern Present (3/26/2025)    [employer] [place] [employer] - Occupational Stress Questionnaire     Feeling of Stress : Rather much   Social Connections: Unknown (3/26/2025)    Social Connection and Isolation Panel [NHANES]     Frequency of Communication with Friends and Family: Not on file     Frequency of Social Gatherings with Friends and Family: Once a week     Attends Temple Services: Not on file     Active Member of Clubs or Organizations: Not on file     Attends Club or Organization Meetings: Not on file     Marital Status: Not on file   Interpersonal Safety: Low Risk  (3/31/2025)    Interpersonal Safety     Do you feel physically and emotionally safe where you currently live?: Yes     Within the past 12 months, have you been hit, slapped, kicked or otherwise physically hurt by someone?: No     Within the past 12 months, have you been humiliated or emotionally abused in other ways by your partner or ex-partner?: No   Housing Stability: Low Risk  (3/26/2025)    Housing Stability     Do you have housing? : Yes     Are you worried about losing your housing?: No       Family History:  Family History   Problem Relation Age of Onset    Eye Disorder Mother         glaucoma, wet macular degeneration    Lipids Mother     Gynecology Mother         hyst    Melanoma Mother     Skin Cancer Mother     Hyperlipidemia Mother     Other Cancer Mother         melanoma     Neuropathy Mother     Cancer Father         colon /prostate    Circulatory Father         amputee    Diabetes Father         type II    C.A.D. Father         MIs    Eye Disorder Father         dry macular degeneration    Cardiovascular Father         small AAA    Melanoma Father     Colon Cancer Father     Prostate Cancer Father     Other Cancer Father         melanoma    Gastrointestinal Disease Paternal Grandmother          from hepatitis    Neuropathy Sister     Arthritis Sister     Gynecology Sister         partial hyst  fibrous sheaths on ovary egg sized polyp uterine removed    Lipids Sister     Neurologic Disorder Sister         migraines    Diabetes Sister     Hyperlipidemia Sister     Other Cancer Sister         melanoma    Obesity Sister     Cardiovascular Brother         large 7 cm AAA    Coronary Artery Disease Brother         MI    Asthma Brother     Other Cancer Brother         melanoma, small cell carcinoma    Anxiety Disorder Brother        Review of Systems:  A complete review of systems reviewed by me is negative with the exeption of what has been mentioned in the history of present illness.  In the last TWO WEEKS have you experienced any of the following symptoms?  Fevers: (Patient-Rptd) No  Night Sweats: (Patient-Rptd) No  Weight Gain: (Patient-Rptd) Yes  Pain at Night: (Patient-Rptd) Yes  Double Vision: (Patient-Rptd) No  Changes in Vision: (Patient-Rptd) No  Difficulty Breathing through Nose: (Patient-Rptd) No  Sore Throat in Morning: (Patient-Rptd) Yes  Dry Mouth in the Morning: (Patient-Rptd) Yes  Shortness of Breath Lying Flat: (Patient-Rptd) No  Shortness of Breath With Activity: (Patient-Rptd) Yes  Awakening with Shortness of Breath: (Patient-Rptd) No  Increased Cough: (Patient-Rptd) No  Heart Racing at Night: (Patient-Rptd) No  Swelling in Feet or Legs: (Patient-Rptd) No  Diarrhea at Night: (Patient-Rptd) No  Heartburn at Night: (Patient-Rptd) No  Urinating More than Once at Night:  "(Patient-Rptd) Yes  Losing Control of Urine at Night: (Patient-Rptd) No  Joint Pains at Night: (Patient-Rptd) Yes  Headaches in Morning: (Patient-Rptd) No  Weakness in Arms or Legs: (Patient-Rptd) Yes  Depressed Mood: (Patient-Rptd) No  Anxiety: (Patient-Rptd) No     Physical Examination:  Vitals: /78   Pulse 72   Resp 20   Ht 1.683 m (5' 6.25\")   Wt 98.5 kg (217 lb 3.2 oz)   SpO2 98%   BMI 34.79 kg/m    BMI= Body mass index is 34.79 kg/m .    Neck Cir (cm): 41 cm    GENERAL APPEARANCE: healthy, alert, no distress, and cooperative  EYES: Eyes grossly normal to inspection, PERRL, and conjunctivae and sclerae normal  HENT: oropharynx crowded and oral mucous membranes moist  NECK: no asymmetry, masses, or scars  RESP: no increased work of breathing noted, no audible cough or wheeze   NEURO: mentation intact and speech normal  PSYCH: mentation appears normal and affect normal/bright  Mallampati Class: III.  Tonsillar Stage: 1 hidden by pillars.         Data: All pertinent previous laboratory data reviewed     Recent Labs   Lab Test 03/18/25  0935 03/14/24  0812    141   POTASSIUM 4.9 5.0   CHLORIDE 105 103   CO2 27 28   ANIONGAP 9 10   * 120*   BUN 16.8 20.1   CR 0.98* 0.97*   JEFFY 9.6 9.6       Recent Labs   Lab Test 03/06/24  1020   WBC 6.4   RBC 4.91   HGB 13.8   HCT 42.5   MCV 87   MCH 28.1   MCHC 32.5   RDW 11.9          Recent Labs   Lab Test 03/06/24  1020   PROTTOTAL 7.5   ALBUMIN 4.3   BILITOTAL 0.3   ALKPHOS 90   AST 25   ALT 24       TSH   Date Value   03/27/2023 0.96 uIU/mL   11/28/2016 0.66 mU/L   08/28/2006 0.91 mU/L       No results found for: \"UAMP\", \"UBARB\", \"BENZODIAZEUR\", \"UCANN\", \"UCOC\", \"OPIT\", \"UPCP\"    No results found for: \"IRONSAT\", \"KN05981\", \"SETH\"    No results found for: \"PH\", \"PHARTERIAL\", \"PO2\", \"TM8HKLFGRNG\", \"SAT\", \"PCO2\", \"HCO3\", \"BASEEXCESS\", \"BROOKLYN\", \"BEB\"    @LABRCNTIPR(phv:4,pco2v:4,po2v:4,hco3v:4,clara:4,o2per:4)@    Echocardiology: No results found for " this or any previous visit (from the past 4320 hours).    Chest x-ray:   No results found for this or any previous visit from the past 365 days.      Chest CT:   CT Chest w/o Contrast 05/21/2025    Narrative  EXAM: CT CHEST W/O CONTRAST  LOCATION: [facility]  DATE: 5/21/2025    INDICATION:  Solitary pulmonary nodule  COMPARISON: CT chest 11/13/2024.  TECHNIQUE: CT chest without IV contrast. Multiplanar reformats were obtained. Dose reduction techniques were used.  CONTRAST: None.    FINDINGS:  LUNGS AND PLEURA: Stable irregular 8 mm right lower lobe nodule with surrounding micronodules (series 4, image 171). Few additional stable small pulmonary nodules including a 3 mm groundglass nodule in the superior segment of the right lower lobe (series  4, image 109) and stable 2 mm right lower lobe nodule (series 4, image 220). Scattered tiny calcified pulmonary granulomas. No pleural effusion.    MEDIASTINUM/AXILLAE: Unchanged multinodular thyroid, previously evaluated by ultrasound. Normal heart size without pericardial effusion. No aneurysmal dilatation of the thoracic aorta. No thoracic adenopathy by noncontrast technique.    CORONARY ARTERY CALCIFICATION: None.    UPPER ABDOMEN: Hepatic steatosis. Partially imaged left renal cyst. Unchanged low-density thickening of the left greater than right adrenal glands, likely representing adenomatous change.    MUSCULOSKELETAL: No aggressive osseous lesion. Degenerative changes of the spine.    Impression  IMPRESSION:  1.  Stable irregular 8 mm right lower lobe nodule with surrounding micronodules. This is favored sequela of granulomatous disease.  2.  Additional stable small pulmonary nodules.  3.  Hepatic steatosis.      PFT: Most Recent [facility] Pulmonary Function Testing    FVC-Pred   Date Value Ref Range Status   07/26/2018 3.28 L      FVC-Pre   Date Value Ref Range Status   07/26/2018 2.60 L      FVC-%Pred-Pre   Date Value Ref Range Status  "  07/26/2018 79 %      FEV1-Pre   Date Value Ref Range Status   07/26/2018 2.09 L      FEV1-%Pred-Pre   Date Value Ref Range Status   07/26/2018 81 %      FEV1FVC-Pred   Date Value Ref Range Status   07/26/2018 77 %      FEV1FVC-Pre   Date Value Ref Range Status   07/26/2018 80 %      No results found for: \"20029\"  FEFMax-Pred   Date Value Ref Range Status   07/26/2018 6.37 L/sec      FEFMax-Pre   Date Value Ref Range Status   07/26/2018 5.99 L/sec      FEFMax-%Pred-Pre   Date Value Ref Range Status   07/26/2018 94 %      ExpTime-Pre   Date Value Ref Range Status   07/26/2018 6.63 sec      FIFMax-Pre   Date Value Ref Range Status   07/26/2018 4.41 L/sec      FEV1FEV6-Pred   Date Value Ref Range Status   07/26/2018 80 %      FEV1FEV6-Pre   Date Value Ref Range Status   07/26/2018 81 %      No results found for: \"20055\"      Armando Hill, APRN CNP 7/1/2025   "

## 2025-07-01 NOTE — NURSING NOTE
Patient has been scheduled for HST & follow up with provider. Writer handed hst info and AVS to patient.

## 2025-07-01 NOTE — NURSING NOTE
"Chief Complaint   Patient presents with    Sleep Problem     Patient presents to clinic for a sleep medicine consultation for Snoring. Patient was referred by Mirna Child MD.         Initial /78   Pulse 72   Resp 20   Ht 1.683 m (5' 6.25\")   Wt 98.5 kg (217 lb 3.2 oz)   SpO2 98%   BMI 34.79 kg/m   Estimated body mass index is 34.79 kg/m  as calculated from the following:    Height as of this encounter: 1.683 m (5' 6.25\").    Weight as of this encounter: 98.5 kg (217 lb 3.2 oz).    Medication Reconciliation: complete  ESS: 9  Neck circumference: 16.25  inches / 41 centimeters.  DME: N/A  Marina Long CMA      "

## 2025-07-29 ENCOUNTER — PATIENT OUTREACH (OUTPATIENT)
Dept: CARE COORDINATION | Facility: CLINIC | Age: 75
End: 2025-07-29
Payer: COMMERCIAL

## 2025-08-01 ENCOUNTER — TELEPHONE (OUTPATIENT)
Dept: FAMILY MEDICINE | Facility: OTHER | Age: 75
End: 2025-08-01
Payer: COMMERCIAL

## 2025-08-01 DIAGNOSIS — E78.5 HYPERLIPIDEMIA LDL GOAL <130: Primary | ICD-10-CM

## 2025-08-01 DIAGNOSIS — E66.09 CLASS 1 OBESITY DUE TO EXCESS CALORIES WITHOUT SERIOUS COMORBIDITY WITH BODY MASS INDEX (BMI) OF 34.0 TO 34.9 IN ADULT: ICD-10-CM

## 2025-08-01 DIAGNOSIS — E66.811 CLASS 1 OBESITY DUE TO EXCESS CALORIES WITHOUT SERIOUS COMORBIDITY WITH BODY MASS INDEX (BMI) OF 34.0 TO 34.9 IN ADULT: ICD-10-CM

## 2025-08-01 DIAGNOSIS — R73.09 BLOOD GLUCOSE ABNORMAL: ICD-10-CM

## 2025-08-18 DIAGNOSIS — N95.1 SYMPTOMATIC MENOPAUSAL OR FEMALE CLIMACTERIC STATES: ICD-10-CM

## 2025-08-29 ENCOUNTER — ANCILLARY PROCEDURE (OUTPATIENT)
Dept: MAMMOGRAPHY | Facility: CLINIC | Age: 75
End: 2025-08-29
Attending: FAMILY MEDICINE
Payer: COMMERCIAL

## 2025-08-29 DIAGNOSIS — Z12.31 VISIT FOR SCREENING MAMMOGRAM: ICD-10-CM

## 2025-08-29 PROCEDURE — 77063 BREAST TOMOSYNTHESIS BI: CPT | Performed by: RADIOLOGY

## 2025-08-29 PROCEDURE — 77067 SCR MAMMO BI INCL CAD: CPT | Performed by: RADIOLOGY

## (undated) DEVICE — SOL WATER IRRIG 1000ML BOTTLE 07139-09

## (undated) RX ORDER — LIDOCAINE HYDROCHLORIDE 10 MG/ML
INJECTION, SOLUTION EPIDURAL; INFILTRATION; INTRACAUDAL; PERINEURAL
Status: DISPENSED
Start: 2017-11-03

## (undated) RX ORDER — SIMETHICONE 40MG/0.6ML
SUSPENSION, DROPS(FINAL DOSAGE FORM)(ML) ORAL
Status: DISPENSED
Start: 2022-03-15

## (undated) RX ORDER — BUPIVACAINE HYDROCHLORIDE 2.5 MG/ML
INJECTION, SOLUTION EPIDURAL; INFILTRATION; INTRACAUDAL
Status: DISPENSED
Start: 2021-04-29

## (undated) RX ORDER — LIDOCAINE HYDROCHLORIDE 10 MG/ML
INJECTION, SOLUTION EPIDURAL; INFILTRATION; INTRACAUDAL; PERINEURAL
Status: DISPENSED
Start: 2021-04-29

## (undated) RX ORDER — BUPIVACAINE HYDROCHLORIDE 2.5 MG/ML
INJECTION, SOLUTION EPIDURAL; INFILTRATION; INTRACAUDAL
Status: DISPENSED
Start: 2022-01-04

## (undated) RX ORDER — TRIAMCINOLONE ACETONIDE 40 MG/ML
INJECTION, SUSPENSION INTRA-ARTICULAR; INTRAMUSCULAR
Status: DISPENSED
Start: 2021-04-29

## (undated) RX ORDER — FENTANYL CITRATE 50 UG/ML
INJECTION, SOLUTION INTRAMUSCULAR; INTRAVENOUS
Status: DISPENSED
Start: 2022-03-15

## (undated) RX ORDER — GLYCOPYRROLATE 0.2 MG/ML
INJECTION, SOLUTION INTRAMUSCULAR; INTRAVENOUS
Status: DISPENSED
Start: 2017-11-03

## (undated) RX ORDER — ONDANSETRON 2 MG/ML
INJECTION INTRAMUSCULAR; INTRAVENOUS
Status: DISPENSED
Start: 2022-03-15